# Patient Record
Sex: MALE | Race: ASIAN | NOT HISPANIC OR LATINO | Employment: FULL TIME | URBAN - METROPOLITAN AREA
[De-identification: names, ages, dates, MRNs, and addresses within clinical notes are randomized per-mention and may not be internally consistent; named-entity substitution may affect disease eponyms.]

---

## 2017-01-05 ENCOUNTER — MYC REFILL (OUTPATIENT)
Dept: FAMILY MEDICINE | Facility: CLINIC | Age: 52
End: 2017-01-05

## 2017-01-05 DIAGNOSIS — G89.4 CHRONIC PAIN SYNDROME: ICD-10-CM

## 2017-01-06 ENCOUNTER — TELEPHONE (OUTPATIENT)
Dept: PALLIATIVE MEDICINE | Facility: CLINIC | Age: 52
End: 2017-01-06

## 2017-01-06 NOTE — TELEPHONE ENCOUNTER
Patient had a caudal epidural injection on 12/30/16.  Called patient for an update.      Left message that we were calling for an update about how s/he was doing after the injection.  LM that if s/he has any problems or questions to call the nurse line at [phone].     Karen HERNANDEZ)

## 2017-01-06 NOTE — TELEPHONE ENCOUNTER
Controlled Substance Refill Request for Percocet  Problem List Complete:  Yes    Patient is followed by VAMSHI TINSLEY JR for ongoing prescription of pain medication.  All refills should be approved by this provider, or covering partner.    Medication(s): Percocet 5/325 mg.    Maximum quantity per month: 70  Clinic visit frequency required: Q 6 months     Controlled substance agreement on file: Yes       Date(s): 1/11/16    Pain Clinic evaluation in the past: Yes       Date/Location:   [facility] - 2013.  [facility] - 2/3/16.    DIRE Total Score(s): 16  No flowsheet data found.    Last [facility] website verification: 11/28/2016     [email or web address]    Last Written Prescription Date:  12/2/16  Last Fill Quantity: 70,   # refills: 0    Last Office Visit with [facility] primary care provider: 11/2/16    Future Office visit:   Next 5 appointments (look out 90 days)     Feb 01, 2017 10:30 AM   Return Visit with ANDREAS Presley [facility] ([facility] [place])    [address] [address] [address]   [phone]            Feb 06, 2017 10:00 AM   Office Visit with Vamshi Tinsley Jr., MD   [facility] ([facility])    [address] [address] [phone]                 Processing:  Patient will  in clinic   Estefani Morales RN, BSN  [facility]

## 2017-01-06 NOTE — TELEPHONE ENCOUNTER
Message from [employer]:  Original authorizing provider: MD Saran Billingsley Ariel Wallis would like a refill of the following medications:  oxyCODONE-acetaminophen (PERCOCET) 5-325 MG per tablet [Jr Vamshi Clarke MD]    Preferred pharmacy: [facility] [id number] [place] [address], [place] - [address]    Comment:

## 2017-01-09 ENCOUNTER — THERAPY VISIT (OUTPATIENT)
Dept: PHYSICAL THERAPY | Facility: CLINIC | Age: 52
End: 2017-01-09
Payer: COMMERCIAL

## 2017-01-09 DIAGNOSIS — M25.511 CHRONIC RIGHT SHOULDER PAIN: Primary | ICD-10-CM

## 2017-01-09 DIAGNOSIS — G89.29 CHRONIC RIGHT SHOULDER PAIN: Primary | ICD-10-CM

## 2017-01-09 DIAGNOSIS — M75.41 IMPINGEMENT SYNDROME OF RIGHT SHOULDER: ICD-10-CM

## 2017-01-09 PROCEDURE — 97110 THERAPEUTIC EXERCISES: CPT | Mod: GP | Performed by: PHYSICAL THERAPIST

## 2017-01-09 RX ORDER — OXYCODONE AND ACETAMINOPHEN 5; 325 MG/1; MG/1
1-2 TABLET ORAL EVERY 8 HOURS PRN
Qty: 70 TABLET | Refills: 0 | Status: SHIPPED | OUTPATIENT
Start: 2017-01-09 | End: 2017-02-06

## 2017-01-09 NOTE — TELEPHONE ENCOUNTER
The requested prescription(s) has/have been approved and has/have been printed and signed. This note was forwarded to the patient care pool to contact the patient to arrange for the patient to obtain the signed prescription(s).  Jr Vamshi Clarke

## 2017-01-16 DIAGNOSIS — E78.5 HYPERLIPIDEMIA LDL GOAL <70: Primary | ICD-10-CM

## 2017-01-16 RX ORDER — SIMVASTATIN 10 MG
10 TABLET ORAL AT BEDTIME
Qty: 90 TABLET | Refills: 1 | Status: SHIPPED | OUTPATIENT
Start: 2017-01-16 | End: 2017-07-16

## 2017-01-16 NOTE — TELEPHONE ENCOUNTER
Prescription approved per [facility] Refill Protocol.  Estefani Morales, RN, BSN  [facility]

## 2017-01-16 NOTE — TELEPHONE ENCOUNTER
simvastatin (ZOCOR) 10 MG tablet     New Pharmacy  Last Written Prescription Date: 11/2/2016  Last Fill Quantity: 90 tablet, # refills: 1  Last Office Visit with [facility], [facility] or [facility] prescribing provider: 11/2/2016   Next 5 appointments (look out 90 days)     Feb 01, 2017 10:30 AM   Return Visit with ANDREAS Presley CNP   [facility] ([facility])    [address]  [address]  [facility] [address]   [phone]            Feb 06, 2017 10:00 AM   Office Visit with Vamshi Clarke Jr., MD   [facility] ([facility])    [address]  [facility] [address] [phone]                   CHOL      113   5/3/2016  HDL       35   5/3/2016  LDL       51   5/3/2016  TRIG      133   5/3/2016  No results found for this basename: cholhdlratio

## 2017-01-18 DIAGNOSIS — E11.9 TYPE 2 DIABETES MELLITUS WITHOUT COMPLICATION (H): ICD-10-CM

## 2017-01-18 DIAGNOSIS — F51.04 CHRONIC INSOMNIA: Primary | ICD-10-CM

## 2017-01-19 RX ORDER — TRAZODONE HYDROCHLORIDE 100 MG/1
100 TABLET ORAL
Qty: 90 TABLET | Refills: 0 | Status: SHIPPED | OUTPATIENT
Start: 2017-01-19 | End: 2017-02-06

## 2017-01-19 NOTE — TELEPHONE ENCOUNTER
Prescription approved per [facility] Refill Protocol.  Estefani Morales, RN, BSN  [facility]

## 2017-02-01 ENCOUNTER — OFFICE VISIT (OUTPATIENT)
Dept: PALLIATIVE MEDICINE | Facility: CLINIC | Age: 52
End: 2017-02-01
Payer: COMMERCIAL

## 2017-02-01 VITALS
HEART RATE: 78 BPM | SYSTOLIC BLOOD PRESSURE: 112 MMHG | DIASTOLIC BLOOD PRESSURE: 78 MMHG | OXYGEN SATURATION: 99 % | WEIGHT: 198 LBS | BODY MASS INDEX: 29.23 KG/M2

## 2017-02-01 DIAGNOSIS — F51.04 CHRONIC INSOMNIA: ICD-10-CM

## 2017-02-01 DIAGNOSIS — M54.16 CHRONIC RADICULAR LOW BACK PAIN: Primary | ICD-10-CM

## 2017-02-01 DIAGNOSIS — R53.81 PHYSICAL DECONDITIONING: ICD-10-CM

## 2017-02-01 DIAGNOSIS — G89.4 CHRONIC PAIN SYNDROME: ICD-10-CM

## 2017-02-01 DIAGNOSIS — G89.29 CHRONIC RADICULAR LOW BACK PAIN: Primary | ICD-10-CM

## 2017-02-01 PROCEDURE — 99213 OFFICE O/P EST LOW 20 MIN: CPT | Performed by: NURSE PRACTITIONER

## 2017-02-01 ASSESSMENT — PAIN SCALES - GENERAL: PAINLEVEL: SEVERE PAIN (6)

## 2017-02-01 NOTE — PATIENT INSTRUCTIONS
PLAN:  Medications:  Gabapentin to 600 mg  3 x per day.  Ok to take Percocet every 4 hours as needed, still limit to 3 per day prescribed per PCP   Occasional use of tylenol pm is ok   Diclofenac gel  As needed up to 4 x per day    Zanaflex 2-4 mg 3 x/ day prn         Stretching   Clam shells X 3 reps count to 5   Hip extensions X 3 reps count to 5   Hip flexion X 3 reps count to 5   Daily to 2x per day stretching   Daily walking or exercise         Referrals:  Physical Therapy  [facility] start after epidural      Internal referrals:  Lumbar BARBARA  Caudal   Return visit to be scheduled about 6-8 weeks with Germania Gong CNP     Nurse Triage line:  [phone]   Call this number with any questions or concerns. You may leave a detailed message anytime. Calls are typically returned Monday through Friday between 8 AM and 4:30 PM. We usually get back to you within 2 business days depending on the issue/request.       Medication refills:    For non-narcotic medications, call your pharmacy directly to request a refill. The pharmacy will contact the Pain Management Center for authorization. Please allow 3-4 days for these refills to be processed.     For narcotic refills, call the nurse triage line or send a [employer] message. Please contact us 7-10 days before your refill is due. The message MUST include the name of the specific medication(s) requested and how you would like to receive the prescription(s). The options are as follows:    Pain Clinic staff can mail the prescription to your pharmacy. Please tell us the name of the pharmacy.    You may pick the prescription up at the Pain Clinic (tell us the location) or during a clinic visit with your pain provider    Pain Clinic staff can deliver the prescription to the [place] pharmacy in the clinic building. Please tell us the location.      Scheduling number: [phone].  Call this number to schedule or change appointments.    We believe regular  attendance is key to your success in our program.    Any time you are unable to keep your appointment we ask that you call us at least 24 hours in advance to let us know. This will allow us to offer the appointment time to another patient.

## 2017-02-01 NOTE — PROGRESS NOTES
"               [facility]  INTERVAL VISIT    This a  follow up examination  for Saran Wallis is a 51 year old male.  [facility] provider[facility]  [facility]  Referring Provider: Vamshi Clarke MD    Today's visit: 02/01/2017  Last visit: 11/22/16  CHIEF COMPLAINT:  Chief Complaint   Patient presents with     Pain     Interval Visit:   S/p L5 S1 left BARBARA 03/25/16 and 5/10/16 helping with sustained relief    Left calve cramp now in back thigh at night worsening, always when laying on right side   Seen by Physical Therapy for LBP  Complete  Walking daily and stretching \"not as much\"  No restlessness in legs, but movement helps SX.  Magnesium did not help.   Pain in low back is less radicular to left leg  Right shoulder pain, post surgery 2008 can not sleep on it post injection 11/03/16 saw    Recurrent knee pain better and right lateral forearm pain continues   Sleep, mood, libido are better. trazodone ( Desyrel)  prn     Pain level 6/10 range 4-8/10  Pain description: aching, cramping  Aggravating factors: siting and standing laying down at night    Relieving factors: laying down percocet and steroid injection     ASSESSMENT:  1. Chronic low back pain with radicular features L3-4 on right and L5-S1 on left  1. Left calve cramp >> gluteal deconditioning, ? Left hip bursitis, per interventionist could be related to lumbar Degenerative Disc Disease.  2. Chronic opioid use ~1 per day   3. Facet mediation VS hip pain VS Degenerative Disc Disease   2. Multi level mild to moderate lumbar stenosis S1 nerve root impingement  3. Failed back syndrome S/p hemicraniectomy L4-5 2009, L5-S1 2012 x 2  4. Anxiety related to increase pain associated with routine, sexual activity reduced arousal chronic use of ativan. Seems controlled   5. MARK  6. History of Alcohol over use ( excess drinking in [place]) not drinking in [place] Currently resolved  7. H/O Hep B [place] at [facility]  8. OSTEOARTHRITIS right shoulder, " forearm and knees   1. Repetitive use syndrome   1. Early ulnar neuritis  2. Tendonitis, vs rotator vs joint disease of the shoulder     9. Disorder of the Gluteus Medius Muscle.  1. Physical Deconditioning, Myofascial pain left gluteus medius         PLAN:  Medications:  Gabapentin to 600 mg  3 x per day.  Ok to take Percocet every 4 hours as needed, still limit to 3 per day prescribed per PCP   Occasional use of tylenol pm is ok   Diclofenac gel  As needed up to 4 x per day    Zanaflex 2-4 mg 3 x/ day prn         Stretching   Clam shells X 3 reps count to 5   Hip extensions X 3 reps count to 5   Hip flexion X 3 reps count to 5   Daily to 2x per day stretching   Daily walking or exercise         Referrals:  Physical Therapy  [facility] start after epidural      Internal referrals:  Lumbar BARBARA  Caudal   Return visit to be scheduled about 6-8 weeks with Germania Gong CNP       PAIN HISTORY: Back hx since 1991 after a lifting injury, This episode resolved with periodic back pain until winter 2008. The patient had another lifting injury after raising a garage door. He then had discectomy with Dr. Macdonald. He report that his pain was well controlled until he was working in the yard. The patient went to [facility] and under the care of Dr. Rodriguez he had another discectomy. His pain did not improve post surgery, resulting in failed lback surgery. He then went to Dr. Macdonald who did not want to do surgery. He went to [facility] also in 2013, a lumbar fusion was recommended but denied by his insurance. He found the lack of surgical access from his insurance very frustrating and distressing. The patient then 2014 he went to [facility] an epidural was recommended and again denied by his insurance.   Over time the patient has become less active and spend the majority of his day is quite sedentary.   His pain is located central low back with muscle spasm and radiating pain down both legs on left to L5-S1  and right to L3-4.    PAST MEDICATION TRIALS:   OPIOIDS: Fentanyl, Hydromorphone, Tramadol  NSAID'S/ANALEGESICS: clinoril, Ibuprofen   ANTIMIGRAINE:none  STEROIDS: none  ANTIDEPRESSANTS: Celexa, Sertraline Trazodone (bad taste in mouth)   ANTIANXIETY: Ativan   HYPNOTICS: Lunesta, Ambien,   ANTICONVULSANTS: gabapentin   TOPICALS: None     PAST PAIN TREATMENTS:   Y/N   HELP   NO HELP   WORSENED  PREVIOUS PAIN CLINIC:   YES   X ([facility], [facility])  SURGERY:     YES   X FOR SHORT TIME  INJECTIONS:               YES   PHYSICAL THERAPY:   YES   X (TENS)   EXERCISE:     YES   X  INTEGRATIVE MEDICINE  CHIROPRACTOR    NO   ACUPUNCTURE    NO  AROMATHERAPY    NO  RELAXATION THERAPY  NO  COUNSELING:    NO    Progress in pain program:  good  Impact on function:moderate to severe  Working  Yes   Quality of life:improved   Self care yes exercise yes,relaxation no, body awareness no.    CURRENT PAIN MEDICATION: Percocet 5/325 mg 2-3 per day, Gabapentin 600-tid  , Trazodone 100 mg , tizanidine ( Zanaflex) 4 mg prn   Medication side effects: none     INVESTIGATIONAL:  MRI 2016 Jan 16 LUMBAR:  L1-L2, L2-L3: Normal.  L3-L4: Small to moderate central disc extrusion slightly to the left  of midline moderately indenting the thecal sac with overall mild  central stenosis but no apparent direct neural impingement. The neural  foramina appear to be bilaterally patent.  L4-L5: Broad-based central disc protrusion or herniation slightly  greater to the right of midline. No central stenosis. Lateral annular  bulging and endplate spurring results in moderate left and mild right  foraminal stenosis.  L5-S1: Postsurgical changes of left hemilaminotomy/posterior  decompression. There is a moderate broad-based central disc protrusion  or herniation posteriorly displacing the left S1 nerve root. No  central stenosis. The L5 neural foramina appear bilaterally patent.    IMPRESSION:  1. L5-S1 broad-based left central disc herniation posteriorly  displacing  the left S1 nerve root. There has been previous posterior  decompression. No central stenosis.  2. L4-L5 broad-based central disc protrusion or herniation without  apparent central stenosis.  3. L4-L5 moderate bilateral foraminal stenosis.  4. L3-L4 central disc herniation slightly to the left of midline  without apparent direct neural impingement or central stenosis      PMHX:  Past Medical History   Diagnosis Date     Diabetes mellitus (H)      Gastric reflux      Hypercholesteremia      Hypertension      Depressive disorder      Chronic radicular low back pain 4/30/2016     PAST SURGICAL HISTORY  Past Surgical History   Procedure Laterality Date     Orthopedic surgery       Colonoscopy N/A 1/28/2016     Procedure: COMBINED COLONOSCOPY, SINGLE OR MULTIPLE BIOPSY/POLYPECTOMY BY BIOPSY;  Surgeon: Michelle Morrison MD;  Location:  GI     Esophagoscopy, gastroscopy, duodenoscopy (egd), combined N/A 5/13/2016     Procedure: COMBINED ESOPHAGOSCOPY, GASTROSCOPY, DUODENOSCOPY (EGD);  Surgeon: Vamshi Lynn MD;  Location:  GI       CURRENT MEDICATIONS:   Current Outpatient Prescriptions   Medication     traZODone (DESYREL) 100 MG tablet     metFORMIN (GLUCOPHAGE) 500 MG tablet     simvastatin (ZOCOR) 10 MG tablet     oxyCODONE-acetaminophen (PERCOCET) 5-325 MG per tablet     diazepam (VALIUM) 5 MG tablet     gabapentin (NEURONTIN) 300 MG capsule     irbesartan (AVAPRO) 150 MG tablet     pantoprazole (PROTONIX) 40 MG enteric coated tablet     blood glucose monitoring (ONETOUCH VERIO IQ SYSTEM) meter device kit     blood glucose monitoring (ONE TOUCH VERIO IQ) test strip     blood glucose monitoring (ONE TOUCH DELICA) lancets     nicotine (NICODERM CQ) 14 MG/24HR patch 2h hr     tiZANidine (ZANAFLEX) 4 MG tablet     LORazepam (ATIVAN) 1 MG tablet     ASPIRIN PO     order for DME     diclofenac (VOLTAREN) 1 % GEL     lidocaine-prilocaine (EMLA) cream     No current facility-administered medications for this  visit.       ROS: twelve systems review negative except for: pain, cramps left calve and thigh, right shoulder   Since last visit: changes in mood: No, suicide thoughts No  Any changes in your medical condition, illness, injury or hospitalizations: No    PHYSICAL EXAM:  Constitutional:Blood pressure 112/78, pulse 78, weight 89.812 kg (198 lb), SpO2 99 %., Body mass index is 29.23 kg/(m^2).  Psyche:  Fully oriented, Behavioral Observations: Eye contact  good. Mood  and spirits are improved .  Musculoskeletal exam:  Gait:  Steady reciprocating,  ROM within normal limits,  Shoulder symmetry good, strength 5/5, (-) SLAP,  tender points left hip, right shoulder posterior,   No trigger points,   SAEED x 4, no producible pain with cramping in left calve  No  hip rotation.    Neuro exam:   Alert,  IGNACIO @  3 mm, Speech clear fluent and appropriate. Strength 5/5   Skin/Vascular/ Autonomic:  warm, dry and intact    OTHER: Opioid risk for ongoing opioid management for non malignant chronic pain   DIRE Score for ongoing opioid management is calculated as follows:    Diagnosis = 2    Intractability = 2    Risk: Psych = 2  Chem Hlth = 3  Reliability = 3  Social = 2    Efficacy = 2    Total DIRE Score = 16 (14 or higher predicts good candidate for ongoing opioid management; 13 or lower predicts poor candidate for opioid management)   Carlos Rosa 2006,The Journal of  Pain.,The DIRE Score: Predicting Outcomes of Opioid Prescribing for Chronic Pain Vol.7,No.9, pp 671-681.  MNPMP : Reviewed and as expected without evidence of abuse or misuse? Yes  URINE DRUG SCREEN  yes, DATE: 2/3/16, as expected. OPIOID AGREEMENT: Yes DATE 1/11/16 Vamshi Clarke MD  Daily opioid use ~ 3 /day 5 mg percocet. Morphine  EQ =20  mg /day     Analgesia improved, Adverse effects none,  Activity improved  Adherence good     Opioid management will continue with Vamshi Clarke MD    PROCEDURES none     Time spent: 20 minutes 100 % face to face including   15 minutes counseling and coordinating care for the above identified medical problems    Signed: ANDREAS Gaona, BC, C.N.P.  [facility]

## 2017-02-01 NOTE — NURSING NOTE
"Chief Complaint   Patient presents with     Pain       Initial /78 mmHg  Pulse 78  Wt 89.812 kg (198 lb)  SpO2 99% Estimated body mass index is 29.23 kg/(m^2) as calculated from the following:    Height as of 11/3/16: 1.753 m (5' 9\").    Weight as of this encounter: 89.812 kg (198 lb).  BP completed using cuff size: nora Barfield CMA   [facility]    "

## 2017-02-01 NOTE — MR AVS SNAPSHOT
After Visit Summary   2/1/2017    Saran Wallis    MRN: [id number]           Patient Information     Date Of Birth          1965        Visit Information        Provider Department      2/1/2017 10:30 AM Germania Gong APRN CNP [facility]        Today's Diagnoses     Chronic radicular low back pain    -  1     Chronic pain syndrome         Chronic insomnia           Care Instructions    PLAN:  Medications:  Gabapentin to 600 mg  3 x per day.  Ok to take Percocet every 4 hours as needed, still limit to 3 per day prescribed per PCP   Occasional use of tylenol pm is ok   Diclofenac gel  As needed up to 4 x per day    Zanaflex 2-4 mg 3 x/ day prn         Stretching   Clam shells X 3 reps count to 5   Hip extensions X 3 reps count to 5   Hip flexion X 3 reps count to 5   Daily to 2x per day stretching   Daily walking or exercise         Referrals:  Physical Therapy  [facility] start after epidural      Internal referrals:  Lumbar BARBARA  Caudal   Return visit to be scheduled about 6-8 weeks with Germania Gong CNP     Nurse Triage line:  [phone]   Call this number with any questions or concerns. You may leave a detailed message anytime. Calls are typically returned Monday through Friday between 8 AM and 4:30 PM. We usually get back to you within 2 business days depending on the issue/request.       Medication refills:    For non-narcotic medications, call your pharmacy directly to request a refill. The pharmacy will contact the [facility] for authorization. Please allow 3-4 days for these refills to be processed.     For narcotic refills, call the nurse triage line or send a [employer] message. Please contact us 7-10 days before your refill is due. The message MUST include the name of the specific medication(s) requested and how you would like to receive the prescription(s). The options are as follows:    Pain Clinic staff can mail the  prescription to your pharmacy. Please tell us the name of the pharmacy.    You may pick the prescription up at the Pain Clinic (tell us the location) or during a clinic visit with your pain provider    Pain Clinic staff can deliver the prescription to the [place] pharmacy in the clinic building. Please tell us the location.      Scheduling number: [phone].  Call this number to schedule or change appointments.    We believe regular attendance is key to your success in our program.    Any time you are unable to keep your appointment we ask that you call us at least 24 hours in advance to let us know. This will allow us to offer the appointment time to another patient.             Follow-ups after your visit        Additional Services     [facility] PT, HAND, AND CHIROPRACTIC REFERRAL       **This order will print in the [facility] Scheduling Office**    Physical Therapy, Hand Therapy and Chiropractic Care are available through:    *[facility]  *[facility]  *[facility]    Call one number to schedule at any of the above locations: [phone].    Your provider has referred you to: Physical Therapy at [facility] or [facility]    Indication/Reason for Referral: Low Back Pain  Onset of Illness:    Therapy Orders: Evaluate and Treat  Special Programs: None  Special Request: None    Andrey Balderas      Additional Comments for the Therapist or Chiropractor:     Please be aware that coverage of these services is subject to the terms and limitations of your health insurance plan.  Call member services at your health plan with any benefit or coverage questions.      Please bring the following to your appointment:    *Your personal calendar for scheduling future appointments  *Comfortable clothing            PAIN INJECTION EVAL/TREAT/FOLLOW UP                 Your next 10 appointments already scheduled     Feb 06, 2017  9:20 AM   [facility] Extremity with Rory Souza PT   [facility] ([facility])    [address] [place] [id number] [phone]            Feb 06, 2017 10:00 AM   [facility]    [address] [place] [id number] [phone]           Bring a current list of meds and any records pertaining to this visit.  For Physicals, please bring immunization records and any forms needing to be filled out.  Please arrive 10 minutes early to complete paperwork.              Who to contact     If you have questions or need follow up information about today's clinic visit or your schedule please contact [facility] directly at [phone].  Normal or non-critical lab and imaging results will be communicated to you by [employer], letter or phone within 4 business days after the clinic has received the results. If you do not hear from us within 7 days, please contact the clinic through [employer] or phone. If you have a critical or abnormal lab result, we will notify you by phone as soon as possible.  Submit refill requests through [employer] or call your pharmacy and they will forward the refill request to us. Please allow 3 business days for your refill to be completed.          Additional Information About Your Visit        Chujianhart Information     [employer] gives you secure access to your electronic health record. If you see a primary care provider, you can also send messages to your care team and make appointments. If you have questions, please call your primary care clinic.  If you do not have a primary care provider, please call [phone] and they will assist you.        Care EveryWhere ID     This is your Care EveryWhere ID. This could be used by other organizations to access your [facility] medical records  [id number]        Your Vitals Were     Pulse Pulse Oximetry                78 99%           Blood Pressure from Last 3 Encounters:   02/01/17 112/78   12/30/16  116/77   12/14/16 120/78    Weight from Last 3 Encounters:   02/01/17 89.812 kg (198 lb)   11/03/16 89.359 kg (197 lb)   11/02/16 89.359 kg (197 lb)              We Performed the Following     [facility] PT, HAND, AND CHIROPRACTIC REFERRAL     PAIN INJECTION EVAL/TREAT/FOLLOW UP        Primary Care Provider Office Phone # Fax #    Vamshi Clarke Jr., [phone]       [facility] [address] [address]        Thank you!     Thank you for choosing [facility]  for your care. Our goal is always to provide you with excellent care. Hearing back from our patients is one way we can continue to improve our services. Please take a few minutes to complete the written survey that you may receive in the mail after your visit with us. Thank you!             Your Updated Medication List - Protect others around you: Learn how to safely use, store and throw away your medicines at www.disposemymeds.org.          This list is accurate as of: 2/1/17 11:07 AM.  Always use your most recent med list.                   Brand Name Dispense Instructions for use    ASPIRIN PO      Take 81 mg by mouth       blood glucose monitoring lancets     1 Box    Use to test blood sugar one times daily or as directed.  Ok to substitute alternative if insurance prefers.       blood glucose monitoring meter device kit     1 kit    Use to test blood sugar 1 times daily or as directed.  Ok to substitute alternative if insurance prefers.       blood glucose monitoring test strip    ONE TOUCH VERIO IQ    100 each    Use to test blood sugar one times daily or as directed.  Ok to substitute alternative if insurance prefers.       diazepam 5 MG tablet    VALIUM    10 tablet    Take 0.5-1 tablets (2.5-5 mg) by mouth 2 times daily as needed for pain ((post-procedure))       diclofenac 1 % Gel topical gel    VOLTAREN    100 g    Apply 4 grams to knees,  Left hip and buttock four times daily using enclosed dosing  card.       gabapentin 300 MG capsule    NEURONTIN    540 capsule    2 caps tid       irbesartan 150 MG tablet    AVAPRO    90 tablet    Take 1 tablet (150 mg) by mouth daily       lidocaine-prilocaine cream    EMLA    30 g    Apply topically as needed for moderate pain To left foot 4-5 times per day nickel size amount       LORazepam 1 MG tablet    ATIVAN    10 tablet    Take 1 tablet (1 mg) by mouth every 8 hours as needed for anxiety       metFORMIN 500 MG tablet    GLUCOPHAGE    360 tablet    Take 2 tablets (1,000 mg) by mouth 2 times daily (with meals)       nicotine 14 MG/24HR 24 hr patch    NICODERM CQ    30 patch    Place 1 patch onto the skin every 24 hours       order for DME     1 each    Equipment being ordered: TENS       oxyCODONE-acetaminophen 5-325 MG per tablet    PERCOCET    70 tablet    Take 1-2 tablets by mouth every 8 hours as needed for moderate to severe pain or pain (Seventy tablets to last thirty days.)       pantoprazole 40 MG EC tablet    PROTONIX    90 tablet    Take 1 tablet (40 mg) by mouth daily       simvastatin 10 MG tablet    ZOCOR    90 tablet    Take 1 tablet (10 mg) by mouth At Bedtime       tiZANidine 4 MG tablet    ZANAFLEX    270 tablet    Take 1 tablet (4 mg) by mouth 3 times daily as needed for muscle spasms       traZODone 100 MG tablet    DESYREL    90 tablet    Take 1 tablet (100 mg) by mouth nightly as needed for sleep

## 2017-02-02 ENCOUNTER — TELEPHONE (OUTPATIENT)
Dept: PALLIATIVE MEDICINE | Facility: CLINIC | Age: 52
End: 2017-02-02

## 2017-02-02 NOTE — TELEPHONE ENCOUNTER
Pre-screening questions for Radiology Injections:    Injection to be done at which interventional clinic site? [facility]    Procedure ordered by Germania Godinez    Procedure ordered? Caudal Epidural Steroid Injection    What insurance would patient like us to bill for this procedure? [employer]      Worker's comp-Any injection DO NOT SCHEDULE and route to Jeannine García.      [employer] insurance - If scheduling an SI joint injection DO NOT SCHEDULE and route Jeannine García.    [employer]- MBB's must be scheduled at LEAST two weeks apart      [employer] - Any injection besides hip/shoulder/knee joint DO NOT SCHEDULE and route to Jeannine García. She will obtain PA and call pt back to schedule procedure or notify pt of denial.     Is an  needed? No     Patient has a drive home? (mandatory) YES:      Is patient taking any blood thinners (plavix, coumadin, jantoven, warfarin, heparin, pradaxa or dabigatran )? No   (If so, do not schedule, contact RN and/or MD)     Is patient taking any aspirin products? Yes - Pt takes 81mg daily; instructed to hold 0 day(s) prior to procedure.    (If more than 325mg/day do not schedule; Contact RN/MD. For all non-cervical interventional procedures if patient is taking MORE than 325mg/day, limit aspirin to 81-325mg/day x 1 week. No hold required day of procedure.  For CERVICAL procedures, hold all aspirin products for 6 days.)      Does the patient have a bleeding or clotting disorder? No   (If yes, okay to schedule, but contact RN/MD).  **For any patients with platelet count <100, must be forwarded to provider**    Is patient diabetic?  Yes  If YES, have them bring their glucometer.    Does patient have an active infection or treated for one within the past week? No     Is patient currently taking any antibiotics?  No   For patients on chronic, preventative, or prophylactic antibiotics, procedures can be scheduled.   For patients on antibiotics for active or  recent infection:  Chyna Gilliam [place], [place]-antibiotic course must have been completed for 4 days  Bobby Aguayo-antibiotic course must have been completed for 7 days    Is patient currently taking any steroid medications? (i.e. Prednisone, Medrol)  No   For patients on steroid medications:  Chyna Gilliam [place], [place]-steroid course must have been completed for 4 days  Bobby Aguayo-steroid course must have been completed for 7 days  Review with patient:  If you are started on any steroids or antibiotics between now and your appointment, you must contact us because it may affect our ability to perform your procedure Informed    Is patient actively being treated for cancer or immunocompromised, including the spleen having been removed? No  **For Dr. Mancia patients without spleens should have the chart sent to her**  (If YES, do NOT schedule and route to RN)    Are you able to get on and off an exam table with minimal or no assistance? Yes  (If NO, do NOT schedule and route to RN)  Are you able to roll over and lay on your stomach with minimal or no assistance? Yes  (If NO, do NOT schedule and route to RN)         Any allergies to contrast dye, iodine, shellfish, or numbing and steroid medications? No  (If so, inform nursing and note in scheduling comments.)    Allergies: Eszopiclone and Food      Any chance of pregnancy?Not Applicable    Has the patient had a flu shot or any other vaccinations within 7 days before or after the procedure.  No       Does patient have an MRI/CT?  YES: MRI  (SI joint, hip injections, lumbar sympathetic blocks, and stellate ganglion blocks do not require an MRI)    If so, was it done at [place]? Yes      If not, where was it done? N/A     Was the MRI done w/in the last 3 years?  Yes   If MRI was not done at [place], [facility] do NOT schedule. Route to nursing.  (If pt has disc the injection can be scheduled but pt has to bring disc  to appt. If they show up w/out disc the injection cannot be done)    Reminders (please tell patient if applicable):       Instructed pt to arrive 30 minutes early for IV start if this is for a cervical procedure, ALL sympathetic (stellate ganglion, hypogastric, or lumbar sympathetic block) and all sedation procedures (RFA, spinal cord stimulation trials).  Not Applicable    -IVs are not routinely placed for [place] and [place] cervical case       If NPO for sedation, informed patient that it is okay to take medications with sips of water (except if they are to hold blood thinners).  Not Applicable   *DO take blood pressure medication if it is prescribed*      If this is for a cervical BARBARA, informed patient that aspirin needs to be held for 6 days.   Not Applicable      Do not schedule procedures requiring IV placement in the first appointment of the day or first appointment after lunch         For patients 85 or older we recommend having an adult stay w/ them for the remainder of the day.         Does the patient have any questions?  NO      Yamila Durán  [facility]

## 2017-02-06 ENCOUNTER — THERAPY VISIT (OUTPATIENT)
Dept: PHYSICAL THERAPY | Facility: CLINIC | Age: 52
End: 2017-02-06
Payer: COMMERCIAL

## 2017-02-06 ENCOUNTER — OFFICE VISIT (OUTPATIENT)
Dept: FAMILY MEDICINE | Facility: CLINIC | Age: 52
End: 2017-02-06
Payer: COMMERCIAL

## 2017-02-06 VITALS
SYSTOLIC BLOOD PRESSURE: 108 MMHG | OXYGEN SATURATION: 99 % | HEART RATE: 79 BPM | DIASTOLIC BLOOD PRESSURE: 64 MMHG | BODY MASS INDEX: 28.58 KG/M2 | TEMPERATURE: 97.4 F | WEIGHT: 193 LBS | HEIGHT: 69 IN

## 2017-02-06 DIAGNOSIS — S60.212A CONTUSION OF LEFT WRIST, INITIAL ENCOUNTER: ICD-10-CM

## 2017-02-06 DIAGNOSIS — G89.4 CHRONIC PAIN SYNDROME: ICD-10-CM

## 2017-02-06 DIAGNOSIS — I10 ESSENTIAL HYPERTENSION WITH GOAL BLOOD PRESSURE LESS THAN 140/90: ICD-10-CM

## 2017-02-06 DIAGNOSIS — Z72.0 TOBACCO ABUSE: ICD-10-CM

## 2017-02-06 DIAGNOSIS — F51.04 CHRONIC INSOMNIA: ICD-10-CM

## 2017-02-06 DIAGNOSIS — M25.511 CHRONIC RIGHT SHOULDER PAIN: Primary | ICD-10-CM

## 2017-02-06 DIAGNOSIS — M75.41 IMPINGEMENT SYNDROME OF RIGHT SHOULDER: ICD-10-CM

## 2017-02-06 DIAGNOSIS — G89.29 CHRONIC RIGHT SHOULDER PAIN: Primary | ICD-10-CM

## 2017-02-06 DIAGNOSIS — E11.9 TYPE 2 DIABETES MELLITUS WITHOUT COMPLICATION, WITHOUT LONG-TERM CURRENT USE OF INSULIN (H): Primary | ICD-10-CM

## 2017-02-06 LAB — HBA1C MFR BLD: 6.3 % (ref 4.3–6)

## 2017-02-06 PROCEDURE — 83036 HEMOGLOBIN GLYCOSYLATED A1C: CPT | Performed by: FAMILY MEDICINE

## 2017-02-06 PROCEDURE — 97110 THERAPEUTIC EXERCISES: CPT | Mod: GP | Performed by: PHYSICAL THERAPIST

## 2017-02-06 PROCEDURE — 99406 BEHAV CHNG SMOKING 3-10 MIN: CPT | Performed by: FAMILY MEDICINE

## 2017-02-06 PROCEDURE — 36415 COLL VENOUS BLD VENIPUNCTURE: CPT | Performed by: FAMILY MEDICINE

## 2017-02-06 PROCEDURE — 99214 OFFICE O/P EST MOD 30 MIN: CPT | Performed by: FAMILY MEDICINE

## 2017-02-06 RX ORDER — IRBESARTAN 150 MG/1
150 TABLET ORAL DAILY
Qty: 90 TABLET | Refills: 1 | Status: SHIPPED | OUTPATIENT
Start: 2017-02-06 | End: 2017-07-16

## 2017-02-06 RX ORDER — OXYCODONE AND ACETAMINOPHEN 5; 325 MG/1; MG/1
1-2 TABLET ORAL EVERY 8 HOURS PRN
Qty: 70 TABLET | Refills: 0 | Status: SHIPPED | OUTPATIENT
Start: 2017-02-09 | End: 2017-03-14

## 2017-02-06 RX ORDER — TRAZODONE HYDROCHLORIDE 100 MG/1
100 TABLET ORAL
Qty: 90 TABLET | Refills: 1 | Status: SHIPPED | OUTPATIENT
Start: 2017-02-06 | End: 2017-08-07

## 2017-02-06 NOTE — PROGRESS NOTES
Quick Note:    Mr. Wallis,    -A1C (test of diabetes control the last 2-3 months) is at your goal. Please continue with current plan. Also, see me and recheck your A1C test in 6 months.     If you have further questions about the interpretation of your labs, labtestsonline.org is a good website to check out for further information.    Please contact the clinic if you have additional questions. Thank you and good luck with your smoking cessation!    Sincerely,    Vamshi Clarke MD     ______

## 2017-02-06 NOTE — PROGRESS NOTES
Subjective:    HPI                    Objective:    System    Physical Exam    General     ROS    Assessment/Plan:      DISCHARGE REPORT    Progress reporting period is from December 2016 to February 2017.       SUBJECTIVE  Subjective: Patient reports feeling much better; states that he rarely has pain, except with full flexion or abduction of the shoulder (sleeping on side with arm tucked under pillow)    Current pain level is 0/10  .     Previous pain level was  6/10  .   Changes in function:  Yes (See Goal flowsheet attached for changes in current functional level)  Adverse reaction to treatment or activity: None    OBJECTIVE  Changes noted in objective findings:  Yes  Objective: AROM: All WNL - ERP with flex, abd, ext/IR; MMT: All 5/5 and pain-free, including ER endurance     ASSESSMENT/PLAN  Updated problem list and treatment plan: Diagnosis 1:  Shoulder Impingement  Pain -  self management, education and home program  Decreased strength - therapeutic exercise and home program  Impaired muscle performance - home program  Decreased function - home program  STG/LTGs have been met or progress has been made towards goals:  Yes (See Goal flow sheet completed today.)  Assessment of Progress: The patient's condition is improving.  Patient is meeting short term goals and is progressing towards long term goals.  Self Management Plans:  Patient has been instructed in a home treatment program.  Patient is independent in a home treatment program.  Patient  has been instructed in self management of symptoms.  I have re-evaluated this patient and find that the nature, scope, duration and intensity of the therapy is appropriate for the medical condition of the patient.  Saran continues to require the following intervention to meet STG and LTG's:  PT intervention is no longer required to meet STG/LTG.    Recommendations:  This patient is ready to be discharged from therapy and continue their home treatment program.    Please  refer to the daily flowsheet for treatment today, total treatment time and time spent performing 1:1 timed codes.

## 2017-02-06 NOTE — PROGRESS NOTES
SUBJECTIVE:                                                    Saran Wallis is a 52 year old male who presents to clinic today for the following health issues:        Diabetes Follow-up      Patient is checking blood sugars: once 180's after eating 120's before eating     Diabetic concerns: None     Symptoms of hypoglycemia (low blood sugar): none     Paresthesias (numbness or burning in feet) or sores: No     Date of last diabetic eye exam: not recently      Hyperlipidemia Follow-Up      Rate your low fat/cholesterol diet?: FAIR    Taking statin?  Yes, no muscle aches from statin    Other lipid medications/supplements?:  none     Hypertension Follow-up      Outpatient blood pressures are not being checked.    Low Salt Diet: not monitoring salt         Amount of exercise or physical activity: 2-3 days/week    Problems taking medications regularly: No    Medication side effects: none    Diet: regular (no restrictions)      Chronic Pain Follow-Up       Type / Location of Pain: multiple locations as outlined at pain clinic visit on 2/1/17  Analgesia/pain control:       Recent changes:  Improved.  Right shoulder pain resolved with physical therapy.  Getting shots for left hip pain which temporarily have relieved pain there for about a week at a time      Overall control: Tolerable with discomfort  Activity level/function:      Daily activities:  Able to do moderate activities    Work:  not applicable  Adverse effects:  No  Adherance    Taking medication as directed?  Yes    Participating in other treatments: yes  Risk Factors:    Sleep:  Good    Mood/anxiety:  controlled    Recent family or social stressors:  none noted    Other aggravating factors: none  PHQ-9 SCORE 1/11/2016 2/4/2016 5/3/2016   Total Score 7 8 7     SKYLA-7 SCORE 1/11/2016 5/3/2016   Total Score 0 0     Encounter-Level CSA - 1/11/16:               Controlled Substance Agreement - Scan on 1/18/2016  3:31 PM : CONTROLLED SUBSTANCE AGREEMENT 01/12/16  "(below)                 Problem list and histories reviewed & adjusted, as indicated.  Additional history: as documented    Recent Labs   Lab Test  02/06/17   1032  11/02/16   1427  08/01/16   0936  05/17/16   1132  05/03/16   1115  12/28/15   1420   A1C  6.3*  6.6*  7.7*   --   6.9*  6.7*   LDL   --    --    --    --   51   --    HDL   --    --    --    --   35*   --    TRIG   --    --    --    --   133   --    ALT   --    --    --   82*   --    --    CR   --    --    --   0.67   --   0.65*   GFRESTIMATED   --    --    --   >90  Non  GFR Calc     --   >90  Non  GFR Calc     GFRESTBLACK   --    --    --   >90   GFR Calc     --   >90   GFR Calc     POTASSIUM   --    --    --   4.7   --   4.5   TSH   --    --    --   0.64   --   0.70      Labs reviewed in EPIC  Problem list, Medication list, Allergies, and Medical/Social/Surgical histories reviewed in [facility] and updated as appropriate.    ROS:  Constitutional, HEENT, cardiovascular, pulmonary, gi and gu systems are negative, except as otherwise noted.    OBJECTIVE:                                                    /64 mmHg  Pulse 79  Temp(Src) 97.4  F (36.3  C) (Tympanic)  Ht 5' 9\" (1.753 m)  Wt 193 lb (87.544 kg)  BMI 28.49 kg/m2  SpO2 99%  Body mass index is 28.49 kg/(m^2).  GENERAL: healthy, alert and no distress  NECK: no adenopathy, no asymmetry, masses, or scars and thyroid normal to palpation  RESP: lungs clear to auscultation - no rales, rhonchi or wheezes  CV: regular rate and rhythm, normal S1 S2, no S3 or S4, no murmur, click or rub, no peripheral edema and peripheral pulses strong  ABDOMEN: soft, nontender, no hepatosplenomegaly, no masses and bowel sounds normal  MS: no gross musculoskeletal defects noted, no edema  NEURO: Normal strength and tone, mentation intact and speech normal    Diagnostic Test Results:  Results for orders placed or performed in visit on 02/06/17 (from the " "past 24 hour(s))   Hemoglobin A1c   Result Value Ref Range    Hemoglobin A1C 6.3 (H) 4.3 - 6.0 %        ASSESSMENT/PLAN:                                                        BMI:   Estimated body mass index is 28.49 kg/(m^2) as calculated from the following:    Height as of this encounter: 5' 9\" (1.753 m).    Weight as of this encounter: 193 lb (87.544 kg).   Weight management plan: Discussed healthy diet and exercise guidelines and patient will follow up in 6 months in clinic to re-evaluate.      1. Type 2 diabetes mellitus without complication, without long-term current use of insulin (H)  A1C under very good control.  Tolerating medications without side effects. Emphasized importance of healthy lifestyle including regular exercise and lower fat & lower sodium diet. Due for annual Ophthalmology check.  - metFORMIN (GLUCOPHAGE) 500 MG tablet; Take 2 tablets (1,000 mg) by mouth 2 times daily (with meals)  Dispense: 360 tablet; Refill: 1  - Hemoglobin A1c  - OPHTHALMOLOGY ADULT REFERRAL    2. Tobacco abuse  Has had most success in quitting cold turkey.  Tried Chantix once but had side effects; reports \"I had a dream where I spoke with a fish.  I stopped the Chantix right after that.\"  Not interested in bupropion.  Advised of importance of smoking cessation in reducing risk of cancer and vascular disease.    3. Chronic pain syndrome  Following with the pain clinic as outlined above.  Having injections by them.  Continue Percocet Rx from me.  Declined request to increase monthly quantity to 80 tablets.    - oxyCODONE-acetaminophen (PERCOCET) 5-325 MG per tablet; Take 1-2 tablets by mouth every 8 hours as needed for moderate to severe pain or pain (Seventy tablets to last thirty days.)  Dispense: 70 tablet; Refill: 0    4. Essential hypertension with goal blood pressure less than 140/90  Blood pressure at goal. Recheck in 6 months.   - irbesartan (AVAPRO) 150 MG tablet; Take 1 tablet (150 mg) by mouth daily  Dispense: " 90 tablet; Refill: 1    5. Chronic insomnia  Refill given.   - traZODone (DESYREL) 100 MG tablet; Take 1 tablet (100 mg) by mouth nightly as needed for sleep  Dispense: 90 tablet; Refill: 1    6. Contusion of left wrist, initial encounter  Notes that he slipped on this ice last week.  Having some discomfort in writs, but has full range of motion.  No edema seen on exam.  Discussed possibly doing an x-ray, though neither Luke nor I felt this was strongly indicated.  Will continue to follow clinically.      See Patient Instructions    Jr Vamshi Clarke MD  [facility]

## 2017-02-06 NOTE — MR AVS SNAPSHOT
After Visit Summary   2/6/2017    Saran Wallis    MRN: [id number]           Patient Information     Date Of Birth          1965        Visit Information        Provider Department      2/6/2017 10:00 AM Vamshi Clarke Jr., MD [facility] [place]        Today's Diagnoses     Type 2 diabetes mellitus without complication, without long-term current use of insulin (H)    -  1     Tobacco abuse         Chronic pain syndrome         Essential hypertension with goal blood pressure less than 140/90         Chronic insomnia         Contusion of left wrist, initial encounter            Follow-ups after your visit        Additional Services     OPHTHALMOLOGY ADULT REFERRAL       Your provider has referred you to: N: [facility]  [phone]  [email or web address]  Dr. Vargas Callahan    Please be aware that coverage of these services is subject to the terms and limitations of your health insurance plan.  Call member services at your health plan with any benefit or coverage questions.      Please bring the following with you to your appointment:    (1) Any X-Rays, CTs or MRIs which have been performed.  Contact the facility where they were done to arrange for  prior to your scheduled appointment.    (2) List of current medications  (3) This referral request   (4) Any documents/labs given to you for this referral                  Follow-up notes from your care team     Return in about 6 months (around 8/6/2017) for diabetes recheck.      Your next 10 appointments already scheduled     Feb 13, 2017  2:15 PM   Radiology Injections with MD [facility] ([facility])    [address] [facility]  [address] [place] [address]   [phone]            Mar 06, 2017  9:15 AM   [facility] with Eulalia Solitario PT   [facility] ([facility] [place]  )    [address] [address]  "[id number]   [phone]            Mar 17, 2017  9:00 AM   Return Visit with ANDREAS Presley CNP   [facility] ([facility])    [address] [address] [address]   [phone]              Who to contact     If you have questions or need follow up information about today's clinic visit or your schedule please contact [facility] [place] directly at [phone].  Normal or non-critical lab and imaging results will be communicated to you by [email or web address], letter or phone within 4 business days after the clinic has received the results. If you do not hear from us within 7 days, please contact the clinic through [email or web address] or phone. If you have a critical or abnormal lab result, we will notify you by phone as soon as possible.  Submit refill requests through [employer] or call your pharmacy and they will forward the refill request to us. Please allow 3 business days for your refill to be completed.          Additional Information About Your Visit        [email or web address] SPINE Information     Yum[employer]ots gives you secure access to your electronic health record. If you see a primary care provider, you can also send messages to your care team and make appointments. If you have questions, please call your primary care clinic.  If you do not have a primary care provider, please call [phone] and they will assist you.        Care EveryWhere ID     This is your Care EveryWhere ID. This could be used by other organizations to access your [facility] medical records  [id number]        Your Vitals Were     Pulse Temperature Height BMI (Body Mass Index) Pulse Oximetry       79 97.4  F (36.3  C) (Tympanic) 5' 9\" (1.753 m) 28.49 kg/m2 99%        Blood Pressure from Last 3 Encounters:   02/06/17 108/64   02/01/17 112/78   12/30/16 116/77    Weight from Last 3 Encounters:   02/06/17 193 lb (87.544 kg)   02/01/17 198 lb (89.812 kg)   11/03/16 197 lb (89.359 kg)              We " Performed the Following     Hemoglobin A1c     OPHTHALMOLOGY ADULT REFERRAL          Where to get your medicines      These medications were sent to [id number] - [place], [place] - [address] [facility]  [address] [address]     Phone:  [phone]    - irbesartan 150 MG tablet  - metFORMIN 500 MG tablet  - traZODone 100 MG tablet      Some of these will need a paper prescription and others can be bought over the counter.  Ask your nurse if you have questions.     Bring a paper prescription for each of these medications    - oxyCODONE-acetaminophen 5-325 MG per tablet       Primary Care Provider Office Phone # Fax #    Vamshi Clarke Jr., [phone]       [facility] [address]  [place] [address]        Thank you!     Thank you for choosing [facility]  for your care. Our goal is always to provide you with excellent care. Hearing back from our patients is one way we can continue to improve our services. Please take a few minutes to complete the written survey that you may receive in the mail after your visit with us. Thank you!             Your Updated Medication List - Protect others around you: Learn how to safely use, store and throw away your medicines at www.disposemymeds.org.          This list is accurate as of: 2/6/17 10:31 AM.  Always use your most recent med list.                   Brand Name Dispense Instructions for use    ASPIRIN PO      Take 81 mg by mouth       blood glucose monitoring lancets     1 Box    Use to test blood sugar one times daily or as directed.  Ok to substitute alternative if insurance prefers.       blood glucose monitoring meter device kit     1 kit    Use to test blood sugar 1 times daily or as directed.  Ok to substitute alternative if insurance prefers.       blood glucose monitoring test strip    ONE TOUCH VERIO IQ    100 each    Use to test blood sugar one times daily or as  directed.  Ok to substitute alternative if insurance prefers.       diclofenac 1 % Gel topical gel    VOLTAREN    100 g    Apply 4 grams to knees,  Left hip and buttock four times daily using enclosed dosing card.       gabapentin 300 MG capsule    NEURONTIN    540 capsule    2 caps tid       irbesartan 150 MG tablet    AVAPRO    90 tablet    Take 1 tablet (150 mg) by mouth daily       metFORMIN 500 MG tablet    GLUCOPHAGE    360 tablet    Take 2 tablets (1,000 mg) by mouth 2 times daily (with meals)       oxyCODONE-acetaminophen 5-325 MG per tablet   Start taking on:  2/9/2017    PERCOCET    70 tablet    Take 1-2 tablets by mouth every 8 hours as needed for moderate to severe pain or pain (Seventy tablets to last thirty days.)       pantoprazole 40 MG EC tablet    PROTONIX    90 tablet    Take 1 tablet (40 mg) by mouth daily       simvastatin 10 MG tablet    ZOCOR    90 tablet    Take 1 tablet (10 mg) by mouth At Bedtime       tiZANidine 4 MG tablet    ZANAFLEX    270 tablet    Take 1 tablet (4 mg) by mouth 3 times daily as needed for muscle spasms       traZODone 100 MG tablet    DESYREL    90 tablet    Take 1 tablet (100 mg) by mouth nightly as needed for sleep

## 2017-02-10 ENCOUNTER — TRANSFERRED RECORDS (OUTPATIENT)
Dept: HEALTH INFORMATION MANAGEMENT | Facility: CLINIC | Age: 52
End: 2017-02-10

## 2017-02-13 ENCOUNTER — RADIOLOGY INJECTION OFFICE VISIT (OUTPATIENT)
Dept: PALLIATIVE MEDICINE | Facility: CLINIC | Age: 52
End: 2017-02-13
Payer: COMMERCIAL

## 2017-02-13 ENCOUNTER — RADIANT APPOINTMENT (OUTPATIENT)
Dept: GENERAL RADIOLOGY | Facility: CLINIC | Age: 52
End: 2017-02-13
Attending: PAIN MEDICINE
Payer: COMMERCIAL

## 2017-02-13 VITALS — OXYGEN SATURATION: 98 % | SYSTOLIC BLOOD PRESSURE: 116 MMHG | HEART RATE: 71 BPM | DIASTOLIC BLOOD PRESSURE: 81 MMHG

## 2017-02-13 DIAGNOSIS — M54.16 LUMBAR RADICULAR PAIN: ICD-10-CM

## 2017-02-13 DIAGNOSIS — M51.17 INTERVERTEBRAL DISC DISORDER WITH RADICULOPATHY OF LUMBOSACRAL REGION: Primary | ICD-10-CM

## 2017-02-13 PROCEDURE — 62323 NJX INTERLAMINAR LMBR/SAC: CPT | Performed by: PAIN MEDICINE

## 2017-02-13 NOTE — NURSING NOTE
Discharge Information    IV Discontiued Time:  NA    Amount of Fluid Infused:  NA    Discharge Criteria = When patient returns to baseline or as per MD order    Consciousness:  Pt is fully awake    Circulation:  BP +/- 20% of pre-procedure level    Respiration:  Patient is able to breathe deeply    O2 Sat:  Patient is able to maintain O2 Sat >92% on room air    Activity:  Moves 4 extremities on command    Ambulation:  Patient is able to stand and walk     Dressing:  Clean/dry or No Dressing    Notes:   Discharge instructions and AVS given to patient    Patient meets criteria for discharge?  YES    Admitted to PCU?  No    Responsible adult present to accompany patient home?  Yes    Signature/Title:    Raeann Carpenter RN Care Coordinator  [facility] [place]

## 2017-02-13 NOTE — NURSING NOTE
Injection intake:    If this procedure is requiring IV sedation has the patient been NPO for 6  Hours? NA    Is patient on a prescribed blood thinner such as coumadin, Plavix, Xarelto?    No    Does patient take aspirin?  Yes -   ASA 81    If this is for a cervical procedure and patient is on aspirin has it been held for 6 days?   No     Any allergies to contrast dye, iodine, steroid and/or numbing medications?  Not Applicable    Is patient currently taking antibiotics or have an active infection?  NO    Does patient have a ? Yes       Is patient pregnant or breastfeeding?  Not Applicable    Are the vital signs normal? Yes    Anupama Carpenter, MSN, RN-BC  Care Coordinator  [facility] [place]

## 2017-02-13 NOTE — PATIENT INSTRUCTIONS
[facility] Procedure Discharge Instructions    Today you saw:    Dr. Sophia Mancia         Your procedure:  Caudal epidural steroid injection      Medications used:  Lidocaine (anesthetic)    Kenalog (steroid)     Omnipaque (contrast)               Be cautious when walking as numbness and/or weakness in the legs may occur up to 6-8 hours after the procedure due to effect of the local anesthetic    Do not drive for 6 hours. The effect of the local anesthetic could slow your reflexes.     Avoid strenuous activity for the first 24 hours. You may resume your regular activities after that.     You may shower, however avoid swimming, tub baths or hot tubs for 24 hours following your procedure    You may have a mild to moderate increase in pain for several days following the injection.      You may use ice packs for 10-15 minutes, 3 to 4 times a day at the injection site for comfort    Do not use heat to painful areas for 6 to 8 hours. This will give the local anesthetic time to wear off and prevent you from accidentally burning your skin.    You may use anti-inflammatory medications (such as Ibuprofen/Advil or Aleve) or Tylenol for pain control if necessary    With diabetes, check your blood sugar more frequently than usual as your blood sugar may be higher than normal for 10-14 days following a steroid injection. Contact your doctor who manages your diabetes if your blood sugar is higher than usual    It may take up to 14 days for the steroid medication to start working although you may feel the effect as early as a few days after the procedure.       If you experience any of the following, call the pain center nursing line during work hours at [phone] or on-call physician after hours at [phone]:  -Fever over 100 degree F  -Swelling, bleeding, redness, drainage, warmth at the injection site  -Progressive weakness or numbness in your legs   -Loss of bowel or bladder function  -Unusual headache that is  not relieved by Tylenol  -Unusual new onset of pain that is not improving    Phone #s:  Nurse triage line for general questions: [phone]

## 2017-02-13 NOTE — MR AVS SNAPSHOT
After Visit Summary   2/13/2017    Saran Wallis    MRN: [id number]           Patient Information     Date Of Birth          1965        Visit Information        Provider Department      2/13/2017 2:15 PM Sophia Mancia MD [facility]        Care Instructions    [facility] Procedure Discharge Instructions    Today you saw:    Dr. Sophia Mancia         Your procedure:  Caudal epidural steroid injection      Medications used:  Lidocaine (anesthetic)    Kenalog (steroid)     Omnipaque (contrast)               Be cautious when walking as numbness and/or weakness in the legs may occur up to 6-8 hours after the procedure due to effect of the local anesthetic    Do not drive for 6 hours. The effect of the local anesthetic could slow your reflexes.     Avoid strenuous activity for the first 24 hours. You may resume your regular activities after that.     You may shower, however avoid swimming, tub baths or hot tubs for 24 hours following your procedure    You may have a mild to moderate increase in pain for several days following the injection.      You may use ice packs for 10-15 minutes, 3 to 4 times a day at the injection site for comfort    Do not use heat to painful areas for 6 to 8 hours. This will give the local anesthetic time to wear off and prevent you from accidentally burning your skin.    You may use anti-inflammatory medications (such as Ibuprofen/Advil or Aleve) or Tylenol for pain control if necessary    With diabetes, check your blood sugar more frequently than usual as your blood sugar may be higher than normal for 10-14 days following a steroid injection. Contact your doctor who manages your diabetes if your blood sugar is higher than usual    It may take up to 14 days for the steroid medication to start working although you may feel the effect as early as a few days after the procedure.       If you experience any of the following, call the pain center nursing  line during work hours at [phone] or on-call physician after hours at [phone]:  -Fever over 100 degree F  -Swelling, bleeding, redness, drainage, warmth at the injection site  -Progressive weakness or numbness in your legs   -Loss of bowel or bladder function  -Unusual headache that is not relieved by Tylenol  -Unusual new onset of pain that is not improving    Phone #s:  Nurse triage line for general questions: [phone]            Follow-ups after your visit        Your next 10 appointments already scheduled     Mar 06, 2017  9:15 AM CST   [facility] with Eulalia Solitario, PT   [facility] ([facility] [place]  )    [address]  [address]  [facility] [address]   [phone]            Mar 17, 2017  9:00 AM CDT   Return Visit with ANDREAS Presley CNP   [facility] ([facility])    [address] [facility]  [address]  [facility] [address] [phone]            Aug 07, 2017  8:20 AM CDT   Office Visit with Vamshi Clarke Jr., MD   [facility]    [address] [facility] [address] [phone]           Bring a current list of meds and any records pertaining to this visit.  For Physicals, please bring immunization records and any forms needing to be filled out.  Please arrive 10 minutes early to complete paperwork.              Who to contact     If you have questions or need follow up information about today's clinic visit or your schedule please contact [facility] directly at [phone].  Normal or non-critical lab and imaging results will be communicated to you by MyChart, letter or phone within 4 business days after the clinic has received the results. If you do not hear from us within 7 days, please contact the clinic through MyChart or phone. If you have a critical or abnormal lab result, we will notify you by phone as soon as possible.  Submit refill requests through  [employer] or call your pharmacy and they will forward the refill request to us. Please allow 3 business days for your refill to be completed.          Additional Information About Your Visit        Job36hart Information     [employer] gives you secure access to your electronic health record. If you see a primary care provider, you can also send messages to your care team and make appointments. If you have questions, please call your primary care clinic.  If you do not have a primary care provider, please call [phone] and they will assist you.        Care EveryWhere ID     This is your Care EveryWhere ID. This could be used by other organizations to access your [facility] medical records  [id number]        Your Vitals Were     Pulse Pulse Oximetry                91 99%           Blood Pressure from Last 3 Encounters:   02/13/17 123/82   02/06/17 108/64   02/01/17 112/78    Weight from Last 3 Encounters:   02/06/17 87.5 kg (193 lb)   02/01/17 89.8 kg (198 lb)   11/03/16 89.4 kg (197 lb)              Today, you had the following     No orders found for display       Primary Care Provider Office Phone # Fax #    Vamshi Clarke Jr., [phone]       [facility] [address] [facility] [address]        Thank you!     Thank you for choosing [facility]  for your care. Our goal is always to provide you with excellent care. Hearing back from our patients is one way we can continue to improve our services. Please take a few minutes to complete the written survey that you may receive in the mail after your visit with us. Thank you!             Your Updated Medication List - Protect others around you: Learn how to safely use, store and throw away your medicines at www.disposemymeds.org.          This list is accurate as of: 2/13/17  2:29 PM.  Always use your most recent med list.                   Brand Name Dispense Instructions for use    ASPIRIN PO      Take 81 mg by mouth        blood glucose monitoring lancets     1 Box    Use to test blood sugar one times daily or as directed.  Ok to substitute alternative if insurance prefers.       blood glucose monitoring meter device kit     1 kit    Use to test blood sugar 1 times daily or as directed.  Ok to substitute alternative if insurance prefers.       blood glucose monitoring test strip    ONE TOUCH VERIO IQ    100 each    Use to test blood sugar one times daily or as directed.  Ok to substitute alternative if insurance prefers.       diclofenac 1 % Gel topical gel    VOLTAREN    100 g    Apply 4 grams to knees,  Left hip and buttock four times daily using enclosed dosing card.       gabapentin 300 MG capsule    NEURONTIN    540 capsule    2 caps tid       irbesartan 150 MG tablet    AVAPRO    90 tablet    Take 1 tablet (150 mg) by mouth daily       metFORMIN 500 MG tablet    GLUCOPHAGE    360 tablet    Take 2 tablets (1,000 mg) by mouth 2 times daily (with meals)       oxyCODONE-acetaminophen 5-325 MG per tablet    PERCOCET    70 tablet    Take 1-2 tablets by mouth every 8 hours as needed for moderate to severe pain or pain (Seventy tablets to last thirty days.)       pantoprazole 40 MG EC tablet    PROTONIX    90 tablet    Take 1 tablet (40 mg) by mouth daily       simvastatin 10 MG tablet    ZOCOR    90 tablet    Take 1 tablet (10 mg) by mouth At Bedtime       tiZANidine 4 MG tablet    ZANAFLEX    270 tablet    Take 1 tablet (4 mg) by mouth 3 times daily as needed for muscle spasms       traZODone 100 MG tablet    DESYREL    90 tablet    Take 1 tablet (100 mg) by mouth nightly as needed for sleep

## 2017-02-19 NOTE — PROGRESS NOTES
"[facility] - Procedure Note    Date of Visit: 17    Indications: Saran Wallis is a 52-year-old male who is seen at the referral of ANDREAS Gaona CNP for caudal epidural steroid injection. He was last seen in clinic on 16, at which time he underwent caudal epidural steroid injection. He felt that this \"hit the right spot\". He notes complete resolution of pain for one week after this injection, although his symptoms have gradually returned since that time. He denies new sensorimotor deficits. He would like to repeat caudal epidural injection today. Previous injections include left L5, S1 transforaminal epidural performed on 16 (50% relief lasting 1.5 months) and 05/10/16 (limited benefit).     Electronic Chart Review: Patient history, pertinent diagnostic studies, vitals, allergies, and medications were reviewed.    Review of Systems: He reports use of Aspirin 81 mg daily. The patient denies recent fever, chills, illness, use of antibiotics or anticoagulants. No allergy to local anesthetic, contrast dye, or steroid.     Physical Examination:  /81  Pulse 71  SpO2 98%  GEN: Alert. Well developed, well nourished. Mild distress secondary to pain.  CV/Resp: Symmetric chest wall excursion. Non-labored breathing. No audible wheezing.  Skin: No rashes/lesions of posterior torso.   Extremities: Distal extremities warm, well perfused.  Neuro/MSK: Slight give-way weakness at left ankle, otherwise power 5/5 throughout bilateral lower extremities.     Imagin16 MRI lumbar spine: Five lumbar vertebrae are assumed. Moderate degenerative loss of disc signal with mild loss of disc height is noted at L4-L5 and L5-S1. Vertebral body heights and sagittal alignment appear within normal limits. Marrow signal is essentially within normal limits. The conus medullaris is unremarkable in appearance on the sagittal images. L1-L2, L2-L3: Normal. L3-L4: Small to moderate central " disc extrusion slightly to the left of midline moderately indenting the thecal sac with overall mild central stenosis but no apparent direct neural impingement. The neural foramina appear to be bilaterally patent. L4-L5: Broad-based central disc protrusion or herniation slightly greater to the right of midline. No central stenosis. Lateral annular bulging and endplate spurring results in moderate left and mild right foraminal stenosis. L5-S1: Postsurgical changes of left hemilaminotomy/posterior decompression. There is a moderate broad-based central disc protrusion or herniation posteriorly displacing the left S1 nerve root. No central stenosis. The L5 neural foramina appear bilaterally patent. Impression: 1. L5-S1 broad-based left central disc herniation posteriorly displacing the left S1 nerve root. There has been previous posterior decompression. No central stenosis. 2. L4-L5 broad-based central disc protrusion or herniation without apparent central stenosis. 3. L4-L5 moderate bilateral foraminal stenosis. 4. L3-L4 central disc herniation slightly to the left of midline without apparent direct neural impingement or central stenosis.    Procedure Description:    Pre-procedure Diagnosis: Intervertebral disc disorder with radiculopathy, lumbosacral region  Post-procedure Diagnosis: Intervertebral disc disorder with radiculopathy, lumbosacral region  Procedure performed: Caudal epidural steroid injection  : Sophia Mancia MD    Local anesthetic: 2 mL 1% lidocaine (preservative free)  Medication solution (injectate): 2 mL of 40 mg/mL triamcinolone + 1.5 mL of normal saline + 1.5 mL of 1% lidocaine (preservative free)  Contrast: 0.5 mL of Omnipaque 300 used, 9.5 mL discarded  Sterile prep/cleansing solution: ChloraPrep    The procedure and risks were explained, and informed written consent was obtained from the patient. Risks include but are not limited to: infection, bleeding, increased pain, and damage to  "soft tissue, nerve, muscle, and vasculature structures. The procedure site was marked using a disposable pen. Immediately prior to the start of the procedure, a \"time out\" safety check was conducted to confirm correct patient, procedure, and laterality. The patient's heart rate and oxygen saturation were monitored throughout the procedure.    The patient was positioned prone on the fluoroscopy table. The skin was prepped and draped in sterile fashion. The sacrum and sacral cornua were visualized in an AP view. The skin and subcutaneous tissue was anesthetized with local anesthetic. Under fluoroscopic guidance, a 25-gauge, 3.5 inch Quincke spinal needle was inserted and advanced into the sacral hiatus. After the needle passed through the sacrococcygeal ligament, the needle angle was lowered, and the needle was advanced 2 cm. There was no evidence of paresthesias throughout needle placement. After negative aspiration for heme and cerebrospinal fluid, 0.5 mL of non-ionic contrast agent was slowly injected. Confirmation of spread of contrast agent within the caudal epidural space was made with fluoroscopic imaging in the AP and lateral views. Subsequently, the injectate was slowly administered without resistance. The stylet was reinserted and the needle was withdrawn.    The patient tolerated the procedure well, and there was no evidence of procedural complications. No new sensory or motor deficits were noted following the procedure. The patient was stable and able to ambulate on discharge home. Post-procedure instructions were provided.     Pre-procedure pain score: 7/10 back, 4/10 leg/buttock  Post-procedure pain score: 4/10 back, 0/10 leg/buttock (\"numb\")    Assessment/Plan: Saran Wallis is a 52-year-old male s/p caudal epidural steroid injection today.    Following today's procedure, the patient was advised to contact the [facility] for any of the following:   Fever, chills, or night " sweats   New onset of pain, numbness, or weakness   Any questions/concerns regarding the procedure  If unable to contact the Pain Center, the patient was instructed to go to a local Emergency Room for any complications.       Sophia Mancia MD  [facility]

## 2017-02-20 ENCOUNTER — TELEPHONE (OUTPATIENT)
Dept: PALLIATIVE MEDICINE | Facility: CLINIC | Age: 52
End: 2017-02-20

## 2017-02-20 NOTE — TELEPHONE ENCOUNTER
Patient had a caudal epidural injection on 02/13/17.  Called patient for an update.      Pt reported the following details:  Patient states that he is feeling pain relief from the injection.   Told patient that the information will be forwarded to her provider.  Also explained that, if a steroid medication was used, it could take up to 14 days to feel the full effect and if pt has any further questions or concerns pt should call the nurse line at [phone].    Karen HERNANDEZ)

## 2017-03-06 ENCOUNTER — THERAPY VISIT (OUTPATIENT)
Dept: PHYSICAL THERAPY | Facility: CLINIC | Age: 52
End: 2017-03-06
Payer: COMMERCIAL

## 2017-03-06 DIAGNOSIS — G89.29 CHRONIC RADICULAR LOW BACK PAIN: Primary | ICD-10-CM

## 2017-03-06 DIAGNOSIS — M54.16 CHRONIC RADICULAR LOW BACK PAIN: Primary | ICD-10-CM

## 2017-03-06 PROCEDURE — 97110 THERAPEUTIC EXERCISES: CPT | Mod: GP | Performed by: PHYSICAL THERAPIST

## 2017-03-06 PROCEDURE — 97162 PT EVAL MOD COMPLEX 30 MIN: CPT | Mod: GP | Performed by: PHYSICAL THERAPIST

## 2017-03-06 PROCEDURE — 97112 NEUROMUSCULAR REEDUCATION: CPT | Mod: GP | Performed by: PHYSICAL THERAPIST

## 2017-03-06 NOTE — MR AVS SNAPSHOT
After Visit Summary   3/6/2017    Saran Wallis    MRN: [id number]           Patient Information     Date Of Birth          1965        Visit Information        Provider Department      3/6/2017 9:15 AM Eulalia Solitario, PT [facility]        Today's Diagnoses     Chronic radicular low back pain    -  1       Follow-ups after your visit        Your next 10 appointments already scheduled     Mar 14, 2017  3:30 PM CDT   [facility] with Eulalia Solitario, PT   [facility] ([facility] [place]  )    [id number] [place], [place] [address] [address] [address]   [phone]            Mar 17, 2017  9:00 AM CDT   Return Visit with ANDREAS Presley [facility] ([facility] [place])    [id number] [place], [place] [address] [address] [address]   [phone]            Apr 04, 2017  3:30 PM CDT   [facility] with Eulalia Solitario, PT   [facility] ([facility] [place]  )    [address] [place], [place] [address] [address] [address]   [phone]            Apr 11, 2017  4:10 PM CDT   [facility] with Eulalia Solitario, PT   [facility] ([facility] [place]  )    [address] [place] [address] [address]   [phone]            Apr 18, 2017  4:10 PM CDT   [facility] with Eulalia Solitario, PT   [facility] ([facility] [place]  )    [address] [place] [address] [address]   [phone]            Apr 25, 2017  4:10 PM CDT   [facility] with Eulalia Solitario, PT   [facility] ([facility] [place]  )    [address] [place] [address] [address]   [phone]            May 02, 2017  4:10 PM CDT   [facility] with Eulalia Solitario, PT   [facility] ([facility] [place]  )    [address] [place] [address] [address]   [phone]            Aug 07, 2017  8:20 AM CDT   Office Visit with Vamshi Clarke Jr., MD   [facility] [place] ([facility])    [address] [place] [address] [phone]           Bring  a current list of meds and any records pertaining to this visit.  For Physicals, please bring immunization records and any forms needing to be filled out.  Please arrive 10 minutes early to complete paperwork.              Who to contact     If you have questions or need follow up information about today's clinic visit or your schedule please contact [facility] directly at [phone].  Normal or non-critical lab and imaging results will be communicated to you by MyChart, letter or phone within 4 business days after the clinic has received the results. If you do not hear from us within 7 days, please contact the clinic through [employer] or phone. If you have a critical or abnormal lab result, we will notify you by phone as soon as possible.  Submit refill requests through [employer] or call your pharmacy and they will forward the refill request to us. Please allow 3 business days for your refill to be completed.          Additional Information About Your Visit        [employer] Information     [employer] gives you secure access to your electronic health record. If you see a primary care provider, you can also send messages to your care team and make appointments. If you have questions, please call your primary care clinic.  If you do not have a primary care provider, please call [phone] and they will assist you.        Care EveryWhere ID     This is your Care EveryWhere ID. This could be used by other organizations to access your [facility] medical records  [id number]         Blood Pressure from Last 3 Encounters:   02/13/17 116/81   02/06/17 108/64   02/01/17 112/78    Weight from Last 3 Encounters:   02/06/17 87.5 kg (193 lb)   02/01/17 89.8 kg (198 lb)   11/03/16 89.4 kg (197 lb)              We Performed the Following     HC PT EVAL, MODERATE COMPLEXITY     [facility] INITIAL EVAL REPORT     NEUROMUSCULAR RE-EDUCATION     THERAPEUTIC EXERCISES        Primary Care Provider Office Phone # Fax #    Vamshi Clarke  MD Sair[place] [phone]       [facility] [address] [facility] [address]        Thank you!     Thank you for choosing [facility]  for your care. Our goal is always to provide you with excellent care. Hearing back from our patients is one way we can continue to improve our services. Please take a few minutes to complete the written survey that you may receive in the mail after your visit with us. Thank you!             Your Updated Medication List - Protect others around you: Learn how to safely use, store and throw away your medicines at www.disposemymeds.org.          This list is accurate as of: 3/6/17 10:00 AM.  Always use your most recent med list.                   Brand Name Dispense Instructions for use    ASPIRIN PO      Take 81 mg by mouth       blood glucose monitoring lancets     1 Box    Use to test blood sugar one times daily or as directed.  Ok to substitute alternative if insurance prefers.       blood glucose monitoring meter device kit     1 kit    Use to test blood sugar 1 times daily or as directed.  Ok to substitute alternative if insurance prefers.       blood glucose monitoring test strip    ONE TOUCH VERIO IQ    100 each    Use to test blood sugar one times daily or as directed.  Ok to substitute alternative if insurance prefers.       diclofenac 1 % Gel topical gel    VOLTAREN    100 g    Apply 4 grams to knees,  Left hip and buttock four times daily using enclosed dosing card.       gabapentin 300 MG capsule    NEURONTIN    540 capsule    2 caps tid       irbesartan 150 MG tablet    AVAPRO    90 tablet    Take 1 tablet (150 mg) by mouth daily       metFORMIN 500 MG tablet    GLUCOPHAGE    360 tablet    Take 2 tablets (1,000 mg) by mouth 2 times daily (with meals)       oxyCODONE-acetaminophen 5-325 MG per tablet    PERCOCET    70 tablet    Take 1-2 tablets by mouth every 8 hours as needed for moderate to severe pain or pain (Seventy tablets to last thirty  days.)       pantoprazole 40 MG EC tablet    PROTONIX    90 tablet    Take 1 tablet (40 mg) by mouth daily       simvastatin 10 MG tablet    ZOCOR    90 tablet    Take 1 tablet (10 mg) by mouth At Bedtime       tiZANidine 4 MG tablet    ZANAFLEX    270 tablet    Take 1 tablet (4 mg) by mouth 3 times daily as needed for muscle spasms       traZODone 100 MG tablet    DESYREL    90 tablet    Take 1 tablet (100 mg) by mouth nightly as needed for sleep

## 2017-03-06 NOTE — PROGRESS NOTES
"[facility] Initial Evaluation -- Lumbar    Date: March 6, 2017  Saran Wallis is a 52 year old male with a lumbar condition.   Referral: [facility]  Work mechanical stresses:  Sitting/computer  Employment status:  Full time  Leisure mechanical stresses: walking 20-30' 3x week  Functional disability score (VY/STarT Back):  50/HIGH  VAS score (0-10): 8/10  Patient goals: \"I would like to reduce my pain by 50%\"    HISTORY:    Present symptoms: L low back, L calf  Pain quality (sharp/shooting/stabbing/aching/burning/cramping):  Achying, cramping in lower leg   Paresthesia (yes/no):  Yes, R lower leg/foot numbness    Present since: 2004, chronic low back pain with multiple interventions, most recent office visit February 2017 with referral to PT.   Symptoms (improving/unchanging/worsening):  unchanging.   Symptoms commenced as a result of: lifting something front trunk of car   Condition occurred in the following environment:   n/a     Symptoms at onset (back/thigh/leg): back/leg  Constant symptoms (back/thigh/leg): left low back, L calf  Intermittent symptoms (back/thigh/leg): L foot    Symptoms are made worse with the following: Always sitting, Always standing and Always PM   Symptoms are made better with the following: Always lying    Disturbed sleep (yes/no):  Yes, due to calf cramping Sleeping postures (prone/sup/side R/L): L side, worse on R    Previous episodes (0/1-5/6-10/11+): 11+ Year of first episode: 2004    Previous history: original injury with back 2004, underwent microdiscectomy 2008/2012  Previous treatments: PT, chronic pain management, BARBARA      Specific Questions:  Cough/Sneeze/Strain (pos/neg): negative  Bowel/Bladder (normal/abnormal): normal  Gait (normal/abnormal): normal  Medications (nil/NSAIDS/analg/steroids/anticoag/other):  Narcotics/Opiods and Other - High blood pressure; Metformin  Medical allergies:  none  General health (excellent/good/fair/poor):  fair  Pertinent " medical history:  Diabetes and Heart problems, smoking  Imaging (NA/Xray/MRI):  None recent  Recent or major surgery (yes/no):  no  Night pain (yes/no): yes  Accidents (yes/no): no  Unexplained weight loss (yes/no): no  Barriers at home: none  Other red flags: none    EXAMINATION    Posture:   Sitting (good/fair/poor): fair  Standing (good/fair/poor):fair  Lordosis (red/acc/normal): decr  Correction of posture (better/worse/no effect):  better    Lateral Shift (right/left/nil): nil ?R  Relevant (yes/no):  ?  Other Observations: n/a    Neurological:    Motor deficit: L5: EHL=4/5, S1 Gastroc=4/5, L5 Ant Tib=4/5  Reflexes:  Not tested  Sensory deficit:  Decreased L5 pattern  Dural signs:  +Slump L    Movement Loss:   Maykel Mod Min Nil Pain   Flexion  x x  erp   Extension  x x  erp   Side Gliding R  x x  erp   Side Gliding L  x x  erp     Test Movements:   During: produces, abolishes, increases, decreases, no effect, centralizing, peripheralizing   After: better, worse, no better, no worse, no effect, centralized, peripheralized    Pretest symptoms standing: not tested   Symptoms During Symptoms After ROM increased ROM decreased No Effect   FIS        Rep FIS        EIS        Rep EIS        Pretest symptoms lying: low back, L Calf    Symptoms During Symptoms After ROM increased ROM decreased No Effect   ESAU No Effect No Effect      Rep ESAU No Effect Better lower leg, better back incr flexion     EIL Increases back, NE L LE No Worse      Rep EIL Increases back, NE L LE No Worse back, better lower leg incr SG, Ext     If required, pretest symptoms: not tested   Symptoms During Symptoms After ROM increased ROM decreased No Effect   SGIS - R        Rep SGIS - R        SGIS - L        Rep SGIS - L          Static Tests:  Sitting slouched:    Sitting erect:    Standing slouched   Standing erect:    Lying prone in extension:   Long sitting:      Other Tests: n/a    Provisional Classification:  Inconclusive/Other - Mechanically  Inconclusive    Principle of Management:  Education:  Posture, DP, monitor radicular sx during exercise, dc if worsens   Equipment provided:  Has lumbar roll  Mechanical therapy (Y/N):  Y   Extension principle:  ?  Lateral Principle:  no  Flexion principle:  Rep ESAU x 20/5x day, monitor sx  Other:  none    ASSESSMENT/PLAN:    Patient is a 52 year old male with lumbar complaints.    Patient has the following significant findings with corresponding treatment plan.                Diagnosis 1:  Lumbar radiculopathy  Pain -  self management, education, directional preference exercise and home program  Decreased ROM/flexibility - manual therapy and therapeutic exercise  Decreased strength - therapeutic exercise and therapeutic activities  Decreased function - therapeutic activities    Therapy Evaluation Codes:   1) History comprised of:   Personal factors that impact the plan of care:      Time since onset of symptoms.    Comorbidity factors that impact the plan of care are:      Diabetes and Smoking.     Medications impacting care: High blood pressure and Pain.  2) Examination of Body Systems comprised of:   Body structures and functions that impact the plan of care:      Lumbar spine.   Activity limitations that impact the plan of care are:      Sitting, Standing, Working and Sleeping.  3) Clinical presentation characteristics are:   Evolving/Changing.  4) Decision-Making    Moderate complexity using standardized patient assessment instrument and/or measureable assessment of functional outcome.  Cumulative Therapy Evaluation is: Moderate complexity.    Previous and current functional limitations:  (See Goal Flow Sheet for this information)    Short term and Long term goals: (See Goal Flow Sheet for this information)     Communication ability:  Patient appears to be able to clearly communicate and understand verbal and written communication and follow directions correctly.  Treatment Explanation - The following has been  discussed with the patient:   RX ordered/plan of care  Anticipated outcomes  Possible risks and side effects  This patient would benefit from PT intervention to resume normal activities.   Rehab potential is fair.    Frequency:  1 X week, once daily  Duration:  for 8 weeks  Discharge Plan:  Achieve all LTG.  Independent in home treatment program.  Reach maximal therapeutic benefit.    Please refer to the daily flowsheet for treatment today, total treatment time and time spent performing 1:1 timed codes.

## 2017-03-14 ENCOUNTER — MYC REFILL (OUTPATIENT)
Dept: FAMILY MEDICINE | Facility: CLINIC | Age: 52
End: 2017-03-14

## 2017-03-14 ENCOUNTER — THERAPY VISIT (OUTPATIENT)
Dept: PHYSICAL THERAPY | Facility: CLINIC | Age: 52
End: 2017-03-14
Payer: COMMERCIAL

## 2017-03-14 DIAGNOSIS — M54.16 CHRONIC RADICULAR LOW BACK PAIN: ICD-10-CM

## 2017-03-14 DIAGNOSIS — G89.29 CHRONIC RADICULAR LOW BACK PAIN: ICD-10-CM

## 2017-03-14 DIAGNOSIS — G89.4 CHRONIC PAIN SYNDROME: ICD-10-CM

## 2017-03-14 PROCEDURE — 97110 THERAPEUTIC EXERCISES: CPT | Mod: GP | Performed by: PHYSICAL THERAPIST

## 2017-03-14 PROCEDURE — 97112 NEUROMUSCULAR REEDUCATION: CPT | Mod: GP | Performed by: PHYSICAL THERAPIST

## 2017-03-14 RX ORDER — OXYCODONE AND ACETAMINOPHEN 5; 325 MG/1; MG/1
1-2 TABLET ORAL EVERY 8 HOURS PRN
Qty: 70 TABLET | Refills: 0 | Status: SHIPPED | OUTPATIENT
Start: 2017-03-14 | End: 2017-04-27

## 2017-03-14 NOTE — TELEPHONE ENCOUNTER
Message from [employer]:  Original authorizing provider: MD Saran Billingsley Ariel Wallis would like a refill of the following medications:  oxyCODONE-acetaminophen (PERCOCET) 5-325 MG per tablet [Jr Vamshi Clarke MD]    Preferred pharmacy: [facility] [id number] [place] [address], [place] - [phone]    Comment:

## 2017-03-14 NOTE — TELEPHONE ENCOUNTER
Controlled Substance Refill Request for Percocet  Problem List Complete:  Yes  Patient is followed by COBY TINSLEY JR for ongoing prescription of pain medication. All refills should be approved by this provider, or covering partner.     Medication(s): Percocet 5/325 mg.   Maximum quantity per month: 70  Clinic visit frequency required: Q 6 months      Controlled substance agreement on file: Yes  Date(s): 1/11/16     Pain Clinic evaluation in the past: Yes  Date/Location:  [facility] - 2013. [place] - 2/3/16.     DIRE Total Score(s): 16  No flowsheet data found.     Last [facility] website verification: 11/28/2016   [email or web address]    Last Written Prescription Date:  2/9/17  Last Fill Quantity: 70,   # refills: 0    [place] Office Visit with [facility] [place]: 2/6/17    Future Office visit:   Next 5 appointments (look out 90 days)     Mar 17, 2017  9:00 AM CDT   Return Visit with ANDREAS Presley CNP   [facility] ([facility])    [address] [address] [address]   [phone]                 Processing:  Patient will  in clinic  Estefani Morales, RN, BSN  [facility] [place]

## 2017-03-17 ENCOUNTER — OFFICE VISIT (OUTPATIENT)
Dept: PALLIATIVE MEDICINE | Facility: CLINIC | Age: 52
End: 2017-03-17
Payer: COMMERCIAL

## 2017-03-17 VITALS — HEART RATE: 100 BPM | OXYGEN SATURATION: 98 % | SYSTOLIC BLOOD PRESSURE: 119 MMHG | DIASTOLIC BLOOD PRESSURE: 77 MMHG

## 2017-03-17 DIAGNOSIS — M71.9 DISORDER OF BURSAE AND TENDONS IN SHOULDER REGION: ICD-10-CM

## 2017-03-17 DIAGNOSIS — F33.0 MAJOR DEPRESSIVE DISORDER, RECURRENT EPISODE, MILD (H): ICD-10-CM

## 2017-03-17 DIAGNOSIS — F51.04 CHRONIC INSOMNIA: ICD-10-CM

## 2017-03-17 DIAGNOSIS — M54.16 CHRONIC RADICULAR LOW BACK PAIN: ICD-10-CM

## 2017-03-17 DIAGNOSIS — G89.29 CHRONIC RADICULAR LOW BACK PAIN: ICD-10-CM

## 2017-03-17 DIAGNOSIS — M67.919 DISORDER OF BURSAE AND TENDONS IN SHOULDER REGION: ICD-10-CM

## 2017-03-17 DIAGNOSIS — G89.4 CHRONIC PAIN SYNDROME: ICD-10-CM

## 2017-03-17 DIAGNOSIS — R53.81 PHYSICAL DECONDITIONING: ICD-10-CM

## 2017-03-17 PROCEDURE — 99213 OFFICE O/P EST LOW 20 MIN: CPT | Performed by: NURSE PRACTITIONER

## 2017-03-17 RX ORDER — GABAPENTIN 300 MG/1
CAPSULE ORAL
Qty: 540 CAPSULE | Refills: 3 | Status: SHIPPED | OUTPATIENT
Start: 2017-03-17 | End: 2017-09-18

## 2017-03-17 ASSESSMENT — PAIN SCALES - GENERAL: PAINLEVEL: SEVERE PAIN (7)

## 2017-03-17 NOTE — PROGRESS NOTES
[facility]  INTERVAL VISIT    This a  follow up examination  for Saran Wallis is a 51 year old male.  Primary Care provider:  MD Kenna Ibanez  Referring Provider: Vamshi Clarke MD    Today's visit: 03/17/2017  Last visit: 11/22/16  CHIEF COMPLAINT:  Chief Complaint   Patient presents with     Pain     Interval Visit:   S/p L5 S1 left BARBARA 03/25/16 and 5/10/16,  Caudal BARBARA 2/13/17.  Lasting effect for about on week.  Returning of left calve cramp  in back thigh, night worsening, always when laying on right side.  Walking 20 minutes 3 days per week, stretching   Sleep, mood, libido poor,  trazodone ( Desyrel)  prn     Pain level 6/10 range 4-8/10  Pain description: aching, cramping  Aggravating factors: sitting and standing laying down at night    Relieving factors: laying down percocet and steroid injection     ASSESSMENT:  1. Chronic low back pain with radicular features L3-4 on right and L5-S1 on left  1. Left calve cramp >> gluteal deconditioning, ? Left hip bursitis, per interventionist could be related to lumbar Degenerative Disc Disease.  2. Chronic opioid use ~1 per day   3. Facet mediation VS hip pain VS Degenerative Disc Disease   2. Multi level mild to moderate lumbar stenosis S1 nerve root impingement  3. Failed back syndrome S/p hemicraniectomy L4-5 2009, L5-S1 2012 x 2  4. Anxiety related to increase pain associated with routine, sexual activity reduced arousal chronic use of ativan. Seems controlled   5. MARK  6. History of Alcohol over use ( excess drinking in [place]) not drinking in [place] Currently resolved  7. H/O Hep B [place] at [facility]  8. OSTEOARTHRITIS right shoulder, forearm and knees   1. Repetitive use syndrome   1. Early ulnar neuritis  2. Tendonitis, vs rotator vs joint disease of the shoulder     9. Disorder of the Gluteus Medius Muscle.  1. Physical Deconditioning, Myofascial pain left gluteus medius         PLAN:  Medications:  Gabapentin to 600 mg  3 x per  day.  Ok to take Percocet every 4 hours as needed, still limit to 3 per day prescribed per PCP   Occasional use of tylenol pm is ok   Diclofenac gel  As needed up to 4 x per day    Zanaflex 2-4 mg 3 x/ day prn         Stretching   Clam shells X 3 reps count to 5   Hip extensions X 3 reps count to 5   Hip flexion X 3 reps count to 5   Daily to 2x per day stretching   Daily walking or exercise     Continue Physical Therapy        Referrals:  Consider spinal cord stimulator     Return visit to be scheduled about 6-8 weeks with Germania Gong CNP       PAIN HISTORY: Back hx since 1991 after a lifting injury, This episode resolved with periodic back pain until winter 2008. The patient had another lifting injury after raising a garage door. He then had discectomy with Dr. Macdonald. He report that his pain was well controlled until he was working in the yard. The patient went to [facility] and under the care of Dr. Rodriguez he had another discectomy. His pain did not improve post surgery, resulting in failed lback surgery. He then went to Dr. Macdonald who did not want to do surgery. He went to [facility] also in 2013, a lumbar fusion was recommended but denied by his insurance. He found the lack of surgical access from his insurance very frustrating and distressing. The patient then 2014 he went to [facility] an epidural was recommended and again denied by his insurance.   Over time the patient has become less active and spend the majority of his day is quite sedentary.   His pain is located central low back with muscle spasm and radiating pain down both legs on left to L5-S1 and right to L3-4.    PAST MEDICATION TRIALS:   OPIOIDS: Fentanyl, Hydromorphone, Tramadol  NSAID'S/ANALEGESICS: clinoril, Ibuprofen   ANTIMIGRAINE:none  STEROIDS: none  ANTIDEPRESSANTS: Celexa, Sertraline Trazodone (bad taste in mouth)   ANTIANXIETY: Ativan   HYPNOTICS: Lunesta, Ambien,   ANTICONVULSANTS: gabapentin   TOPICALS: None     PAST  PAIN TREATMENTS:   Y/N   HELP   NO HELP   WORSENED  PREVIOUS PAIN CLINIC:   YES   X ([facility], [facility])  SURGERY:     YES   X FOR SHORT TIME  INJECTIONS:               YES   PHYSICAL THERAPY:   YES   X (TENS)   EXERCISE:     YES   X  INTEGRATIVE MEDICINE  CHIROPRACTOR    NO   ACUPUNCTURE    NO  AROMATHERAPY    NO  RELAXATION THERAPY  NO  COUNSELING:    NO    Progress in pain program:  good  Impact on function:moderate to severe  Working  Yes   Quality of life:improved   Self care yes exercise yes,relaxation no, body awareness no.    CURRENT PAIN MEDICATION: Percocet 5/325 mg 2-3 per day, Gabapentin 600-tid  , Trazodone 100 mg prn , tizanidine ( Zanaflex) 4 mg prn   Medication side effects: none     INVESTIGATIONAL:  MRI 2016 Jan 16 LUMBAR:  L1-L2, L2-L3: Normal.  L3-L4: Small to moderate central disc extrusion slightly to the left  of midline moderately indenting the thecal sac with overall mild  central stenosis but no apparent direct neural impingement. The neural  foramina appear to be bilaterally patent.  L4-L5: Broad-based central disc protrusion or herniation slightly  greater to the right of midline. No central stenosis. Lateral annular  bulging and endplate spurring results in moderate left and mild right  foraminal stenosis.  L5-S1: Postsurgical changes of left hemilaminotomy/posterior  decompression. There is a moderate broad-based central disc protrusion  or herniation posteriorly displacing the left S1 nerve root. No  central stenosis. The L5 neural foramina appear bilaterally patent.    IMPRESSION:  1. L5-S1 broad-based left central disc herniation posteriorly  displacing the left S1 nerve root. There has been previous posterior  decompression. No central stenosis.  2. L4-L5 broad-based central disc protrusion or herniation without  apparent central stenosis.  3. L4-L5 moderate bilateral foraminal stenosis.  4. L3-L4 central disc herniation slightly to the left of midline  without apparent direct neural  impingement or central stenosis      PMHX:  Past Medical History   Diagnosis Date     Chronic radicular low back pain 4/30/2016     Depressive disorder      Diabetes mellitus (H)      Gastric reflux      Hypercholesteremia      Hypertension      PAST SURGICAL HISTORY  Past Surgical History   Procedure Laterality Date     Orthopedic surgery       Colonoscopy N/A 1/28/2016     Procedure: COMBINED COLONOSCOPY, SINGLE OR MULTIPLE BIOPSY/POLYPECTOMY BY BIOPSY;  Surgeon: Michelle Morrison MD;  Location:  GI     Esophagoscopy, gastroscopy, duodenoscopy (egd), combined N/A 5/13/2016     Procedure: COMBINED ESOPHAGOSCOPY, GASTROSCOPY, DUODENOSCOPY (EGD);  Surgeon: Vamshi Lynn MD;  Location:  GI       CURRENT MEDICATIONS:   Current Outpatient Prescriptions   Medication     oxyCODONE-acetaminophen (PERCOCET) 5-325 MG per tablet     metFORMIN (GLUCOPHAGE) 500 MG tablet     irbesartan (AVAPRO) 150 MG tablet     traZODone (DESYREL) 100 MG tablet     simvastatin (ZOCOR) 10 MG tablet     gabapentin (NEURONTIN) 300 MG capsule     pantoprazole (PROTONIX) 40 MG enteric coated tablet     blood glucose monitoring (ONETOUCH VERIO IQ SYSTEM) meter device kit     blood glucose monitoring (ONE TOUCH VERIO IQ) test strip     blood glucose monitoring (ONE TOUCH DELICA) lancets     tiZANidine (ZANAFLEX) 4 MG tablet     diclofenac (VOLTAREN) 1 % GEL     ASPIRIN PO     No current facility-administered medications for this visit.        ROS: twelve systems review negative except for: pain, cramps left calve and thigh, right shoulder   Since last visit: changes in mood: No, suicide thoughts No  Any changes in your medical condition, illness, injury or hospitalizations: No    PHYSICAL EXAM:  Constitutional:Blood pressure 119/77, pulse 100, SpO2 98 %., There is no height or weight on file to calculate BMI.  Psyche:  Fully oriented, Behavioral Observations: Eye contact  good. Mood  and spirits are improved .  Musculoskeletal exam:   Gait:  Steady reciprocating,  ROM within normal limits,  Shoulder symmetry good, strength 5/5, (-) SLAP,  tender points left hip, right shoulder posterior,   No trigger points,   SAEED x 4, no producible pain with cramping in left calve  No  hip rotation.    Neuro exam:   Alert,  IGNACIO @  3 mm, Speech clear fluent and appropriate. Strength 5/5   Skin/Vascular/ Autonomic:  warm, dry and intact    OTHER: Opioid risk for ongoing opioid management for non malignant chronic pain   DIRE Score for ongoing opioid management is calculated as follows:    Diagnosis = 2    Intractability = 2    Risk: Psych = 2  Chem Hlth = 3  Reliability = 3  Social = 2    Efficacy = 2    Total DIRE Score = 16 (14 or higher predicts good candidate for ongoing opioid management; 13 or lower predicts poor candidate for opioid management)   Carlos Rosa 2006,The Journal of  Pain.,The DIRE Score: Predicting Outcomes of Opioid Prescribing for Chronic Pain Vol.7,No.9, pp 671-961.  MNPMP : Reviewed and as expected without evidence of abuse or misuse? Yes  URINE DRUG SCREEN  yes, DATE: 2/3/16, as expected. OPIOID AGREEMENT: Yes DATE 1/11/16 Vamshi Clarke MD  Daily opioid use ~ 3 /day 5 mg percocet. Morphine  EQ =20  mg /day     Analgesia improved, Adverse effects none,  Activity improved  Adherence good     Opioid management will continue with Vamshi Clarke MD    PROCEDURES none     Time spent: 20 minutes 100 % face to face including  15 minutes counseling and coordinating care for the above identified medical problems    Signed: ANDREAS Gaona, BC, C.N.P.  [facility]

## 2017-03-17 NOTE — NURSING NOTE
"Chief Complaint   Patient presents with     Pain       Initial /77 (BP Location: Right arm, Patient Position: Chair, Cuff Size: Adult Regular)  Pulse 100  SpO2 98% Estimated body mass index is 28.5 kg/(m^2) as calculated from the following:    Height as of 2/6/17: 1.753 m (5' 9\").    Weight as of 2/6/17: 87.5 kg (193 lb).  Medication Reconciliation: dung Barfield CMA   [facility]    "

## 2017-03-17 NOTE — MR AVS SNAPSHOT
After Visit Summary   3/17/2017    Saran Wallis    MRN: [id number]           Patient Information     Date Of Birth          1965        Visit Information        Provider Department      3/17/2017 9:00 AM Germania Gong APRN CNP [facility]      Today's Diagnoses     Chronic pain syndrome        Chronic radicular low back pain        Physical deconditioning        Major depressive disorder, recurrent episode, mild (H)        Chronic insomnia        Disorder of bursae and tendons in shoulder region          Care Instructions    PLAN:  Medications:  Gabapentin to 600 mg  3 x per day.  Ok to take Percocet every 4 hours as needed, still limit to 3 per day prescribed per PCP   Occasional use of tylenol pm is ok   Diclofenac gel  As needed up to 4 x per day    Zanaflex 2-4 mg 3 x/ day prn         Stretching   Clam shells X 3 reps count to 5   Hip extensions X 3 reps count to 5   Hip flexion X 3 reps count to 5   Daily to 2x per day stretching   Daily walking or exercise     Continue Physical Therapy        Referrals:  Consider spinal cord stimulator     Return visit to be scheduled about 6-8 weeks with Germania Gong CNP       ----------------------------------------------------------------  Nurse Triage line:  [phone]   Call this number with any questions or concerns. You may leave a detailed message anytime. Calls are typically returned Monday through Friday between 8 AM and 4:30 PM. We usually get back to you within 2 business days depending on the issue/request.       Medication refills:    For non-narcotic medications, call your pharmacy directly to request a refill. The pharmacy will contact the Pain Management Center for authorization. Please allow 3-4 days for these refills to be processed.     For narcotic refills, call the nurse triage line or send a KnockaTV message. Please contact us 7-10 days before your refill is due. The message MUST  include the name of the specific medication(s) requested and how you would like to receive the prescription(s). The options are as follows:    Pain Clinic staff can mail the prescription to your pharmacy. Please tell us the name of the pharmacy.    You may pick the prescription up at the Pain Clinic (tell us the location) or during a clinic visit with your pain provider    Pain Clinic staff can deliver the prescription to the [facility] pharmacy in the clinic building. Please tell us the location.      Scheduling number: [phone].  Call this number to schedule or change appointments.    We believe regular attendance is key to your success in our program.    Any time you are unable to keep your appointment we ask that you call us at least 24 hours in advance to let us know. This will allow us to offer the appointment time to another patient.             Follow-ups after your visit        Follow-up notes from your care team     Discussed this visit Return in about 2 months (around 5/17/2017) for Routine Visit.      Your next 10 appointments already scheduled     Apr 04, 2017  3:30 PM CDT   [facility] with Eulalia Altaf, PT   [facility] ([facility]  )    [id number] [place] [address]   [phone]            Apr 11, 2017  4:10 PM CDT   [facility] with Eulalia Altaf, PT   [facility] ([facility]  )    [address] [place] [address]   [phone]            Apr 18, 2017  4:10 PM CDT   [facility] with Eulalia Altaf, PT   [facility] ([facility]  )    [address] [address] [address]   [phone]            Apr 25, 2017  4:10 PM CDT   [facility] with Eulalia Altaf, PT   [facility] ([facility]  )    [address] [address] [address]   [phone]            May 02, 2017  4:10 PM CDT   [facility] with Eulalia Altaf, PT   [facility] ([facility]  )    [address] [place] [address]  [address]  [facility] [address]   [phone]            Aug 07, 2017  8:20 AM CDT   Office Visit with Vamshi Clarke Jr., MD   [facility]    [address] [place] [address] [phone]           Bring a current list of meds and any records pertaining to this visit.  For Physicals, please bring immunization records and any forms needing to be filled out.  Please arrive 10 minutes early to complete paperwork.              Who to contact     If you have questions or need follow up information about today's clinic visit or your schedule please contact [facility] directly at [phone].  Normal or non-critical lab and imaging results will be communicated to you by Yoolinkhart, letter or phone within 4 business days after the clinic has received the results. If you do not hear from us within 7 days, please contact the clinic through WeFit or phone. If you have a critical or abnormal lab result, we will notify you by phone as soon as possible.  Submit refill requests through [employer] or call your pharmacy and they will forward the refill request to us. Please allow 3 business days for your refill to be completed.          Additional Information About Your Visit        [employer] Information     [employer] gives you secure access to your electronic health record. If you see a primary care provider, you can also send messages to your care team and make appointments. If you have questions, please call your primary care clinic.  If you do not have a primary care provider, please call [phone] and they will assist you.        Care EveryWhere ID     This is your Care EveryWhere ID. This could be used by other organizations to access your [facility] medical records  [id number]        Your Vitals Were     Pulse Pulse Oximetry                100 98%           Blood Pressure from Last 3 Encounters:   03/17/17 119/77   02/13/17 116/81   02/06/17 108/64    Weight from Last 3  Encounters:   02/06/17 87.5 kg (193 lb)   02/01/17 89.8 kg (198 lb)   11/03/16 89.4 kg (197 lb)              Today, you had the following     No orders found for display         Where to get your medicines      These medications were sent to [id number] - [place], [place] - [address] AT [facility] [address]  [address], [place] [address]     Phone:  [phone]     gabapentin 300 MG capsule          Primary Care Provider Office Phone # Fax #    Vamshi Clarke Jr., [phone]       [facility] [place] [address]  [place] [address]        Thank you!     Thank you for choosing [facility]  for your care. Our goal is always to provide you with excellent care. Hearing back from our patients is one way we can continue to improve our services. Please take a few minutes to complete the written survey that you may receive in the mail after your visit with us. Thank you!             Your Updated Medication List - Protect others around you: Learn how to safely use, store and throw away your medicines at www.disposemymeds.org.          This list is accurate as of: 3/17/17  9:43 AM.  Always use your most recent med list.                   Brand Name Dispense Instructions for use    ASPIRIN PO      Take 81 mg by mouth       blood glucose monitoring lancets     1 Box    Use to test blood sugar one times daily or as directed.  Ok to substitute alternative if insurance prefers.       blood glucose monitoring meter device kit     1 kit    Use to test blood sugar 1 times daily or as directed.  Ok to substitute alternative if insurance prefers.       blood glucose monitoring test strip    ONE TOUCH VERIO IQ    100 each    Use to test blood sugar one times daily or as directed.  Ok to substitute alternative if insurance prefers.       diclofenac 1 % Gel topical gel    VOLTAREN    100 g    Apply 4 grams to knees,  Left hip and buttock four times daily using enclosed  dosing card.       gabapentin 300 MG capsule    NEURONTIN    540 capsule    2 caps tid       irbesartan 150 MG tablet    AVAPRO    90 tablet    Take 1 tablet (150 mg) by mouth daily       metFORMIN 500 MG tablet    GLUCOPHAGE    360 tablet    Take 2 tablets (1,000 mg) by mouth 2 times daily (with meals)       oxyCODONE-acetaminophen 5-325 MG per tablet    PERCOCET    70 tablet    Take 1-2 tablets by mouth every 8 hours as needed for moderate to severe pain or pain (Seventy tablets to last thirty days.)       pantoprazole 40 MG EC tablet    PROTONIX    90 tablet    Take 1 tablet (40 mg) by mouth daily       simvastatin 10 MG tablet    ZOCOR    90 tablet    Take 1 tablet (10 mg) by mouth At Bedtime       tiZANidine 4 MG tablet    ZANAFLEX    270 tablet    Take 1 tablet (4 mg) by mouth 3 times daily as needed for muscle spasms       traZODone 100 MG tablet    DESYREL    90 tablet    Take 1 tablet (100 mg) by mouth nightly as needed for sleep

## 2017-03-17 NOTE — PATIENT INSTRUCTIONS
PLAN:  Medications:  Gabapentin to 600 mg  3 x per day.  Ok to take Percocet every 4 hours as needed, still limit to 3 per day prescribed per PCP   Occasional use of tylenol pm is ok   Diclofenac gel  As needed up to 4 x per day    Zanaflex 2-4 mg 3 x/ day prn         Stretching   Clam shells X 3 reps count to 5   Hip extensions X 3 reps count to 5   Hip flexion X 3 reps count to 5   Daily to 2x per day stretching   Daily walking or exercise     Continue Physical Therapy        Referrals:  Consider spinal cord stimulator     Return visit to be scheduled about 6-8 weeks with Germania Gong CNP       ----------------------------------------------------------------  Nurse Triage line:  [phone]   Call this number with any questions or concerns. You may leave a detailed message anytime. Calls are typically returned Monday through Friday between 8 AM and 4:30 PM. We usually get back to you within 2 business days depending on the issue/request.       Medication refills:    For non-narcotic medications, call your pharmacy directly to request a refill. The pharmacy will contact the Pain Management Center for authorization. Please allow 3-4 days for these refills to be processed.     For narcotic refills, call the nurse triage line or send a [employer] message. Please contact us 7-10 days before your refill is due. The message MUST include the name of the specific medication(s) requested and how you would like to receive the prescription(s). The options are as follows:    Pain Clinic staff can mail the prescription to your pharmacy. Please tell us the name of the pharmacy.    You may pick the prescription up at the Pain Clinic (tell us the location) or during a clinic visit with your pain provider    Pain Clinic staff can deliver the prescription to the [facility] pharmacy in the clinic building. Please tell us the location.      Scheduling number: [phone].  Call this number to schedule or change  appointments.    We believe regular attendance is key to your success in our program.    Any time you are unable to keep your appointment we ask that you call us at least 24 hours in advance to let us know. This will allow us to offer the appointment time to another patient.

## 2017-04-04 ENCOUNTER — THERAPY VISIT (OUTPATIENT)
Dept: PHYSICAL THERAPY | Facility: CLINIC | Age: 52
End: 2017-04-04
Payer: COMMERCIAL

## 2017-04-04 DIAGNOSIS — M54.16 CHRONIC RADICULAR LOW BACK PAIN: ICD-10-CM

## 2017-04-04 DIAGNOSIS — G89.29 CHRONIC RADICULAR LOW BACK PAIN: ICD-10-CM

## 2017-04-04 PROCEDURE — 97110 THERAPEUTIC EXERCISES: CPT | Mod: GP | Performed by: PHYSICAL THERAPIST

## 2017-04-04 PROCEDURE — 97530 THERAPEUTIC ACTIVITIES: CPT | Mod: GP | Performed by: PHYSICAL THERAPIST

## 2017-04-11 ENCOUNTER — THERAPY VISIT (OUTPATIENT)
Dept: PHYSICAL THERAPY | Facility: CLINIC | Age: 52
End: 2017-04-11
Payer: COMMERCIAL

## 2017-04-11 DIAGNOSIS — M54.16 CHRONIC RADICULAR LOW BACK PAIN: ICD-10-CM

## 2017-04-11 DIAGNOSIS — G89.29 CHRONIC RADICULAR LOW BACK PAIN: ICD-10-CM

## 2017-04-11 PROCEDURE — 97110 THERAPEUTIC EXERCISES: CPT | Mod: GP | Performed by: PHYSICAL THERAPIST

## 2017-04-11 PROCEDURE — 97530 THERAPEUTIC ACTIVITIES: CPT | Mod: GP | Performed by: PHYSICAL THERAPIST

## 2017-04-11 PROCEDURE — 97112 NEUROMUSCULAR REEDUCATION: CPT | Mod: GP | Performed by: PHYSICAL THERAPIST

## 2017-04-27 ENCOUNTER — MYC REFILL (OUTPATIENT)
Dept: FAMILY MEDICINE | Facility: CLINIC | Age: 52
End: 2017-04-27

## 2017-04-27 DIAGNOSIS — G89.4 CHRONIC PAIN SYNDROME: ICD-10-CM

## 2017-04-27 NOTE — TELEPHONE ENCOUNTER
Controlled Substance Refill Request for Percocet  Problem List Complete:  Yes  Patient is followed by COBY TINSLEY JR for ongoing prescription of pain medication. All refills should be approved by this provider, or covering partner.     Medication(s): Percocet 5/325 mg.   Maximum quantity per month: 70  Clinic visit frequency required: Q 6 months      Controlled substance agreement on file: Yes  Date(s): 1/11/16     Pain Clinic evaluation in the past: Yes  Date/Location:  [facility] - 2013. [place] - 2/3/16.     DIRE Total Score(s): 16  No flowsheet data found.     Last [facility] website verification: 11/28/2016   [email or web address]    Last Written Prescription Date:  3/14/17  Last Fill Quantity: 70,   # refills: 0    Last Office Visit with [facility] primary care provider: 2/6/17    Future Office visit:   Next 5 appointments (look out 90 days)     May 03, 2017  8:30 AM CDT   Return Visit with ANDREAS Presley CNP   [facility] ([facility])    [address] [address] [address]   [phone]                 Processing:  Patient will  in clinic  Estefani Morales, RN, BSN  [facility]

## 2017-04-27 NOTE — TELEPHONE ENCOUNTER
Message from [employer]:  Original authorizing provider: MD Saran Billingsley TERALeanne Kadi would like a refill of the following medications:  oxyCODONE-acetaminophen (PERCOCET) 5-325 MG per tablet [Jr Vamshi Clarke MD]    Preferred pharmacy: [facility] [id number] [place] [address] FU - [address]    Comment:

## 2017-04-28 RX ORDER — OXYCODONE AND ACETAMINOPHEN 5; 325 MG/1; MG/1
1-2 TABLET ORAL EVERY 8 HOURS PRN
Qty: 70 TABLET | Refills: 0 | Status: SHIPPED | OUTPATIENT
Start: 2017-04-28 | End: 2017-08-07

## 2017-04-28 NOTE — TELEPHONE ENCOUNTER
The requested prescription(s) has/have been approved and has/have been printed and signed. This note was forwarded to the patient care pool to contact the patient to arrange for the patient to obtain the signed prescription(s).  Vamshi Clarke Jr

## 2017-04-28 NOTE — TELEPHONE ENCOUNTER
Spoke with patient and informed him that prescription was placed at the  with his name on it.  Mariia Benavides MA

## 2017-05-02 ENCOUNTER — THERAPY VISIT (OUTPATIENT)
Dept: PHYSICAL THERAPY | Facility: CLINIC | Age: 52
End: 2017-05-02
Payer: COMMERCIAL

## 2017-05-02 DIAGNOSIS — G89.29 CHRONIC RADICULAR LOW BACK PAIN: ICD-10-CM

## 2017-05-02 DIAGNOSIS — M54.16 CHRONIC RADICULAR LOW BACK PAIN: ICD-10-CM

## 2017-05-02 PROCEDURE — 97530 THERAPEUTIC ACTIVITIES: CPT | Mod: GP | Performed by: PHYSICAL THERAPIST

## 2017-05-02 PROCEDURE — 97110 THERAPEUTIC EXERCISES: CPT | Mod: GP | Performed by: PHYSICAL THERAPIST

## 2017-05-02 NOTE — MR AVS SNAPSHOT
After Visit Summary   5/2/2017    Saran Wallis    MRN: [id number]           Patient Information     Date Of Birth          1965        Visit Information        Provider Department      5/2/2017 4:10 PM Eulalia Solitario, PT [facility]        Today's Diagnoses     Chronic radicular low back pain           Follow-ups after your visit        Your next 10 appointments already scheduled     May 03, 2017  8:30 AM CDT   Return Visit with ANDREAS Presley CNP   [facility] ([facility])    [address]  [address]  [place] [address]   [phone]            Aug 07, 2017  8:20 AM CDT   Office Visit with Vamshi Clarke Jr., MD   [facility]    [address] [address] [phone]           Bring a current list of meds and any records pertaining to this visit.  For Physicals, please bring immunization records and any forms needing to be filled out.  Please arrive 10 minutes early to complete paperwork.              Who to contact     If you have questions or need follow up information about today's clinic visit or your schedule please contact [facility] directly at [phone].  Normal or non-critical lab and imaging results will be communicated to you by VIVAhart, letter or phone within 4 business days after the clinic has received the results. If you do not hear from us within 7 days, please contact the clinic through VIVAhart or phone. If you have a critical or abnormal lab result, we will notify you by phone as soon as possible.  Submit refill requests through ethology or call your pharmacy and they will forward the refill request to us. Please allow 3 business days for your refill to be completed.          Additional Information About Your Visit        VIVAharYottaa Information     ethology gives you secure access to your electronic health record. If you see a primary  care provider, you can also send messages to your care team and make appointments. If you have questions, please call your primary care clinic.  If you do not have a primary care provider, please call [phone] and they will assist you.        Care EveryWhere ID     This is your Care EveryWhere ID. This could be used by other organizations to access your [facility] medical records  [id number]         Blood Pressure from Last 3 Encounters:   03/17/17 119/77   02/13/17 116/81   02/06/17 108/64    Weight from Last 3 Encounters:   02/06/17 87.5 kg (193 lb)   02/01/17 89.8 kg (198 lb)   11/03/16 89.4 kg (197 lb)              We Performed the Following     THERAPEUTIC ACTIVITIES     THERAPEUTIC EXERCISES        Primary Care Provider Office Phone # Fax #    Vamshi Clarke Jr., [phone]       [facility] [place] [address] [address]        Thank you!     Thank you for choosing [facility]  for your care. Our goal is always to provide you with excellent care. Hearing back from our patients is one way we can continue to improve our services. Please take a few minutes to complete the written survey that you may receive in the mail after your visit with us. Thank you!             Your Updated Medication List - Protect others around you: Learn how to safely use, store and throw away your medicines at www.disposemymeds.org.          This list is accurate as of: 5/2/17  4:51 PM.  Always use your most recent med list.                   Brand Name Dispense Instructions for use    ASPIRIN PO      Take 81 mg by mouth       blood glucose monitoring lancets     1 Box    Use to test blood sugar one times daily or as directed.  Ok to substitute alternative if insurance prefers.       blood glucose monitoring meter device kit     1 kit    Use to test blood sugar 1 times daily or as directed.  Ok to substitute alternative if insurance prefers.       blood glucose monitoring test strip     ONE TOUCH VERIO IQ    100 each    Use to test blood sugar one times daily or as directed.  Ok to substitute alternative if insurance prefers.       diclofenac 1 % Gel topical gel    VOLTAREN    100 g    Apply 4 grams to knees,  Left hip and buttock four times daily using enclosed dosing card.       gabapentin 300 MG capsule    NEURONTIN    540 capsule    2 caps tid       irbesartan 150 MG tablet    AVAPRO    90 tablet    Take 1 tablet (150 mg) by mouth daily       metFORMIN 500 MG tablet    GLUCOPHAGE    360 tablet    Take 2 tablets (1,000 mg) by mouth 2 times daily (with meals)       oxyCODONE-acetaminophen 5-325 MG per tablet    PERCOCET    70 tablet    Take 1-2 tablets by mouth every 8 hours as needed for moderate to severe pain or pain (Seventy tablets to last thirty days.)       pantoprazole 40 MG EC tablet    PROTONIX    90 tablet    Take 1 tablet (40 mg) by mouth daily       simvastatin 10 MG tablet    ZOCOR    90 tablet    Take 1 tablet (10 mg) by mouth At Bedtime       tiZANidine 4 MG tablet    ZANAFLEX    270 tablet    Take 1 tablet (4 mg) by mouth 3 times daily as needed for muscle spasms       traZODone 100 MG tablet    DESYREL    90 tablet    Take 1 tablet (100 mg) by mouth nightly as needed for sleep

## 2017-05-03 ENCOUNTER — OFFICE VISIT (OUTPATIENT)
Dept: PALLIATIVE MEDICINE | Facility: CLINIC | Age: 52
End: 2017-05-03
Payer: COMMERCIAL

## 2017-05-03 VITALS
SYSTOLIC BLOOD PRESSURE: 114 MMHG | HEART RATE: 100 BPM | WEIGHT: 193 LBS | OXYGEN SATURATION: 98 % | DIASTOLIC BLOOD PRESSURE: 68 MMHG | BODY MASS INDEX: 28.5 KG/M2

## 2017-05-03 DIAGNOSIS — K21.9 GASTROESOPHAGEAL REFLUX DISEASE WITHOUT ESOPHAGITIS: ICD-10-CM

## 2017-05-03 DIAGNOSIS — M79.662 PAIN OF LEFT LOWER LEG: ICD-10-CM

## 2017-05-03 DIAGNOSIS — M54.16 LUMBAR RADICULOPATHY: Primary | ICD-10-CM

## 2017-05-03 PROCEDURE — 99214 OFFICE O/P EST MOD 30 MIN: CPT | Performed by: NURSE PRACTITIONER

## 2017-05-03 RX ORDER — PANTOPRAZOLE SODIUM 40 MG/1
40 TABLET, DELAYED RELEASE ORAL DAILY
Qty: 90 TABLET | Refills: 1 | Status: SHIPPED | OUTPATIENT
Start: 2017-05-03 | End: 2017-10-26

## 2017-05-03 ASSESSMENT — PAIN SCALES - GENERAL: PAINLEVEL: MILD PAIN (3)

## 2017-05-03 NOTE — PROGRESS NOTES
[facility]  INTERVAL VISIT    This a  follow up examination  for Saran Wallis is a 51 year old male.  Primary Care provider:  MD Kenna Ibanez  Referring Provider: Vamshi Clarke MD    Today's visit: 05/03/2017  Last visit: 03/17/17  CHIEF COMPLAINT:  Chief Complaint   Patient presents with     Pain     left side pain     Interval Visit:   S/p Caudal BARBARA 2/13/17.  Lasting effect for about on week.2/13/17  Returning of left calve cramp  in back thigh, night worsening, always when laying on right side.  Low back pain better , left calve not much better   Completed Physical Therapy per note   Walking 20 minutes 3 days per week, stretching   Sleep, mood, libido poor,  trazodone ( Desyrel)  prn     Pain level 3/10 range 3-7/10  Pain description:  Low back aching,  Left calve cramping and numb  Aggravating factors: sitting and standing laying down at night    Relieving factors: laying down percocet and steroid injection     ASSESSMENT:  1. Chronic low back pain with radicular features L3-4 on right and L5-S1 on left  1. Left calve cramp >> gluteal deconditioning, ? Left hip bursitis, per interventionist could be related to lumbar Degenerative Disc Disease.  2. Chronic opioid use ~1 per day   3. Facet mediation VS hip pain VS Degenerative Disc Disease  4. left BARBARA 03/25/16 and 5/10/16,  Caudal BARBARA 2/13/17.   2. Multi level mild to moderate lumbar stenosis S1 nerve root impingement  3. Failed back syndrome S/p hemicraniectomy L4-5 2009, L5-S1 2012 x 2  4. Anxiety related to increase pain associated with routine, sexual activity reduced arousal chronic use of ativan. Seems controlled   5. MARK  6. History of Alcohol over use ( excess drinking in [place]) not drinking in [place] Currently resolved  7. H/O Hep B [place] at [facility]  8. OSTEOARTHRITIS right shoulder, forearm and knees   1. Repetitive use syndrome   1. Early ulnar neuritis  2. Tendonitis, vs rotator vs joint disease of the shoulder      9. Disorder of the Gluteus Medius Muscle.  1. Physical Deconditioning, Myofascial pain left gluteus medius         PLAN:  Medications:  Gabapentin to 600 mg  3 x per day.  Ok to take Percocet every 4 hours as needed, still limit to 3 per day prescribed per PCP   Occasional use of tylenol pm is ok   Diclofenac gel  As needed up to 4 x per day    Zanaflex 2-4 mg 3 x/ day prn         Stretching   Clam shells X 3 reps count to 5   Hip extensions X 3 reps count to 5   Hip flexion X 3 reps count to 5   Daily to 2x per day stretching   Daily walking or exercise         Referrals:  EMG, if + for lumbar radicular consider spinal cord stimulator,     Return visit to be scheduled after EMG Germania Gong CNP  Transition plan ( I am leaving position here) to Pia Clemente DO [facility]  for  spinal cord stimulator, if not a good candidate [place] to PCP  ? Vascular referral for left leg cramping can be done with PCP       PAIN HISTORY: Back hx since 1991 after a lifting injury, This episode resolved with periodic back pain until winter 2008. The patient had another lifting injury after raising a garage door. He then had discectomy with Dr. Macdonald. He report that his pain was well controlled until he was working in the yard. The patient went to [facility] and under the care of Dr. Rodriguez he had another discectomy. His pain did not improve post surgery, resulting in failed lback surgery. He then went to Dr. Macdonald who did not want to do surgery. He went to [facility] also in 2013, a lumbar fusion was recommended but denied by his insurance. He found the lack of surgical access from his insurance very frustrating and distressing. The patient then 2014 he went to [facility] an epidural was recommended and again denied by his insurance.   Over time the patient has become less active and spend the majority of his day is quite sedentary.   His pain is located central low back with muscle spasm and radiating pain  down both legs on left to L5-S1 and right to L3-4.    PAST MEDICATION TRIALS:   OPIOIDS: Fentanyl, Hydromorphone, Tramadol  NSAID'S/ANALEGESICS: clinoril, Ibuprofen   ANTIMIGRAINE:none  STEROIDS: none  ANTIDEPRESSANTS: Celexa, Sertraline Trazodone (bad taste in mouth)   ANTIANXIETY: Ativan   HYPNOTICS: Lunesta, Ambien,   ANTICONVULSANTS: gabapentin   TOPICALS: None     PAST PAIN TREATMENTS:   Y/N   HELP   NO HELP   WORSENED  PREVIOUS PAIN CLINIC:   YES   X ([facility], [facility])  SURGERY:     YES   X FOR SHORT TIME  INJECTIONS:               YES   PHYSICAL THERAPY:   YES   X (TENS)   EXERCISE:     YES   X  INTEGRATIVE MEDICINE  CHIROPRACTOR    NO   ACUPUNCTURE    NO  AROMATHERAPY    NO  RELAXATION THERAPY  NO  COUNSELING:    NO    Progress in pain program:  good  Impact on function:moderate to severe  Working  Yes   Quality of life:improved   Self care yes exercise yes,relaxation no, body awareness no.    CURRENT PAIN MEDICATION: Percocet 5/325 mg 2-3 per day, Gabapentin 600-tid, Trazodone 100 mg prn , tizanidine ( Zanaflex) 4 mg prn   Medication side effects: none     INVESTIGATIONAL:  MRI 2016 Jan 16 LUMBAR:  L1-L2, L2-L3: Normal.  L3-L4: Small to moderate central disc extrusion slightly to the left  of midline moderately indenting the thecal sac with overall mild  central stenosis but no apparent direct neural impingement. The neural  foramina appear to be bilaterally patent.  L4-L5: Broad-based central disc protrusion or herniation slightly  greater to the right of midline. No central stenosis. Lateral annular  bulging and endplate spurring results in moderate left and mild right  foraminal stenosis.  L5-S1: Postsurgical changes of left hemilaminotomy/posterior  decompression. There is a moderate broad-based central disc protrusion  or herniation posteriorly displacing the left S1 nerve root. No  central stenosis. The L5 neural foramina appear bilaterally patent.    IMPRESSION:  1. L5-S1 broad-based left central disc  herniation posteriorly  displacing the left S1 nerve root. There has been previous posterior  decompression. No central stenosis.  2. L4-L5 broad-based central disc protrusion or herniation without  apparent central stenosis.  3. L4-L5 moderate bilateral foraminal stenosis.  4. L3-L4 central disc herniation slightly to the left of midline  without apparent direct neural impingement or central stenosis      PMHX:  Past Medical History:   Diagnosis Date     Chronic radicular low back pain 4/30/2016     Depressive disorder      Diabetes mellitus (H)      Gastric reflux      Hypercholesteremia      Hypertension      PAST SURGICAL HISTORY  Past Surgical History:   Procedure Laterality Date     COLONOSCOPY N/A 1/28/2016    Procedure: COMBINED COLONOSCOPY, SINGLE OR MULTIPLE BIOPSY/POLYPECTOMY BY BIOPSY;  Surgeon: Michelle Morrison MD;  Location:  GI     ESOPHAGOSCOPY, GASTROSCOPY, DUODENOSCOPY (EGD), COMBINED N/A 5/13/2016    Procedure: COMBINED ESOPHAGOSCOPY, GASTROSCOPY, DUODENOSCOPY (EGD);  Surgeon: Vamshi Lynn MD;  Location:  GI     ORTHOPEDIC SURGERY         CURRENT MEDICATIONS:   Current Outpatient Prescriptions   Medication     oxyCODONE-acetaminophen (PERCOCET) 5-325 MG per tablet     gabapentin (NEURONTIN) 300 MG capsule     metFORMIN (GLUCOPHAGE) 500 MG tablet     irbesartan (AVAPRO) 150 MG tablet     traZODone (DESYREL) 100 MG tablet     simvastatin (ZOCOR) 10 MG tablet     pantoprazole (PROTONIX) 40 MG enteric coated tablet     blood glucose monitoring (ONETOUCH VERIO IQ SYSTEM) meter device kit     blood glucose monitoring (ONE TOUCH VERIO IQ) test strip     blood glucose monitoring (ONE TOUCH DELICA) lancets     tiZANidine (ZANAFLEX) 4 MG tablet     diclofenac (VOLTAREN) 1 % GEL     ASPIRIN PO     No current facility-administered medications for this visit.        ROS: twelve systems review negative except for: pain, cramps left calve and thigh, right shoulder   Since last visit: changes in  mood: No, suicide thoughts No  Any changes in your medical condition, illness, injury or hospitalizations: No    PHYSICAL EXAM:  Constitutional:Blood pressure 114/68, pulse 100, weight 87.5 kg (193 lb), SpO2 98 %., Body mass index is 28.5 kg/(m^2).  Psyche:  Fully oriented, Behavioral Observations: Eye contact  good. Mood  and spirits are improved .  Musculoskeletal exam:  Gait:  Steady reciprocating,  ROM within normal limits,  Shoulder symmetry good, strength 5/5,  No trigger points,   SAEED x 4, no producible pain with cramping in left calve  No  hip rotation.    Neuro exam:   Alert,  IGNACIO @  3 mm, Speech clear fluent and appropriate.   Skin/Vascular/ Autonomic:  warm, dry and intact    OTHER: Opioid risk for ongoing opioid management for non malignant chronic pain   DIRE Score for ongoing opioid management is calculated as follows:    Diagnosis = 2    Intractability = 2    Risk: Psych = 2  Chem Hlth = 3  Reliability = 3  Social = 2    Efficacy = 2    Total DIRE Score = 16 (14 or higher predicts good candidate for ongoing opioid management; 13 or lower predicts poor candidate for opioid management)   Carlos Rosa 2006,The Journal of  Pain.,The DIRE Score: Predicting Outcomes of Opioid Prescribing for Chronic Pain Vol.7,No.9, pp 671-681.  MNPMP : Reviewed and as expected without evidence of abuse or misuse? Yes  URINE DRUG SCREEN  yes, DATE: 2/3/16, as expected. OPIOID AGREEMENT: Yes DATE 1/11/16 Vamshi Clarke MD  Daily opioid use ~ 2- 3 /day 5 mg percocet. Morphine  EQ =20  mg /day     Analgesia improved, Adverse effects none,  Activity improved  Adherence good     Opioid management will continue with Vamshi Clarke MD    PROCEDURES none     Time spent: 20 minutes 100 % face to face including  15 minutes counseling and coordinating care for the above identified medical problems    Signed: ANDREAS Gaona, BC, C.N.P.  [facility]

## 2017-05-03 NOTE — Clinical Note
I thought you would like to know that I am leaving the pain clinic as of 6/16/17.  I am taking a position at [facility] inpt consult service. The patient will need to decide if he would like to be evaluated for a spinal cord stimulator.  He may be able to see Pia Clemente DO in our clinic here for this evaluation .  If the patient does not want this done then I will transfer his pain care back to you.  If he needs a future epidural you can write for just an injection referral for him to have this done.  Please let me know if you have questions or concerns  Germania Gong, CNP   [facility]

## 2017-05-03 NOTE — PATIENT INSTRUCTIONS
PLAN:  Medications:  Gabapentin to 600 mg  3 x per day.  Ok to take Percocet every 4 hours as needed, still limit to 3 per day prescribed per PCP   Occasional use of tylenol pm is ok   Diclofenac gel  As needed up to 4 x per day    Zanaflex 2-4 mg 3 x/ day prn         Stretching   Clam shells X 3 reps count to 5   Hip extensions X 3 reps count to 5   Hip flexion X 3 reps count to 5   Daily to 2x per day stretching   Daily walking or exercise         Referrals:  EMG, if + for lumbar radicular consider spinal cord stimulator,     Return visit to be scheduled after EMG Germania Gong CNP  Transition plan ( I am leaving position here) to Pia Clemente, DO [facility] for  spinal cord stimulator, if no radiculopathy >> [place] to PCP  ? Vascular referral         ----------------------------------------------------------------  Nurse Triage line:  [phone]   Call this number with any questions or concerns. You may leave a detailed message anytime. Calls are typically returned Monday through Friday between 8 AM and 4:30 PM. We usually get back to you within 2 business days depending on the issue/request.       Medication refills:    For non-narcotic medications, call your pharmacy directly to request a refill. The pharmacy will contact the [facility] for authorization. Please allow 3-4 days for these refills to be processed.     For narcotic refills, call the nurse triage line or send a [facility] message. Please contact us 7-10 days before your refill is due. The message MUST include the name of the specific medication(s) requested and how you would like to receive the prescription(s). The options are as follows:    Pain Clinic staff can mail the prescription to your pharmacy. Please tell us the name of the pharmacy.    You may pick the prescription up at the Pain Clinic (tell us the location) or during a clinic visit with your pain provider    Pain Clinic staff can deliver the prescription to the [facility] pharmacy  in the clinic building. Please tell us the location.      Scheduling number: [phone].  Call this number to schedule or change appointments.    We believe regular attendance is key to your success in our program.    Any time you are unable to keep your appointment we ask that you call us at least 24 hours in advance to let us know. This will allow us to offer the appointment time to another patient.

## 2017-05-03 NOTE — MR AVS SNAPSHOT
After Visit Summary   5/3/2017    Saran Wallis    MRN: [id number]           Patient Information     Date Of Birth          1965        Visit Information        Provider Department      5/3/2017 8:30 AM Germania Gong APRN CNP [facility]      Today's Diagnoses     Lumbar radiculopathy    -  1    Pain of left lower leg          Care Instructions    PLAN:  Medications:  Gabapentin to 600 mg  3 x per day.  Ok to take Percocet every 4 hours as needed, still limit to 3 per day prescribed per PCP   Occasional use of tylenol pm is ok   Diclofenac gel  As needed up to 4 x per day    Zanaflex 2-4 mg 3 x/ day prn         Stretching   Clam shells X 3 reps count to 5   Hip extensions X 3 reps count to 5   Hip flexion X 3 reps count to 5   Daily to 2x per day stretching   Daily walking or exercise         Referrals:  EMG, if + for lumbar radicular consider spinal cord stimulator,     Return visit to be scheduled after EMG Germania Gong CNP  Transition plan ( I am leaving position here) to [facility], DO [facility] for  spinal cord stimulator, if no radiculopathy >> TX to PCP  ? Vascular referral         ----------------------------------------------------------------  Nurse Triage line:  [phone]   Call this number with any questions or concerns. You may leave a detailed message anytime. Calls are typically returned Monday through Friday between 8 AM and 4:30 PM. We usually get back to you within 2 business days depending on the issue/request.       Medication refills:    For non-narcotic medications, call your pharmacy directly to request a refill. The pharmacy will contact the [facility] for authorization. Please allow 3-4 days for these refills to be processed.     For narcotic refills, call the nurse triage line or send a SampleBoard message. Please contact us 7-10 days before your refill is due. The message MUST include the name of the specific  medication(s) requested and how you would like to receive the prescription(s). The options are as follows:    Pain Clinic staff can mail the prescription to your pharmacy. Please tell us the name of the pharmacy.    You may pick the prescription up at the Pain Clinic (tell us the location) or during a clinic visit with your pain provider    Pain Clinic staff can deliver the prescription to the [facility] in the clinic building. Please tell us the location.      Scheduling number: [phone].  Call this number to schedule or change appointments.    We believe regular attendance is key to your success in our program.    Any time you are unable to keep your appointment we ask that you call us at least 24 hours in advance to let us know. This will allow us to offer the appointment time to another patient.           Follow-ups after your visit        Additional Services     NEUROLOGY ADULT REFERRAL       Your provider has referred you to: [facility]: [facility] of Neurology [facility] [phone]   [email or web address]    Reason for Referral: EMG Left leg     Please be aware that coverage of these services is subject to the terms and limitations of your health insurance plan.  Call member services at your health plan with any benefit or coverage questions.      Please bring the following with you to your appointment:    (1) Any X-Rays, CTs or MRIs which have been performed.  Contact the facility where they were done to arrange for  prior to your scheduled appointment.    (2) List of current medications  (3) This referral request   (4) Any documents/labs given to you for this referral                  Follow-up notes from your care team     Discussed this visit Return in about 4 weeks (around 5/31/2017) for Routine Visit.      Your next 10 appointments already scheduled     Aug 07, 2017  8:20 AM CDT   Office Visit with Vamshi Clarke Jr., MD   [facility]  ([facility])    [address] [place] [address] [phone]           Bring a current list of meds and any records pertaining to this visit.  For Physicals, please bring immunization records and any forms needing to be filled out.  Please arrive 10 minutes early to complete paperwork.              Who to contact     If you have questions or need follow up information about today's clinic visit or your schedule please contact [facility] directly at [phone].  Normal or non-critical lab and imaging results will be communicated to you by Evgenhart, letter or phone within 4 business days after the clinic has received the results. If you do not hear from us within 7 days, please contact the clinic through SurIDx or phone. If you have a critical or abnormal lab result, we will notify you by phone as soon as possible.  Submit refill requests through SurIDx or call your pharmacy and they will forward the refill request to us. Please allow 3 business days for your refill to be completed.          Additional Information About Your Visit        SurIDx Information     Sur[employer]x gives you secure access to your electronic health record. If you see a primary care provider, you can also send messages to your care team and make appointments. If you have questions, please call your primary care clinic.  If you do not have a primary care provider, please call [phone] and they will assist you.        Care EveryWhere ID     This is your Care EveryWhere ID. This could be used by other organizations to access your [place] medical records  [id number]        Your Vitals Were     Pulse Pulse Oximetry BMI (Body Mass Index)             100 98% 28.5 kg/m2          Blood Pressure from Last 3 Encounters:   05/03/17 114/68   03/17/17 119/77   02/13/17 116/81    Weight from Last 3 Encounters:   05/03/17 87.5 kg (193 lb)   02/06/17 87.5 kg (193 lb)   02/01/17 89.8 kg (198 lb)              We  Performed the Following     NEUROLOGY ADULT REFERRAL        Primary Care Provider Office Phone # Fax #    Vamshi Clarke Jr., [phone]       [facility] [address] [facility] [address]        Thank you!     Thank you for choosing [facility]  for your care. Our goal is always to provide you with excellent care. Hearing back from our patients is one way we can continue to improve our services. Please take a few minutes to complete the written survey that you may receive in the mail after your visit with us. Thank you!             Your Updated Medication List - Protect others around you: Learn how to safely use, store and throw away your medicines at www.disposemymeds.org.          This list is accurate as of: 5/3/17  9:28 AM.  Always use your most recent med list.                   Brand Name Dispense Instructions for use    ASPIRIN PO      Take 81 mg by mouth       blood glucose monitoring lancets     1 Box    Use to test blood sugar one times daily or as directed.  Ok to substitute alternative if insurance prefers.       blood glucose monitoring meter device kit     1 kit    Use to test blood sugar 1 times daily or as directed.  Ok to substitute alternative if insurance prefers.       blood glucose monitoring test strip    ONE TOUCH VERIO IQ    100 each    Use to test blood sugar one times daily or as directed.  Ok to substitute alternative if insurance prefers.       diclofenac 1 % Gel topical gel    VOLTAREN    100 g    Apply 4 grams to knees,  Left hip and buttock four times daily using enclosed dosing card.       gabapentin 300 MG capsule    NEURONTIN    540 capsule    2 caps tid       irbesartan 150 MG tablet    AVAPRO    90 tablet    Take 1 tablet (150 mg) by mouth daily       metFORMIN 500 MG tablet    GLUCOPHAGE    360 tablet    Take 2 tablets (1,000 mg) by mouth 2 times daily (with meals)       oxyCODONE-acetaminophen 5-325 MG per tablet    PERCOCET     70 tablet    Take 1-2 tablets by mouth every 8 hours as needed for moderate to severe pain or pain (Seventy tablets to last thirty days.)       pantoprazole 40 MG EC tablet    PROTONIX    90 tablet    Take 1 tablet (40 mg) by mouth daily       simvastatin 10 MG tablet    ZOCOR    90 tablet    Take 1 tablet (10 mg) by mouth At Bedtime       tiZANidine 4 MG tablet    ZANAFLEX    270 tablet    Take 1 tablet (4 mg) by mouth 3 times daily as needed for muscle spasms       traZODone 100 MG tablet    DESYREL    90 tablet    Take 1 tablet (100 mg) by mouth nightly as needed for sleep

## 2017-05-03 NOTE — NURSING NOTE
"Chief Complaint   Patient presents with     Pain     left side pain       Initial /68  Pulse 100  Wt 87.5 kg (193 lb)  SpO2 98%  BMI 28.5 kg/m2 Estimated body mass index is 28.5 kg/(m^2) as calculated from the following:    Height as of 2/6/17: 1.753 m (5' 9\").    Weight as of this encounter: 87.5 kg (193 lb).  [facility]    "

## 2017-05-03 NOTE — TELEPHONE ENCOUNTER
pantoprazole (PROTONIX) 40 MG enteric coated tablet      Last Written Prescription Date: 11/2/2016  Last Fill Quantity: 90 tablet,  # refills: 1   Last Office Visit with FMMARYAN, YESENIAP or [facility] prescribing provider: 2/6/2017

## 2017-05-09 ENCOUNTER — OFFICE VISIT (OUTPATIENT)
Dept: FAMILY MEDICINE | Facility: CLINIC | Age: 52
End: 2017-05-09
Payer: COMMERCIAL

## 2017-05-09 ENCOUNTER — RADIANT APPOINTMENT (OUTPATIENT)
Dept: GENERAL RADIOLOGY | Facility: CLINIC | Age: 52
End: 2017-05-09
Attending: PHYSICIAN ASSISTANT
Payer: COMMERCIAL

## 2017-05-09 VITALS
OXYGEN SATURATION: 100 % | DIASTOLIC BLOOD PRESSURE: 76 MMHG | BODY MASS INDEX: 29.62 KG/M2 | WEIGHT: 200 LBS | TEMPERATURE: 97.8 F | SYSTOLIC BLOOD PRESSURE: 112 MMHG | HEART RATE: 80 BPM | HEIGHT: 69 IN

## 2017-05-09 DIAGNOSIS — R53.83 FATIGUE, UNSPECIFIED TYPE: ICD-10-CM

## 2017-05-09 DIAGNOSIS — R19.5 LOOSE STOOLS: ICD-10-CM

## 2017-05-09 DIAGNOSIS — R00.2 PALPITATIONS: Primary | ICD-10-CM

## 2017-05-09 DIAGNOSIS — G47.33 OSA (OBSTRUCTIVE SLEEP APNEA): ICD-10-CM

## 2017-05-09 DIAGNOSIS — R51.9 FREQUENT HEADACHES: ICD-10-CM

## 2017-05-09 LAB
BASOPHILS # BLD AUTO: 0 10E9/L (ref 0–0.2)
BASOPHILS NFR BLD AUTO: 0.3 %
DIFFERENTIAL METHOD BLD: NORMAL
EOSINOPHIL # BLD AUTO: 0.2 10E9/L (ref 0–0.7)
EOSINOPHIL NFR BLD AUTO: 2.8 %
ERYTHROCYTE [DISTWIDTH] IN BLOOD BY AUTOMATED COUNT: 11.7 % (ref 10–15)
HBA1C MFR BLD: 6.4 % (ref 4.3–6)
HCT VFR BLD AUTO: 41 % (ref 40–53)
HGB BLD-MCNC: 14.3 G/DL (ref 13.3–17.7)
LYMPHOCYTES # BLD AUTO: 2.3 10E9/L (ref 0.8–5.3)
LYMPHOCYTES NFR BLD AUTO: 32.1 %
MCH RBC QN AUTO: 31.6 PG (ref 26.5–33)
MCHC RBC AUTO-ENTMCNC: 34.9 G/DL (ref 31.5–36.5)
MCV RBC AUTO: 91 FL (ref 78–100)
MONOCYTES # BLD AUTO: 0.5 10E9/L (ref 0–1.3)
MONOCYTES NFR BLD AUTO: 6.8 %
NEUTROPHILS # BLD AUTO: 4.1 10E9/L (ref 1.6–8.3)
NEUTROPHILS NFR BLD AUTO: 58 %
PLATELET # BLD AUTO: 178 10E9/L (ref 150–450)
RBC # BLD AUTO: 4.53 10E12/L (ref 4.4–5.9)
WBC # BLD AUTO: 7.1 10E9/L (ref 4–11)

## 2017-05-09 PROCEDURE — 71020 XR CHEST 2 VW: CPT

## 2017-05-09 PROCEDURE — 93000 ELECTROCARDIOGRAM COMPLETE: CPT | Performed by: PHYSICIAN ASSISTANT

## 2017-05-09 PROCEDURE — 83516 IMMUNOASSAY NONANTIBODY: CPT | Mod: 59 | Performed by: PHYSICIAN ASSISTANT

## 2017-05-09 PROCEDURE — 99214 OFFICE O/P EST MOD 30 MIN: CPT | Performed by: PHYSICIAN ASSISTANT

## 2017-05-09 PROCEDURE — 80050 GENERAL HEALTH PANEL: CPT | Performed by: PHYSICIAN ASSISTANT

## 2017-05-09 PROCEDURE — 36415 COLL VENOUS BLD VENIPUNCTURE: CPT | Performed by: PHYSICIAN ASSISTANT

## 2017-05-09 PROCEDURE — 83516 IMMUNOASSAY NONANTIBODY: CPT | Performed by: PHYSICIAN ASSISTANT

## 2017-05-09 PROCEDURE — 83036 HEMOGLOBIN GLYCOSYLATED A1C: CPT | Performed by: PHYSICIAN ASSISTANT

## 2017-05-09 ASSESSMENT — ANXIETY QUESTIONNAIRES
3. WORRYING TOO MUCH ABOUT DIFFERENT THINGS: NOT AT ALL
GAD7 TOTAL SCORE: 0
IF YOU CHECKED OFF ANY PROBLEMS ON THIS QUESTIONNAIRE, HOW DIFFICULT HAVE THESE PROBLEMS MADE IT FOR YOU TO DO YOUR WORK, TAKE CARE OF THINGS AT HOME, OR GET ALONG WITH OTHER PEOPLE: NOT DIFFICULT AT ALL
6. BECOMING EASILY ANNOYED OR IRRITABLE: NOT AT ALL
2. NOT BEING ABLE TO STOP OR CONTROL WORRYING: NOT AT ALL
7. FEELING AFRAID AS IF SOMETHING AWFUL MIGHT HAPPEN: NOT AT ALL
5. BEING SO RESTLESS THAT IT IS HARD TO SIT STILL: NOT AT ALL
1. FEELING NERVOUS, ANXIOUS, OR ON EDGE: NOT AT ALL

## 2017-05-09 ASSESSMENT — PATIENT HEALTH QUESTIONNAIRE - PHQ9: 5. POOR APPETITE OR OVEREATING: NOT AT ALL

## 2017-05-09 NOTE — NURSING NOTE
"Chief Complaint   Patient presents with     Headache     Diarrhea     Fatigue       Initial /76 (BP Location: Right arm, Cuff Size: Adult Regular)  Pulse 110  Temp 97.8  F (36.6  C) (Oral)  Ht 5' 9\" (1.753 m)  Wt 200 lb (90.7 kg)  SpO2 100%  BMI 29.53 kg/m2 Estimated body mass index is 29.53 kg/(m^2) as calculated from the following:    Height as of this encounter: 5' 9\" (1.753 m).    Weight as of this encounter: 200 lb (90.7 kg).  Medication Reconciliation: complete    "

## 2017-05-09 NOTE — MR AVS SNAPSHOT
After Visit Summary   5/9/2017    Saran Wallis    MRN: [id number]           Patient Information     Date Of Birth          1965        Visit Information        Provider Department      5/9/2017 10:00 AM Kari Glaser PA-C [facility]        Today's Diagnoses     Palpitations    -  1    Loose stools        Frequent headaches        MARK (obstructive sleep apnea)          Care Instructions    Exact origin of sx not yet known.  Normal EKG, CXR and no evidence for anemia.   Will repeat screening for electrolytes, TSH and also check for celiac disease.  Given persistence of HA also discussed brain MRI, but pt declines as otherwise neurologically intact.  Reviewed that untreated sleep apnea certainly can cause fatigue, palpitations and headaches.  Also reviewed how medications could cause these sx.  Start percocet taper as directed.  Re-check in 2-3 weeks.    Electronically Signed By: Kari Glaser PA-C              Follow-ups after your visit        Your next 10 appointments already scheduled     Aug 07, 2017  8:20 AM CDT   Office Visit with Vamshi Clarke Jr., MD   [facility] ([facility])    [address] [address] [phone]           Bring a current list of meds and any records pertaining to this visit.  For Physicals, please bring immunization records and any forms needing to be filled out.  Please arrive 10 minutes early to complete paperwork.              Who to contact     If you have questions or need follow up information about today's clinic visit or your schedule please contact [facility] directly at [phone].  Normal or non-critical lab and imaging results will be communicated to you by MyChart, letter or phone within 4 business days after the clinic has received the results. If you do not hear from us within 7 days, please contact the clinic through MyChart or phone. If you have a critical or  "abnormal lab result, we will notify you by phone as soon as possible.  Submit refill requests through [employer] or call your pharmacy and they will forward the refill request to us. Please allow 3 business days for your refill to be completed.          Additional Information About Your Visit        [facility] Information     [employer] gives you secure access to your electronic health record. If you see a primary care provider, you can also send messages to your care team and make appointments. If you have questions, please call your primary care clinic.  If you do not have a primary care provider, please call [phone] and they will assist you.        Care EveryWhere ID     This is your Care EveryWhere ID. This could be used by other organizations to access your [place] medical records  [id number]        Your Vitals Were     Pulse Temperature Height Pulse Oximetry BMI (Body Mass Index)       110 97.8  F (36.6  C) (Oral) 5' 9\" (1.753 m) 100% 29.53 kg/m2        Blood Pressure from Last 3 Encounters:   05/09/17 112/76   05/03/17 114/68   03/17/17 119/77    Weight from Last 3 Encounters:   05/09/17 200 lb (90.7 kg)   05/03/17 193 lb (87.5 kg)   02/06/17 193 lb (87.5 kg)              We Performed the Following     CBC with platelets and differential     Comprehensive metabolic panel (BMP + Alb, Alk Phos, ALT, AST, Total. Bili, TP)     EKG 12-lead complete w/read - Clinics     Hemoglobin A1c     Tissue transglutaminase olvin IgA and IgG     TSH with free T4 reflex     XR Chest 2 Views        Primary Care Provider Office Phone # Fax #    Vamshi Clarke Jr., [phone]       [facility] [address]  [facility] [address]        Thank you!     Thank you for choosing [facility]  for your care. Our goal is always to provide you with excellent care. Hearing back from our patients is one way we can continue to improve our services. Please take a few minutes to complete the written " survey that you may receive in the mail after your visit with us. Thank you!             Your Updated Medication List - Protect others around you: Learn how to safely use, store and throw away your medicines at www.disposemymeds.org.          This list is accurate as of: 5/9/17 11:37 AM.  Always use your most recent med list.                   Brand Name Dispense Instructions for use    ASPIRIN PO      Take 81 mg by mouth       blood glucose monitoring lancets     1 Box    Use to test blood sugar one times daily or as directed.  Ok to substitute alternative if insurance prefers.       blood glucose monitoring meter device kit     1 kit    Use to test blood sugar 1 times daily or as directed.  Ok to substitute alternative if insurance prefers.       blood glucose monitoring test strip    ONE TOUCH VERIO IQ    100 each    Use to test blood sugar one times daily or as directed.  Ok to substitute alternative if insurance prefers.       diclofenac 1 % Gel topical gel    VOLTAREN    100 g    Apply 4 grams to knees,  Left hip and buttock four times daily using enclosed dosing card.       gabapentin 300 MG capsule    NEURONTIN    540 capsule    2 caps tid       irbesartan 150 MG tablet    AVAPRO    90 tablet    Take 1 tablet (150 mg) by mouth daily       metFORMIN 500 MG tablet    GLUCOPHAGE    360 tablet    Take 2 tablets (1,000 mg) by mouth 2 times daily (with meals)       oxyCODONE-acetaminophen 5-325 MG per tablet    PERCOCET    70 tablet    Take 1-2 tablets by mouth every 8 hours as needed for moderate to severe pain or pain (Seventy tablets to last thirty days.)       pantoprazole 40 MG EC tablet    PROTONIX    90 tablet    Take 1 tablet (40 mg) by mouth daily       simvastatin 10 MG tablet    ZOCOR    90 tablet    Take 1 tablet (10 mg) by mouth At Bedtime       tiZANidine 4 MG tablet    ZANAFLEX    270 tablet    Take 1 tablet (4 mg) by mouth 3 times daily as needed for muscle spasms       traZODone 100 MG tablet     DESYREL    90 tablet    Take 1 tablet (100 mg) by mouth nightly as needed for sleep

## 2017-05-09 NOTE — PATIENT INSTRUCTIONS
Exact origin of sx not yet known.  Normal EKG, CXR and no evidence for anemia.   Will repeat screening for electrolytes, TSH and also check for celiac disease.  Given persistence of HA also discussed brain MRI, but pt declines as otherwise neurologically intact.  Reviewed that untreated sleep apnea certainly can cause fatigue, palpitations and headaches.  Also reviewed how medications could cause these sx.  Start percocet taper as directed.  Re-check in 2-3 weeks.    Electronically Signed By: Kari Glaser PA-C
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
## 2017-05-09 NOTE — LETTER
[facility] [place]          [address] [place] [address]                                           [phone]  May 16, 2017     Saran Wallis  [address]  [facility] [address]      Dear Saran,    The results of your recent tests were reviewed and are as follows:    -Liver and gallbladder tests are normal. (ALT,AST, Alk phos, bilirubin), kidney function is normal (Cr, GFR), Sodium is normal, Potassium is normal, Calcium is normal, Glucose is elevated consistent with your history of diabetes.   -TSH (thyroid stimulating hormone) level is normal which indicates normal thyroid function.  -Normal red blood cell (hgb) levels, normal white blood cell count and normal platelet levels.  -A1C (test of diabetes control the last 2-3 months) is at your goal. Please continue with current plan. Follow-up with Dr. Clarke as planned for routine care.  -Celiac disease testing was normal.   Follow-up as previously discussed in clinic.    Enclosed is a copy of the results.     Thank you for choosing [facility].  We appreciate the opportunity to serve you and look forward to supporting your healthcare needs in the future.    If you have any questions or concerns, please call me or my staff at [phone].      Sincerely,    CARLOS Amor

## 2017-05-10 LAB
ALBUMIN SERPL-MCNC: 4.5 G/DL (ref 3.4–5)
ALP SERPL-CCNC: 94 U/L (ref 40–150)
ALT SERPL W P-5'-P-CCNC: 59 U/L (ref 0–70)
ANION GAP SERPL CALCULATED.3IONS-SCNC: 11 MMOL/L (ref 3–14)
AST SERPL W P-5'-P-CCNC: 30 U/L (ref 0–45)
BILIRUB SERPL-MCNC: 0.8 MG/DL (ref 0.2–1.3)
BUN SERPL-MCNC: 17 MG/DL (ref 7–30)
CALCIUM SERPL-MCNC: 9.4 MG/DL (ref 8.5–10.1)
CHLORIDE SERPL-SCNC: 104 MMOL/L (ref 94–109)
CO2 SERPL-SCNC: 24 MMOL/L (ref 20–32)
CREAT SERPL-MCNC: 0.85 MG/DL (ref 0.66–1.25)
GFR SERPL CREATININE-BSD FRML MDRD: ABNORMAL ML/MIN/1.7M2
GLUCOSE SERPL-MCNC: 203 MG/DL (ref 70–99)
POTASSIUM SERPL-SCNC: 4.4 MMOL/L (ref 3.4–5.3)
PROT SERPL-MCNC: 7.8 G/DL (ref 6.8–8.8)
SODIUM SERPL-SCNC: 139 MMOL/L (ref 133–144)
TSH SERPL DL<=0.005 MIU/L-ACNC: 1.3 MU/L (ref 0.4–4)
TTG IGA SER-ACNC: NORMAL U/ML
TTG IGG SER-ACNC: NORMAL U/ML

## 2017-05-10 ASSESSMENT — PATIENT HEALTH QUESTIONNAIRE - PHQ9: SUM OF ALL RESPONSES TO PHQ QUESTIONS 1-9: 9

## 2017-05-10 ASSESSMENT — ANXIETY QUESTIONNAIRES: GAD7 TOTAL SCORE: 0

## 2017-05-11 ENCOUNTER — TELEPHONE (OUTPATIENT)
Dept: GENERAL RADIOLOGY | Facility: CLINIC | Age: 52
End: 2017-05-11

## 2017-05-11 NOTE — TELEPHONE ENCOUNTER
Notice of approval of pantoprazole (PROTONIX) 40 MG EC tablet prescription drug coverage.  Dates of coverage have not yet been provided.    Insurance:  [employer]      Name of medication and dosage:  pantoprazole (PROTONIX) 40 MG EC tablet  Previously tried and failed:  NA  Submitted PA via:  [email or web address]  To:  [employer]  Reference #:  [id number]  Standard or Urgent:  Standard  Date submitted:  5/11/2017  MA signature:  Amanda Cameron RT

## 2017-05-16 NOTE — PROGRESS NOTES
Please call or write patient with the following results:    -Liver and gallbladder tests are normal. (ALT,AST, Alk phos, bilirubin), kidney function is normal (Cr, GFR), Sodium is normal, Potassium is normal, Calcium is normal, Glucose is elevated consistent with your history of diabetes.   -TSH (thyroid stimulating hormone) level is normal which indicates normal thyroid function.  -Normal red blood cell (hgb) levels, normal white blood cell count and normal platelet levels.  -A1C (test of diabetes control the last 2-3 months) is at your goal. Please continue with current plan. Follow-up with Dr. Clarke as planned for routine care.  -Celiac disease testing was normal.   Follow-up as previously discussed in clinic.    Electronically Signed By: Kari Glaser PA-C

## 2017-07-16 ENCOUNTER — MYC REFILL (OUTPATIENT)
Dept: FAMILY MEDICINE | Facility: CLINIC | Age: 52
End: 2017-07-16

## 2017-07-16 DIAGNOSIS — E78.5 HYPERLIPIDEMIA LDL GOAL <70: ICD-10-CM

## 2017-07-16 DIAGNOSIS — I10 ESSENTIAL HYPERTENSION WITH GOAL BLOOD PRESSURE LESS THAN 140/90: ICD-10-CM

## 2017-07-18 NOTE — TELEPHONE ENCOUNTER
Message from [employer]ool[employer]:  Original authorizing provider: MD Saran Olson Jr would like a refill of the following medications:  simvastatin (ZOCOR) 10 MG tablet [Vamshi Clarke Jr, MD]  irbesartan (AVAPRO) 150 MG tablet [Vamshi Clarke Jr, MD]    Preferred pharmacy: [facility] [place] - [address]    Comment:

## 2017-07-18 NOTE — TELEPHONE ENCOUNTER
irbesartan (AVAPRO) 150 MG tablet      Last Written Prescription Date: 2/6/2017  Last Fill Quantity: 90 tablet, # refills: 1  Last Office Visit with [facility] [place] [place], [facility] or  Health prescribing provider: 5/9/2017  Next 5 appointments (look out 90 days)     Aug 07, 2017  8:20 AM CDT   Office Visit with Vamshi Clarke Jr., MD   [facility] ([facility]    [address] at [address] [facility] [id number] [phone]                   Potassium   Date Value Ref Range Status   05/09/2017 4.4 3.4 - 5.3 mmol/L Final     Creatinine   Date Value Ref Range Status   05/09/2017 0.85 0.66 - 1.25 mg/dL Final     BP Readings from Last 3 Encounters:   05/09/17 112/76   05/03/17 114/68   03/17/17 119/77         simvastatin (ZOCOR) 10 MG tablet     Last Written Prescription Date: 1/16/2017  Last Fill Quantity: 90 tablet, # refills: 1  Last Office Visit with [facility] [place], [facility] or  Health prescribing provider: 5/9/2017  Next 5 appointments (look out 90 days)     Aug 07, 2017  8:20 AM CDT   Office Visit with Vamshi Clarke Jr., MD   [facility] ([facility]    [address]  [facility] [id number] [phone]                   Lab Results   Component Value Date    CHOL 113 05/03/2016     Lab Results   Component Value Date    HDL 35 05/03/2016     Lab Results   Component Value Date    LDL 51 05/03/2016     Lab Results   Component Value Date    TRIG 133 05/03/2016     No results found for: CHOLIVANO

## 2017-07-19 RX ORDER — IRBESARTAN 150 MG/1
150 TABLET ORAL DAILY
Qty: 90 TABLET | Refills: 1 | Status: SHIPPED | OUTPATIENT
Start: 2017-07-19 | End: 2017-11-28

## 2017-07-19 RX ORDER — SIMVASTATIN 10 MG
10 TABLET ORAL AT BEDTIME
Qty: 90 TABLET | Refills: 1 | Status: SHIPPED | OUTPATIENT
Start: 2017-07-19 | End: 2017-11-28

## 2017-07-19 NOTE — TELEPHONE ENCOUNTER
Prescription approved per [facility] Refill Protocol.  Peggy Gutierrez R.N.

## 2017-08-07 ENCOUNTER — OFFICE VISIT (OUTPATIENT)
Dept: FAMILY MEDICINE | Facility: CLINIC | Age: 52
End: 2017-08-07
Payer: COMMERCIAL

## 2017-08-07 VITALS
WEIGHT: 203 LBS | TEMPERATURE: 97.4 F | HEIGHT: 69 IN | HEART RATE: 104 BPM | DIASTOLIC BLOOD PRESSURE: 66 MMHG | SYSTOLIC BLOOD PRESSURE: 110 MMHG | OXYGEN SATURATION: 97 % | BODY MASS INDEX: 30.07 KG/M2

## 2017-08-07 DIAGNOSIS — E78.5 HYPERLIPIDEMIA LDL GOAL <70: ICD-10-CM

## 2017-08-07 DIAGNOSIS — E11.9 TYPE 2 DIABETES MELLITUS WITHOUT COMPLICATION, WITHOUT LONG-TERM CURRENT USE OF INSULIN (H): Primary | ICD-10-CM

## 2017-08-07 DIAGNOSIS — F51.04 CHRONIC INSOMNIA: ICD-10-CM

## 2017-08-07 DIAGNOSIS — G89.4 CHRONIC PAIN SYNDROME: ICD-10-CM

## 2017-08-07 LAB
CHOLEST SERPL-MCNC: 132 MG/DL
HBA1C MFR BLD: 6.9 % (ref 4.3–6)
HDLC SERPL-MCNC: 47 MG/DL
LDLC SERPL CALC-MCNC: 45 MG/DL
NONHDLC SERPL-MCNC: 85 MG/DL
TRIGL SERPL-MCNC: 198 MG/DL

## 2017-08-07 PROCEDURE — 99214 OFFICE O/P EST MOD 30 MIN: CPT | Performed by: FAMILY MEDICINE

## 2017-08-07 PROCEDURE — 83036 HEMOGLOBIN GLYCOSYLATED A1C: CPT | Performed by: FAMILY MEDICINE

## 2017-08-07 PROCEDURE — 80061 LIPID PANEL: CPT | Performed by: FAMILY MEDICINE

## 2017-08-07 PROCEDURE — 36415 COLL VENOUS BLD VENIPUNCTURE: CPT | Performed by: FAMILY MEDICINE

## 2017-08-07 RX ORDER — TRAZODONE HYDROCHLORIDE 100 MG/1
100 TABLET ORAL
Qty: 90 TABLET | Refills: 1 | Status: SHIPPED | OUTPATIENT
Start: 2017-08-07 | End: 2017-10-13

## 2017-08-07 NOTE — PROGRESS NOTES
Mr. Wallis,    -Cholesterol levels are at your goal levels.  ADVISE: Continuing your medication, a regular exercise program with at least 30 minutes of aerobic exercise 3-4 days/week ( 45 minutes 4-6 days/week if weight loss needed), and a low saturated fat,/low carbohydrate diet are helpful to maintain this.  Rechecking your fasting cholesterol panel in 12 months is recommended (Lipid w/ LDL reflex).  -A1C (test of diabetes control the last 2-3 months) is at your goal. Please continue with current plan. Also, see me and recheck your A1C test in 6 months.   -You should be getting a call from our chiropractic schedulers to get you seen for an acupuncture evaluation.    If you have further questions about the interpretation of your labs, [email or web address] is a good website to check out for further information.    Please contact the clinic if you have additional questions.  Thank you.    Sincerely,    Vamshi Clarke MD

## 2017-08-07 NOTE — MR AVS SNAPSHOT
After Visit Summary   8/7/2017    Saran Wallis    MRN: [id number]           Patient Information     Date Of Birth          1965        Visit Information        Provider Department      8/7/2017 8:20 AM Vamshi Clarke Jr., MD [facility]        Today's Diagnoses     Type 2 diabetes mellitus without complication, without long-term current use of insulin (H)    -  1    Chronic insomnia        Hyperlipidemia LDL goal <70           Follow-ups after your visit        Follow-up notes from your care team     Return in about 6 months (around 2/7/2018) for Diabetes Recheck.      Who to contact     If you have questions or need follow up information about today's clinic visit or your schedule please contact [facility] [place] directly at [phone].  Normal or non-critical lab and imaging results will be communicated to you by LocBoxhart, letter or phone within 4 business days after the clinic has received the results. If you do not hear from us within 7 days, please contact the clinic through LocBoxhart or phone. If you have a critical or abnormal lab result, we will notify you by phone as soon as possible.  Submit refill requests through [email or web address] or call your pharmacy and they will forward the refill request to us. Please allow 3 business days for your refill to be completed.          Additional Information About Your Visit        MyChart Information     [email or web address] gives you secure access to your electronic health record. If you see a primary care provider, you can also send messages to your care team and make appointments. If you have questions, please call your primary care clinic.  If you do not have a primary care provider, please call [phone] and they will assist you.        Care EveryWhere ID     This is your Care EveryWhere ID. This could be used by other organizations to access your [facility] medical records  [id number]        Your Vitals Were     Pulse Temperature Height Pulse  "Oximetry BMI (Body Mass Index)       104 97.4  F (36.3  C) (Oral) 5' 9\" (1.753 m) 97% 29.98 kg/m2        Blood Pressure from Last 3 Encounters:   08/07/17 110/66   05/09/17 112/76   05/03/17 114/68    Weight from Last 3 Encounters:   08/07/17 203 lb (92.1 kg)   05/09/17 200 lb (90.7 kg)   05/03/17 193 lb (87.5 kg)              We Performed the Following     Hemoglobin A1c     Lipid panel reflex to direct LDL          Where to get your medicines      These medications were sent to [id number] - [place], [place] - [address] AT [place] [address]  [address] [address]     Phone:  [phone]     blood glucose monitoring lancets    blood glucose monitoring test strip    metFORMIN 500 MG tablet    traZODone 100 MG tablet          Primary Care Provider Office Phone # Fax #    Vamshi Clarke Jr., [phone]       [facility] [address] [facility] [address]        Equal Access to Services     [facility] AH: Hadii aad ku hadasho Soomaali, waaxda luqadaha, qaybta kaalmada adeegyada, waxay mercyin hayaan laura man lanazn ah. So [facility] [phone].    ATENCIÓN: Si habla español, tiene a kwan disposición servicios gratuitos de asistencia lingüística. Llame al [phone].    We comply with applicable federal civil rights laws and [place] laws. We do not discriminate on the basis of race, color, national origin, age, disability sex, sexual orientation or gender identity.            Thank you!     Thank you for choosing [facility]  for your care. Our goal is always to provide you with excellent care. Hearing back from our patients is one way we can continue to improve our services. Please take a few minutes to complete the written survey that you may receive in the mail after your visit with us. Thank you!             Your Updated Medication List - Protect others around you: Learn how to safely use, store and throw away your medicines at " www.disposemymeds.org.          This list is accurate as of: 8/7/17  8:52 AM.  Always use your most recent med list.                   Brand Name Dispense Instructions for use Diagnosis    ASPIRIN PO      Take 81 mg by mouth    Chronic pain syndrome, Chronic radicular low back pain, Physical deconditioning, Major depressive disorder, recurrent episode, mild (H), Chronic insomnia       blood glucose monitoring lancets     100 each    Use to test blood sugar one times daily or as directed.  Ok to substitute alternative if insurance prefers.    Type 2 diabetes mellitus without complication, without long-term current use of insulin (H)       blood glucose monitoring meter device kit     1 kit    Use to test blood sugar 1 times daily or as directed.  Ok to substitute alternative if insurance prefers.    Type 2 diabetes mellitus without complication, without long-term current use of insulin (H)       blood glucose monitoring test strip    ONE TOUCH VERIO IQ    100 each    Use to test blood sugar one times daily or as directed.  Ok to substitute alternative if insurance prefers.    Type 2 diabetes mellitus without complication, without long-term current use of insulin (H)       gabapentin 300 MG capsule    NEURONTIN    540 capsule    2 caps tid    Chronic pain syndrome, Chronic radicular low back pain, Physical deconditioning, Major depressive disorder, recurrent episode, mild (H), Chronic insomnia, Disorder of bursae and tendons in shoulder region       irbesartan 150 MG tablet    AVAPRO    90 tablet    Take 1 tablet (150 mg) by mouth daily    Essential hypertension with goal blood pressure less than 140/90       metFORMIN 500 MG tablet    GLUCOPHAGE    360 tablet    Take 2 tablets (1,000 mg) by mouth 2 times daily (with meals)    Type 2 diabetes mellitus without complication, without long-term current use of insulin (H)       pantoprazole 40 MG EC tablet    PROTONIX    90 tablet    Take 1 tablet (40 mg) by mouth daily     Gastroesophageal reflux disease without esophagitis       simvastatin 10 MG tablet    ZOCOR    90 tablet    Take 1 tablet (10 mg) by mouth At Bedtime    Hyperlipidemia LDL goal <70       tiZANidine 4 MG tablet    ZANAFLEX    270 tablet    Take 1 tablet (4 mg) by mouth 3 times daily as needed for muscle spasms    Myofascial pain, Chronic pain syndrome       traZODone 100 MG tablet    DESYREL    90 tablet    Take 1 tablet (100 mg) by mouth nightly as needed for sleep    Chronic insomnia

## 2017-08-07 NOTE — Clinical Note
Kim - chronic pain patient of mine now off narcotics who is interested in acupuncture to help manage his pain.  Do you know of anyone in the [facility] who does this?

## 2017-08-07 NOTE — PROGRESS NOTES
SUBJECTIVE:                                                    Saran Wallis is a 52 year old male who presents to clinic today for the following health issues:      Diabetes Follow-up      Patient is checking blood sugars: not at all    Diabetic concerns: None     Symptoms of hypoglycemia (low blood sugar): dizziness     Paresthesias (numbness or burning in feet) or sores: No     Date of last diabetic eye exam: last winter pt unsure of date     Hyperlipidemia Follow-Up      Rate your low fat/cholesterol diet?: fair    Taking statin?  Yes, no muscle aches from statin    Other lipid medications/supplements?:  none    Hypertension Follow-up      Outpatient blood pressures are not being checked.    Low Salt Diet: not monitoring salt        Amount of exercise or physical activity: 2-3 days/week     Problems taking medications regularly: No    Medication side effects: none  Diet: regular (no restrictions)    Back Pain Follow Up      Description:   Location of pain:  L4 and L5  Character of pain: sharp, dull ache, cramping and constant  Pain radiation: Does not radiate  Since last visit, pain is:  worsened  New numbness or weakness in legs, not attributed to pain:  no     Intensity: moderate    History:   Pain interferes with job: No  Therapies tried without relief: cold and heat  Therapies tried with relief: acetaminophen (Tylenol), NSAIDs and Physical Therapy           Accompanying Signs & Symptoms:  Risk of Fracture:  None  Risk of Cauda Equina:  None  Risk of Infection:  None  Risk of Cancer:  None    Reports he stopped his oxycodone about 3 months ago after having side effects from this. He was having palpitations, diarrhea and headaches and a thorough medical evaluation revealed no underlying cause. He therefore tapered himself off of his oxycodone reports he is feeling significantly better. He still has some breakthrough pain in his low back however and is wondering what else we can try to improve this. He's tried  "epidural steroid injections, topical treatments such as Voltaren gel and gabapentin. He's also tried some muscle relaxants but reports they simply made him too tired to take during the daytime. He has tried acupuncture in the past and is open to trying this again. He is quick to point out the acupuncture he's had in [place] seems to be more effective than that he's had in the [place].        Amount of exercise or physical activity: None    Problems taking medications regularly: No    Medication side effects: none  Diet: diabetic          Problem list and histories reviewed & adjusted, as indicated.  Additional history: as documented    Labs reviewed in EPIC    Reviewed and updated as needed this visit by clinical staffTobacco  Allergies  Meds  Med Hx  Soc Hx      Reviewed and updated as needed this visit by Provider         ROS:  Constitutional, HEENT, cardiovascular, pulmonary, gi and gu systems are negative, except as otherwise noted.      OBJECTIVE:   /66  Pulse 104  Temp 97.4  F (36.3  C) (Oral)  Ht 5' 9\" (1.753 m)  Wt 203 lb (92.1 kg)  SpO2 97%  BMI 29.98 kg/m2  Body mass index is 29.98 kg/(m^2).  GENERAL: healthy, alert and no distress    Diagnostic Test Results:  No results found for this or any previous visit (from the past 24 hour(s)).    ASSESSMENT/PLAN:             1. Type 2 diabetes mellitus without complication, without long-term current use of insulin (H)  Though he is not checking his blood sugars on a regular basis, his last hemoglobin A1c 3 months ago was 6.4. Over the past 5 months he has managed to stop smoking.  His blood pressure is at goal. His weight is increased about 5 pounds over the past year. Continue to work on lifestyle as I don't think he needs any more medications at this point. Refills are given as below. Follow-up with me in 6 months.  - metFORMIN (GLUCOPHAGE) 500 MG tablet; Take 2 tablets (1,000 mg) by mouth 2 times daily (with meals)  Dispense: 360 tablet; " Refill: 1  - Hemoglobin A1c  - blood glucose monitoring (ONE TOUCH VERIO IQ) test strip; Use to test blood sugar one times daily or as directed.  Ok to substitute alternative if insurance prefers.  Dispense: 100 each; Refill: 3  - blood glucose monitoring (ONE TOUCH DELICA) lancets; Use to test blood sugar one times daily or as directed.  Ok to substitute alternative if insurance prefers.  Dispense: 100 each; Refill: 3    2. Chronic insomnia  Sleep is better with trazodone. This is refilled today.  - traZODone (DESYREL) 100 MG tablet; Take 1 tablet (100 mg) by mouth nightly as needed for sleep  Dispense: 90 tablet; Refill: 1    3. Hyperlipidemia LDL goal <70  He continues on simvastatin 10 mg daily. Smoking cessation will significantly decrease his risk for coronary disease and he is reminded of that today. Follow-up in one year.  - Lipid panel reflex to direct LDL    Chronic pain is now largely managed with gabapentin and Tylenol. He requests today consideration of different opioid analgesic which I declined. I also concerned about tramadol use in him chronically. He is open to acupuncture and I think this should be a great way to treat what he feels is largely a musculoskeletal problem. I will recheck out to my colleague Dr. Dodd to see if she can give me some recommendations on who does this with her in our system.    See Patient Instructions    Vamshi Clarke Jr, MD  [facility]

## 2017-08-07 NOTE — NURSING NOTE
"Chief Complaint   Patient presents with     Hypertension     Diabetes     Lipids       Initial /66  Pulse 104  Temp 97.4  F (36.3  C) (Oral)  Ht 5' 9\" (1.753 m)  Wt 203 lb (92.1 kg)  SpO2 97%  BMI 29.98 kg/m2 Estimated body mass index is 29.98 kg/(m^2) as calculated from the following:    Height as of this encounter: 5' 9\" (1.753 m).    Weight as of this encounter: 203 lb (92.1 kg).  Medication Reconciliation: complete  "

## 2017-08-08 ENCOUNTER — TELEPHONE (OUTPATIENT)
Dept: FAMILY MEDICINE | Facility: CLINIC | Age: 52
End: 2017-08-08

## 2017-08-08 DIAGNOSIS — E11.9 TYPE 2 DIABETES MELLITUS WITHOUT COMPLICATION, WITHOUT LONG-TERM CURRENT USE OF INSULIN (H): ICD-10-CM

## 2017-08-08 NOTE — TELEPHONE ENCOUNTER
[facility] [address]  We sent test strips for One Touch Verio so pt needs a new meter to go with that.  Please send new rx to [facility].  Pharmacy pended.  Mali Thomson

## 2017-08-09 NOTE — TELEPHONE ENCOUNTER
New meter still needs to be sent.  RN, please sned new meter to pended pharmacy.  Mali Thomson

## 2017-08-09 NOTE — TELEPHONE ENCOUNTER
Prescription approved per [facility] Refill Protocol.  Peggy Gutierrez R.N.

## 2017-08-16 ENCOUNTER — THERAPY VISIT (OUTPATIENT)
Dept: CHIROPRACTIC MEDICINE | Facility: CLINIC | Age: 52
End: 2017-08-16
Payer: COMMERCIAL

## 2017-08-16 DIAGNOSIS — M99.03 SOMATIC DYSFUNCTION OF LUMBAR REGION: ICD-10-CM

## 2017-08-16 DIAGNOSIS — M54.42 CHRONIC LEFT-SIDED LOW BACK PAIN WITH LEFT-SIDED SCIATICA: Primary | ICD-10-CM

## 2017-08-16 DIAGNOSIS — G89.29 CHRONIC LEFT-SIDED LOW BACK PAIN WITH LEFT-SIDED SCIATICA: Primary | ICD-10-CM

## 2017-08-16 PROCEDURE — 98940 CHIROPRACT MANJ 1-2 REGIONS: CPT | Mod: AT | Performed by: CHIROPRACTOR

## 2017-08-16 PROCEDURE — 99203 OFFICE O/P NEW LOW 30 MIN: CPT | Mod: 25 | Performed by: CHIROPRACTOR

## 2017-08-16 PROCEDURE — 97810 ACUP 1/> WO ESTIM 1ST 15 MIN: CPT | Performed by: CHIROPRACTOR

## 2017-08-16 NOTE — PROGRESS NOTES
Initial Chiropractic Clinic Visit    PCP: Vamshi Clarke Jr.    Saran Wallis is a 52 year old male who is seen  in consultation at the request of  Vamshi Clarke Jr., M.D. presenting with chronic lower back, left leg pain, left lower leg numbness. Patient reports that the onset was 1995. Patient MRI Jan 2016 lumbar spine:  1. L5-S1 broad-based left central disc herniation posteriorly  displacing the left S1 nerve root. There has been previous posterior  decompression. No central stenosis.  2. L4-L5 broad-based central disc protrusion or herniation without  apparent central stenosis.  3. L4-L5 moderate bilateral foraminal stenosis.  4. L3-L4 central disc herniation slightly to the left of midline  without apparent direct neural impingement or central stenosis.      When asked, patient denies:, falling, slipping, bending and reaching or sleeping awkwardly. Patient reports he has had 2 lumbar surgeries (last surgery 2012), injections, physical therapy, and chiropractic care.  Prior to onset, the patient was able to sit 2 hours with back pain. Patient notes that due to symptoms, they can only sit a 1/2 hour without and increase in pain. Saran Wallis notes   sitting rated at a 6/10 painful, difficult and prior to this incident it was 1/10.        Injury: No history of trauma    Location of Pain: bilateral lower lumbar spine with pain radiating down the left leg to the toes. Numbness after the last surgery which never improved-lateral left calf at the following level(s) L5  and Sacrum   Duration of Pain: Since 1995, worse over the last 5 years.  Rating of Pain at worst: 9/10  Rating of Pain Currently: 5/10  Symptoms are better with: Rest  Symptoms are worse with: prolonged sitting and standing  Additional Features: numbness lateral left calf, slight weakness left leg     Health History  as reported by the patient:    How does the patient rate their own health:   Good    Current or past medical history:   Diabetes  "and High blood pressure    Medical allergies  None    Past Traumas/Surgeries  Orthopedic: 2 lumbar surgeries, right shoulder, left hand    Family History  This patient has no significant family history    Medications:  High blood pressure, Pain and Sleep    Occupation:      Primary job tasks:   Computer work and Prolonged sitting    Barriers as home/work:   none    Additional health Issues:     RICK Noonan was asked to complete the Oswestry Low Back Disability Index and Andrey Start Back screening tool.  today in the office.  Disability score: 50%. Keel Start Total Score:7 Sub Score: 4 50  Review of Systems  Musculoskeletal: as above  Remainder of review of systems is negative including constitutional, CV, pulmonary, GI, Skin and Neurologic except as noted in HPI or medical history.    Past Medical History:   Diagnosis Date     Chronic radicular low back pain 4/30/2016     Depressive disorder      Diabetes mellitus (H)      Gastric reflux      Hypercholesteremia      Hypertension      Past Surgical History:   Procedure Laterality Date     COLONOSCOPY N/A 1/28/2016    Procedure: COMBINED COLONOSCOPY, SINGLE OR MULTIPLE BIOPSY/POLYPECTOMY BY BIOPSY;  Surgeon: Michelle Morrison MD;  Location:  GI     ESOPHAGOSCOPY, GASTROSCOPY, DUODENOSCOPY (EGD), COMBINED N/A 5/13/2016    Procedure: COMBINED ESOPHAGOSCOPY, GASTROSCOPY, DUODENOSCOPY (EGD);  Surgeon: Vamshi Lynn MD;  Location:  GI     ORTHOPEDIC SURGERY       Objective  There were no vitals taken for this visit.      GENERAL APPEARANCE: healthy, alert and no distress   GAIT: NORMAL  SKIN: no suspicious lesions or rashes  NEURO: Normal strength and tone, mentation intact and speech normal  PSYCH:  mentation appears normal and affect normal/bright    Low back exam:    Inspection:  \"     no visible deformity in the low back       normal skin\",    ROM:       limited flexion due to pain       limited extension due to " pain    Tender:       paraspinal muscles    Non Tender:       remainder of lumbar spine    Strength:       ankle dorsiflexion 5/5       ankle plantarflexion 5/5       dorsiflexion of the great toe 4/5-left, right 5/5    Reflexes:       patellar (L3, L4) symmetric normal       achilles tendons (S1) symmetric normal    Sensation:      grossly intact throughout lower extremities, except S1 nerve root distribution left lateral calf    Special tests:  Kemps - Right negative and Left negative, SLR - Right negative and Left negative and Slump - Right negative and Left negative    Segmental spinal dysfunction/restrictions found at::  L5 Left rotation restricted  Sacrum Left rotation restricted.    The following soft tissue hypotonicities were observed:Quad lumb: left, referred pain: no    Trigger points were found in:Lumbar erector spine    Muscle spasm found in:Lumbar erector spine and Quad lumb      Radiology:  none    Assessment:    No diagnosis found.    RX ordered/plan of care  Anticipated outcomes  Possible risks and side effects    After discussing the risk and benefits of care, patient consented to treatment    Prognosis: Guarded/Poor-high bebo index, chronic and failed other conservative treatment.       Patient's condition:  Patient had restrictions pre-manipulation    Treatment effectiveness:  Post manipulation there is better intersegmental movement and Patient claims to feel looser post manipulation      Plan:    Procedures:  Evaluation and Management:  56095 Moderate level exam 30 min    CMT:  61127 Chiropractic manipulative treatment 1-2 regions performed   Lumbar: Very gentle PtoA distraction, L5, Prone    Modalities:  88751: Acupuncture, for 15 minutes:  Points: for lower back pain, left leg pain-patient prone 15 min    Therapeutic procedures:  None    Response to Treatment  Patient tolerated the treatment well today.      Treatment plan and goals:  Goals:  SLEEPING: the patient specific goal is to attain  his pre-injury status of 6 hours comfortably  SITTING: the patient specific goal is to attain pre-injury status of  2 hours comfortably    Frequency of care  Duration of care is estimated to be 6-8 weeks, from the initial treatment.  It is estimated that the patient will need a total of 6-8 visits to resolve this episode.  For the initial therapeutic trial of care, the frequency is recommended at 1 X week, once daily.  A reevaluation would be clinically appropriate in 8 visits, to determine progress and further course of care. Trial of 4-5 visits to see if we can improve pain and function.     In-Office Treatment  Evaluation  Chiropractic manipulative treatment 1-2 regions performed   Lumbar: Very gentle PtoA distraction, L5, Prone    Modalities:  38657: Acupuncture, for 15 minutes:  Points: for lower back pain, left leg pain-patient prone 15 min      Recommendations:    Instructions:ice 20 minutes every other hour as needed    Follow-up:  Return to care after his vacation in 2 weeks and we will start our treatment plan.       Discussed the assessment with the patient.      Disclaimer: This note consists of symbols derived from keyboarding, dictation and/or voice recognition software. As a result, there may be errors in the script that have gone undetected. Please consider this when interpreting information found in this chart.

## 2017-09-06 ENCOUNTER — THERAPY VISIT (OUTPATIENT)
Dept: CHIROPRACTIC MEDICINE | Facility: CLINIC | Age: 52
End: 2017-09-06
Payer: COMMERCIAL

## 2017-09-06 DIAGNOSIS — G89.29 CHRONIC LEFT-SIDED LOW BACK PAIN WITH LEFT-SIDED SCIATICA: Primary | ICD-10-CM

## 2017-09-06 DIAGNOSIS — M99.03 SOMATIC DYSFUNCTION OF LUMBAR REGION: ICD-10-CM

## 2017-09-06 DIAGNOSIS — M54.42 CHRONIC LEFT-SIDED LOW BACK PAIN WITH LEFT-SIDED SCIATICA: Primary | ICD-10-CM

## 2017-09-06 PROCEDURE — 98940 CHIROPRACT MANJ 1-2 REGIONS: CPT | Mod: AT | Performed by: CHIROPRACTOR

## 2017-09-06 PROCEDURE — 97810 ACUP 1/> WO ESTIM 1ST 15 MIN: CPT | Performed by: CHIROPRACTOR

## 2017-09-06 NOTE — PROGRESS NOTES
Visit #:  2 of 6 based on treatment plan 6-8 visits    Subjective:  Saran Wallis is a 52 year old male who is seen in f/u up for: chronic lower left back, and radicular left leg pain     Data Unavailable.     Since last visit on 8/16/2017,  Saran Wallis reports the following changes: Pain immediately after last treatment: 5/10 and their pain level today 5/10. Sitting rated at a 5/10 painful, difficult and prior to initial visit 6/10 and prior to this incident it was 1/10. Overall no change since the first visit.    Area of chief complaint:  Lumbar :  Symptoms are graded at 5/10. The quality is described as stiff, achey, dull.  Motion has remained about the same, no improvement, L4-L5, SIJ. Patient feels that they have not improved and feel the same.     Since last visit the patient feels that they are 0 percent  improved from last visit.       Objective:  The following was observed:    P: pain elicited on palpation, L4-S1    A: static palpation demonstrates intersegmental asymmetry , L4-SIJ    R: motion palpation notes restricted motion    T: muscle spasm at level(s): Lumbar erector spine and Quad lumb L>>R      Assessment:    Segmental spinal dysfunction/restrictions found at:  L4  L5  Sacrum    Diagnoses:    No diagnosis found.    Patient's condition:  Patient had restrictions pre-manipulation and Patient had decreased motion prior to manipulation    Treatment effectiveness:  Post manipulation there is better intersegmental movement and Patient claims to feel looser post manipulation      Procedures:  CMT:  46605 Chiropractic manipulative treatment 1-2 regions performed   Lumbar: Very gentle PtoA distraction, L5, Prone    Modalities:  58789: Acupuncture, for 15 minutes:  Points: for lower back pain, left leg pain-patient prone 15 min    Therapeutic procedures:  None      Prognosis: Good    Progress towards Goals: Patient has not made progress towards goal of SLEEPING: the patient specific goal is to attain his  pre-injury status of 6 hours comfortably  SITTING: the patient specific goal is to attain pre-injury status of  2 hours comfortably.     Response to Treatment:   Patient tolerated the treatment well today      Recommendations:    Instructions:ice 20 minutes every other hour as needed    Follow-up:  Return to care in one week.

## 2017-09-18 ENCOUNTER — MYC REFILL (OUTPATIENT)
Dept: PALLIATIVE MEDICINE | Facility: CLINIC | Age: 52
End: 2017-09-18

## 2017-09-18 DIAGNOSIS — M54.16 CHRONIC RADICULAR LOW BACK PAIN: ICD-10-CM

## 2017-09-18 DIAGNOSIS — G89.29 CHRONIC RADICULAR LOW BACK PAIN: ICD-10-CM

## 2017-09-18 DIAGNOSIS — G89.4 CHRONIC PAIN SYNDROME: ICD-10-CM

## 2017-09-20 ENCOUNTER — THERAPY VISIT (OUTPATIENT)
Dept: CHIROPRACTIC MEDICINE | Facility: CLINIC | Age: 52
End: 2017-09-20
Payer: COMMERCIAL

## 2017-09-20 DIAGNOSIS — G89.29 CHRONIC LEFT-SIDED LOW BACK PAIN WITH LEFT-SIDED SCIATICA: Primary | ICD-10-CM

## 2017-09-20 DIAGNOSIS — M54.42 CHRONIC LEFT-SIDED LOW BACK PAIN WITH LEFT-SIDED SCIATICA: Primary | ICD-10-CM

## 2017-09-20 DIAGNOSIS — M99.03 SOMATIC DYSFUNCTION OF LUMBAR REGION: ICD-10-CM

## 2017-09-20 PROCEDURE — 98940 CHIROPRACT MANJ 1-2 REGIONS: CPT | Mod: AT | Performed by: CHIROPRACTOR

## 2017-09-20 PROCEDURE — 97810 ACUP 1/> WO ESTIM 1ST 15 MIN: CPT | Performed by: CHIROPRACTOR

## 2017-09-20 RX ORDER — GABAPENTIN 300 MG/1
CAPSULE ORAL
Qty: 540 CAPSULE | Refills: 3 | Status: SHIPPED | OUTPATIENT
Start: 2017-09-20 | End: 2018-08-30

## 2017-09-20 NOTE — TELEPHONE ENCOUNTER
Patient was at physical therapy today, and stopped by pain clinic to ask status. Will need to check Germania Gong's plan for patient. Patient was advised that I would look into it and get back to him asap.     Anupama Barfield, [facility]

## 2017-09-20 NOTE — PROGRESS NOTES
Visit #:  3 of 6 based on treatment plan 6-8 visits    Subjective:  Saran Wallis is a 52 year old male who is seen in f/u up for: chronic lower left back, and radicular left leg pain     Data Unavailable.     Since last visit on 9/6/17,  Saran Wallis reports the following changes: Pain immediately after last treatment: 5/10 and their pain level today 5/10. Sitting rated at a 5/10 painful, difficult and prior to initial visit 6/10 and prior to this incident it was 1/10. Overall no change since the first visit.    Area of chief complaint:  Lumbar :  Symptoms are graded at 5/10. The quality is described as stiff, achey, dull.  Motion has remained about the same, no improvement, L4-L5, SIJ. Patient feels that they have not improved and feel the same.     Since last visit the patient feels that they are 0 percent  improved from last visit.       Objective:  The following was observed:    P: pain elicited on palpation, L4-S1    A: static palpation demonstrates intersegmental asymmetry , L4-SIJ    R: motion palpation notes restricted motion    T: muscle spasm at level(s): Lumbar erector spine and Quad lumb L>>R      Assessment:    Segmental spinal dysfunction/restrictions found at:  L4  L5  Sacrum    Diagnoses:    No diagnosis found.    Patient's condition:  Patient had restrictions pre-manipulation and Patient had decreased motion prior to manipulation    Treatment effectiveness:  Post manipulation there is better intersegmental movement and Patient claims to feel looser post manipulation      Procedures:  CMT:  34147 Chiropractic manipulative treatment 1-2 regions performed   Lumbar: Very gentle PtoA distraction, L5, Prone    Modalities:  72478: Acupuncture, for 15 minutes:  Points: for lower back pain, left leg pain-patient prone 15 min    Therapeutic procedures:  None      Prognosis: Good    Progress towards Goals: Patient has not made progress towards goal of SLEEPING: the patient specific goal is to attain his  pre-injury status of 6 hours comfortably  SITTING: the patient specific goal is to attain pre-injury status of  2 hours comfortably.     Response to Treatment:   Patient tolerated the treatment well today      Recommendations:    Instructions:ice 20 minutes every other hour as needed    Follow-up:  Return to care in one week.

## 2017-09-20 NOTE — TELEPHONE ENCOUNTER
Chart reviewed.  Rx sent to pt's preferred pharmacy.    [facility] [address] [facility], [place] [address]    Vamshi Clarke MD

## 2017-09-20 NOTE — TELEPHONE ENCOUNTER
Received fax request from [facility] pharmacy requesting refill(s) for gabapentin (NEURONTIN) 300 MG capsule    Last refilled on 3/17/2017 by Germania Gong, Pain Management    Pending Prescriptions:                       Disp   Refills    gabapentin (NEURONTIN) 300 MG capsule     540 ca*3            Si caps tid    Germania Gong's plan for patient was to be discharged from pain clinic and refills for medications should be sent to his PCP, Dr. Clarke.     Will route to patient's PCP to review and advise.     Anupama Barfield, [facility]

## 2017-10-13 ENCOUNTER — OFFICE VISIT (OUTPATIENT)
Dept: FAMILY MEDICINE | Facility: CLINIC | Age: 52
End: 2017-10-13
Payer: COMMERCIAL

## 2017-10-13 ENCOUNTER — RADIANT APPOINTMENT (OUTPATIENT)
Dept: GENERAL RADIOLOGY | Facility: CLINIC | Age: 52
End: 2017-10-13
Attending: FAMILY MEDICINE
Payer: COMMERCIAL

## 2017-10-13 VITALS
TEMPERATURE: 98.7 F | HEIGHT: 69 IN | BODY MASS INDEX: 29.92 KG/M2 | DIASTOLIC BLOOD PRESSURE: 72 MMHG | SYSTOLIC BLOOD PRESSURE: 122 MMHG | OXYGEN SATURATION: 98 % | HEART RATE: 110 BPM | WEIGHT: 202 LBS

## 2017-10-13 DIAGNOSIS — Z86.59 HISTORY OF ANXIETY: ICD-10-CM

## 2017-10-13 DIAGNOSIS — R05.3 CHRONIC COUGH: Primary | ICD-10-CM

## 2017-10-13 DIAGNOSIS — R05.3 CHRONIC COUGH: ICD-10-CM

## 2017-10-13 DIAGNOSIS — Z23 NEED FOR PROPHYLACTIC VACCINATION AND INOCULATION AGAINST INFLUENZA: ICD-10-CM

## 2017-10-13 LAB
FEF 25/75: NORMAL
FEV-1: NORMAL
FEV1/FVC: NORMAL
FVC: NORMAL

## 2017-10-13 PROCEDURE — 90471 IMMUNIZATION ADMIN: CPT | Performed by: FAMILY MEDICINE

## 2017-10-13 PROCEDURE — 90686 IIV4 VACC NO PRSV 0.5 ML IM: CPT | Performed by: FAMILY MEDICINE

## 2017-10-13 PROCEDURE — 71020 XR CHEST 2 VW: CPT

## 2017-10-13 PROCEDURE — 99214 OFFICE O/P EST MOD 30 MIN: CPT | Mod: 25 | Performed by: FAMILY MEDICINE

## 2017-10-13 RX ORDER — CITALOPRAM HYDROBROMIDE 10 MG/1
10 TABLET ORAL DAILY
Qty: 30 TABLET | Refills: 0 | Status: SHIPPED | OUTPATIENT
Start: 2017-10-13 | End: 2017-11-09

## 2017-10-13 ASSESSMENT — ANXIETY QUESTIONNAIRES
2. NOT BEING ABLE TO STOP OR CONTROL WORRYING: SEVERAL DAYS
GAD7 TOTAL SCORE: 5
7. FEELING AFRAID AS IF SOMETHING AWFUL MIGHT HAPPEN: NOT AT ALL
5. BEING SO RESTLESS THAT IT IS HARD TO SIT STILL: SEVERAL DAYS
1. FEELING NERVOUS, ANXIOUS, OR ON EDGE: SEVERAL DAYS
6. BECOMING EASILY ANNOYED OR IRRITABLE: SEVERAL DAYS
3. WORRYING TOO MUCH ABOUT DIFFERENT THINGS: SEVERAL DAYS

## 2017-10-13 ASSESSMENT — PATIENT HEALTH QUESTIONNAIRE - PHQ9
5. POOR APPETITE OR OVEREATING: NOT AT ALL
SUM OF ALL RESPONSES TO PHQ QUESTIONS 1-9: 11

## 2017-10-13 NOTE — NURSING NOTE
"Chief Complaint   Patient presents with     Cough       Initial /72  Pulse 110  Temp 98.7  F (37.1  C) (Oral)  Ht 5' 9\" (1.753 m)  Wt 202 lb (91.6 kg)  SpO2 98%  BMI 29.83 kg/m2 Estimated body mass index is 29.83 kg/(m^2) as calculated from the following:    Height as of this encounter: 5' 9\" (1.753 m).    Weight as of this encounter: 202 lb (91.6 kg).  Medication Reconciliation: complete    "

## 2017-10-13 NOTE — MR AVS SNAPSHOT
After Visit Summary   10/13/2017    Saran Wallis    MRN: [id number]           Patient Information     Date Of Birth          1965        Visit Information        Provider Department      10/13/2017 11:20 AM Vargas Davison, DO [facility]        Today's Diagnoses     Chronic cough    -  1    Need for prophylactic vaccination and inoculation against influenza        History of anxiety           Follow-ups after your visit        Follow-up notes from your care team     Return in about 1 month (around 11/13/2017), or follow up anxiety, chronic cough.      Who to contact     If you have questions or need follow up information about today's clinic visit or your schedule please contact [facility] [place] directly at [phone].  Normal or non-critical lab and imaging results will be communicated to you by RareCytehart, letter or phone within 4 business days after the clinic has received the results. If you do not hear from us within 7 days, please contact the clinic through Spinnakrt or phone. If you have a critical or abnormal lab result, we will notify you by phone as soon as possible.  Submit refill requests through WinWeb or call your pharmacy and they will forward the refill request to us. Please allow 3 business days for your refill to be completed.          Additional Information About Your Visit        MyChart Information     WinWeb gives you secure access to your electronic health record. If you see a primary care provider, you can also send messages to your care team and make appointments. If you have questions, please call your primary care clinic.  If you do not have a primary care provider, please call [phone] and they will assist you.        Care EveryWhere ID     This is your Care EveryWhere ID. This could be used by other organizations to access your [place] medical records  [id number]        Your Vitals Were     Pulse Temperature Height Pulse Oximetry BMI (Body  "Mass Index)       110 98.7  F (37.1  C) (Oral) 5' 9\" (1.753 m) 98% 29.83 kg/m2        Blood Pressure from Last 3 Encounters:   10/13/17 122/72   08/07/17 110/66   05/09/17 112/76    Weight from Last 3 Encounters:   10/13/17 202 lb (91.6 kg)   08/07/17 203 lb (92.1 kg)   05/09/17 200 lb (90.7 kg)              We Performed the Following          ADMIN VACCINE, FIRST [13049]     HC FLU VAC PRESRV FREE QUAD SPLIT VIR 3+YRS IM  [38867]     Spirometry, Breathing Capacity          Today's Medication Changes          These changes are accurate as of: 10/13/17 12:16 PM.  If you have any questions, ask your nurse or doctor.               Start taking these medicines.        Dose/Directions    citalopram 10 MG tablet   Commonly known as:  celeXA   Used for:  History of anxiety   Started by:  Vargas Davison DO        Dose:  10 mg   Take 1 tablet (10 mg) by mouth daily   Quantity:  30 tablet   Refills:  0         Stop taking these medicines if you haven't already. Please contact your care team if you have questions.     tiZANidine 4 MG tablet   Commonly known as:  ZANAFLEX   Stopped by:  Vargas Davison DO           traZODone 100 MG tablet   Commonly known as:  DESYREL   Stopped by:  Vargas Davison DO                Where to get your medicines      [facility] [address]  [place], [place] - [address] AT [facility] &  [address] [address]     Phone:  [phone]     citalopram 10 MG tablet                Primary Care Provider Office Phone # Fax #    Vamshi Clarke Jr., [phone] [address]  [place] [address]        Equal Access to Services     ANDRÉS BENTON AH: Malachi Melvin, wasanford luqadaha, qaybta kaalmaberta herrera, ana cristina garcia. So [facility] [phone].    ATENCIÓN: Si habla español, tiene a kwan disposición servicios gratuitos de asistencia lingüística. Llame al [phone].    We " comply with applicable federal civil rights laws and [place] laws. We do not discriminate on the basis of race, color, national origin, age, disability, sex, sexual orientation, or gender identity.            Thank you!     Thank you for choosing [facility] [place]  for your care. Our goal is always to provide you with excellent care. Hearing back from our patients is one way we can continue to improve our services. Please take a few minutes to complete the written survey that you may receive in the mail after your visit with us. Thank you!             Your Updated Medication List - Protect others around you: Learn how to safely use, store and throw away your medicines at www.disposemymeds.org.          This list is accurate as of: 10/13/17 12:16 PM.  Always use your most recent med list.                   Brand Name Dispense Instructions for use Diagnosis    ASPIRIN PO      Take 81 mg by mouth    Chronic pain syndrome, Chronic radicular low back pain, Physical deconditioning, Major depressive disorder, recurrent episode, mild (H), Chronic insomnia       blood glucose monitoring lancets     100 each    Use to test blood sugar one times daily or as directed.  Ok to substitute alternative if insurance prefers.    Type 2 diabetes mellitus without complication, without long-term current use of insulin (H)       blood glucose monitoring meter device kit     1 kit    Use to test blood sugar 1 times daily or as directed.  Ok to substitute alternative if insurance prefers.    Type 2 diabetes mellitus without complication, without long-term current use of insulin (H)       blood glucose monitoring test strip    ONE TOUCH VERIO IQ    100 each    Use to test blood sugar one times daily or as directed.  Ok to substitute alternative if insurance prefers.    Type 2 diabetes mellitus without complication, without long-term current use of insulin (H)       citalopram 10 MG tablet    celeXA    30 tablet    Take 1 tablet (10  mg) by mouth daily    History of anxiety       gabapentin 300 MG capsule    NEURONTIN    540 capsule    2 caps tid    Chronic pain syndrome, Chronic radicular low back pain       irbesartan 150 MG tablet    AVAPRO    90 tablet    Take 1 tablet (150 mg) by mouth daily    Essential hypertension with goal blood pressure less than 140/90       metFORMIN 500 MG tablet    GLUCOPHAGE    360 tablet    Take 2 tablets (1,000 mg) by mouth 2 times daily (with meals)    Type 2 diabetes mellitus without complication, without long-term current use of insulin (H)       pantoprazole 40 MG EC tablet    PROTONIX    90 tablet    Take 1 tablet (40 mg) by mouth daily    Gastroesophageal reflux disease without esophagitis       simvastatin 10 MG tablet    ZOCOR    90 tablet    Take 1 tablet (10 mg) by mouth At Bedtime    Hyperlipidemia LDL goal <70

## 2017-10-14 ASSESSMENT — ANXIETY QUESTIONNAIRES: GAD7 TOTAL SCORE: 5

## 2017-10-26 DIAGNOSIS — K21.9 GASTROESOPHAGEAL REFLUX DISEASE WITHOUT ESOPHAGITIS: ICD-10-CM

## 2017-10-27 RX ORDER — PANTOPRAZOLE SODIUM 40 MG/1
40 TABLET, DELAYED RELEASE ORAL DAILY
Qty: 90 TABLET | Refills: 3 | Status: SHIPPED | OUTPATIENT
Start: 2017-10-27 | End: 2018-10-05

## 2017-10-27 NOTE — TELEPHONE ENCOUNTER
Prescription approved per [facility], [facility] or [facility] refill protocol.  Mini Arriaga RN - Triage  [facility]

## 2017-11-09 DIAGNOSIS — Z86.59 HISTORY OF ANXIETY: ICD-10-CM

## 2017-11-09 RX ORDER — CITALOPRAM HYDROBROMIDE 10 MG/1
10 TABLET ORAL DAILY
Qty: 30 TABLET | Refills: 0 | Status: SHIPPED | OUTPATIENT
Start: 2017-11-09 | End: 2017-11-28

## 2017-11-09 NOTE — TELEPHONE ENCOUNTER
citalopram     Last Written Prescription Date: 10/13/17  Last Fill Quantity: 30, # refills: 0  [place] Office [place] with [facility] [place] provider:  10/13/17   Next 5 appointments (look out 90 days)     Nov 20, 2017 10:40 AM CST   SHORT with Vamshi Clarke Jr., MD   [facility]    [address]  [facility] [place] [id number] [phone]                   Last PHQ-9 score on record=   PHQ-9 SCORE 10/13/2017   Total Score 11     Medication is being filled for 1 time refill only due to:  Patient needs to be seen because for anxiety follow up. Patient has appointment with MD CHRIS on 11/20/17.  Estefani Morales, RN, BSN  [facility]

## 2017-11-28 ENCOUNTER — OFFICE VISIT (OUTPATIENT)
Dept: FAMILY MEDICINE | Facility: CLINIC | Age: 52
End: 2017-11-28
Payer: COMMERCIAL

## 2017-11-28 VITALS
HEIGHT: 69 IN | DIASTOLIC BLOOD PRESSURE: 72 MMHG | BODY MASS INDEX: 29.62 KG/M2 | HEART RATE: 116 BPM | SYSTOLIC BLOOD PRESSURE: 112 MMHG | WEIGHT: 200 LBS | OXYGEN SATURATION: 98 % | TEMPERATURE: 98.3 F

## 2017-11-28 DIAGNOSIS — E11.9 TYPE 2 DIABETES MELLITUS WITHOUT COMPLICATION, WITHOUT LONG-TERM CURRENT USE OF INSULIN (H): ICD-10-CM

## 2017-11-28 DIAGNOSIS — G89.4 CHRONIC PAIN SYNDROME: ICD-10-CM

## 2017-11-28 DIAGNOSIS — F33.0 MAJOR DEPRESSIVE DISORDER, RECURRENT EPISODE, MILD (H): Primary | ICD-10-CM

## 2017-11-28 DIAGNOSIS — E78.5 HYPERLIPIDEMIA LDL GOAL <70: ICD-10-CM

## 2017-11-28 DIAGNOSIS — I10 ESSENTIAL HYPERTENSION WITH GOAL BLOOD PRESSURE LESS THAN 140/90: ICD-10-CM

## 2017-11-28 LAB — HBA1C MFR BLD: 7.5 % (ref 4.3–6)

## 2017-11-28 PROCEDURE — 36415 COLL VENOUS BLD VENIPUNCTURE: CPT | Performed by: FAMILY MEDICINE

## 2017-11-28 PROCEDURE — 99214 OFFICE O/P EST MOD 30 MIN: CPT | Performed by: FAMILY MEDICINE

## 2017-11-28 PROCEDURE — 82043 UR ALBUMIN QUANTITATIVE: CPT | Performed by: FAMILY MEDICINE

## 2017-11-28 PROCEDURE — 99207 C FOOT EXAM  NO CHARGE: CPT | Performed by: FAMILY MEDICINE

## 2017-11-28 PROCEDURE — 83036 HEMOGLOBIN GLYCOSYLATED A1C: CPT | Performed by: FAMILY MEDICINE

## 2017-11-28 RX ORDER — OXYCODONE AND ACETAMINOPHEN 5; 325 MG/1; MG/1
1 TABLET ORAL EVERY 8 HOURS PRN
Qty: 10 TABLET | Refills: 0 | Status: SHIPPED | OUTPATIENT
Start: 2017-11-28 | End: 2017-11-29

## 2017-11-28 RX ORDER — CITALOPRAM HYDROBROMIDE 10 MG/1
10 TABLET ORAL DAILY
Qty: 90 TABLET | Refills: 1 | Status: SHIPPED | OUTPATIENT
Start: 2017-11-28 | End: 2018-05-30 | Stop reason: SINTOL

## 2017-11-28 RX ORDER — SIMVASTATIN 10 MG
10 TABLET ORAL AT BEDTIME
Qty: 90 TABLET | Refills: 1 | Status: SHIPPED | OUTPATIENT
Start: 2017-11-28 | End: 2018-05-30

## 2017-11-28 RX ORDER — IRBESARTAN 150 MG/1
150 TABLET ORAL DAILY
Qty: 90 TABLET | Refills: 1 | Status: SHIPPED | OUTPATIENT
Start: 2017-11-28 | End: 2018-05-30

## 2017-11-28 ASSESSMENT — ANXIETY QUESTIONNAIRES
5. BEING SO RESTLESS THAT IT IS HARD TO SIT STILL: NOT AT ALL
IF YOU CHECKED OFF ANY PROBLEMS ON THIS QUESTIONNAIRE, HOW DIFFICULT HAVE THESE PROBLEMS MADE IT FOR YOU TO DO YOUR WORK, TAKE CARE OF THINGS AT HOME, OR GET ALONG WITH OTHER PEOPLE: NOT DIFFICULT AT ALL
2. NOT BEING ABLE TO STOP OR CONTROL WORRYING: NOT AT ALL
GAD7 TOTAL SCORE: 0
7. FEELING AFRAID AS IF SOMETHING AWFUL MIGHT HAPPEN: NOT AT ALL
6. BECOMING EASILY ANNOYED OR IRRITABLE: NOT AT ALL
1. FEELING NERVOUS, ANXIOUS, OR ON EDGE: NOT AT ALL
3. WORRYING TOO MUCH ABOUT DIFFERENT THINGS: NOT AT ALL

## 2017-11-28 ASSESSMENT — PATIENT HEALTH QUESTIONNAIRE - PHQ9
SUM OF ALL RESPONSES TO PHQ QUESTIONS 1-9: 4
5. POOR APPETITE OR OVEREATING: NOT AT ALL

## 2017-11-28 NOTE — NURSING NOTE
"Chief Complaint   Patient presents with     Anxiety       Initial /72  Pulse 116  Temp 98.3  F (36.8  C) (Oral)  Ht 5' 9\" (1.753 m)  Wt 200 lb (90.7 kg)  SpO2 98%  BMI 29.53 kg/m2 Estimated body mass index is 29.53 kg/(m^2) as calculated from the following:    Height as of this encounter: 5' 9\" (1.753 m).    Weight as of this encounter: 200 lb (90.7 kg).  Medication Reconciliation: complete  "

## 2017-11-28 NOTE — PROGRESS NOTES
SUBJECTIVE:   Saran Walils is a 52 year old male who presents to clinic today for the following health issues:    Anxiety Follow-Up    Status since last visit: Improved     Other associated symptoms:None    Complicating factors:   Significant life event: No   Current substance abuse: None  Depression symptoms: No  SKYLA-7 SCORE 5/3/2016 5/9/2017 10/13/2017   Total Score 0 0 5     GAD7      Anxiety- Pt notes his citalopram is helping with his anxiety, needs refill.     Diabetes- Pt is not fasting today but he wants to check A1C today, he checks his blood sugars in the mornings, have been around 130-140.    Pain- Pt had been taking Oxycodone 1X-2X everyday, he stopped his Percocet this past May due to experiencing palpitations, and diarrhea but has since started taking Percocet for his chronic pain. Pt did acupuncture but did not help. He is still on Gabapentin.  Is again requesting refill of oxycodone today.      Problem list and histories reviewed & adjusted, as indicated.  Additional history: as documented    Recent Labs   Lab Test  08/07/17   0843  05/09/17   1043  02/06/17   1032   05/17/16   1132  05/03/16   1115   A1C  6.9*  6.4*  6.3*   < >   --   6.9*   LDL  45   --    --    --    --   51   HDL  47   --    --    --    --   35*   TRIG  198*   --    --    --    --   133   ALT   --   59   --    --   82*   --    CR   --   0.85   --    --   0.67   --    GFRESTIMATED   --   >90  Non  GFR Calc     --    --   >90  Non  GFR Calc     --    GFRESTBLACK   --   >90   GFR Calc     --    --   >90   GFR Calc     --    POTASSIUM   --   4.4   --    --   4.7   --    TSH   --   1.30   --    --   0.64   --     < > = values in this interval not displayed.      BP Readings from Last 3 Encounters:   11/28/17 112/72   10/13/17 122/72   08/07/17 110/66    Wt Readings from Last 3 Encounters:   11/28/17 90.7 kg (200 lb)   10/13/17 91.6 kg (202 lb)   08/07/17 92.1 kg (203  "lb)        Reviewed and updated as needed this visit by clinical staffTobacco  Allergies  Meds  Problems  Med Hx  Soc Hx      Reviewed and updated as needed this visit by Provider  Allergies  Meds  Problems       ROS:  Constitutional, HEENT, cardiovascular, pulmonary, gi and gu systems are negative, except as otherwise noted.    This document serves as a record of the services and decisions personally performed and made by Vamshi Clarke MD. It was created on his behalf by Carmine Erickson, a trained medical scribe. The creation of this document is based on the provider's statements to the medical scribe.  Carmine Erickson 1:43 PM November 28, 2017    OBJECTIVE:   /72  Pulse 116  Temp 98.3  F (36.8  C) (Oral)  Ht 1.753 m (5' 9\")  Wt 90.7 kg (200 lb)  SpO2 98%  BMI 29.53 kg/m2  Body mass index is 29.53 kg/(m^2).  GENERAL: healthy, alert and no distress  RESP: lungs clear to auscultation - no rales, rhonchi or wheezes  CV: regular rate and rhythm, normal S1 S2, no S3 or S4, no murmur, click or rub, no peripheral edema and peripheral pulses strong  PSYCH: mentation appears normal, affect normal/bright  Diabetic foot exam: normal DP and PT pulses, no trophic changes or ulcerative lesions, normal sensory exam and normal monofilament exam    Diagnostic Test Results:  No results found for this or any previous visit (from the past 24 hour(s)).    ASSESSMENT/PLAN:     (F33.0) Major depressive disorder, recurrent episode, mild (H)  (primary encounter diagnosis)  Comment: Stable, pt is doing well with 10 mg citalopram, will refill and have him continue.  Plan: citalopram (CELEXA) 10 MG tablet        Follow up if needed.    (E11.9) Type 2 diabetes mellitus without complication, without long-term current use of insulin (H)  Comment: His blood sugars have been well controlled with current Metformin dose, will refill and have him continue. Pt is not fasting today but he wanted to check his A1C anyway. Will have " him leave urine sample for further evaluation of renal function.   Plan: Albumin Random Urine Quantitative with Creat         Ratio, Hemoglobin A1c, C FOOT EXAM  NO CHARGE,         metFORMIN (GLUCOPHAGE) 500 MG tablet        Follow up based on labs.    (I10) Essential hypertension with goal blood pressure less than 140/90  Comment: BP was good today (112/72), will refill and have him continue with irbesartan.  Plan: irbesartan (AVAPRO) 150 MG tablet        Follow up if needed.    (E78.5) Hyperlipidemia LDL goal <70  Comment: His last lipid panel on 8/7/17 showed elevated triglycerides at 198 but otherwise was good, will refill and have him continue with simvastatin.  Plan: simvastatin (ZOCOR) 10 MG tablet        Follow up if needed.    (G89.4) Chronic pain syndrome  Comment: Pt did not receive pain relief when he did acupuncture and reports he has since started taking his Percocet again 1X daily, he is requesting refill of his Percocet.  Per  website, last Rx came from me on 4/28/17.   Discussed with pt that I am apprehensive to refill his Oxycodone, is not an appropriate long term solution for his chronic pain, will give him tent of Percocet, advised pt to use PRN for severe pain.  This should last him several months. Will write DME prescription for TENS unit, pt will see if pharmacy will fill this prescription.  Plan: order for DME, oxyCODONE-acetaminophen         (PERCOCET) 5-325 MG per tablet        Follow up if needed.    The information in this document, created by the medical scribe for me, accurately reflects the services I personally performed and the decisions made by me. I have reviewed and approved this document for accuracy prior to leaving the patient care area.  November 28, 2017 1:54 PM    Vamshi Clarke Jr, MD  [facility]

## 2017-11-28 NOTE — MR AVS SNAPSHOT
After Visit Summary   11/28/2017    Saran Wallis    MRN: [id number]           Patient Information     Date Of Birth          1965        Visit Information        Provider Department      11/28/2017 1:20 PM Vamshi Clarke Jr., MD [facility] [place]        Today's Diagnoses     Major depressive disorder, recurrent episode, mild (H)    -  1    Type 2 diabetes mellitus without complication, without long-term current use of insulin (H)        Essential hypertension with goal blood pressure less than 140/90        Hyperlipidemia LDL goal <70        Chronic pain syndrome           Follow-ups after your visit        Follow-up notes from your care team     Return in about 6 months (around 5/28/2018) for Diabetes Recheck.      Who to contact     If you have questions or need follow up information about today's clinic visit or your schedule please contact [facility] [place] directly at [phone].  Normal or non-critical lab and imaging results will be communicated to you by Mobile Health Consumerhart, letter or phone within 4 business days after the clinic has received the results. If you do not hear from us within 7 days, please contact the clinic through Mobile Health Consumerhart or phone. If you have a critical or abnormal lab result, we will notify you by phone as soon as possible.  Submit refill requests through LIQUITY or call your pharmacy and they will forward the refill request to us. Please allow 3 business days for your refill to be completed.          Additional Information About Your Visit        MyChart Information     LIQUITY gives you secure access to your electronic health record. If you see a primary care provider, you can also send messages to your care team and make appointments. If you have questions, please call your primary care clinic.  If you do not have a primary care provider, please call [phone] and they will assist you.        Care EveryWhere ID     This is your Care EveryWhere ID. This could  "be used by other organizations to access your [place] medical records  [id number]        Your Vitals Were     Pulse Temperature Height Pulse Oximetry BMI (Body Mass Index)       116 98.3  F (36.8  C) (Oral) 5' 9\" (1.753 m) 98% 29.53 kg/m2        Blood Pressure from Last 3 Encounters:   11/28/17 112/72   10/13/17 122/72   08/07/17 110/66    Weight from Last 3 Encounters:   11/28/17 200 lb (90.7 kg)   10/13/17 202 lb (91.6 kg)   08/07/17 203 lb (92.1 kg)              We Performed the Following     Albumin Random Urine Quantitative with Creat Ratio     C FOOT EXAM  NO CHARGE     Hemoglobin A1c          Today's Medication Changes          These changes are accurate as of: 11/28/17  1:45 PM.  If you have any questions, ask your nurse or doctor.               Start taking these medicines.        Dose/Directions    order for DME   Used for:  Chronic pain syndrome   Started by:  Vamshi Clarke Jr., MD        Equipment being ordered: TENS   Quantity:  1 Units   Refills:  0       oxyCODONE-acetaminophen 5-325 MG per tablet   Commonly known as:  PERCOCET   Used for:  Chronic pain syndrome   Started by:  Vamshi Clarke Jr., MD        Dose:  1 tablet   Take 1 tablet by mouth every 8 hours as needed for moderate to severe pain or pain (Seventy tablets to last thirty days.)   Quantity:  10 tablet   Refills:  0            Where to get your medicines      These medications were sent to [facility] [id number]  [place], [place] - [address] AT [facility]  [address] [address]     Phone:  [phone]     citalopram 10 MG tablet    irbesartan 150 MG tablet    metFORMIN 500 MG tablet    simvastatin 10 MG tablet         Some of these will need a paper prescription and others can be bought over the counter.  Ask your nurse if you have questions.     Bring a paper prescription for each of these medications     order for DME    oxyCODONE-acetaminophen 5-325 MG per tablet                Primary " Care Provider Office Phone # Fax #    Vamshi Clarke Jr., [phone] [address]  [place] [address]        Equal Access to Services     KEATON BENTON : Hadkatherine fleming ku kathieo Sokemalali, waaxda luqadaha, qaybta kaalmada lalitoda, ana cristina mackluis jose. So [facility] [phone].    ATENCIÓN: Si habla español, tiene a kwan disposición servicios gratuitos de asistencia lingüística. Llame al [phone].    We comply with applicable federal civil rights laws and [place] laws. We do not discriminate on the basis of race, color, national origin, age, disability, sex, sexual orientation, or gender identity.            Thank you!     Thank you for choosing [facility]  for your care. Our goal is always to provide you with excellent care. Hearing back from our patients is one way we can continue to improve our services. Please take a few minutes to complete the written survey that you may receive in the mail after your visit with us. Thank you!             Your Updated Medication List - Protect others around you: Learn how to safely use, store and throw away your medicines at www.disposemymeds.org.          This list is accurate as of: 11/28/17  1:45 PM.  Always use your most recent med list.                   Brand Name Dispense Instructions for use Diagnosis    ASPIRIN PO      Take 81 mg by mouth    Chronic pain syndrome, Chronic radicular low back pain, Physical deconditioning, Major depressive disorder, recurrent episode, mild (H), Chronic insomnia       blood glucose monitoring lancets     100 each    Use to test blood sugar one times daily or as directed.  Ok to substitute alternative if insurance prefers.    Type 2 diabetes mellitus without complication, without long-term current use of insulin (H)       blood glucose monitoring meter device kit     1 kit    Use to test blood sugar 1 times daily or as directed.  Ok to substitute alternative if insurance prefers.     Type 2 diabetes mellitus without complication, without long-term current use of insulin (H)       blood glucose monitoring test strip    ONETOUCH VERIO IQ    100 each    Use to test blood sugar one times daily or as directed.  Ok to substitute alternative if insurance prefers.    Type 2 diabetes mellitus without complication, without long-term current use of insulin (H)       citalopram 10 MG tablet    celeXA    90 tablet    Take 1 tablet (10 mg) by mouth daily    Major depressive disorder, recurrent episode, mild (H)       gabapentin 300 MG capsule    NEURONTIN    540 capsule    2 caps tid    Chronic pain syndrome, Chronic radicular low back pain       irbesartan 150 MG tablet    AVAPRO    90 tablet    Take 1 tablet (150 mg) by mouth daily    Essential hypertension with goal blood pressure less than 140/90       metFORMIN 500 MG tablet    GLUCOPHAGE    360 tablet    Take 2 tablets (1,000 mg) by mouth 2 times daily (with meals)    Type 2 diabetes mellitus without complication, without long-term current use of insulin (H)       order for DME     1 Units    Equipment being ordered: TENS    Chronic pain syndrome       oxyCODONE-acetaminophen 5-325 MG per tablet    PERCOCET    10 tablet    Take 1 tablet by mouth every 8 hours as needed for moderate to severe pain or pain (Seventy tablets to last thirty days.)    Chronic pain syndrome       pantoprazole 40 MG EC tablet    PROTONIX    90 tablet    Take 1 tablet (40 mg) by mouth daily    Gastroesophageal reflux disease without esophagitis       simvastatin 10 MG tablet    ZOCOR    90 tablet    Take 1 tablet (10 mg) by mouth At Bedtime    Hyperlipidemia LDL goal <70

## 2017-11-29 DIAGNOSIS — G89.4 CHRONIC PAIN SYNDROME: ICD-10-CM

## 2017-11-29 LAB
CREAT UR-MCNC: 220 MG/DL
MICROALBUMIN UR-MCNC: 26 MG/L
MICROALBUMIN/CREAT UR: 11.86 MG/G CR (ref 0–17)

## 2017-11-29 RX ORDER — OXYCODONE AND ACETAMINOPHEN 5; 325 MG/1; MG/1
1 TABLET ORAL EVERY 8 HOURS PRN
Qty: 10 TABLET | Refills: 0 | Status: SHIPPED
Start: 2017-11-29 | End: 2018-05-30

## 2017-11-29 ASSESSMENT — ANXIETY QUESTIONNAIRES: GAD7 TOTAL SCORE: 0

## 2017-11-29 NOTE — PROGRESS NOTES
Mr. Wallis,    -A1C test (average blood sugar the last 2-3 months) is above your goal.  I think your Halloween candy comment was spot on, Saran.  ADVISE:  Diabetic followup appointment in 6 months.  In the interim let's decrease your carbohydrate intake and try to exercise a bit more.  We're at the maximum dose of metformin, so if we have to do more medication at your Follow up visit to control your sugars we'll have to add a second medication.  -Microalbumin (urine protein) test is normal.  ADVISE: recheck annually    If you have further questions about the interpretation of your labs, labtestsonline.org is a good website to check out for further information.    Please contact the clinic if you have additional questions.  Thank you.    Sincerely,    Vamshi Clarke MD

## 2018-03-12 ENCOUNTER — TRANSFERRED RECORDS (OUTPATIENT)
Dept: HEALTH INFORMATION MANAGEMENT | Facility: CLINIC | Age: 53
End: 2018-03-12

## 2018-05-30 ENCOUNTER — OFFICE VISIT (OUTPATIENT)
Dept: FAMILY MEDICINE | Facility: CLINIC | Age: 53
End: 2018-05-30
Payer: COMMERCIAL

## 2018-05-30 VITALS
SYSTOLIC BLOOD PRESSURE: 116 MMHG | BODY MASS INDEX: 29.62 KG/M2 | HEART RATE: 107 BPM | WEIGHT: 200 LBS | DIASTOLIC BLOOD PRESSURE: 74 MMHG | OXYGEN SATURATION: 98 % | TEMPERATURE: 97.8 F | HEIGHT: 69 IN

## 2018-05-30 DIAGNOSIS — F33.0 MAJOR DEPRESSIVE DISORDER, RECURRENT EPISODE, MILD (H): ICD-10-CM

## 2018-05-30 DIAGNOSIS — Z11.4 SCREENING FOR HIV WITHOUT PRESENCE OF RISK FACTORS: ICD-10-CM

## 2018-05-30 DIAGNOSIS — E11.9 TYPE 2 DIABETES MELLITUS WITHOUT COMPLICATION, WITHOUT LONG-TERM CURRENT USE OF INSULIN (H): Primary | ICD-10-CM

## 2018-05-30 DIAGNOSIS — N52.03 COMBINED ARTERIAL INSUFFICIENCY AND CORPORO-VENOUS OCCLUSIVE ERECTILE DYSFUNCTION: ICD-10-CM

## 2018-05-30 DIAGNOSIS — G89.4 CHRONIC PAIN SYNDROME: ICD-10-CM

## 2018-05-30 DIAGNOSIS — I10 ESSENTIAL HYPERTENSION WITH GOAL BLOOD PRESSURE LESS THAN 140/90: ICD-10-CM

## 2018-05-30 DIAGNOSIS — E78.5 HYPERLIPIDEMIA LDL GOAL <70: ICD-10-CM

## 2018-05-30 DIAGNOSIS — Z23 NEED FOR SHINGLES VACCINE: ICD-10-CM

## 2018-05-30 LAB
ANION GAP SERPL CALCULATED.3IONS-SCNC: 8 MMOL/L (ref 3–14)
BUN SERPL-MCNC: 13 MG/DL (ref 7–30)
CALCIUM SERPL-MCNC: 9.5 MG/DL (ref 8.5–10.1)
CHLORIDE SERPL-SCNC: 103 MMOL/L (ref 94–109)
CO2 SERPL-SCNC: 27 MMOL/L (ref 20–32)
CREAT SERPL-MCNC: 0.69 MG/DL (ref 0.66–1.25)
GFR SERPL CREATININE-BSD FRML MDRD: >90 ML/MIN/1.7M2
GLUCOSE SERPL-MCNC: 171 MG/DL (ref 70–99)
HBA1C MFR BLD: 8.1 % (ref 0–5.6)
POTASSIUM SERPL-SCNC: 4.2 MMOL/L (ref 3.4–5.3)
SODIUM SERPL-SCNC: 137 MMOL/L (ref 133–144)

## 2018-05-30 PROCEDURE — 99214 OFFICE O/P EST MOD 30 MIN: CPT | Performed by: FAMILY MEDICINE

## 2018-05-30 PROCEDURE — 90750 HZV VACC RECOMBINANT IM: CPT | Performed by: FAMILY MEDICINE

## 2018-05-30 PROCEDURE — 83036 HEMOGLOBIN GLYCOSYLATED A1C: CPT | Performed by: FAMILY MEDICINE

## 2018-05-30 PROCEDURE — 87389 HIV-1 AG W/HIV-1&-2 AB AG IA: CPT | Performed by: FAMILY MEDICINE

## 2018-05-30 PROCEDURE — 80307 DRUG TEST PRSMV CHEM ANLYZR: CPT | Mod: 90 | Performed by: FAMILY MEDICINE

## 2018-05-30 PROCEDURE — 80048 BASIC METABOLIC PNL TOTAL CA: CPT | Performed by: FAMILY MEDICINE

## 2018-05-30 PROCEDURE — 99000 SPECIMEN HANDLING OFFICE-LAB: CPT | Performed by: FAMILY MEDICINE

## 2018-05-30 PROCEDURE — 36415 COLL VENOUS BLD VENIPUNCTURE: CPT | Performed by: FAMILY MEDICINE

## 2018-05-30 RX ORDER — IRBESARTAN 150 MG/1
150 TABLET ORAL DAILY
Qty: 90 TABLET | Refills: 1 | Status: SHIPPED | OUTPATIENT
Start: 2018-05-30 | End: 2018-12-16

## 2018-05-30 RX ORDER — SIMVASTATIN 10 MG
10 TABLET ORAL AT BEDTIME
Qty: 90 TABLET | Refills: 1 | Status: SHIPPED | OUTPATIENT
Start: 2018-05-30 | End: 2018-11-14

## 2018-05-30 RX ORDER — OXYCODONE AND ACETAMINOPHEN 5; 325 MG/1; MG/1
1 TABLET ORAL EVERY 8 HOURS PRN
Qty: 24 TABLET | Refills: 0 | Status: SHIPPED | OUTPATIENT
Start: 2018-05-30 | End: 2018-08-29

## 2018-05-30 RX ORDER — SILDENAFIL CITRATE 20 MG/1
TABLET ORAL
Qty: 90 TABLET | Refills: 1 | Status: SHIPPED | OUTPATIENT
Start: 2018-05-30 | End: 2018-08-29

## 2018-05-30 RX ORDER — GLIPIZIDE 5 MG/1
5 TABLET, FILM COATED, EXTENDED RELEASE ORAL DAILY
Qty: 90 TABLET | Refills: 1 | Status: SHIPPED | OUTPATIENT
Start: 2018-05-30 | End: 2018-11-14

## 2018-05-30 NOTE — PROGRESS NOTES
SUBJECTIVE:   Saran Wallis is a 53 year old male who presents to clinic today for the following health issues:    Diabetes Follow-up      Patient is checking blood sugars: rarely.  This morning was 220 Fasting     Diabetic concerns: None     Symptoms of hypoglycemia (low blood sugar): none     Paresthesias (numbness or burning in feet) or sores: No     Date of last diabetic eye exam: 2/2018    Hyperlipidemia Follow-Up      Rate your low fat/cholesterol diet?: fair    Taking statin?  Yes, no muscle aches from statin    Other lipid medications/supplements?:  none    Hypertension Follow-up      Outpatient blood pressures are not being checked.    Low Salt Diet: not monitoring salt    BP Readings from Last 2 Encounters:   05/30/18 116/74   11/28/17 112/72     Hemoglobin A1C (%)   Date Value   05/30/2018 8.1 (H)   11/28/2017 7.5 (H)     LDL Cholesterol Calculated (mg/dL)   Date Value   08/07/2017 45   05/03/2016 51     Depression and Anxiety Follow-Up    Status since last visit: doing ok stopped Citalopram due to side effects    Significant life event: No     Current substance abuse: None    PHQ-9 5/9/2017 10/13/2017 11/28/2017   Total Score 9 11 4   Q9: Suicide Ideation Not at all Not at all Not at all     SKYLA-7 SCORE 5/9/2017 10/13/2017 11/28/2017   Total Score 0 5 0     PHQ-9  English  PHQ-9   Any Language  SKYLA-7  Suicide Assessment Five-step Evaluation and Treatment (SAFE-T)    Amount of exercise or physical activity: 2-3 days/week     Problems taking medications regularly: No    Medication side effects: none    Diet: regular (no restrictions)        Diabetes- His fasting blood sugar this morning was at 220. He notes he ate [employer] last night at 12 am. He sometimes checks his sugars at lunch time and they have been around 140. 3 years ago he played table tennis every week, but he notes his exercise has decreased. 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 His  diet has not changed. He finds himself eating a lot of white rice, usually is a fist full each time he eats rice. Pt was on glipizide a few years ago, and did not experience any side effects with it. He notes he was on this for about 6 months. Pt rarely checks his blood sugars.     Lower back pain- Pt notes he flies to [place] each month and takes 4 tablets during this week due to experiencing increased pain with flying. Last time he used Percocet was 2 weeks ago. Last prescription for Percocet I gave was October 2017 and this was for 10 tablets. Pt notes the OTC TENS unit has been working well for his lower back pain, but he wants another referral to see [facility] to discuss doing spinal cord stimulator as a treatment option.    Decreased sex drive- Pt has been having decreased sex drive. He is wondering if there are cheaper options than Viagra to help with this.    Anxiety and depression- Pt notes he stopped his Citalopram due to side effects. His anxiety and depression is currently stable.     He notes his father passed away 3 months ago secondary to pneumonia infection and is wondering if he should have the pneumonia shot. Pt has already received pneumovax.     Pt agreed to screen for HIV today.      Problem list and histories reviewed & adjusted, as indicated.  Additional history: as documented    Reviewed and updated as needed this visit by clinical staff  Tobacco  Allergies       Reviewed and updated as needed this visit by Provider       ROS:  Constitutional, HEENT, cardiovascular, pulmonary, gi and gu systems are negative, except as otherwise noted.    This document serves as a record of the services and decisions personally performed and made by Vamshi Clarke MD. It was created on his behalf by Carmine Erickson, a trained medical scribe. The creation of this document is based on the provider's statements to the medical scribe.  Carmine Erickson 1:42 PM May 30, 2018    OBJECTIVE:     /74  " Pulse 107  Temp 97.8  F (36.6  C) (Oral)  Ht 5' 9\" (1.753 m)  Wt 200 lb (90.7 kg)  SpO2 98%  BMI 29.53 kg/m2  Body mass index is 29.53 kg/(m^2).  GENERAL: healthy, alert and no distress  PSYCH: mentation appears normal, affect normal/bright    Diagnostic Test Results:  Results for orders placed or performed in visit on 05/30/18 (from the past 24 hour(s))   Hemoglobin A1c   Result Value Ref Range    Hemoglobin A1C 8.1 (H) 0 - 5.6 %       ASSESSMENT/PLAN:     (E11.9) Type 2 diabetes mellitus without complication, without long-term current use of insulin (H)  (primary encounter diagnosis)  Comment: Will wait to see the results of his A1C today. Discussed with pt that if this comes back higher than 7.5, then will start him on Glipizide along with his Metformin since he has tolerated this well in the past. If this comes back at 7.5 or lower, will have him continue with his Metformin as prescribed. Regardless of his A1C results from today, recommended pt increase his exercise activity as this will help improve his blood sugars.  Given A1C is 8.1 will start him on glipizide and see him back in three months.  Plan: Hemoglobin A1c, Basic metabolic panel  (Ca, Cl,        CO2, Creat, Gluc, K, Na, BUN), metFORMIN         (GLUCOPHAGE) 500 MG tablet        Follow up in 3 months for diabetes recheck.    (F33.0) Major depressive disorder, recurrent episode, mild (H)  Comment: Pt recently stopped his citalopram due to experiencing side effects; however, his depression and anxiety remains stable.  Plan: Follow up if needed.    (I10) Essential hypertension with goal blood pressure less than 140/90  Comment: His BP was good today (116/74); will refill his irbesartan and will recheck BMP today.  Plan: Basic metabolic panel  (Ca, Cl, CO2, Creat,         Gluc, K, Na, BUN), irbesartan (AVAPRO) 150 MG         tablet        Follow up based on labs.    (E78.5) Hyperlipidemia LDL goal <70  Comment: Will refill his simvastatin and have " him continue as prescribed. Will plan on rechecking his cholesterol levels in 3 months.  Plan: simvastatin (ZOCOR) 10 MG tablet        Follow up if needed.    (G89.4) Chronic pain syndrome  Comment: Pt has been taking 4 tablets of his Percocet per month when he flies to [place] given he has increased pain with flying. I agreed to dispense 24 tablets of his Percocet which should last him 6 months. In the interim, will do a urine drug screening today. Lastly, pt has been using OTC TENS unit which has helped with his lower back pain. However, pt is requesting a referral to [facility] to discuss doing spinal cord stimulation as a treatment option; referral placed today.  Plan: Drug  Screen Comprehensive, Urine w/o Reported         Meds (Pain Care Package),         oxyCODONE-acetaminophen (PERCOCET) 5-325 MG per        tablet, PAIN MANAGEMENT REFERRAL        Follow up based on labs and if needed.    (Z11.4) Screening for HIV without presence of risk factors  Comment: Pt agreed to screen for HIV today.  Plan: HIV Antigen Antibody Combo        Follow up based on labs.    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 However, given he has been having decreased sex drive along with his ED, I discussed with pt that sildenafil does not help increase sex drive; it only helps with developing and maintaining erections. Furthermore, discussed with pt that generic sildenafil may not be covered by his insurance, but will write prescription to see if it will be covered. He may find one tablet is all he needs to take to be effective for him, therefore I will write prescription and have him take 1-5 tablets as needed.  Plan: sildenafil (REVATIO) 20 MG tablet        Follow up if needed.    (Z23) Need for shingles vaccine  Comment: Recommended pt receive the  first dose of the Shingrix vaccine; it is over 90% effective at preventing shingles outbreak and subsequent PHN. Discussed with pt that previous Zostavax was only 50% effective at preventing shingles outbreak. After discussion, pt agreed to receive the 1st dose today.  Plan: ZOSTER VACCINE RECOMBINANT ADJUVANTED IM NJX         (SHINGRIX)        Will plan on having him receive the 2nd dose when he follows up in 3 months for diabetes recheck.    The information in this document, created by the medical scribe for me, accurately reflects the services I personally performed and the decisions made by me. I have reviewed and approved this document for accuracy prior to leaving the patient care area.  May 30, 2018 1:42 PM    Vamshi Clarke Jr, MD  [facility]

## 2018-05-30 NOTE — MR AVS SNAPSHOT
After Visit Summary   5/30/2018    Saran Wallis    MRN: [id number]           Patient Information     Date Of Birth          1965        Visit Information        Provider Department      5/30/2018 1:20 PM Vamshi Clarke Jr., MD [facility] [place]        Today's Diagnoses     Type 2 diabetes mellitus without complication, without long-term current use of insulin (H)    -  1    Major depressive disorder, recurrent episode, mild (H)        Essential hypertension with goal blood pressure less than 140/90        Hyperlipidemia LDL goal <70        Chronic pain syndrome        Screening for HIV without presence of risk factors        Combined arterial insufficiency and corporo-venous occlusive erectile dysfunction        Need for shingles vaccine           Follow-ups after your visit        Additional Services     PAIN MANAGEMENT REFERRAL       Your provider [place] referred you [place] [facility] [place] -    Reason for Referral: Evaluation for procedure- clinic evaluation to determine if procedure is appropriate.  Procedure would be done at later date.     Please complete the following questions:    Do you have any specific questions for the pain specialist? Yes:     Are there any red flags that may impact the assessment or management of the patient? None      What is your diagnosis for the patient's pain? Chronic low back pain.  Interested in learning more about spinal cord stimulator as a treatment option.  Has had some benefit from an OTC TENS unit.       For any questions, contact the [facility] [place] at [phone].     **ANY DIAGNOSTIC TESTS THAT ARE NOT IN EPIC SHOULD BE SENT TO THE PAIN CENTER**    REGARDING OPIOID MEDICATIONS:  The discussion of opioids management, appropriateness of therapy, and dosing will be discussed in patients being seen for evaluation.  The pain management clinics are not long-term prescribing clinics, with transition of prescribing  of medications ultimately going back to the referring provider/PCP.  If prescribing is taken over at the pain clinic, it is in actively involved patients whom are appropriate for opioids, urine drug screening is completed, and long-term prescribing plan has been determined.  Therefore, we will not be automatically taking over prescribing at the patient's first visit.  Is this agreeable to you? agrees.     Please be aware that coverage of these services is subject to the terms and limitations of your health insurance plan.  Call member services at your health plan with any benefit or coverage questions.      Please bring the following with you to your appointment:    (1) Any X-Rays, CTs or MRIs which have been performed.  Contact the facility where they were done to arrange for  prior to your scheduled appointment.    (2) List of current medications   (3) This referral request   (4) Any documents/labs given to you for this referral                  Follow-up notes from your care team     Return in about 3 months (around 8/30/2018) for Diabetes Recheck.      Who to contact     If you have questions or need follow up information about today's clinic visit or your schedule please contact [facility] [place] directly at [phone].  Normal or non-critical lab and imaging results will be communicated to you by Invaciohart, letter or phone within 4 business days after the clinic has received the results. If you do not hear from us within 7 days, please contact the clinic through Invaciohart or phone. If you have a critical or abnormal lab result, we will notify you by phone as soon as possible.  Submit refill requests through DashBurst or call your pharmacy and they will forward the refill request to us. Please allow 3 business days for your refill to be completed.          Additional Information About Your Visit        InvacioharReSnap Information     DashBurst gives you secure access to your electronic health record. If you see  "a primary care provider, you can also send messages to your care team and make appointments. If you have questions, please call your primary care clinic.  If you do not have a primary care provider, please call [phone] and they will assist you.        Care EveryWhere ID     This is your Care EveryWhere ID. This could be used by other organizations to access your [place] medical records  [id number]        Your Vitals Were     Pulse Temperature Height Pulse Oximetry BMI (Body Mass Index)       107 97.8  F (36.6  C) (Oral) 5' 9\" (1.753 m) 98% 29.53 kg/m2        Blood Pressure from Last 3 Encounters:   05/30/18 116/74   11/28/17 112/72   10/13/17 122/72    Weight from Last 3 Encounters:   05/30/18 200 lb (90.7 kg)   11/28/17 200 lb (90.7 kg)   10/13/17 202 lb (91.6 kg)              We Performed the Following     Basic metabolic panel  (Ca, Cl, CO2, Creat, Gluc, K, Na, BUN)     Drug  Screen Comprehensive, Urine w/o Reported Meds (Pain Care Package)     Hemoglobin A1c     HIV Antigen Antibody Combo     PAIN MANAGEMENT REFERRAL     ZOSTER VACCINE RECOMBINANT ADJUVANTED IM NJX (SHINGRIX)          Today's Medication Changes          These changes are accurate as of 5/30/18  2:09 PM.  If you have any questions, ask your nurse or doctor.               Start taking these medicines.        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   Quantity:  90 tablet   Refills:  1         Stop taking these medicines if you haven't already. Please contact your care team if you have questions.     citalopram 10 MG tablet   Commonly known as:  celeXA   Stopped by:  Vamshi Clarke Jr., MD                Where to get your medicines      These medications were sent to [facility] [address] [place], [place] - [address] [facility] [address] [address]     Phone:  [phone]     irbesartan 150 MG tablet    metFORMIN 500 MG tablet    sildenafil 20 MG tablet    simvastatin 10 MG tablet         Some of these will need a paper prescription and others can be bought over the counter.  Ask your nurse if you have questions.     Bring a paper prescription for each of these medications     oxyCODONE-acetaminophen 5-325 MG per tablet               Information about OPIOIDS     PRESCRIPTION OPIOIDS: WHAT YOU NEED TO KNOW   You have a prescription for an opioid (narcotic) pain medicine. Opioids can cause addiction. If you have a history of chemical dependency of any type, you are at a higher risk of becoming addicted to opioids. Only take this medicine after all other options have been tried. Take it for as short a time and as few doses as possible.     Do not:    Drive. If you drive while taking these medicines, you could be arrested for driving under the influence (DUI).    Operate heavy machinery    Do any other dangerous activities while taking these medicines.     Drink any alcohol while taking these medicines.      Take with any other medicines that contain acetaminophen. Read all labels carefully. Look for the word  acetaminophen  or  Tylenol.  Ask your pharmacist if you have questions or are unsure.    Store your pills in a secure place, locked if possible. We will not replace any lost or stolen medicine. If you don t finish your medicine, please throw away (dispose) as directed by your pharmacist. The [place] Pollution Control Agency has more information about safe disposal: https://www.pca.[facility].[place].us/living-green/managing-unwanted-medications    All opioids tend to cause constipation. Drink plenty of water and eat foods that have a lot of fiber, such as fruits, vegetables, prune juice, apple juice and high-fiber cereal. Take a laxative (Miralax, milk of magnesia, Colace, Senna) if you  don t move your bowels at least every other day.          Primary Care Provider Office Phone # Fax #    Vamshi Clarke Jr., [phone] [address]  [place] [address]        Equal Access to Services     BONIFACIOANDRÉS SERENITY : Hadii aad ku hadmarquiso Soomaali, waaxda luqadaha, qaybta kaalmada adeegyada, ana cristina rogersn laura man lanazluis garcia. So [facility] [phone].    ATENCIÓN: Si habla español, tiene a kwan disposición servicios gratuitos de asistencia lingüística. Llame al [phone].    We comply with applicable federal civil rights laws and [place] laws. We do not discriminate on the basis of race, color, national origin, age, disability, sex, sexual orientation, or gender identity.            Thank you!     Thank you for choosing [facility]  for your care. Our goal is always to provide you with excellent care. Hearing back from our patients is one way we can continue to improve our services. Please take a few minutes to complete the written survey that you may receive in the mail after your visit with us. Thank you!             Your Updated Medication List - Protect others around you: Learn how to safely use, store and throw away your medicines at www.disposemymeds.org.          This list is accurate as of 5/30/18  2:09 PM.  Always use your most recent med list.                   Brand Name Dispense Instructions for use Diagnosis    ASPIRIN PO      Take 81 mg by mouth    Chronic pain syndrome, Chronic radicular low back pain, Physical deconditioning, Major depressive disorder, recurrent episode, mild (H), Chronic insomnia       blood glucose monitoring lancets     100 each    Use to test blood sugar one times daily or as directed.  Ok to substitute alternative if insurance prefers.    Type 2 diabetes mellitus without complication, without long-term current use of insulin (H)       blood glucose monitoring meter device kit     1 kit    Use to test blood sugar 1 times daily or  as directed.  Ok to substitute alternative if insurance prefers.    Type 2 diabetes mellitus without complication, without long-term current use of insulin (H)       blood glucose monitoring test strip    ONETOUCH VERIO IQ    100 each    Use to test blood sugar one times daily or as directed.  Ok to substitute alternative if insurance prefers.    Type 2 diabetes mellitus without complication, without long-term current use of insulin (H)       gabapentin 300 MG capsule    NEURONTIN    540 capsule    2 caps tid    Chronic pain syndrome, Chronic radicular low back pain       irbesartan 150 MG tablet    AVAPRO    90 tablet    Take 1 tablet (150 mg) by mouth daily    Essential hypertension with goal blood pressure less than 140/90       metFORMIN 500 MG tablet    GLUCOPHAGE    360 tablet    Take 2 tablets (1,000 mg) by mouth 2 times daily (with meals)    Type 2 diabetes mellitus without complication, without long-term current use of insulin (H)       order for DME     1 Units    Equipment being ordered: TENS    Chronic pain syndrome       oxyCODONE-acetaminophen 5-325 MG per tablet    PERCOCET    24 tablet    Take 1 tablet by mouth every 8 hours as needed for moderate to severe pain or pain    Chronic pain syndrome       pantoprazole 40 MG EC tablet    PROTONIX    90 tablet    Take 1 tablet (40 mg) by mouth daily    Gastroesophageal reflux disease without esophagitis       sildenafil 20 MG tablet    REVATIO    90 tablet    Take 1-5 tablet (20 mg) by mouth daily as needed.    Combined arterial insufficiency and corporo-venous occlusive erectile dysfunction       simvastatin 10 MG tablet    ZOCOR    90 tablet    Take 1 tablet (10 mg) by mouth At Bedtime    Hyperlipidemia LDL goal <70

## 2018-05-30 NOTE — PROGRESS NOTES
Mr. Wallis,    -A1C test (average blood sugar the last 2-3 months) is above your goal.   ADVISE:  Diabetic followup appointment in 12 weeks as we scheduled today. Please check and record your blood sugars at least 4 times daily for 1 week prior to your appointment and bring for review.  I sent the glipizide to your pharmacy so you can start taking that once daily when you pick it up.    If you have further questions about the interpretation of your labs, labtestsonline.org is a good website to check out for further information.    Please contact the clinic if you have additional questions.  Thank you.    Sincerely,    Vamshi Clarke MD

## 2018-05-31 ENCOUNTER — TELEPHONE (OUTPATIENT)
Dept: PALLIATIVE MEDICINE | Facility: CLINIC | Age: 53
End: 2018-05-31

## 2018-05-31 LAB — HIV 1+2 AB+HIV1 P24 AG SERPL QL IA: NONREACTIVE

## 2018-05-31 NOTE — TELEPHONE ENCOUNTER
Pain Management Center Referral      1. Confirmed address with patient? Yes  2. Confirmed phone number with patient? Yes  3. Confirmed referring provider? Yes  4. Is the PCP the same as the referring provider? Yes  5. Has the patient been to any previous pain clinics? Yes-[facility]- 2017  (If yes, send LEATHA with welcome letter)  6. Which insurance are we to bill for this appointment?  [employer]    7. Informed pt of cancellation (48 hour) policy? Yes    REGARDING OPIOID MEDICATIONS: We will always address appropriateness of opioid pain medications, but we generally will not automatically take on a prescribing role. When we do take on prescribing of opioids for chronic pain, it is in collaboration with the referring physician for an intermediate period of time (months), with an expectation that the primary physician or provider will assume the prescribing role if medications are effective at stable doses with demonstrated compliance. Therefore, please do not assume that your prescribing responsibilities end on the day of pain clinic consultation.  7. Informed pt of prescribing policy? Yes      8. Referring Provider: Vamshi Clarke Jr.

## 2018-05-31 NOTE — LETTER
May 31, 2018    Saran Wallis  [address] [place] [address]    Dear Saran,                                                                   Welcome to the [facility] at the [facility]. We are located at [address], [address], [place] [address]. Your appointment has been scheduled on Thursday June 7, 2018 at 2:00 PM with Kim Han NP.    At your first visit, you will meet your team of caregivers who will help you to develop pain management strategies that will last a lifetime. You will meet with our support staff to review your insurance information and collect your co-payment if required by your insurance company. You will meet with a medical pain specialist and care coordinator who will assess your pain and develop a plan of care for your successful pain rehabilitation. You should expect to spend 1-2 hours at your first visit with us. Usually, patients work with us for a period of 6-12 months, and eventually return to their primary doctor once their pain management has stabilized.      To help us make your visit go as smoothly as possible, please bring the following items with you on your visit:   Completed Pain Questionnaire enclosed in this packet.  If you do not bring the completed questionnaire, we may have to reschedule your appointment.  List of any medicines that you are currently taking or have been prescribed  Pertinent NON-[facility] medical information such as medical records or tests results (X-rays, or laboratory tests)  Your health insurance card  Financial resources to cover your co-payment or balance due at the time of service (cash, personal check, Visa, and MasterCard are acceptable methods of payment)     Due to the high demand for new patient evaluations, you must notify the scheduling department 48 hours in advance if you are not able to keep this appointment.  Failure to do so could affect your ability to reschedule  with our clinic. Please do not assume that you will receive any prescription medications at your first visit.    Please call [phone] with any questions regarding your appointment. We look forward to meeting you and working to address your health care needs.     Sincerely,    [facility]

## 2018-06-01 NOTE — PROGRESS NOTES
Mr. Wallis,    -Kidney function (GFR) is normal.  -Sodium is normal.  -Potassium is normal.  -HIV test is negative.    If you have further questions about the interpretation of your labs, [email or web address] is a good website to check out for further information.    Please contact the clinic if you have additional questions.  Thank you.    Sincerely,    Vamshi Clarke MD

## 2018-06-02 LAB — COMPREHEN DRUG ANALYSIS UR: NORMAL

## 2018-06-03 NOTE — PROGRESS NOTES
Mr. Wallis,    -All of your labs are normal.  Drug screen is positive for the appropriate substances.    If you have further questions about the interpretation of your labs, [email or web address] is a good website to check out for further information.    Please contact the clinic if you have additional questions.  Thank you.    Sincerely,    Vamshi Clarke MD    D

## 2018-07-02 ENCOUNTER — OFFICE VISIT (OUTPATIENT)
Dept: PALLIATIVE MEDICINE | Facility: CLINIC | Age: 53
End: 2018-07-02
Attending: FAMILY MEDICINE
Payer: COMMERCIAL

## 2018-07-02 VITALS — SYSTOLIC BLOOD PRESSURE: 117 MMHG | HEART RATE: 100 BPM | DIASTOLIC BLOOD PRESSURE: 84 MMHG | OXYGEN SATURATION: 100 %

## 2018-07-02 DIAGNOSIS — M54.16 LUMBAR RADICULOPATHY: ICD-10-CM

## 2018-07-02 DIAGNOSIS — M96.1 FAILED BACK SYNDROME: Primary | ICD-10-CM

## 2018-07-02 PROCEDURE — 99214 OFFICE O/P EST MOD 30 MIN: CPT | Performed by: PAIN MEDICINE

## 2018-07-02 ASSESSMENT — PAIN SCALES - GENERAL: PAINLEVEL: MODERATE PAIN (4)

## 2018-07-02 NOTE — PROGRESS NOTES
[facility]    Referring Provider: Vamshi Clarke MD    Today's visit: 07/02/2018  Last visit: 5/17 with SHAQ Gong    Chief Complaint   Patient presents with     Pain     Interventional evalutation   Recommendation: ASSESSMENT:  1. Chronic low back pain with radicular features L3-4 on right and L5-S1 on left  1. Left calve cramp >> gluteal deconditioning, ? Left hip bursitis, per interventionist could be related to lumbar Degenerative Disc Disease.  2. Chronic opioid use ~1 per day   3. Facet mediation VS hip pain VS Degenerative Disc Disease  4. left BARBARA 03/25/16 and 5/10/16,  Caudal BARBARA 2/13/17.   2. Multi level mild to moderate lumbar stenosis S1 nerve root impingement  3. Failed back syndrome S/p hemicraniectomy L4-5 2009, L5-S1 2012 x 2  4. Anxiety related to increase pain associated with routine, sexual activity reduced arousal chronic use of ativan. Seems controlled   5. MARK  6. History of Alcohol over use ( excess drinking in [place]) not drinking in [place] Currently resolved  7. H/O Hep B [facility]  8. OSTEOARTHRITIS right shoulder, forearm and knees   1. Repetitive use syndrome   1. Early ulnar neuritis  2. Tendonitis, vs rotator vs joint disease of the shoulder     9. Disorder of the Gluteus Medius Muscle.  1. Physical Deconditioning, Myofascial pain left gluteus medius         Interval Visit:   The pt reports continue intermittent sharp shooting pain to his Left calf. The pain radiates into the top of his foot. The pain is sharp/electrical pain. He notes numbness over the same distribution. Along, with the sharp shooting pain he has L>R LBP. The pain is similar to his pain prior to his surgeries. He reports no improvement with surgery. The pain is worse with sitting and bending. He denies progressive weakness. The pain is slightly better with rest. He denies any incontinence. Of note pt reports some benefit with TENS unit  Of note pt has had prior epidural  injection with some short lived relief   Pain level 5/10 range 3-9/10t    Relieving factors: laying down percocet and steroid injection       PAST MEDICATION TRIALS:   Diclofenac gel  As needed up to 4 x per day    Zanaflex 2-4 mg 3 x/ day prn         OPIOIDS: Fentanyl, Hydromorphone, Tramadol  ANTIDEPRESSANTS: Celexa, Sertraline Trazodone (bad taste in mouth)   ANTICONVULSANTS: gabapentin   PREVIOUS PAIN CLINIC:   YES   X ([facility], [facility])  SURGERY:     YES   X FOR SHORT TIME  INJECTIONS:               YES   PHYSICAL THERAPY:   YES   X (TENS)         IMPRESSION:  1. L5-S1 broad-based left central disc herniation posteriorly  displacing the left S1 nerve root. There has been previous posterior  decompression. No central stenosis.  2. L4-L5 broad-based central disc protrusion or herniation without  apparent central stenosis.  3. L4-L5 moderate bilateral foraminal stenosis.  4. L3-L4 central disc herniation slightly to the left of midline  without apparent direct neural impingement or central stenosis      PMHX:  Past Medical History:   Diagnosis Date     Chronic radicular low back pain 4/30/2016     Depressive disorder      Diabetes mellitus (H)      Gastric reflux      Hypercholesteremia      Hypertension      PAST SURGICAL HISTORY  Past Surgical History:   Procedure Laterality Date     COLONOSCOPY N/A 1/28/2016    Procedure: COMBINED COLONOSCOPY, SINGLE OR MULTIPLE BIOPSY/POLYPECTOMY BY BIOPSY;  Surgeon: Michelle Morrison MD;  Location:  GI     ESOPHAGOSCOPY, GASTROSCOPY, DUODENOSCOPY (EGD), COMBINED N/A 5/13/2016    Procedure: COMBINED ESOPHAGOSCOPY, GASTROSCOPY, DUODENOSCOPY (EGD);  Surgeon: Vamshi Lynn MD;  Location:  GI     ORTHOPEDIC SURGERY         CURRENT MEDICATIONS:   Current Outpatient Prescriptions   Medication     ASPIRIN PO     blood glucose monitoring (ONE TOUCH DELICA) lancets     blood glucose monitoring (ONE TOUCH VERIO IQ) test strip     blood glucose monitoring (ONETOUCH VERIO IQ  SYSTEM) meter device kit     gabapentin (NEURONTIN) 300 MG capsule     glipiZIDE (GLIPIZIDE XL) 5 MG 24 hr tablet     irbesartan (AVAPRO) 150 MG tablet     metFORMIN (GLUCOPHAGE) 500 MG tablet     order for DME     oxyCODONE-acetaminophen (PERCOCET) 5-325 MG per tablet     pantoprazole (PROTONIX) 40 MG EC tablet     sildenafil (REVATIO) 20 MG tablet     simvastatin (ZOCOR) 10 MG tablet     No current facility-administered medications for this visit.        ROS:  Any changes in your medical condition, illness, injury or hospitalizations: No    PHYSICAL EXAM:  Constitutional:Blood pressure 117/84, pulse 100, SpO2 100 %., There is no height or weight on file to calculate BMI.  Gait/Station/Posture: slightly antalgic       Lumbar spine:     ROM: neg   Myofascial tenderness:  mild   Evans's: neg               Straight leg exam: mildly + on the L     Neurologic exam:  CN:  Cranial nerves 2-12 are normal  Motor:  5/5 LE strength  Reflexes:        Achilles:  +2    Sensory:  Decreased over the left compared to right  Dx: Failed Back Syndrome/Lumbar radiculopathy      Further procedures recommended:    - Patient will need a psychology evaluation    - Pending approval may consider spinal cord stimulator   - Follow up:    - Will start process towards spinal cord stimulator      Total time spent was 30 minutes, and more than 50% of face to face time was spent counseling and/or coordination of care regarding principles of multidisciplinary care and medication management  L LBP radiating to his LE.     Billy Ralph MD  [facility]

## 2018-07-02 NOTE — PATIENT INSTRUCTIONS
Spinal Cord Stimulation (SCS) Therapy #1  Your provider recommends spinal cord stimulation as an option for management of your chronic pain. It is important that you understand the possible risks and benefits associated with spinal cord stimulation therapy.  Benefits may include:    At least 50% reduction in pain.    Improved ability to function in daily living.    Less pain medication.    Ability to adjust the therapy based on your pain level or turn it off as needed.  Risks may include:    Surgical complications including infection, pain, and bleeding.    Device complications including corrective surgery, jolting, lead breaking, and movement of the lead requiring reprogramming or surgical replacement.    Uncomfortable stimulation.    What happens next?  Spinal cord stimulation information will be given to you in the form of an informational packet and DVD. This will give you a brief explanation of spinal cord stimulation therapy and may help answer any questions you have. Please review the material at home before meeting with the psychologist.     Psychological evaluation:   A psychological evaluation is required by your insurance company prior to going forward with the trial.     Insurance:  Spinal cord stimulation is a procedure that requires special authorization. Once you have completed the psychological evaluation, our clinic can send the necessary records to your insurance company or refer to the coverage policies in order to reach a decision on coverage. Our clinic requires approval from the insurance company before we can schedule a trial. It may take 4-6 weeks to get this approval. You may also want to contact your insurance company. Even though they have approved the trial, there still may be out of pocket expenses that you will be responsible for. Your insurance company can give you an estimate of your out of pocket costs for the trial.    Scheduling:  Once we have received the prior authorization, we  will call you to schedule the remaining appointments, including the trial. You may choose to return to the clinic to meet with your provider prior to the scheduled trial.         Neurostimulator Handout    EXPECTATIONS   1. You will need to refrain from having any dental work or a colonoscopy for 2 weeks prior and 2 weeks after this procedure.  2. Certain things such as an MRI, ultrasound, diathermy, chiropractic manipulation, radiation, or electro-cautery may damage your neurostimulator and you should avoid these things or get physician approval first. Cardiac pacemakers and defibrillators may interfere with the neurostimulator.  3. There are activity restrictions during the recovery period after the trial implant as well as after the permanent implant.    ACTIVITY RESTRICTIONS  The following restrictions will be in place during your trial while the leads are in place and for 6-8 weeks following lead placement of the permanent neurostimulator:  1. No raising hands above the head  2. No twisting, bending, or stretching body at the waist  3. No lifting items greater than 5 pounds    Some things that may help you comply with these restrictions are: slip on shoes, a step stool for the kitchen or bathroom and a reacher to grasp objects. These can be purchased at most pharmacies.   Following these restrictions will help ensure your leads do not move. Once the permanent device is implanted, scar tissue will build up which will ensure the leads do not migrate.    SITUATIONS IN WHICH YOU CANNOT USE THE STIMULATOR  1. Driving an automobile or anything motorized.  2. Operating any type of machinery or equipment such as: chain saw, electric nailer, or wood cutting tools.  Why? Any quick movement or position change can cause extra stimulation to the nerves resulting in you losing your ability to control an automobile or tool. You will eventually learn which position cause this extra stimulation, but it is best for you to simply  turn off the stimulator while doing these tasks to avoid accidents.     RISKS  1. Any complication following surgery or anesthesia is possible, including but not limited to bleeding, infection, and death.  2. Infection in the epidural space could potentially lead to meningitis or an epidural abscess. This would result in removal of the spinal cord stimulator.  3. Accumulation of fluid in the power source site (seroma).  4. Spinal headache which sometimes requires a blood patch.  5. Mechanical complications with the system including dislodgement of the lead/electrode, breaks in the wiring or problems with the power source.  6. Bleeding into the epidural space could result in hematoma and nerve damage. This may require surgical removal of the spinal cord stimulator.        DISCHARGE INSTRUCTIONS  1. Temporary surgical pain may require pain management with the use of opioids. Opioids can cause constipation. Make sure you are eating plenty of fibrous foods and drinking plenty of water. For a while, you may need to use an over-the-counter laxative such as senokot or milk of magnesia.  2. You may not shower at all while your trial lead is in. You may shower 24 hours after your trial leads are pulled. You may not shower for 7 days after the permanent implant surgery.  3. You may bathe 7 days after your trial leads are pulled. You may bathe 3 weeks after your permanent implant surgery.  4. You should start your oral antibiotics the day after your trial and/or permanent implantation and continue taking them for 10 days.   5. Sometimes a leakage of cerebral spinal fluid around the lead site may cause a spinal headache. To alleviate this headache try lying flat as much as possible and drinking plenty of non-carbonated caffeinated fluids.  6. Stimulator reprogramming may be required periodically for the next few months.    SYMPTOMS TO REPORT  1. Signs of infection such as chills, fever, increased pain, redness, drainage or  swelling from the incision site.  2. Headache persisting beyond 48 hours.  3. Unusual changes in sensation or loss of sensation.  4. Painful sensations form the neurostimulator. Please stop the stimulator and call your doctor.  5. It is considered a medical emergency if you experience:    Sudden severe back pain    Sudden onset leg weakness or severe spasms    Loss of bladder control and/or bowel function  GO TO THE HOSPITAL OR CALL DOCTOR ASAP!         Further procedures recommended:    - Patient will need a psychology evaluation    - Pending approval may consider spinal cord stimulator   - Follow up:    - Will start process towards spinal cord stimulator      ----------------------------------------------------------------  Nurse Triage line:  [phone]   Call this number with any questions or concerns. You may leave a detailed message anytime. Calls are typically returned Monday through Friday between 8 AM and 4:30 PM. We usually get back to you within 2 business days depending on the issue/request.       Medication refills:    For non-narcotic medications, call your pharmacy directly to request a refill. The pharmacy will contact the [facility] for authorization. Please allow 3-4 days for these refills to be processed.     For narcotic refills, call the nurse triage line or send a [employer] message. Please contact us 7-10 days before your refill is due. The message MUST include the name of the specific medication(s) requested and how you would like to receive the prescription(s). The options are as follows:    Pain Clinic staff can mail the prescription to your pharmacy. Please tell us the name of the pharmacy.    You may pick the prescription up at the Pain Clinic (tell us the location) or during a clinic visit with your pain provider    Pain Clinic staff can deliver the prescription to the [facility] pharmacy in the clinic building. Please tell us the location.      Scheduling number: [phone].   Call this number to schedule or change appointments.    We believe regular attendance is key to your success in our program.    Any time you are unable to keep your appointment we ask that you call us at least 24 hours in advance to let us know. This will allow us to offer the appointment time to another patient.

## 2018-07-02 NOTE — MR AVS SNAPSHOT
After Visit Summary   7/2/2018    Saran Wallis    MRN: [id number]           Patient Information     Date Of Birth          1965        Visit Information        Provider Department      7/2/2018 1:30 PM Billy Ralph MD [facility]        Today's Diagnoses     Failed back syndrome    -  1    Lumbar radiculopathy          Care Instructions      Spinal Cord Stimulation (SCS) Therapy #1  Your provider recommends spinal cord stimulation as an option for management of your chronic pain. It is important that you understand the possible risks and benefits associated with spinal cord stimulation therapy.  Benefits may include:    At least 50% reduction in pain.    Improved ability to function in daily living.    Less pain medication.    Ability to adjust the therapy based on your pain level or turn it off as needed.  Risks may include:    Surgical complications including infection, pain, and bleeding.    Device complications including corrective surgery, jolting, lead breaking, and movement of the lead requiring reprogramming or surgical replacement.    Uncomfortable stimulation.    What happens next?  Spinal cord stimulation information will be given to you in the form of an informational packet and DVD. This will give you a brief explanation of spinal cord stimulation therapy and may help answer any questions you have. Please review the material at home before meeting with the psychologist.     Psychological evaluation:   A psychological evaluation is required by your insurance company prior to going forward with the trial.     Insurance:  Spinal cord stimulation is a procedure that requires special authorization. Once you have completed the psychological evaluation, our clinic can send the necessary records to your insurance company or refer to the coverage policies in order to reach a decision on coverage. Our clinic requires approval from the insurance company before we can  schedule a trial. It may take 4-6 weeks to get this approval. You may also want to contact your insurance company. Even though they have approved the trial, there still may be out of pocket expenses that you will be responsible for. Your insurance company can give you an estimate of your out of pocket costs for the trial.    Scheduling:  Once we have received the prior authorization, we will call you to schedule the remaining appointments, including the trial. You may choose to return to the clinic to meet with your provider prior to the scheduled trial.         Neurostimulator Handout    EXPECTATIONS   1. You will need to refrain from having any dental work or a colonoscopy for 2 weeks prior and 2 weeks after this procedure.  2. Certain things such as an MRI, ultrasound, diathermy, chiropractic manipulation, radiation, or electro-cautery may damage your neurostimulator and you should avoid these things or get physician approval first. Cardiac pacemakers and defibrillators may interfere with the neurostimulator.  3. There are activity restrictions during the recovery period after the trial implant as well as after the permanent implant.    ACTIVITY RESTRICTIONS  The following restrictions will be in place during your trial while the leads are in place and for 6-8 weeks following lead placement of the permanent neurostimulator:  1. No raising hands above the head  2. No twisting, bending, or stretching body at the waist  3. No lifting items greater than 5 pounds    Some things that may help you comply with these restrictions are: slip on shoes, a step stool for the kitchen or bathroom and a reacher to grasp objects. These can be purchased at most pharmacies.   Following these restrictions will help ensure your leads do not move. Once the permanent device is implanted, scar tissue will build up which will ensure the leads do not migrate.    SITUATIONS IN WHICH YOU CANNOT USE THE STIMULATOR  1. Driving an automobile or  anything motorized.  2. Operating any type of machinery or equipment such as: chain saw, electric nailer, or wood cutting tools.  Why? Any quick movement or position change can cause extra stimulation to the nerves resulting in you losing your ability to control an automobile or tool. You will eventually learn which position cause this extra stimulation, but it is best for you to simply turn off the stimulator while doing these tasks to avoid accidents.     RISKS  1. Any complication following surgery or anesthesia is possible, including but not limited to bleeding, infection, and death.  2. Infection in the epidural space could potentially lead to meningitis or an epidural abscess. This would result in removal of the spinal cord stimulator.  3. Accumulation of fluid in the power source site (seroma).  4. Spinal headache which sometimes requires a blood patch.  5. Mechanical complications with the system including dislodgement of the lead/electrode, breaks in the wiring or problems with the power source.  6. Bleeding into the epidural space could result in hematoma and nerve damage. This may require surgical removal of the spinal cord stimulator.        DISCHARGE INSTRUCTIONS  1. Temporary surgical pain may require pain management with the use of opioids. Opioids can cause constipation. Make sure you are eating plenty of fibrous foods and drinking plenty of water. For a while, you may need to use an over-the-counter laxative such as senokot or milk of magnesia.  2. You may not shower at all while your trial lead is in. You may shower 24 hours after your trial leads are pulled. You may not shower for 7 days after the permanent implant surgery.  3. You may bathe 7 days after your trial leads are pulled. You may bathe 3 weeks after your permanent implant surgery.  4. You should start your oral antibiotics the day after your trial and/or permanent implantation and continue taking them for 10 days.   5. Sometimes a  leakage of cerebral spinal fluid around the lead site may cause a spinal headache. To alleviate this headache try lying flat as much as possible and drinking plenty of non-carbonated caffeinated fluids.  6. Stimulator reprogramming may be required periodically for the next few months.    SYMPTOMS TO REPORT  1. Signs of infection such as chills, fever, increased pain, redness, drainage or swelling from the incision site.  2. Headache persisting beyond 48 hours.  3. Unusual changes in sensation or loss of sensation.  4. Painful sensations form the neurostimulator. Please stop the stimulator and call your doctor.  5. It is considered a medical emergency if you experience:    Sudden severe back pain    Sudden onset leg weakness or severe spasms    Loss of bladder control and/or bowel function  GO TO THE HOSPITAL OR CALL DOCTOR ASAP!         Further procedures recommended:    - Patient will need a psychology evaluation    - Pending approval may consider spinal cord stimulator   - Follow up:    - Will start process towards spinal cord stimulator      ----------------------------------------------------------------  Nurse Triage line:  [phone]   Call this number with any questions or concerns. You may leave a detailed message anytime. Calls are typically returned Monday through Friday between 8 AM and 4:30 PM. We usually get back to you within 2 business days depending on the issue/request.       Medication refills:    For non-narcotic medications, call your pharmacy directly to request a refill. The pharmacy will contact the [facility] for authorization. Please allow 3-4 days for these refills to be processed.     For narcotic refills, call the nurse triage line or send a [employer] message. Please contact us 7-10 days before your refill is due. The message MUST include the name of the specific medication(s) requested and how you would like to receive the prescription(s). The options are as follows:    Pain  Clinic staff can mail the prescription to your pharmacy. Please tell us the name of the pharmacy.    You may pick the prescription up at the Pain Clinic (tell us the location) or during a clinic visit with your pain provider    Pain Clinic staff can deliver the prescription to the [facility] pharmacy in the clinic building. Please tell us the location.      Scheduling number: [phone].  Call this number to schedule or change appointments.    We believe regular attendance is key to your success in our program.    Any time you are unable to keep your appointment we ask that you call us at least 24 hours in advance to let us know. This will allow us to offer the appointment time to another patient.               Follow-ups after your visit        Additional Services     PSYCHOLOGY REFERRAL       Your provider has referred you to:  PREFERRED PROVIDERS:  Psychology evaluation for possible spinal cord stimulator   Please be aware that coverage of these services is subject to the terms and limitations of your health insurance plan.  Call member services at your health plan with any benefit or coverage questions.      Please bring the following to your appointment:    >>   Any x-rays, CTs or MRIs which have been performed.  Contact the facility where they were done to arrange for  prior to your scheduled appointment.   >>   List of current medications   >>   This referral request   >>   Any documents/labs given to you for this referral                  Your next 10 appointments already scheduled     Aug 28, 2018 11:00 AM CDT   SHORT with Vmashi Clarke Jr., MD   [facility] ([facility])    [address]  [facility] [place] [id number] [phone]              Who to contact     If you have questions or need follow up information about today's clinic visit or your schedule please contact [facility] directly at [phone].  Normal or non-critical lab and imaging results will  be communicated to you by MyChart, letter or phone within 4 business days after the clinic has received the results. If you do not hear from us within 7 days, please contact the clinic through [employer] or phone. If you have a critical or abnormal lab result, we will notify you by phone as soon as possible.  Submit refill requests through [employer] or call your pharmacy and they will forward the refill request to us. Please allow 3 business days for your refill to be completed.          Additional Information About Your Visit        [employer] Information     [employer] gives you secure access to your electronic health record. If you see a primary care provider, you can also send messages to your care team and make appointments. If you have questions, please call your primary care clinic.  If you do not have a primary care provider, please call [phone] and they will assist you.        Care EveryWhere ID     This is your Care EveryWhere ID. This could be used by other organizations to access your [facility] medical records  [id number]        Your Vitals Were     Pulse Pulse Oximetry                100 100%           Blood Pressure from Last 3 Encounters:   07/02/18 117/84   05/30/18 116/74   11/28/17 112/72    Weight from Last 3 Encounters:   05/30/18 90.7 kg (200 lb)   11/28/17 90.7 kg (200 lb)   10/13/17 91.6 kg (202 lb)              We Performed the Following     PSYCHOLOGY REFERRAL        Primary Care Provider Office Phone # Fax #    Vamshi Clarke Jr., [phone] [address]  [place] [address]        Equal Access to Services     [facility] AH: Hadii aad ku hadasho Soomaali, waaxda luqadaha, qaybta kaalmada adeegyada, waxrajan acevedo . So [facility] [phone].    ATENCIÓN: Si habla español, tiene a kwan disposición servicios gratuitos de asistencia lingüística. Llame al [phone].    We comply with applicable federal civil rights laws and [place] laws. We do not  discriminate on the basis of race, color, national origin, age, disability, sex, sexual orientation, or gender identity.            Thank you!     Thank you for choosing [facility]  for your care. Our goal is always to provide you with excellent care. Hearing back from our patients is one way we can continue to improve our services. Please take a few minutes to complete the written survey that you may receive in the mail after your visit with us. Thank you!             Your Updated Medication List - Protect others around you: Learn how to safely use, store and throw away your medicines at www.disposemymeds.org.          This list is accurate as of 7/2/18  2:06 PM.  Always use your most recent med list.                   Brand Name Dispense Instructions for use Diagnosis    ASPIRIN PO      Take 81 mg by mouth    Chronic pain syndrome, Chronic radicular low back pain, Physical deconditioning, Major depressive disorder, recurrent episode, mild (H), Chronic insomnia       blood glucose monitoring lancets     100 each    Use to test blood sugar one times daily or as directed.  Ok to substitute alternative if insurance prefers.    Type 2 diabetes mellitus without complication, without long-term current use of insulin (H)       blood glucose monitoring meter device kit     1 kit    Use to test blood sugar 1 times daily or as directed.  Ok to substitute alternative if insurance prefers.    Type 2 diabetes mellitus without complication, without long-term current use of insulin (H)       blood glucose monitoring test strip    ONETOUCH VERIO IQ    100 each    Use to test blood sugar one times daily or as directed.  Ok to substitute alternative if insurance prefers.    Type 2 diabetes mellitus without complication, without long-term current use of insulin (H)       gabapentin 300 MG capsule    NEURONTIN    540 capsule    2 caps tid    Chronic pain syndrome, Chronic radicular low back pain       glipiZIDE 5 MG 24  hr tablet    glipiZIDE XL    90 tablet    Take 1 tablet (5 mg) by mouth daily    Type 2 diabetes mellitus without complication, without long-term current use of insulin (H)       irbesartan 150 MG tablet    AVAPRO    90 tablet    Take 1 tablet (150 mg) by mouth daily    Essential hypertension with goal blood pressure less than 140/90       metFORMIN 500 MG tablet    GLUCOPHAGE    360 tablet    Take 2 tablets (1,000 mg) by mouth 2 times daily (with meals)    Type 2 diabetes mellitus without complication, without long-term current use of insulin (H)       order for [facility]     1 Units    Equipment being ordered: TENS    Chronic pain syndrome       oxyCODONE-acetaminophen 5-325 MG per tablet    PERCOCET    24 tablet    Take 1 tablet by mouth every 8 hours as needed for moderate to severe pain or pain    Chronic pain syndrome       pantoprazole 40 MG EC tablet    PROTONIX    90 tablet    Take 1 tablet (40 mg) by mouth daily    Gastroesophageal reflux disease without esophagitis       sildenafil 20 MG tablet    REVATIO    90 tablet    Take 1-5 tablet (20 mg) by mouth daily as needed.    Combined arterial insufficiency and corporo-venous occlusive erectile dysfunction       simvastatin 10 MG tablet    ZOCOR    90 tablet    Take 1 tablet (10 mg) by mouth At Bedtime    Hyperlipidemia LDL goal <70

## 2018-08-24 ENCOUNTER — TELEPHONE (OUTPATIENT)
Dept: PALLIATIVE MEDICINE | Facility: CLINIC | Age: 53
End: 2018-08-24

## 2018-08-24 NOTE — TELEPHONE ENCOUNTER
Called patient to inform him that Dr. Ralph will be moving out of [place], Advised him that if he is interested in pursuing SCS that he should follow up with Dr. Ralph in [place]. Patient verbalized understanding.     Gwendolyn AUGUSTN-RN Care Coordinator  [facility]

## 2018-08-24 NOTE — TELEPHONE ENCOUNTER
Nursing to contact patient to advised that he should follow Dr. Loree Barbour    Last OV 07/02/18:Further procedures recommended:                         - Patient will need a psychology evaluation                         - Pending approval may consider spinal cord stimulator   - Follow up:                         - Will start process towards spinal cord stimulator       Rocío AUGUSTN, RN Care Coordinator  [facility]

## 2018-08-29 ENCOUNTER — OFFICE VISIT (OUTPATIENT)
Dept: FAMILY MEDICINE | Facility: CLINIC | Age: 53
End: 2018-08-29
Payer: COMMERCIAL

## 2018-08-29 VITALS
TEMPERATURE: 98.7 F | OXYGEN SATURATION: 100 % | DIASTOLIC BLOOD PRESSURE: 62 MMHG | HEIGHT: 69 IN | WEIGHT: 203 LBS | HEART RATE: 102 BPM | BODY MASS INDEX: 30.07 KG/M2 | SYSTOLIC BLOOD PRESSURE: 108 MMHG

## 2018-08-29 DIAGNOSIS — G89.4 CHRONIC PAIN SYNDROME: ICD-10-CM

## 2018-08-29 DIAGNOSIS — E11.9 TYPE 2 DIABETES MELLITUS WITHOUT COMPLICATION, WITHOUT LONG-TERM CURRENT USE OF INSULIN (H): Primary | ICD-10-CM

## 2018-08-29 DIAGNOSIS — Z23 NEED FOR SHINGLES VACCINE: ICD-10-CM

## 2018-08-29 DIAGNOSIS — Z23 NEED FOR PROPHYLACTIC VACCINATION AND INOCULATION AGAINST INFLUENZA: ICD-10-CM

## 2018-08-29 LAB — HBA1C MFR BLD: 6.6 % (ref 0–5.6)

## 2018-08-29 PROCEDURE — 90750 HZV VACC RECOMBINANT IM: CPT | Performed by: FAMILY MEDICINE

## 2018-08-29 PROCEDURE — 90471 IMMUNIZATION ADMIN: CPT | Performed by: FAMILY MEDICINE

## 2018-08-29 PROCEDURE — 99214 OFFICE O/P EST MOD 30 MIN: CPT | Mod: 25 | Performed by: FAMILY MEDICINE

## 2018-08-29 PROCEDURE — 80061 LIPID PANEL: CPT | Performed by: FAMILY MEDICINE

## 2018-08-29 PROCEDURE — 83036 HEMOGLOBIN GLYCOSYLATED A1C: CPT | Performed by: FAMILY MEDICINE

## 2018-08-29 PROCEDURE — 36415 COLL VENOUS BLD VENIPUNCTURE: CPT | Performed by: FAMILY MEDICINE

## 2018-08-29 PROCEDURE — 90472 IMMUNIZATION ADMIN EACH ADD: CPT | Performed by: FAMILY MEDICINE

## 2018-08-29 PROCEDURE — 90686 IIV4 VACC NO PRSV 0.5 ML IM: CPT | Performed by: FAMILY MEDICINE

## 2018-08-29 RX ORDER — OXYCODONE AND ACETAMINOPHEN 5; 325 MG/1; MG/1
1 TABLET ORAL EVERY 8 HOURS PRN
Qty: 20 TABLET | Refills: 0 | Status: SHIPPED | OUTPATIENT
Start: 2018-08-29 | End: 2019-01-17

## 2018-08-29 ASSESSMENT — PATIENT HEALTH QUESTIONNAIRE - PHQ9: 5. POOR APPETITE OR OVEREATING: NOT AT ALL

## 2018-08-29 ASSESSMENT — ANXIETY QUESTIONNAIRES
6. BECOMING EASILY ANNOYED OR IRRITABLE: NOT AT ALL
1. FEELING NERVOUS, ANXIOUS, OR ON EDGE: NOT AT ALL
5. BEING SO RESTLESS THAT IT IS HARD TO SIT STILL: NOT AT ALL
7. FEELING AFRAID AS IF SOMETHING AWFUL MIGHT HAPPEN: NOT AT ALL
IF YOU CHECKED OFF ANY PROBLEMS ON THIS QUESTIONNAIRE, HOW DIFFICULT HAVE THESE PROBLEMS MADE IT FOR YOU TO DO YOUR WORK, TAKE CARE OF THINGS AT HOME, OR GET ALONG WITH OTHER PEOPLE: NOT DIFFICULT AT ALL
2. NOT BEING ABLE TO STOP OR CONTROL WORRYING: NOT AT ALL
GAD7 TOTAL SCORE: 0
3. WORRYING TOO MUCH ABOUT DIFFERENT THINGS: NOT AT ALL

## 2018-08-29 NOTE — PROGRESS NOTES
SUBJECTIVE:   Saran Wallis is a 53 year old male who presents to clinic today for the following health issues:    Diabetes Follow-up      Patient is checking blood sugars: rarely.  Results range from 100 to 140    Diabetic concerns: None     Symptoms of hypoglycemia (low blood sugar): none     Paresthesias (numbness or burning in feet) or sores: No     Date of last diabetic eye exam: 2/18    Diabetes Management Resources    Hyperlipidemia Follow-Up      Rate your low fat/cholesterol diet?: fair    Taking statin?  Yes, no muscle aches from statin    Other lipid medications/supplements?:  none    Hypertension Follow-up      Outpatient blood pressures are not being checked.    Low Salt Diet: not monitoring salt    BP Readings from Last 2 Encounters:   08/29/18 108/62   07/02/18 117/84     Hemoglobin A1C (%)   Date Value   05/30/2018 8.1 (H)   11/28/2017 7.5 (H)     LDL Cholesterol Calculated (mg/dL)   Date Value   08/07/2017 45   05/03/2016 51       Amount of exercise or physical activity: Walking     Problems taking medications regularly: No    Medication side effects: none    Diet: regular (no restrictions)      Pt requesting flu vaccine.    Diabetes- We added Glipizide last visit. Pt notes he is not having problems with this new medication. He checks his blood sugars, but not everyday. He notes his blood sugars have been running between 100-140. Pt used to play table tennis frequently, but has not been playing as much. He notes where he normally plays table tennis is 30 minutes away in [place]. He notes this is the only table tennis club near him. Ever since he got his new job his weight has been increasing.    Failed back syndrome- Pt has been seeing the pain clinic with Dr. Ralph. He recommended starting the process towards spinal cord stimulator. Pt notes a couple of weeks ago he had to switch his water softener to a new one by himself and his back pain was exacerbated with this. Therefore he had to take his  "Oxycodone and he is requesting refill of this. Pt requesting another TENS unit so he can bring this to the [facility] store so he can have it covered by his insurance.    ED- Pt notes he did not fill sildenafil prescription due to it being too expensive.      Problem list and histories reviewed & adjusted, as indicated.  Additional history: as documented    Reviewed and updated as needed this visit by clinical staff  Tobacco  Allergies  Meds       Reviewed and updated as needed this visit by Provider       ROS:  Constitutional, HEENT, cardiovascular, pulmonary, gi and gu systems are negative, except as otherwise noted.    This document serves as a record of the services and decisions personally performed and made by Vamshi Clarke MD. It was created on his behalf by Carmine Erickson, a trained medical scribe. The creation of this document is based on the provider's statements to the medical scribe.  Carmine Erickson 11:16 AM August 29, 2018    OBJECTIVE:     /62  Pulse 102  Temp 98.7  F (37.1  C) (Oral)  Ht 1.753 m (5' 9\")  Wt 92.1 kg (203 lb)  SpO2 100%  BMI 29.98 kg/m2  Body mass index is 29.98 kg/(m^2).  GENERAL: healthy, alert and no distress    Diagnostic Test Results:  No results found for this or any previous visit (from the past 24 hour(s)).    ASSESSMENT/PLAN:     (E11.9) Type 2 diabetes mellitus without complication, without long-term current use of insulin (H)  (primary encounter diagnosis)  Comment: Will recheck A1C today for further evaluation. Discussed with pt that if his A1C is below 8.0, will have him stay the course with his current Metformin, and Glipizide doses. However, if this is above 8.0, will adjust his medications accordingly. Furthermore, if pt is able to lose weight and his blood sugars are running low to the point where he is becoming hypoglycemic, will adjust his medications accordingly as well. His weight has been increasing, which I discussed with pt can " have a negative impact on his blood sugars. Therefore, I offered to help pt with weight loss to help get his blood sugars under better control, but he declined to accept any of the resources I offered him. Therefore, I encouraged pt to start with walking. Start with 120 seconds of walking per day and try to do this 5 out of 7 days. Then I encouraged pt to gradually increase the amount of time he walks and try to get back into playing table tennis. Discussed with pt that it usually takes about 6 weeks to develop an exercise routine.  Plan: Hemoglobin A1c, Lipid panel reflex to direct         LDL Non-fasting        Follow up based on labs.    (Z23) Need for prophylactic vaccination and inoculation against influenza  Comment: Pt requesting flu vaccine today.  Plan: FLU VACCINE, SPLIT VIRUS, IM (QUADRIVALENT)         [93822]- >3 YRS, Vaccine Administration,         Initial [62431]        Follow up if needed.    (G89.4) Chronic pain syndrome  Comment: Discussed with pt that we see a huge overlap between pain and psychological problems. Therefore, I discussed with pt that this is why Dr. Ralph recommended he have a psychology evaluation and is a standard of care for pain clinics in order to address all aspects that could be contributing to pain. Therefore, I encouraged pt to follow up with seeing a pain psychologist. In addition, I encouraged pt to follow up with Dr. Ralph to do a trial of spinal cord stimulator to see if this gives pt pain relief. Lastly, I agreed to refill his Oxycodone and advised pt to take this PRN. Prescription has also been sent for pt to get a TENS unit at the [facility] so that this is able to be covered by his insurance.  Plan: oxyCODONE-acetaminophen (PERCOCET) 5-325 MG per        tablet, order for DME        Follow up if needed.    ED- Will take sildenafil medication off his list given he did not fill this due to cost.    The information in this document, created by the  medical scribe for me, accurately reflects the services I personally performed and the decisions made by me. I have reviewed and approved this document for accuracy prior to leaving the patient care area.  August 29, 2018 11:16 AM    Vamshi Clarke Jr, MD  [facility]    Injectable Influenza Immunization Documentation    1.  Is the person to be vaccinated sick today?   No    2. Does the person to be vaccinated have an allergy to a component   of the vaccine?   No  Egg Allergy Algorithm Link    3. Has the person to be vaccinated ever had a serious reaction   to influenza vaccine in the past?   No    4. Has the person to be vaccinated ever had Guillain-Barré syndrome?   No    Form completed by pt  Valencia Kelly CMA

## 2018-08-29 NOTE — MR AVS SNAPSHOT
After Visit Summary   8/29/2018    Saran Wallis    MRN: [id number]           Patient Information     Date Of Birth          1965        Visit Information        Provider Department      8/29/2018 11:00 AM Vamshi Clarke Jr., MD [facility]        Today's Diagnoses     Type 2 diabetes mellitus without complication, without long-term current use of insulin (H)    -  1    Need for prophylactic vaccination and inoculation against influenza        Chronic pain syndrome           Follow-ups after your visit        Follow-up notes from your care team     Return in about 6 months (around 2/28/2019) for Diabetes Recheck.      Who to contact     If you have questions or need follow up information about today's clinic visit or your schedule please contact [facility] [place] directly at [phone].  Normal or non-critical lab and imaging results will be communicated to you by MyChart, letter or phone within 4 business days after the clinic has received the results. If you do not hear from us within 7 days, please contact the clinic through Cytoguidehart or phone. If you have a critical or abnormal lab result, we will notify you by phone as soon as possible.  Submit refill requests through Naroomi or call your pharmacy and they will forward the refill request to us. Please allow 3 business days for your refill to be completed.          Additional Information About Your Visit        MyChart Information     Naroomi gives you secure access to your electronic health record. If you see a primary care provider, you can also send messages to your care team and make appointments. If you have questions, please call your primary care clinic.  If you do not have a primary care provider, please call [phone] and they will assist you.        Care EveryWhere ID     This is your Care EveryWhere ID. This could be used by other organizations to access your [facility] medical records  [id number]        Your  "Vitals Were     Pulse Temperature Height Pulse Oximetry BMI (Body Mass Index)       102 98.7  F (37.1  C) (Oral) 5' 9\" (1.753 m) 100% 29.98 kg/m2        Blood Pressure from Last 3 Encounters:   08/29/18 108/62   07/02/18 117/84   05/30/18 116/74    Weight from Last 3 Encounters:   08/29/18 203 lb (92.1 kg)   05/30/18 200 lb (90.7 kg)   11/28/17 200 lb (90.7 kg)              We Performed the Following     FLU VACCINE, SPLIT VIRUS, IM (QUADRIVALENT) [48989]- >3 YRS     Hemoglobin A1c     Lipid panel reflex to direct LDL Non-fasting     Vaccine Administration, Initial [52815]          Today's Medication Changes          These changes are accurate as of 8/29/18 11:54 AM.  If you have any questions, ask your nurse or doctor.               Stop taking these medicines if you haven't already. Please contact your care team if you have questions.     sildenafil 20 MG tablet   Commonly known as:  REVATIO   Stopped by:  Vamshi Clarke Jr., MD                Where to get your medicines      Some of these will need a paper prescription and others can be bought over the counter.  Ask your nurse if you have questions.     Bring a paper prescription for each of these medications     order for DME    oxyCODONE-acetaminophen 5-325 MG per tablet               Information about OPIOIDS     PRESCRIPTION OPIOIDS: WHAT YOU NEED TO KNOW   We gave you an opioid (narcotic) pain medicine. It is important to manage your pain, but opioids are not always the best choice. You should first try all the other options your care team gave you. Take this medicine for as short a time (and as few doses) as possible.    Some activities can increase your pain, such as bandage changes or therapy sessions. It may help to take your pain medicine 30 to 60 minutes before these activities. Reduce your stress by getting enough sleep, working on hobbies you enjoy and practicing relaxation or meditation. Talk to your care team about ways to manage your pain " beyond prescription opioids.    These medicines have risks:    DO NOT drive when on new or higher doses of pain medicine. These medicines can affect your alertness and reaction times, and you could be arrested for driving under the influence (DUI). If you need to use opioids long-term, talk to your care team about driving.    DO NOT operate heavy machinery    DO NOT do any other dangerous activities while taking these medicines.    DO NOT drink any alcohol while taking these medicines.     If the opioid prescribed includes acetaminophen, DO NOT take with any other medicines that contain acetaminophen. Read all labels carefully. Look for the word  acetaminophen  or  Tylenol.  Ask your pharmacist if you have questions or are unsure.    You can get addicted to pain medicines, especially if you have a history of addiction (chemical, alcohol or substance dependence). Talk to your care team about ways to reduce this risk.    All opioids tend to cause constipation. Drink plenty of water and eat foods that have a lot of fiber, such as fruits, vegetables, prune juice, apple juice and high-fiber cereal. Take a laxative (Miralax, milk of magnesia, Colace, Senna) if you don t move your bowels at least every other day. Other side effects include upset stomach, sleepiness, dizziness, throwing up, tolerance (needing more of the medicine to have the same effect), physical dependence and slowed breathing.    Store your pills in a secure place, locked if possible. We will not replace any lost or stolen medicine. If you don t finish your medicine, please throw away (dispose) as directed by your pharmacist. The [place] Pollution Control Agency has more information about safe disposal: [email or web address]         Primary Care Provider Office Phone # Fax #    Vamshi Clarke Jr., [phone] [address]  [place] [address]        Equal Access to Services      KEATON BENTON : Hadii aad katarzyna sarah Melvin, waaxda luqadaha, qaybta kaalmada lalitoberta, ana cristina jen hayshayla olverastephanybreezy acevedo . So [facility] [phone].    ATENCIÓN: Si habla español, tiene a kwan disposición servicios gratuitos de asistencia lingüística. Llame al [phone].    We comply with applicable federal civil rights laws and [place] laws. We do not discriminate on the basis of race, color, national origin, age, disability, sex, sexual orientation, or gender identity.            Thank you!     Thank you for choosing [facility]  for your [facility]. Our goal is always to provide you with excellent care. Hearing back from our patients is one way we can continue to improve our services. Please take a few minutes to complete the written survey that you may receive in the mail after your visit with us. Thank you!             Your Updated Medication List - Protect others around you: Learn how to safely use, store and throw away your medicines at www.disposemymeds.org.          This list is accurate as of 8/29/18 11:54 AM.  Always use your most recent med list.                   Brand Name Dispense Instructions for use Diagnosis    ASPIRIN PO      Take 81 mg by mouth    Chronic pain syndrome, Chronic radicular low back pain, Physical deconditioning, Major depressive disorder, recurrent episode, mild (H), Chronic insomnia       blood glucose monitoring lancets     100 each    Use to test blood sugar one times daily or as directed.  Ok to substitute alternative if insurance prefers.    Type 2 diabetes mellitus without complication, without long-term current use of insulin (H)       blood glucose monitoring meter device kit     1 kit    Use to test blood sugar 1 times daily or as directed.  Ok to substitute alternative if insurance prefers.    Type 2 diabetes mellitus without complication, without long-term current use of insulin (H)       blood glucose monitoring test strip    ONETOUCH VERIO IQ    100 each     Use to test blood sugar one times daily or as directed.  Ok to substitute alternative if insurance prefers.    Type 2 diabetes mellitus without complication, without long-term current use of insulin (H)       gabapentin 300 MG capsule    NEURONTIN    540 capsule    2 caps tid    Chronic pain syndrome, Chronic radicular low back pain       glipiZIDE 5 MG 24 hr tablet    glipiZIDE XL    90 tablet    Take 1 tablet (5 mg) by mouth daily    Type 2 diabetes mellitus without complication, without long-term current use of insulin (H)       irbesartan 150 MG tablet    AVAPRO    90 tablet    Take 1 tablet (150 mg) by mouth daily    Essential hypertension with goal blood pressure less than 140/90       metFORMIN 500 MG tablet    GLUCOPHAGE    360 tablet    Take 2 tablets (1,000 mg) by mouth 2 times daily (with meals)    Type 2 diabetes mellitus without complication, without long-term current use of insulin (H)       order for DME     1 Units    Equipment being ordered: TENS    Chronic pain syndrome       oxyCODONE-acetaminophen 5-325 MG per tablet    PERCOCET    20 tablet    Take 1 tablet by mouth every 8 hours as needed for moderate to severe pain or pain    Chronic pain syndrome       pantoprazole 40 MG EC tablet    PROTONIX    90 tablet    Take 1 tablet (40 mg) by mouth daily    Gastroesophageal reflux disease without esophagitis       simvastatin 10 MG tablet    ZOCOR    90 tablet    Take 1 tablet (10 mg) by mouth At Bedtime    Hyperlipidemia LDL goal <70

## 2018-08-30 DIAGNOSIS — G89.29 CHRONIC RADICULAR LOW BACK PAIN: ICD-10-CM

## 2018-08-30 DIAGNOSIS — M54.16 CHRONIC RADICULAR LOW BACK PAIN: ICD-10-CM

## 2018-08-30 DIAGNOSIS — G89.4 CHRONIC PAIN SYNDROME: ICD-10-CM

## 2018-08-30 LAB
CHOLEST SERPL-MCNC: 144 MG/DL
HDLC SERPL-MCNC: 41 MG/DL
LDLC SERPL CALC-MCNC: 66 MG/DL
NONHDLC SERPL-MCNC: 103 MG/DL
TRIGL SERPL-MCNC: 183 MG/DL

## 2018-08-30 RX ORDER — GABAPENTIN 300 MG/1
CAPSULE ORAL
Qty: 540 CAPSULE | Refills: 3 | Status: SHIPPED | OUTPATIENT
Start: 2018-08-30 | End: 2019-09-16

## 2018-08-30 ASSESSMENT — PATIENT HEALTH QUESTIONNAIRE - PHQ9: SUM OF ALL RESPONSES TO PHQ QUESTIONS 1-9: 2

## 2018-08-30 ASSESSMENT — ANXIETY QUESTIONNAIRES: GAD7 TOTAL SCORE: 0

## 2018-08-30 NOTE — PROGRESS NOTES
Mr. Wallis,    -Cholesterol levels are at your goal levels.  ADVISE: Continuing your medication, a regular exercise program with at least 30 minutes of aerobic exercise 3-4 days/week ( 45 minutes 4-6 days/week if weight loss needed), and a low saturated fat,/low carbohydrate diet are helpful to maintain this.  Rechecking your fasting cholesterol panel in 12 months is recommended (Lipid w/ LDL reflex).  -A1C (test of diabetes control the last 2-3 months) is at your goal. Please continue with current plan. Also, see me and recheck your A1C test in 6 months.     If you have further questions about the interpretation of your labs, [email or web address] is a good website to check out for further information.    Please contact the clinic if you have additional questions.  Thank you.    Sincerely,    Vamshi Clarke MD

## 2018-08-30 NOTE — TELEPHONE ENCOUNTER
gabapentin (NEURONTIN) 300 MG capsule      Last Written Prescription Date:  9/20/17  Last Fill Quantity: 540 capsule,   # refills: 3  Last Office Visit: 8/29/18  Future Office visit:       Routing refill request to provider for review/approval because:  Drug not on the [facility], P or [facility] refill protocol or controlled substance

## 2018-10-05 DIAGNOSIS — K21.9 GASTROESOPHAGEAL REFLUX DISEASE WITHOUT ESOPHAGITIS: ICD-10-CM

## 2018-10-05 NOTE — TELEPHONE ENCOUNTER
"Requested Prescriptions   Pending Prescriptions Disp Refills     pantoprazole (PROTONIX) 40 MG EC tablet  Last Written Prescription Date:  10/27/2017  Last Fill Quantity: 90 tablet,  # refills: 3   Last office visit: 8/29/2018 with prescribing provider:  Vince   Future Office Visit:       90 tablet 3     Sig: Take 1 tablet (40 mg) by mouth daily    PPI Protocol Passed    10/5/2018  9:43 AM       Passed - Not on Clopidogrel (unless Pantoprazole ordered)       Passed - No diagnosis of osteoporosis on record       Passed - Recent (12 mo) or future (30 days) visit within the authorizing provider's specialty    Patient had office visit in the last 12 months or has a visit in the next 30 days with authorizing provider or within the authorizing provider's specialty.  See \"Patient Info\" tab in inbasket, or \"Choose Columns\" in Meds & Orders section of the refill encounter.           Passed - Patient is age 18 or older          "

## 2018-10-08 RX ORDER — PANTOPRAZOLE SODIUM 40 MG/1
40 TABLET, DELAYED RELEASE ORAL DAILY
Qty: 90 TABLET | Refills: 3 | Status: SHIPPED | OUTPATIENT
Start: 2018-10-08 | End: 2019-09-16

## 2018-10-08 NOTE — TELEPHONE ENCOUNTER
Prescription approved per [facility] RN Refill Protocol. Peggy Gutierrez R.N.

## 2018-11-14 DIAGNOSIS — E78.5 HYPERLIPIDEMIA LDL GOAL <70: ICD-10-CM

## 2018-11-14 DIAGNOSIS — E11.9 TYPE 2 DIABETES MELLITUS WITHOUT COMPLICATION, WITHOUT LONG-TERM CURRENT USE OF INSULIN (H): ICD-10-CM

## 2018-11-14 NOTE — TELEPHONE ENCOUNTER
"  Requested Prescriptions   Pending Prescriptions Disp Refills     metFORMIN (GLUCOPHAGE) 500 MG tablet  Last Written Prescription Date:  5/30/2018  Last Fill Quantity: 360 tablet,  # refills: 1   Last office visit: 8/29/2018 with prescribing provider:  Vince   Future Office Visit:       360 tablet 1     Sig: Take 2 tablets (1,000 mg) by mouth 2 times daily (with meals)    Biguanide Agents Passed    11/14/2018  1:45 PM       Passed - Blood pressure less than 140/90 in past 6 months    BP Readings from Last 3 Encounters:   08/29/18 108/62   07/02/18 117/84   05/30/18 116/74            Passed - Patient has documented LDL within the past 12 mos.    Recent Labs   Lab Test  08/29/18   1208   LDL  66            Passed - Patient has had a Microalbumin in the past 15 mos.    Recent Labs   Lab Test  11/28/17   1359   MICROL  26   UMALCR  11.86            Passed - Patient is age 10 or older       Passed - Patient has documented A1c within the specified period of time.    If HgbA1C is 8 or greater, it needs to be on file within the past 3 months.  If less than 8, must be on file within the past 6 months.     Recent Labs   Lab Test  08/29/18   1208   A1C  6.6*            Passed - Patient's CR is NOT>1.4 OR Patient's EGFR is NOT<45 within past 12 mos.    Recent Labs   Lab Test  05/30/18   1417   GFRESTIMATED  >90   GFRESTBLACK  >90       Recent Labs   Lab Test  05/30/18   1417   CR  0.69            Passed - Patient does NOT have a diagnosis of CHF.       Passed - Recent (6 mo) or future (30 days) visit within the authorizing provider's specialty    Patient had office visit in the last 6 months or has a visit in the next 30 days with authorizing provider or within the authorizing provider's specialty.  See \"Patient Info\" tab in inbasket, or \"Choose Columns\" in Meds & Orders section of the refill encounter.                      glipiZIDE (GLIPIZIDE XL) 5 MG 24 hr tablet  Last Written Prescription Date:  5/30/2018  Last Fill " "Quantity: 90 tablet,  # refills: 1   Last office visit: 8/29/2018 with prescribing provider:  Vince   Future Office Visit:       90 tablet 1     Sig: Take 1 tablet (5 mg) by mouth daily    Sulfonylurea Agents Passed    11/14/2018  1:45 PM       Passed - Blood pressure less than 140/90 in past 6 months    BP Readings from Last 3 Encounters:   08/29/18 108/62   07/02/18 117/84   05/30/18 116/74            Passed - Patient has documented LDL within the past 12 mos.    Recent Labs   Lab Test  08/29/18   1208   LDL  66            Passed - Patient has had a Microalbumin in the past 12 mos.    Recent Labs   Lab Test  11/28/17   1359   MICROL  26   UMALCR  11.86            Passed - Patient has documented A1c within the specified period of time.    If HgbA1C is 8 or greater, it needs to be on file within the past 3 months.  If less than 8, must be on file within the past 6 months.     Recent Labs   Lab Test  08/29/18   1208   A1C  6.6*            Passed - Patient is age 18 or older       Passed - Patient has a recent creatinine (normal) within the past 12 mos.    Recent Labs   Lab Test  05/30/18   1417   CR  0.69            Passed - Recent (6 mo) or future (30 days) visit within the authorizing provider's specialty    Patient had office visit in the last 6 months or has a visit in the next 30 days with authorizing provider or within the authorizing provider's specialty.  See \"Patient Info\" tab in inbasket, or \"Choose Columns\" in Meds & Orders section of the refill encounter.                      simvastatin (ZOCOR) 10 MG tablet  Last Written Prescription Date:  5/30/2018  Last Fill Quantity: 90 tablet,  # refills: 1   Last office visit: 8/29/2018 with prescribing provider:  Vince   Future Office Visit:       90 tablet 1     Sig: Take 1 tablet (10 mg) by mouth At Bedtime    Statins Protocol Passed    11/14/2018  1:45 PM       Passed - LDL on file in past 12 months    Recent Labs   Lab Test  08/29/18   1208   LDL  66 " "           Passed - No abnormal creatine kinase in past 12 months    No lab results found.            Passed - Recent (12 mo) or future (30 days) visit within the authorizing provider's specialty    Patient had office visit in the last 12 months or has a visit in the next 30 days with authorizing provider or within the authorizing provider's specialty.  See \"Patient Info\" tab in inbasket, or \"Choose Columns\" in Meds & Orders section of the refill encounter.             Passed - Patient is age 18 or older          "

## 2018-11-15 RX ORDER — SIMVASTATIN 10 MG
10 TABLET ORAL AT BEDTIME
Qty: 90 TABLET | Refills: 2 | Status: SHIPPED | OUTPATIENT
Start: 2018-11-15 | End: 2019-09-16

## 2018-11-15 RX ORDER — GLIPIZIDE 5 MG/1
5 TABLET, FILM COATED, EXTENDED RELEASE ORAL DAILY
Qty: 90 TABLET | Refills: 1 | Status: SHIPPED | OUTPATIENT
Start: 2018-11-15 | End: 2019-03-27

## 2018-11-15 NOTE — TELEPHONE ENCOUNTER
Filled per [facility] protocol.     LOV note from 8/29/2018:     Return in about 6 months (around 2/28/2019) for Diabetes Recheck.       MARIANGEL BlantonN, RN  [employer] [facility]

## 2018-12-14 DIAGNOSIS — E11.9 TYPE 2 DIABETES MELLITUS WITHOUT COMPLICATION, WITHOUT LONG-TERM CURRENT USE OF INSULIN (H): Primary | ICD-10-CM

## 2018-12-14 DIAGNOSIS — I10 ESSENTIAL HYPERTENSION WITH GOAL BLOOD PRESSURE LESS THAN 140/90: ICD-10-CM

## 2018-12-14 NOTE — TELEPHONE ENCOUNTER
Routing refill request to provider for review/approval because:  Please review for follow up. Peggy Gutierrez R.N.

## 2018-12-14 NOTE — TELEPHONE ENCOUNTER
"Requested Prescriptions   Pending Prescriptions Disp Refills     irbesartan (AVAPRO) 150 MG tablet  Last Written Prescription Date:  5/30/2018  Last Fill Quantity: 90 tablet,  # refills: 1   Last office visit: 8/29/2018 with prescribing provider:  Vince     Future Office Visit:   Next 5 appointments (look out 90 days)    Feb 27, 2019 11:20 AM CST  Office Visit with Vamshi Clarke Jr., MD  [facility]) [address]  [facility] [address] [phone]            90 tablet 1     Sig: Take 1 tablet (150 mg) by mouth daily    Angiotensin-II Receptors Passed - 12/14/2018  1:11 PM       Passed - Blood pressure under 140/90 in past 12 months    BP Readings from Last 3 Encounters:   08/29/18 108/62   07/02/18 117/84   05/30/18 116/74                Passed - Recent (12 mo) or future (30 days) visit within the authorizing provider's specialty    Patient had office visit in the last 12 months or has a visit in the next 30 days with authorizing provider or within the authorizing provider's specialty.  See \"Patient Info\" tab in inbasket, or \"Choose Columns\" in Meds & Orders section of the refill encounter.             Passed - Patient is age 18 or older       Passed - Normal serum creatinine on file in past 12 months    Recent Labs   Lab Test 05/30/18  1417   CR 0.69            Passed - Normal serum potassium on file in past 12 months    Recent Labs   Lab Test 05/30/18  1417   POTASSIUM 4.2                      "

## 2018-12-16 RX ORDER — IRBESARTAN 150 MG/1
150 TABLET ORAL DAILY
Qty: 90 TABLET | Refills: 1 | Status: SHIPPED | OUTPATIENT
Start: 2018-12-16 | End: 2019-03-27

## 2018-12-17 DIAGNOSIS — E11.9 TYPE 2 DIABETES MELLITUS WITHOUT COMPLICATION, WITHOUT LONG-TERM CURRENT USE OF INSULIN (H): ICD-10-CM

## 2018-12-17 LAB — HBA1C MFR BLD: 6.8 % (ref 0–5.6)

## 2018-12-17 PROCEDURE — 36415 COLL VENOUS BLD VENIPUNCTURE: CPT | Performed by: FAMILY MEDICINE

## 2018-12-17 PROCEDURE — 83036 HEMOGLOBIN GLYCOSYLATED A1C: CPT | Performed by: FAMILY MEDICINE

## 2018-12-17 NOTE — TELEPHONE ENCOUNTER
Approved, but I'd like Saran to have an A1C before the end of the year as his last A1C was above 8.  Future order placed & MyChart message sent to Saran.    Chart reviewed.  Rx sent to pt's preferred pharmacy.    [id number] - [place], [place] - [address]    Vamshi Clarke MD

## 2018-12-18 NOTE — RESULT ENCOUNTER NOTE
Mr. Wallis,    -All of your labs are normal.  Things look great, Luke.  We'll see you in February!    If you have further questions about the interpretation of your labs, [email or web address] is a good website to check out for further information.    Please contact the clinic if you have additional questions.  Thank you.    Sincerely,    Vamshi Clarke MD

## 2019-01-17 ENCOUNTER — MYC REFILL (OUTPATIENT)
Dept: FAMILY MEDICINE | Facility: CLINIC | Age: 54
End: 2019-01-17

## 2019-01-17 DIAGNOSIS — G89.4 CHRONIC PAIN SYNDROME: ICD-10-CM

## 2019-01-17 RX ORDER — OXYCODONE AND ACETAMINOPHEN 5; 325 MG/1; MG/1
1 TABLET ORAL EVERY 8 HOURS PRN
Qty: 20 TABLET | Refills: 0 | Status: SHIPPED | OUTPATIENT
Start: 2019-01-17 | End: 2019-02-20

## 2019-01-17 NOTE — TELEPHONE ENCOUNTER
Controlled Substance Refill Request for Percocet  Problem List Complete:    Yes  Patient is followed by VAMSHI TINSLEY JR for ongoing prescription of pain medication.  All refills should be approved by this provider, or covering partner.    Medication(s): Percocet 5/325 mg.   Maximum quantity per month: four  Clinic visit frequency required: Q 6 months     Controlled substance agreement on file: Yes       Date(s): 1/11/16    Pain Clinic evaluation in the past: Yes       Date/Location:   [facility] - 2013.  [place] - 2/3/16.    DIRE Total Score(s): 16  No flowsheet data found.    Last [facility] website verification: 1/17/19 - no concerns    [email or web address]      Last Written Prescription Date:  8/29/18  Last Fill Quantity: 20,   # refills: 0    Last Office Visit with [facility] primary care provider: 8/29/18    Future Office visit:   Next 5 appointments (look out 90 days)    Mar 27, 2019 11:20 AM CDT  Office Visit with Vamshi Tinsley Jr., MD  [facility] [address] [facility] [address]  [phone]          Controlled substance agreement: [unfilled]    Last [place] Drug Screen:   Pain Drug SCR UR W RPTD Meds   Date Value Ref Range Status   02/03/2016   Final    FINAL  (Note)  ====================================================================  [facility] COMP DRUG ANALYSIS,UR  ====================================================================  Test                             Result       Flag       Units  Drug Present and Declared for Prescription Verification   Lorazepam                      501          EXPECTED   ng/mg creat    Source of lorazepam is a scheduled prescription medication.     Oxycodone                      117          EXPECTED   ng/mg creat   Oxymorphone                    31           EXPECTED   ng/mg creat   Noroxycodone                   415          EXPECTED   ng/mg creat   Noroxymorphone                 37           EXPECTED   ng/mg  creat    Sources of oxycodone are scheduled prescription medications.    Oxymorphone, noroxycodone, and noroxymorphone are expected    metabolites of oxycodone. Oxymorphone is also available as a    scheduled prescription medication.     Citalopram                     PRESENT      EXPECTED   Desmethylcitalopram            PRESENT      EXPECTED    Desmethylcitalopram is an expected metabolite of citalopram or    the enantiomeric form, escitalopram.    ====================================================================  Test                      Result    Flag   Units      Ref Range   Creatinine              254              mg/dL      >=20  ====================================================================  Declared Medications:  The flagging and interpretation on this report are based on the  following declared medications.  Unexpected results may arise from  inaccuracies in the declared medications.    **Note: The testing scope of this panel includes these medications:    Citalopram (Celexa)  Lorazepam (Ativan)  Oxycodone  ====================================================================  For clinical consultation, please call [phone].  ====================================================================  Analysis performed by [facility], [address] [place] [address]     ,   [facility] Drug Analysis UR   Date Value Ref Range Status   05/30/2018 FINAL  Final     Comment:     (Note)  ====================================================================  COMPREHENSIVE DRUG ANALYSIS,UR  ====================================================================  Test                             Result       Flag       Units        Drug Present   Gabapentin                     PRESENT                               Acetaminophen                  PRESENT                               Diphenhydramine                PRESENT                               ====================================================================  Test                      Result    Flag   Units      Ref Range        Creatinine              105              mg/dL      >=20            ====================================================================  For clinical consultation, please call [phone].  ====================================================================  Analysis performed by [employer] [place] [address]     , No results found for: THC13, PCP13, COC13, MAMP13, OPI13, AMP13, BZO13, TCA13, MTD13, BAR13, OXY13, PPX13, BUP13     Processing:  Patient will  in clinic    [email or web address]  RX [place] reviewed:  reviewed- no concerns    [place]:  [email or web address]    BREE Armas, RN  [facility]

## 2019-02-20 ENCOUNTER — MYC REFILL (OUTPATIENT)
Dept: FAMILY MEDICINE | Facility: CLINIC | Age: 54
End: 2019-02-20

## 2019-02-20 DIAGNOSIS — G89.4 CHRONIC PAIN SYNDROME: ICD-10-CM

## 2019-02-20 NOTE — TELEPHONE ENCOUNTER
Controlled Substance Refill Request for:  oxyCODONE-acetaminophen (PERCOCET) 5-325 MG tablet    Last Written Prescription Date:  2019  Last Fill Quantity: 20 tablet,   # refills: 0    Last Office Visit with [facility] primary care provider: 2018  [place]  THE MOST RECENT OFFICE VISIT MUST BE WITHIN THE PAST 3 MONTHS. (AT LEAST ONE FACE TO FACE VISIT MUST OCCUR EVERY 6 MONTHS. ADDITIONAL VISITS CAN BE VIRTUAL.)    Future Office visit:   Next 5 appointments (look out 90 days)    Mar 27, 2019 11:20 AM CDT  [facility] [address] [facility] [address]  [phone]          Controlled substance agreement: !!  Encounter-Level CSA - 2016:    Controlled Substance Agreement - Scan on 2016  3:31 PM: CONTROLLED SUBSTANCE AGREEMENT 16 (below)       Patient-Level CSA:    There are no patient-level csa.         Problem List Complete:  Yes    Patient is followed by VAMSHI TINSLEY JR for ongoing prescription of pain medication.  All refills should be approved by this provider, or covering partner.     Medication(s): Percocet 5/325 mg.   Maximum quantity per month: four  Clinic visit frequency required: Q 6 months      Controlled substance agreement on file: Yes       Date(s): 16     Pain Clinic evaluation in the past: Yes       Date/Location:   2013.  [place] - 2/3/16.     DIRE Total Score(s): 16  No flowsheet data found.     Last [facility] website verification: 19    [email or web address]   checked in past 3 months?  Yes 2019    Last Urine Drug Screen:   Pain Drug SCR UR W RPTD Meds   Date Value Ref Range Status   2016   Final    FINAL  (Note)  ====================================================================  [facility] COMP DRUG ANALYSIS,UR  ====================================================================  Test                              Result       Flag       Units  Drug Present and Declared for Prescription Verification   Lorazepam                      501          EXPECTED   ng/mg creat    Source of lorazepam is a scheduled prescription medication.     Oxycodone                      117          EXPECTED   ng/mg creat   Oxymorphone                    31           EXPECTED   ng/mg creat   Noroxycodone                   415          EXPECTED   ng/mg creat   Noroxymorphone                 37           EXPECTED   ng/mg creat    Sources of oxycodone are scheduled prescription medications.    Oxymorphone, noroxycodone, and noroxymorphone are expected    metabolites of oxycodone. Oxymorphone is also available as a    scheduled prescription medication.     Citalopram                     PRESENT      EXPECTED   Desmethylcitalopram            PRESENT      EXPECTED    Desmethylcitalopram is an expected metabolite of citalopram or    the enantiomeric form, escitalopram.    ====================================================================  Test                      Result    Flag   Units      Ref Range   Creatinine              254              mg/dL      >=20  ====================================================================  Declared Medications:  The flagging and interpretation on this report are based on the  following declared medications.  Unexpected results may arise from  inaccuracies in the declared medications.    **Note: The testing scope of this panel includes these medications:    Citalopram (Celexa)  Lorazepam (Ativan)  Oxycodone  ====================================================================  For clinical consultation, please call [phone].  ====================================================================  Analysis performed by [facility] [place] [address]     ,   [facility] Drug Analysis UR   Date Value Ref Range Status   05/30/2018 FINAL  Final     Comment:      (Note)  ====================================================================  COMPREHENSIVE DRUG ANALYSIS,UR  ====================================================================  Test                             Result       Flag       Units        Drug Present   Gabapentin                     PRESENT                               Acetaminophen                  PRESENT                               Diphenhydramine                PRESENT                              ====================================================================  Test                      Result    Flag   Units      Ref Range        Creatinine              105              mg/dL      >=20            ====================================================================  For clinical consultation, please call [phone].  ====================================================================  Analysis performed by [email or web address] [place] [address]     , No results found for: THC13, PCP13, COC13, MAMP13, OPI13, AMP13, BZO13, TCA13, MTD13, BAR13, OXY13, PPX13, BUP13     Processing:  Not Noted

## 2019-02-22 RX ORDER — OXYCODONE AND ACETAMINOPHEN 5; 325 MG/1; MG/1
1 TABLET ORAL EVERY 8 HOURS PRN
Qty: 20 TABLET | Refills: 0 | Status: SHIPPED | OUTPATIENT
Start: 2019-02-22 | End: 2019-03-27 | Stop reason: ALTCHOICE

## 2019-02-22 NOTE — TELEPHONE ENCOUNTER
Has appointment with me in four weeks.  Will update CSA at that visit and advise of new protocol for visits q 3 months for controlled substances at that visit.  I have pended the requested refill(s).  Please have one of the other MDs print and sign the controlled substance prescription(s) in my absence.     Vamshi Clarke MD

## 2019-02-22 NOTE — TELEPHONE ENCOUNTER
Patient calling regarding percocet refill.  Advised patient there is 72 business hours to process refills.  Patient stated he is down to 1 pill.  Also advised patient when on these types of medications an office visit is required every 3 months.    Routing to provider for review and advise as appropriate.    MARIANGEL ForbesN, RN  [employer] [facility]

## 2019-02-25 ENCOUNTER — OFFICE VISIT (OUTPATIENT)
Dept: FAMILY MEDICINE | Facility: CLINIC | Age: 54
End: 2019-02-25
Payer: COMMERCIAL

## 2019-02-25 VITALS
HEIGHT: 69 IN | OXYGEN SATURATION: 99 % | HEART RATE: 101 BPM | DIASTOLIC BLOOD PRESSURE: 80 MMHG | WEIGHT: 208 LBS | SYSTOLIC BLOOD PRESSURE: 128 MMHG | BODY MASS INDEX: 30.81 KG/M2 | TEMPERATURE: 98.2 F

## 2019-02-25 DIAGNOSIS — H10.33 ACUTE CONJUNCTIVITIS OF BOTH EYES, UNSPECIFIED ACUTE CONJUNCTIVITIS TYPE: ICD-10-CM

## 2019-02-25 DIAGNOSIS — B34.9 VIRAL SYNDROME: Primary | ICD-10-CM

## 2019-02-25 PROCEDURE — 99213 OFFICE O/P EST LOW 20 MIN: CPT | Performed by: FAMILY MEDICINE

## 2019-02-25 RX ORDER — POLYMYXIN B SULFATE AND TRIMETHOPRIM 1; 10000 MG/ML; [USP'U]/ML
1-2 SOLUTION OPHTHALMIC 4 TIMES DAILY
Qty: 4 ML | Refills: 0 | Status: SHIPPED | OUTPATIENT
Start: 2019-02-25 | End: 2019-03-07

## 2019-02-25 ASSESSMENT — ANXIETY QUESTIONNAIRES
2. NOT BEING ABLE TO STOP OR CONTROL WORRYING: NOT AT ALL
7. FEELING AFRAID AS IF SOMETHING AWFUL MIGHT HAPPEN: NOT AT ALL
6. BECOMING EASILY ANNOYED OR IRRITABLE: SEVERAL DAYS
5. BEING SO RESTLESS THAT IT IS HARD TO SIT STILL: NOT AT ALL
GAD7 TOTAL SCORE: 3
1. FEELING NERVOUS, ANXIOUS, OR ON EDGE: SEVERAL DAYS
3. WORRYING TOO MUCH ABOUT DIFFERENT THINGS: SEVERAL DAYS
IF YOU CHECKED OFF ANY PROBLEMS ON THIS QUESTIONNAIRE, HOW DIFFICULT HAVE THESE PROBLEMS MADE IT FOR YOU TO DO YOUR WORK, TAKE CARE OF THINGS AT HOME, OR GET ALONG WITH OTHER PEOPLE: NOT DIFFICULT AT ALL

## 2019-02-25 ASSESSMENT — MIFFLIN-ST. JEOR: SCORE: 1778.86

## 2019-02-25 ASSESSMENT — PATIENT HEALTH QUESTIONNAIRE - PHQ9
5. POOR APPETITE OR OVEREATING: NOT AT ALL
SUM OF ALL RESPONSES TO PHQ QUESTIONS 1-9: 0

## 2019-02-25 NOTE — PATIENT INSTRUCTIONS
Patient Education     Self-Care for Sore Throats    Sore throats happen for many reasons, such as colds, allergies, and infections caused by viruses or bacteria. In any case, your throat becomes red and sore. Your goal for self-care is to reduce your discomfort while giving your throat a chance to heal.  Moisten and soothe your throat  Tips include the following:    Try a sip of water first thing after waking up.    Keep your throat moist by drinking 6 or more glasses of clear liquids every day.    Run a cool-air humidifier in your room overnight.    Avoid cigarette smoke.     Suck on throat lozenges, cough drops, hard candy, ice chips, or frozen fruit-juice bars. Use the sugar-free versions if your diet or medical condition requires them.  Gargle to ease irritation  Gargling every hour or 2 can ease irritation. Try gargling with 1 of these solutions:    1/4 teaspoon of salt in 1/2 cup of warm water    An over-the-counter anesthetic gargle  Use medicine for more relief  Over-the-counter medicine can reduce sore throat symptoms. Ask your pharmacist if you have questions about which medicine to use:    Ease pain with anesthetic sprays. Aspirin or an aspirin substitute also helps. Remember, never give aspirin to anyone 18 or younger, or if you are already taking blood thinners.     For sore throats caused by allergies, try antihistamines to block the allergic reaction.    Remember: unless a sore throat is caused by a bacterial infection, antibiotics won t help you.  Prevent future sore throats  Prevention tips include the following:    Stop smoking or reduce contact with secondhand smoke. Smoke irritates the tender throat lining.    Limit contact with pets and with allergy-causing substances, such as pollen and mold.    When you re around someone with a sore throat or cold, wash your hands often to keep viruses or bacteria from spreading.    Don t strain your vocal cords.  Call your healthcare provider  Contact your  healthcare provider if you have:    A temperature over 101 F (38.3 C)    White spots on the throat    Great difficulty swallowing    Trouble breathing    A skin rash    Recent exposure to someone else with strep bacteria    Severe hoarseness and swollen glands in the neck or jaw   Date Last Reviewed: 8/1/2016 2000-2018 [employer]. [address] [place], [place] [id number]. All rights reserved. This information is not intended as a substitute for professional medical care. Always follow your healthcare professional's instructions.
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
## 2019-02-26 ASSESSMENT — ANXIETY QUESTIONNAIRES: GAD7 TOTAL SCORE: 3

## 2019-03-27 ENCOUNTER — OFFICE VISIT (OUTPATIENT)
Dept: FAMILY MEDICINE | Facility: CLINIC | Age: 54
End: 2019-03-27
Payer: COMMERCIAL

## 2019-03-27 VITALS
OXYGEN SATURATION: 97 % | DIASTOLIC BLOOD PRESSURE: 78 MMHG | WEIGHT: 206 LBS | SYSTOLIC BLOOD PRESSURE: 120 MMHG | HEIGHT: 69 IN | HEART RATE: 99 BPM | BODY MASS INDEX: 30.51 KG/M2 | TEMPERATURE: 97.6 F

## 2019-03-27 DIAGNOSIS — E11.9 TYPE 2 DIABETES MELLITUS WITHOUT COMPLICATION, WITHOUT LONG-TERM CURRENT USE OF INSULIN (H): Primary | ICD-10-CM

## 2019-03-27 DIAGNOSIS — F33.0 MAJOR DEPRESSIVE DISORDER, RECURRENT EPISODE, MILD (H): ICD-10-CM

## 2019-03-27 DIAGNOSIS — N52.03 COMBINED ARTERIAL INSUFFICIENCY AND CORPORO-VENOUS OCCLUSIVE ERECTILE DYSFUNCTION: ICD-10-CM

## 2019-03-27 DIAGNOSIS — G89.4 CHRONIC PAIN SYNDROME: ICD-10-CM

## 2019-03-27 DIAGNOSIS — I10 ESSENTIAL HYPERTENSION WITH GOAL BLOOD PRESSURE LESS THAN 140/90: ICD-10-CM

## 2019-03-27 LAB — HBA1C MFR BLD: 6.5 % (ref 0–5.6)

## 2019-03-27 PROCEDURE — 83036 HEMOGLOBIN GLYCOSYLATED A1C: CPT | Performed by: FAMILY MEDICINE

## 2019-03-27 PROCEDURE — 99207 C FOOT EXAM  NO CHARGE: CPT | Mod: 25 | Performed by: FAMILY MEDICINE

## 2019-03-27 PROCEDURE — 99214 OFFICE O/P EST MOD 30 MIN: CPT | Performed by: FAMILY MEDICINE

## 2019-03-27 PROCEDURE — 36415 COLL VENOUS BLD VENIPUNCTURE: CPT | Performed by: FAMILY MEDICINE

## 2019-03-27 PROCEDURE — 82043 UR ALBUMIN QUANTITATIVE: CPT | Performed by: FAMILY MEDICINE

## 2019-03-27 RX ORDER — HYDROCODONE BITARTRATE AND ACETAMINOPHEN 5; 325 MG/1; MG/1
1 TABLET ORAL EVERY 6 HOURS PRN
Qty: 10 TABLET | Refills: 0 | Status: SHIPPED | OUTPATIENT
Start: 2019-05-27 | End: 2019-03-27

## 2019-03-27 RX ORDER — HYDROCODONE BITARTRATE AND ACETAMINOPHEN 5; 325 MG/1; MG/1
1 TABLET ORAL EVERY 6 HOURS PRN
Qty: 20 TABLET | Refills: 0 | Status: SHIPPED | OUTPATIENT
Start: 2019-03-27 | End: 2019-04-26

## 2019-03-27 RX ORDER — GLIPIZIDE 5 MG/1
5 TABLET, FILM COATED, EXTENDED RELEASE ORAL DAILY
Qty: 90 TABLET | Refills: 1 | Status: SHIPPED | OUTPATIENT
Start: 2019-03-27 | End: 2019-09-16

## 2019-03-27 RX ORDER — HYDROCODONE BITARTRATE AND ACETAMINOPHEN 5; 325 MG/1; MG/1
1 TABLET ORAL EVERY 6 HOURS PRN
Qty: 20 TABLET | Refills: 0 | Status: SHIPPED | OUTPATIENT
Start: 2019-04-27 | End: 2019-09-16

## 2019-03-27 RX ORDER — HYDROCODONE BITARTRATE AND ACETAMINOPHEN 5; 325 MG/1; MG/1
1 TABLET ORAL EVERY 6 HOURS PRN
Qty: 20 TABLET | Refills: 0 | Status: SHIPPED | OUTPATIENT
Start: 2019-05-27 | End: 2019-06-26

## 2019-03-27 RX ORDER — SILDENAFIL 100 MG/1
100 TABLET, FILM COATED ORAL DAILY PRN
Qty: 30 TABLET | Refills: 1 | Status: SHIPPED | OUTPATIENT
Start: 2019-03-27 | End: 2020-04-13

## 2019-03-27 RX ORDER — IRBESARTAN 150 MG/1
150 TABLET ORAL DAILY
Qty: 90 TABLET | Refills: 1 | Status: SHIPPED | OUTPATIENT
Start: 2019-03-27 | End: 2019-09-16

## 2019-03-27 ASSESSMENT — MIFFLIN-ST. JEOR: SCORE: 1769.79

## 2019-03-27 NOTE — LETTER
My Depression Action Plan  Name: Saran Wallis   Date of Birth 1965  Date: 3/27/2019    My doctor: Vamshi Clarke Jr.   My clinic: [facility] [place]  [address]  [place] [address] [phone]          GREEN    ZONE   Good Control    What it looks like:     Things are going generally well. You have normal up s and down s. You may even feel depressed from time to time, but bad moods usually last less than a day.   What you need to do:  1. Continue to care for yourself (see self care plan)  2. Check your depression survival kit and update it as needed  3. Follow your physician s recommendations including any medication.  4. Do not stop taking medication unless you consult with your physician first.           YELLOW         ZONE Getting Worse    What it looks like:     Depression is starting to interfere with your life.     It may be hard to get out of bed; you may be starting to isolate yourself from others.    Symptoms of depression are starting to last most all day and this has happened for several days.     You may have suicidal thoughts but they are not constant.   What you need to do:     1. Call your care team, your response to treatment will improve if you keep your care team informed of your progress. Yellow periods are signs an adjustment may need to be made.     2. Continue your self-care, even if you have to fake it!    3. Talk to someone in your support network    4. Open up your depression survival kit           RED    ZONE Medical Alert - Get Help    What it looks like:     Depression is seriously interfering with your life.     You may experience these or other symptoms: You can t get out of bed most days, can t work or engage in other necessary activities, you have trouble taking care of basic hygiene, or basic responsibilities, thoughts of suicide or death that will not go away, self-injurious behavior.     What you need to do:  1. Call your care team and request a  same-day appointment. If they are not available (weekends or after hours) call your local crisis line, emergency room or 911.            Depression Self Care Plan / Survival Kit    Self-Care for Depression  Here s the deal. Your body and mind are really not as separate as most people think.  What you do and think affects how you feel and how you feel influences what you do and think. This means if you do things that people who feel good do, it will help you feel better.  Sometimes this is all it takes.  There is also a place for medication and therapy depending on how severe your depression is, so be sure to consult with your medical provider and/ or Behavioral Health Consultant if your symptoms are worsening or not improving.     In order to better manage my stress, I will:    Exercise  Get some form of exercise, every day. This will help reduce pain and release endorphins, the  feel good  chemicals in your brain. This is almost as good as taking antidepressants!  This is not the same as joining a gym and then never going! (they count on that by the way ) It can be as simple as just going for a walk or doing some gardening, anything that will get you moving.      Hygiene   Maintain good hygiene (Get out of bed in the morning, Make your bed, Brush your teeth, Take a shower, and Get dressed like you were going to work, even if you are unemployed).  If your clothes don't fit try to get ones that do.    Diet  I will strive to eat foods that are good for me, drink plenty of water, and avoid excessive sugar, caffeine, alcohol, and other mood-altering substances.  Some foods that are helpful in depression are: complex carbohydrates, B vitamins, flaxseed, fish or fish oil, fresh fruits and vegetables.    Psychotherapy  I agree to participate in Individual Therapy (if recommended).    Medication  If prescribed medications, I agree to take them.  Missing doses can result in serious side effects.  I understand that drinking  alcohol, or other illicit drug use, may cause potential side effects.  I will not stop my medication abruptly without first discussing it with my provider.    Staying Connected With Others  I will stay in touch with my friends, family members, and my primary care provider/team.    Use your imagination  Be creative.  We all have a creative side; it doesn t matter if it s oil painting, sand castles, or mud pies! This will also kick up the endorphins.    Witness Beauty  (AKA stop and smell the roses) Take a look outside, even in mid-winter. Notice colors, textures. Watch the squirrels and birds.     Service to others  Be of service to others.  There is always someone else in need.  By helping others we can  get out of ourselves  and remember the really important things.  This also provides opportunities for practicing all the other parts of the program.    Humor  Laugh and be silly!  Adjust your TV habits for less news and crime-drama and more comedy.    Control your stress  Try breathing deep, massage therapy, biofeedback, and meditation. Find time to relax each day.     My support system    Clinic Contact:  Phone number:    Contact 1:  Phone number:    Contact 2:  Phone number:    Jew/:  Phone number:    Therapist:  Phone number:    Local crisis center:    Phone number:    Other community support:  Phone number:

## 2019-03-27 NOTE — PROGRESS NOTES
SUBJECTIVE:   Saran Wallis is a 54 year old male who presents to clinic today for the following health issues:    Diabetes Follow-up      Patient is checking blood sugars: rarely.  180 after meal     Diabetic concerns: None     Symptoms of hypoglycemia (low blood sugar): none     Paresthesias (numbness or burning in feet) or sores: No     Date of last diabetic eye exam: last Feb     Diabetes Management Resources    Hyperlipidemia Follow-Up      Rate your low fat/cholesterol diet?: fair    Taking statin?  Yes, no muscle aches from statin    Other lipid medications/supplements?:  none    Hypertension Follow-up      Outpatient blood pressures are not being checked.    Low Salt Diet: not monitoring salt    BP Readings from Last 2 Encounters:   03/27/19 120/78   02/25/19 128/80     Hemoglobin A1C (%)   Date Value   03/27/2019 6.5 (H)   12/17/2018 6.8 (H)     LDL Cholesterol Calculated (mg/dL)   Date Value   08/29/2018 66   08/07/2017 45       Amount of exercise or physical activity: not often this past winter due to weather     Problems taking medications regularly: No    Medication side effects: none    Diet: low fat/cholesterol      Diabetes- Pt periodically checks his blood sugars and they are around 180 after meals. He has not been exercising as much with the winter weather and therefore thinks his A1C is going to be higher this time. He tries to watch his carb intake. His wife helps pt manage his portions of rice per meal.    Chronic pain syndrome- Pt is requesting a refill of his Percocet. He states he flies to [place] every month and he gets back pain each time after flying and sometimes it radiates to his LE's. When this pain develops, he tries taking Advil, and if this does not help he then takes Percocet 1X per day until the pain resolves.    Erectile dysfunction- He states that every time he has sexual intercourse, he has problem with erectile dysfunction. He fears hurting his back when he has sex, and  "therefore he thinks this contributes to his ED.      Problem list and histories reviewed & adjusted, as indicated.  Additional history: as documented    Labs reviewed in EPIC    Reviewed and updated as needed this visit by clinical staff  Tobacco  Allergies  Meds  Soc Hx      Reviewed and updated as needed this visit by Provider       ROS:  Constitutional, HEENT, cardiovascular, pulmonary, gi and gu systems are negative, except as otherwise noted.    This document serves as a record of the services and decisions personally performed and made by Vamshi Clarke MD. It was created on his behalf by Carmine Erickson, a trained medical scribe. The creation of this document is based on the provider's statements to the medical scribe.  Carmine Ercikson 11:43 AM March 27, 2019    OBJECTIVE:     /78   Pulse 99   Temp 97.6  F (36.4  C) (Oral)   Ht 1.753 m (5' 9\")   Wt 93.4 kg (206 lb)   SpO2 97%   BMI 30.42 kg/m    Body mass index is 30.42 kg/m .  GENERAL: healthy, alert and no distress  NECK: no adenopathy, no asymmetry, masses, or scars and thyroid normal to palpation  RESP: lungs clear to auscultation - no rales, rhonchi or wheezes  CV: regular rate and rhythm, normal S1 S2, no S3 or S4, no murmur, click or rub, no peripheral edema and peripheral pulses strong  NEURO: Normal strength and tone, mentation intact and speech normal  PSYCH: mentation appears normal, affect normal/bright  Diabetic foot exam: normal DP and PT pulses, no trophic changes or ulcerative lesions, normal sensory exam and normal monofilament exam    Diagnostic Test Results:  Results for orders placed or performed in visit on 03/27/19 (from the past 24 hour(s))   Hemoglobin A1c   Result Value Ref Range    Hemoglobin A1C 6.5 (H) 0 - 5.6 %       ASSESSMENT/PLAN:     (E11.9) Type 2 diabetes mellitus without complication, without long-term current use of insulin (H)  (primary encounter diagnosis)  Comment: Will recheck his A1C today- this came " back today at 6.5. Given this is below 7.0, will have him continue with his glipizide, and Metformin as prescribed without change. I also encouraged him to increase his exercise as the weather improves and decrease his carbohydrate intake to help promote weight loss which will help improve his blood sugars even more. Will plan on rechecking his A1C in 3 months. Lastly, per patient's request, will refer him to ophthalmologist to have an eye exam as he is due for one.  Plan: Hemoglobin A1c, Albumin Random Urine         Quantitative with Creat Ratio, C FOOT EXAM  NO         CHARGE, EYE EXAM (SIMPLE-NONBILLABLE),         metFORMIN (GLUCOPHAGE) 500 MG tablet, glipiZIDE        (GLIPIZIDE XL) 5 MG 24 hr tablet, **A1C FUTURE         3mo        Follow up in 3 months for A1C recheck.    (F33.0) Major depressive disorder, recurrent episode, mild (H)  Comment: This is currently stable and pt remains off medication for this.  Plan: Follow up if needed.    (I10) Essential hypertension with goal blood pressure less than 140/90  Comment: His BP was good today; will refill his irbesartan.  Plan: irbesartan (AVAPRO) 150 MG tablet        Follow up if needed.    (G89.4) Chronic pain syndrome  Comment: I did agree to refill of his Percocet for his chronic pain syndrome and I had him sign a controlled substance agreement with me today. To make it easier for him, will give him three 1 month paper prescriptions of his Percocet. Will then plan on doing an E-visit in 3 months to recheck his chronic pain and Percocet prescription.  Plan: HYDROcodone-acetaminophen (NORCO) 5-325 MG         tablet, HYDROcodone-acetaminophen (NORCO) 5-325        MG tablet, HYDROcodone-acetaminophen (NORCO)         5-325 MG tablet, DISCONTINUED:         HYDROcodone-acetaminophen (NORCO) 5-325 MG         tablet        Follow up if needed.    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 However, advised pt that this insurance will not likely cover this medication and will likely have to pay out-of-pocket for this.   Plan: sildenafil (VIAGRA) 100 MG tablet        Follow up if needed.    The information in this document, created by the medical scribe for me, accurately reflects the services I personally performed and the decisions made by me. I have reviewed and approved this document for accuracy prior to leaving the patient care area.  March 27, 2019 11:43 AM    Vamshi Clarke Jr, MD  [facility]

## 2019-03-27 NOTE — LETTER
[facility]  03/27/19    Patient: Saran Wallis  YOB: 1965  Medical Record Number: [id number]                                                                  Opioid / Opioid Plus Controlled Substance Agreement    I understand that my care provider has prescribed an opioid (narcotic) controlled substance to help manage my condition(s). I am taking this medicine to help me function or work. I know this is strong medicine, and that it can cause serious side effects. Opioid medicine can be sedating, addicting and may cause a dependency on the drug. They can affect my ability to drive or think, and cause depression. They need to be taken exactly as prescribed. Combining opioids with certain medicines or chemicals (such as cocaine, sedatives and tranquilizers, sleeping pills, meth) can be dangerous or even fatal. Also, if I stop opioids suddenly, I may have severe withdrawal symptoms. Last, I understand that opioids do not work for all types of pain nor for all patients. If not helpful, I may be asked to stop them.        The risks, benefits, and side effects of these medicine(s) were explained to me. I agree that:    1. I will take part in other treatments as advised by my care team. This may be psychiatry or counseling, physical therapy, behavioral therapy, group treatment or a referral to a pain clinic. I will reduce or stop my medicine when my care team tells me to do so.  2. I will take my medicines as prescribed. I will not change the dose or schedule unless my care team tells me to. There will be no refills if I  run out early.   I may be contactedwithout warning and asked to complete a urine drug test or pill count at any time.   3. I will keep all my appointments, and understand this is part of the monitoring of opioids. My care team may require an office visit for EVERY opioid/controlled substance refill. If I miss appointments or don t follow instructions, my care team may stop my  medicine.  4. I will not ask other providers to prescribe controlled substances, and I will not accept controlled substances from other people. If I need another prescribed controlled substance for a new reason, I will tell my care team within 1 business day.  5. I will use one pharmacy to fill all of my controlled substance prescriptions, and it is up to me to make sure that I do not run out of my medicines on weekends or holidays. If my care team is willing to refill my opioid prescription without a visit, I must request refills only during office hours, refills may take up to 3 days to process, and it may take up to 5 to 7 days for my medicine to be mailed and ready at my pharmacy. Prescriptions will not be mailed anywhere except my pharmacy.        [id number]  Rev 12/18         Registration to scan to EHR                             Page 1 of 2               Controlled Substance Agreement Opioid        [facility]  03/27/19  Patient: Saran Wallis  YOB: 1965  Medical Record Number: [id number]                                                                  6. I am responsible for my prescriptions. If the medicine/prescription is lost or stolen, it will not be replaced. I also agree not to share controlled substance medicines with anyone.  7. I agree to not use ANY illegal or recreational drugs. This includes marijuana, cocaine, bath salts or other drugs. I agree not to use alcohol unless my care team says I may.          I agree to give urine samples whenever asked. If I don t give a urine sample, the care team may stop my medicine.    8. If I enroll in the [place] Medical Marijuana program, I will tell my care team. I will also sign an agreement to share my medical records with my care team.   9. I will bring in my list of medicines (or my medicine bottles) each time I come to the clinic.   10. I will tell my care team right away if I become pregnant or have a new medical problem treated  outside of my regular clinic.  11. I understand that this medicine can affect my thinking and judgment. It may be unsafe for me to drive, use machinery and do dangerous tasks. I will not do any of these things until I know how the medicine affects me. If my dose changes, I will wait to see how it affects me. I will contact my care team if I have concerns about medicine side effects.    I understand that if I do not follow any of the conditions above, my prescriptions or treatment may be stopped.      I agree that my provider, clinic care team, and pharmacy may work with any city, state or federal law enforcement agency that investigates the misuse, sale, or other diversion of my controlled medicine. I will allow my provider to discuss my care with or share a copy of this agreement with any other treating provider, pharmacy or emergency room where I receive care. I agree to give up (waive) any right of privacy or confidentiality with respect to these consents.     I have read this agreement and have asked questions about anything I did not understand.      ________________________________________________________________________  Patient signature - Date/Time -  Saran Wallis                                      ________________________________________________________________________  Witness signature                                                            ________________________________________________________________________  Provider signature - Vamshi Clarke Jr, MD      [id number]  Rev 12/18         Registration to scan to EHR                         Page 2 of 2                   Controlled Substance Agreement Opioid           Page 1 of 2  Opioid Pain Medicines (also known as Narcotics)  What You Need to Know    What are opioids?   Opioids are pain medicines that must be prescribed by a doctor.  They are also known as narcotics.    Examples are:     morphine (MS Contin, Stacia)    oxycodone  (Oxycontin)    oxycodone and acetaminophen (Percocet)    hydrocodone and acetaminophen (Vicodin, Norco)     fentanyl patch (Duragesic)     hydromorphone (Dilaudid)     methadone     What do opioids do well?   Opioids are best for short-term pain after a surgery or injury. They also work well for cancer pain. Unlike other pain medicines, they do not cause liver or kidney failure or ulcers. They may help some people with long-lasting (chronic) pain.     What do opioids NOT do well?   Opioids never get rid of pain entirely, and they do not work well for most patients with chronic pain. Opioids do not reduce swelling, one of the causes of pain. They also don t work well for nerve pain.                           For informational purposes only.  Not to replace the advice of your care provider.  Copyright [facility]. All right reserved. [employer] 333784-Fdr 02/18.      Page 2 of 2    Risks and side effects   Talk to your doctor before you start or decide to keep taking one of these medicines. Side effects include:    Lowering your breathing rate enough to cause death    Overdose, including death, especially if taking higher than prescribed doses    Long-term opioid use    Worse depression symptoms; less pleasure in things you usually enjoy    Feeling tired or sluggish    Slower thoughts or cloudy thinking    Being more sensitive to pain over time; pain is harder to control    Trouble sleeping or restless sleep    Changes in hormone levels (for example, less testosterone)    Changes in sex drive or ability to have sex    Constipation    Unsafe driving    Itching and sweating    Feeling dizzy    Nausea, vomiting and dry mouth    What else should I know about opioids?  When someone takes opioids for too long or too often, they become dependent. This means that if you stop or reduce the medicine too quickly, you will have withdrawal symptoms.    Dependence is not the same as addiction. Addiction is when  people keep using a substance that harms their body, their mind or their relations with others. If you have a history of drug or alcohol abuse, taking opioids can cause a relapse.    Over time, opioids don t work as well. Most people will need higher and higher doses. The higher the dose, the more serious the side effects. We don t know the long-term effects of opioids.      Prescribed opioids aren't the best way to manage chronic pain    Other ways to manage pain include:      Ibuprofen or acetaminophen.  You should always try this first.      Treat health problems that may be causing pain.      acupuncture or massage, deep breathing, meditation, visual imagery, aromatherapy.      Use heat or ice at the pain site      Physical therapy and exercise      Stop smoking      See a counselor or therapist                                                  People who have used opioids for a long time may have a lower quality of life, worse depression, higher levels of pain and more visits to doctors.    Never share your opioids with others. Be sure to store opioids in a secure place, locked if possible.Young children can easily swallow them and overdose.     You can overdose on opioids.  Signs of overdose include decrease or loss of consciousness, slowed breathing, trouble waking and blue lips.  If someone is worried about overdose, they should call 911.    If you are at risk for overdose, you may get naloxone (Narcan, a medicine that reverses the effects of opioids.  If you overdose, a friend or family member can give you Narcan while waiting for the ambulance.  They need to know the signs of overdose and how to give Narcan.    While you're taking opioids:    Don't use alcohol or street drugs. Taking them together can cause death.    Don't take any of these medicines unless your doctor says its okay.  Taking these with opioids can cause death.    Benzodiazepines (such as lorazepam         or diazepam)    Muscle relaxers  (such as cyclobenzaprine)    sleeping pills    other opioids    Safe disposal of opioids  Find your area drug take-back program, your pharmacy mail-back program, buy a special disposal bag (such as Deterra) from your pharmacy or flush them down the toilet.  Use the guidelines at:  www.fda.gov/drugs/resourcesforyou

## 2019-03-27 NOTE — RESULT ENCOUNTER NOTE
Mr. Wallis,    -A1C (test of diabetes control the last 2-3 months) is at your goal. Please recheck your A1C test in 3 months.     If you have further questions about the interpretation of your labs, [email or web address] is a good website to check out for further information.    Please contact the clinic if you have additional questions.  Thank you.    Sincerely,    Vamshi Clarke MD

## 2019-03-28 LAB
CREAT UR-MCNC: 110 MG/DL
MICROALBUMIN UR-MCNC: 14 MG/L
MICROALBUMIN/CREAT UR: 12.55 MG/G CR (ref 0–17)

## 2019-03-28 NOTE — RESULT ENCOUNTER NOTE
Mr. Wallis,    -Microalbumin (urine protein) test is normal.  ADVISE: rechecking this annually.    If you have further questions about the interpretation of your labs, [email or web address] is a good website to check out for further information.    Please contact the clinic if you have additional questions.  Thank you.    Sincerely,    Vamshi Clarke MD

## 2019-07-29 DIAGNOSIS — E78.5 HYPERLIPIDEMIA LDL GOAL <70: ICD-10-CM

## 2019-07-29 DIAGNOSIS — K21.9 GASTROESOPHAGEAL REFLUX DISEASE WITHOUT ESOPHAGITIS: ICD-10-CM

## 2019-07-29 DIAGNOSIS — G89.29 CHRONIC RADICULAR LOW BACK PAIN: ICD-10-CM

## 2019-07-29 DIAGNOSIS — G89.4 CHRONIC PAIN SYNDROME: ICD-10-CM

## 2019-07-29 DIAGNOSIS — M54.16 CHRONIC RADICULAR LOW BACK PAIN: ICD-10-CM

## 2019-07-29 RX ORDER — GABAPENTIN 300 MG/1
CAPSULE ORAL
Qty: 540 CAPSULE | Refills: 3 | Status: CANCELLED | OUTPATIENT
Start: 2019-07-29

## 2019-07-29 RX ORDER — SIMVASTATIN 10 MG
10 TABLET ORAL AT BEDTIME
Qty: 90 TABLET | Refills: 2 | Status: CANCELLED | OUTPATIENT
Start: 2019-07-29

## 2019-07-29 RX ORDER — PANTOPRAZOLE SODIUM 40 MG/1
40 TABLET, DELAYED RELEASE ORAL DAILY
Qty: 90 TABLET | Refills: 3 | Status: CANCELLED | OUTPATIENT
Start: 2019-07-29

## 2019-07-30 NOTE — TELEPHONE ENCOUNTER
Pantoprazole rx has refills available. Gabapentin and simvastatin - patient should have enough medication until mid-end of August. Too soon to refill.  Estefani Morales, MARIANGELN, RN  [facility]
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
## 2019-09-16 ENCOUNTER — OFFICE VISIT (OUTPATIENT)
Dept: FAMILY MEDICINE | Facility: CLINIC | Age: 54
End: 2019-09-16
Payer: COMMERCIAL

## 2019-09-16 VITALS
SYSTOLIC BLOOD PRESSURE: 112 MMHG | BODY MASS INDEX: 30.13 KG/M2 | TEMPERATURE: 98.2 F | DIASTOLIC BLOOD PRESSURE: 60 MMHG | HEART RATE: 100 BPM | WEIGHT: 204 LBS | OXYGEN SATURATION: 98 %

## 2019-09-16 DIAGNOSIS — K21.9 GASTROESOPHAGEAL REFLUX DISEASE WITHOUT ESOPHAGITIS: ICD-10-CM

## 2019-09-16 DIAGNOSIS — I10 ESSENTIAL HYPERTENSION WITH GOAL BLOOD PRESSURE LESS THAN 140/90: ICD-10-CM

## 2019-09-16 DIAGNOSIS — G47.33 OBSTRUCTIVE SLEEP APNEA SYNDROME: ICD-10-CM

## 2019-09-16 DIAGNOSIS — G89.4 CHRONIC PAIN SYNDROME: ICD-10-CM

## 2019-09-16 DIAGNOSIS — Z23 NEED FOR IMMUNIZATION AGAINST INFLUENZA: ICD-10-CM

## 2019-09-16 DIAGNOSIS — E11.9 TYPE 2 DIABETES MELLITUS WITHOUT COMPLICATION, WITHOUT LONG-TERM CURRENT USE OF INSULIN (H): Primary | ICD-10-CM

## 2019-09-16 DIAGNOSIS — G89.29 CHRONIC RADICULAR LOW BACK PAIN: ICD-10-CM

## 2019-09-16 DIAGNOSIS — M54.16 CHRONIC RADICULAR LOW BACK PAIN: ICD-10-CM

## 2019-09-16 DIAGNOSIS — E78.5 HYPERLIPIDEMIA LDL GOAL <70: ICD-10-CM

## 2019-09-16 LAB — HBA1C MFR BLD: 7.2 % (ref 0–5.6)

## 2019-09-16 PROCEDURE — 80307 DRUG TEST PRSMV CHEM ANLYZR: CPT | Performed by: FAMILY MEDICINE

## 2019-09-16 PROCEDURE — 84443 ASSAY THYROID STIM HORMONE: CPT | Performed by: FAMILY MEDICINE

## 2019-09-16 PROCEDURE — 83036 HEMOGLOBIN GLYCOSYLATED A1C: CPT | Performed by: FAMILY MEDICINE

## 2019-09-16 PROCEDURE — 36415 COLL VENOUS BLD VENIPUNCTURE: CPT | Performed by: FAMILY MEDICINE

## 2019-09-16 PROCEDURE — 90686 IIV4 VACC NO PRSV 0.5 ML IM: CPT | Performed by: FAMILY MEDICINE

## 2019-09-16 PROCEDURE — 90471 IMMUNIZATION ADMIN: CPT | Performed by: FAMILY MEDICINE

## 2019-09-16 PROCEDURE — 80061 LIPID PANEL: CPT | Performed by: FAMILY MEDICINE

## 2019-09-16 PROCEDURE — 80053 COMPREHEN METABOLIC PANEL: CPT | Performed by: FAMILY MEDICINE

## 2019-09-16 PROCEDURE — 99214 OFFICE O/P EST MOD 30 MIN: CPT | Mod: 25 | Performed by: FAMILY MEDICINE

## 2019-09-16 PROCEDURE — 40000358 ZZHCL STATISTIC DRUG SCREEN MULTIPLE (METRO): Performed by: FAMILY MEDICINE

## 2019-09-16 RX ORDER — IRBESARTAN 150 MG/1
150 TABLET ORAL DAILY
Qty: 90 TABLET | Refills: 1 | Status: SHIPPED | OUTPATIENT
Start: 2019-09-16 | End: 2020-04-13

## 2019-09-16 RX ORDER — GLIPIZIDE 5 MG/1
5 TABLET, FILM COATED, EXTENDED RELEASE ORAL DAILY
Qty: 90 TABLET | Refills: 1 | Status: SHIPPED | OUTPATIENT
Start: 2019-09-16 | End: 2020-03-18

## 2019-09-16 RX ORDER — HYDROCODONE BITARTRATE AND ACETAMINOPHEN 5; 325 MG/1; MG/1
1 TABLET ORAL EVERY 6 HOURS PRN
Qty: 20 TABLET | Refills: 0 | Status: SHIPPED | OUTPATIENT
Start: 2019-09-16 | End: 2020-04-13 | Stop reason: ALTCHOICE

## 2019-09-16 ASSESSMENT — ANXIETY QUESTIONNAIRES
GAD7 TOTAL SCORE: 0
6. BECOMING EASILY ANNOYED OR IRRITABLE: NOT AT ALL
IF YOU CHECKED OFF ANY PROBLEMS ON THIS QUESTIONNAIRE, HOW DIFFICULT HAVE THESE PROBLEMS MADE IT FOR YOU TO DO YOUR WORK, TAKE CARE OF THINGS AT HOME, OR GET ALONG WITH OTHER PEOPLE: NOT DIFFICULT AT ALL
1. FEELING NERVOUS, ANXIOUS, OR ON EDGE: NOT AT ALL
5. BEING SO RESTLESS THAT IT IS HARD TO SIT STILL: NOT AT ALL
7. FEELING AFRAID AS IF SOMETHING AWFUL MIGHT HAPPEN: NOT AT ALL
2. NOT BEING ABLE TO STOP OR CONTROL WORRYING: NOT AT ALL
3. WORRYING TOO MUCH ABOUT DIFFERENT THINGS: NOT AT ALL

## 2019-09-16 ASSESSMENT — PATIENT HEALTH QUESTIONNAIRE - PHQ9
SUM OF ALL RESPONSES TO PHQ QUESTIONS 1-9: 2
5. POOR APPETITE OR OVEREATING: NOT AT ALL

## 2019-09-16 NOTE — TELEPHONE ENCOUNTER
"gabapentin (NEURONTIN) 300 MG capsule  Last Written Prescription Date:  8/30/2018  Last Fill Quantity: 540 capsule,   # refills: 3  Last Office Visit: 9/16/2019 Saman  Future Office visit:       Routing refill request to provider for review/approval because:  Drug not on the [facility], [facility] or [facility] refill protocol or controlled substance        pantoprazole (PROTONIX) 40 MG EC tablet [Pharmacy Med Name: PANTOPRAZOLE 40MG TABLETS] 90 tablet 0     Sig: TAKE 1 TABLET(40 MG) BY MOUTH DAILY       PPI Protocol Passed - 9/16/2019 11:20 AM        Passed - Not on Clopidogrel (unless Pantoprazole ordered)        Passed - No diagnosis of osteoporosis on record        Passed - Recent (12 mo) or future (30 days) visit within the authorizing provider's specialty     Patient had office visit in the last 12 months or has a visit in the next 30 days with authorizing provider or within the authorizing provider's specialty.  See \"Patient Info\" tab in inbasket, or \"Choose Columns\" in Meds & Orders section of the refill encounter.              Passed - Medication is active on med list        Passed - Patient is age 18 or older        simvastatin (ZOCOR) 10 MG tablet [Pharmacy Med Name: SIMVASTATIN 10MG TABLETS] 90 tablet 0     Sig: TAKE 1 TABLET(10 MG) BY MOUTH AT BEDTIME       Statins Protocol Failed - 9/16/2019 11:20 AM        Failed - LDL on file in past 12 months     Recent Labs   Lab Test 09/16/19  1032   LDL 71             Passed - No abnormal creatine kinase in past 12 months     No lab results found.             Passed - Recent (12 mo) or future (30 days) visit within the authorizing provider's specialty     Patient had office visit in the last 12 months or has a visit in the next 30 days with authorizing provider or within the authorizing provider's specialty.  See \"Patient Info\" tab in inbasket, or \"Choose Columns\" in Meds & Orders section of the refill encounter.              Passed - Medication is active on med list        " Passed - Patient is age 18 or older

## 2019-09-17 LAB
ACETAMINOPHEN QUAL: POSITIVE
ALBUMIN SERPL-MCNC: 4.6 G/DL (ref 3.4–5)
ALP SERPL-CCNC: 94 U/L (ref 40–150)
ALT SERPL W P-5'-P-CCNC: 95 U/L (ref 0–70)
AMANTADINE: NEGATIVE
AMITRIPTYLINE QUAL: NEGATIVE
AMOXAPINE: NEGATIVE
AMPHETAMINES QUAL: NEGATIVE
ANION GAP SERPL CALCULATED.3IONS-SCNC: 10 MMOL/L (ref 3–14)
AST SERPL W P-5'-P-CCNC: 56 U/L (ref 0–45)
ATROPINE: NEGATIVE
BENZODIAZ UR QL: NEGATIVE
BILIRUB SERPL-MCNC: 1.3 MG/DL (ref 0.2–1.3)
BUN SERPL-MCNC: 18 MG/DL (ref 7–30)
BUPROPION QUAL: NEGATIVE
CAFFEINE QUAL: POSITIVE
CALCIUM SERPL-MCNC: 9.2 MG/DL (ref 8.5–10.1)
CANNABINOIDS UR QL SCN: NEGATIVE
CARBAMAZEPINE QUAL: NEGATIVE
CHLORIDE SERPL-SCNC: 106 MMOL/L (ref 94–109)
CHLORPHENIRAMINE: NEGATIVE
CHLORPROMAZINE: NEGATIVE
CHOLEST SERPL-MCNC: 137 MG/DL
CITALOPRAM QUAL: NEGATIVE
CLOMIPRAMINE QUAL: NEGATIVE
CO2 SERPL-SCNC: 22 MMOL/L (ref 20–32)
COCAINE QUAL: NEGATIVE
COCAINE UR QL: NEGATIVE
CODEINE QUAL: NEGATIVE
CREAT SERPL-MCNC: 0.71 MG/DL (ref 0.66–1.25)
DESIPRAMINE QUAL: NEGATIVE
DEXTROMETHORPHAN: NEGATIVE
DIPHENHYDRAMINE: POSITIVE
DOXEPIN/METABOLITE: NEGATIVE
DOXYLAMINE: NEGATIVE
EPHEDRINE OR PSEUDO: NEGATIVE
FENTANYL QUAL: NEGATIVE
FLUOXETINE AND METAB: NEGATIVE
GFR SERPL CREATININE-BSD FRML MDRD: >90 ML/MIN/{1.73_M2}
GLUCOSE SERPL-MCNC: 159 MG/DL (ref 70–99)
HDLC SERPL-MCNC: 48 MG/DL
HYDROCODONE QUAL: NEGATIVE
HYDROMORPHONE QUAL: NEGATIVE
IBUPROFEN QUAL: NEGATIVE
IMIPRAMINE QUAL: NEGATIVE
KETAMINE QUAL: NEGATIVE
LAMOTRIGINE QUAL: NEGATIVE
LDLC SERPL CALC-MCNC: 71 MG/DL
LIDOCAIN SPEC QL: NEGATIVE
LOXAPINE: NEGATIVE
MAPROTYLINE: NEGATIVE
MDMA QUAL: NEGATIVE
MEPERIDINE QUAL: NEGATIVE
METHAMPHETAMINE: NEGATIVE
METHODONE QUAL: NEGATIVE
MIRTAZAPINE QUAL: NEGATIVE
MORPHINE QUAL: NEGATIVE
NICOTINE: NEGATIVE
NONHDLC SERPL-MCNC: 89 MG/DL
NORTRIPTYLINE QUAL: NEGATIVE
OLANZAPINE QUAL: NEGATIVE
OPIATES UR QL SCN: NEGATIVE
OXYCODONE QUAL: NEGATIVE
PENTAZOCINE: NEGATIVE
PHENCYCLIDINE QUAL: NEGATIVE
PHENTERMINE: NEGATIVE
POTASSIUM SERPL-SCNC: 4 MMOL/L (ref 3.4–5.3)
PROPOFOL QUAL: NEGATIVE
PROPOXPHENE QUAL: NEGATIVE
PROPRANOLOL QUAL: NEGATIVE
PROT SERPL-MCNC: 7.9 G/DL (ref 6.8–8.8)
PYRILAMINE: NEGATIVE
QUETIAPINE METAB QUAL: NEGATIVE
SALICYLATE QUAL: NEGATIVE
SERTRALINE QUAL: NEGATIVE
SODIUM SERPL-SCNC: 138 MMOL/L (ref 133–144)
THEOBROMINE: POSITIVE
TOPIRAMATE QUAL: NEGATIVE
TRAMADOL QUAL: NEGATIVE
TRIGL SERPL-MCNC: 90 MG/DL
TRIMIPRAMINE QUAL: NEGATIVE
TSH SERPL DL<=0.005 MIU/L-ACNC: 1.02 MU/L (ref 0.4–4)
VENLAFAXINE QUAL: NEGATIVE

## 2019-09-17 RX ORDER — SIMVASTATIN 10 MG
TABLET ORAL
Qty: 90 TABLET | Refills: 3 | Status: SHIPPED | OUTPATIENT
Start: 2019-09-17 | End: 2020-09-14

## 2019-09-17 RX ORDER — GABAPENTIN 300 MG/1
CAPSULE ORAL
Qty: 540 CAPSULE | Refills: 0 | Status: SHIPPED | OUTPATIENT
Start: 2019-09-17 | End: 2019-10-26

## 2019-09-17 RX ORDER — PANTOPRAZOLE SODIUM 40 MG/1
TABLET, DELAYED RELEASE ORAL
Qty: 90 TABLET | Refills: 3 | Status: SHIPPED | OUTPATIENT
Start: 2019-09-17 | End: 2020-09-14

## 2019-09-17 ASSESSMENT — ANXIETY QUESTIONNAIRES: GAD7 TOTAL SCORE: 0

## 2019-09-20 ENCOUNTER — TRANSFERRED RECORDS (OUTPATIENT)
Dept: HEALTH INFORMATION MANAGEMENT | Facility: CLINIC | Age: 54
End: 2019-09-20

## 2019-10-26 DIAGNOSIS — G89.4 CHRONIC PAIN SYNDROME: ICD-10-CM

## 2019-10-26 DIAGNOSIS — G89.29 CHRONIC RADICULAR LOW BACK PAIN: ICD-10-CM

## 2019-10-26 DIAGNOSIS — M54.16 CHRONIC RADICULAR LOW BACK PAIN: ICD-10-CM

## 2019-10-28 NOTE — TELEPHONE ENCOUNTER
gabapentin (NEURONTIN) 300 MG capsule      Last Written Prescription Date:  9/17/2019  Last Fill Quantity: 540 capsule,   # refills: 0  Last Office Visit: 9/16/2019 Saman    Future Office visit:       Routing refill request to provider for review/approval because:  Drug not on the [facility], [facility] or [facility] refill protocol or controlled substance

## 2019-10-28 NOTE — TELEPHONE ENCOUNTER
Routing refill request to provider for review/approval because:  Drug not on the [facility] refill protocol   Refill request too soon.    MARIANGEL ForbesN, RN  [employer] [facility]

## 2019-10-29 RX ORDER — GABAPENTIN 300 MG/1
CAPSULE ORAL
Qty: 540 CAPSULE | Refills: 0 | Status: SHIPPED | OUTPATIENT
Start: 2019-10-29 | End: 2020-03-18

## 2019-10-29 NOTE — TELEPHONE ENCOUNTER
Chart reviewed.  Rx sent to pt's preferred pharmacy.    [id number] - [place], [place] - [address]    Vamshi Clarke MD

## 2019-11-08 ENCOUNTER — HEALTH MAINTENANCE LETTER (OUTPATIENT)
Age: 54
End: 2019-11-08

## 2020-02-23 ENCOUNTER — HEALTH MAINTENANCE LETTER (OUTPATIENT)
Age: 55
End: 2020-02-23

## 2020-03-16 DIAGNOSIS — E11.9 TYPE 2 DIABETES MELLITUS WITHOUT COMPLICATION, WITHOUT LONG-TERM CURRENT USE OF INSULIN (H): ICD-10-CM

## 2020-03-16 DIAGNOSIS — M54.16 CHRONIC RADICULAR LOW BACK PAIN: ICD-10-CM

## 2020-03-16 DIAGNOSIS — G89.29 CHRONIC RADICULAR LOW BACK PAIN: ICD-10-CM

## 2020-03-16 DIAGNOSIS — G89.4 CHRONIC PAIN SYNDROME: ICD-10-CM

## 2020-03-16 NOTE — TELEPHONE ENCOUNTER
"Requested Prescriptions   Pending Prescriptions Disp Refills     glipiZIDE (GLUCOTROL XL) 5 MG 24 hr tablet [Pharmacy Med Name: GLIPIZIDE ER 5MG TABLETS]  Last Written Prescription Date:  9/16/2019  Last Fill Quantity: 90 tablet,  # refills: 1   Last office visit: 9/16/2019 with prescribing provider:  Saman     Future Office Visit:   Next 5 appointments (look out 90 days)    Apr 06, 2020  8:40 AM CDT  Office Visit with Vamshi Clarke Jr., MD  [facility] [address]  [facility] [id number] [phone]            90 tablet 1     Sig: TAKE 1 TABLET(5 MG) BY MOUTH DAILY       Sulfonylurea Agents Failed - 3/16/2020  5:06 AM        Failed - Blood pressure less than 140/90 in past 6 months     BP Readings from Last 3 Encounters:   09/16/19 112/60   03/27/19 120/78   02/25/19 128/80             Failed - Patient has documented A1c within the specified period of time.     If HgbA1C is 8 or greater, it needs to be on file within the past 3 months.  If less than 8, must be on file within the past 6 months.     Recent Labs   Lab Test 09/16/19  1032   A1C 7.2*             Passed - Patient has documented LDL within the past 12 mos.     Recent Labs   Lab Test 09/16/19  1032   LDL 71             Passed - Patient has had a Microalbumin in the past 15 mos.     Recent Labs   Lab Test 03/27/19  1200   MICROL 14   UMALCR 12.55             Passed - Medication is active on med list        Passed - Patient is age 18 or older        Passed - Patient has a recent creatinine (normal) within the past 12 mos.     Recent Labs   Lab Test 09/16/19  1032   CR 0.71       Ok to refill medication if creatinine is low          Passed - Recent (6 mo) or future (30 days) visit within the authorizing provider's specialty     Patient had office visit in the last 6 months or has a visit in the next 30 days with authorizing provider or within the authorizing provider's specialty.  See \"Patient Info\" tab in " "inbasket, or \"Choose Columns\" in Meds & Orders section of the refill encounter.               "

## 2020-03-16 NOTE — LETTER
[facility]  [address]  [facility] [id number] [phone]  March 20, 2020    Saran Wallis  [address]  [facility] [place] [address]    Dear Saran,    We had a prescription refill request from your pharmacy for your Glipizide and gabapentin medication.  At this time, we were able to refill that medication for a 90 day supply.  You are due for a follow up on your diabetes however, with the current Covid-19 pandemic we would like to postpone all non urgent appointments until this summer.  Please contact our office closer to when you will be due for more medication and we will discuss appointment scheduling at that time.  Please know we are here for any questions or concerns you may have.        Thank you for choosing [facility] and stay well,    Mali

## 2020-03-17 NOTE — TELEPHONE ENCOUNTER
Routing refill request to provider for review/approval because:  Labs out of range:  A1C  Due for office visit.     Zina Mar RN, BSN  [facility]

## 2020-03-17 NOTE — TELEPHONE ENCOUNTER
gabapentin (NEURONTIN) 300 MG capsule       Last Written Prescription Date:  10/29/2019  Last Fill Quantity: 540 capsule,   # refills: 0  Last Office Visit: 9/16/2019 Saman    Future Office visit:    Next 5 appointments (look out 90 days)    Apr 06, 2020  8:40 AM CDT  Office Visit with Vamshi Clarke Jr., MD  [facility] [place] ([facility]) [address] [place] [id number] [phone]           Routing refill request to provider for review/approval because:  Drug not on the [facility], [facility] or [facility] refill protocol or controlled substance

## 2020-03-18 RX ORDER — GLIPIZIDE 5 MG/1
TABLET, FILM COATED, EXTENDED RELEASE ORAL
Qty: 90 TABLET | Refills: 0 | Status: SHIPPED | OUTPATIENT
Start: 2020-03-18 | End: 2020-04-13

## 2020-03-18 RX ORDER — GABAPENTIN 300 MG/1
600 CAPSULE ORAL 3 TIMES DAILY
Qty: 540 CAPSULE | Refills: 0 | Status: SHIPPED | OUTPATIENT
Start: 2020-03-18 | End: 2020-04-13

## 2020-03-19 NOTE — TELEPHONE ENCOUNTER
Refill signed. Saran will be due for diabetes follow up before needing next refill. Please help him schedule follow up in 2-3 months.    Vargas Davison,   3/18/2020 11:11 PM

## 2020-03-30 ENCOUNTER — TELEPHONE (OUTPATIENT)
Dept: FAMILY MEDICINE | Facility: CLINIC | Age: 55
End: 2020-03-30

## 2020-03-30 NOTE — TELEPHONE ENCOUNTER
Called pt and left message letting him know appt for 4/6 was cancelled.  Can do evisit or phone visit.  Mali Thomson

## 2020-04-09 NOTE — TELEPHONE ENCOUNTER
Patient called to set up an appt for telephone visit on 4/13/2020 320pm with Dr. Clarke.    Please advise  [phone] (home)   Thank you  Ciara Holliday

## 2020-04-13 ENCOUNTER — VIRTUAL VISIT (OUTPATIENT)
Dept: FAMILY MEDICINE | Facility: CLINIC | Age: 55
End: 2020-04-13
Payer: COMMERCIAL

## 2020-04-13 DIAGNOSIS — M54.16 CHRONIC RADICULAR LOW BACK PAIN: ICD-10-CM

## 2020-04-13 DIAGNOSIS — E11.9 TYPE 2 DIABETES MELLITUS WITHOUT COMPLICATION, WITHOUT LONG-TERM CURRENT USE OF INSULIN (H): Primary | ICD-10-CM

## 2020-04-13 DIAGNOSIS — Z20.822 SUSPECTED COVID-19 VIRUS INFECTION: ICD-10-CM

## 2020-04-13 DIAGNOSIS — I10 ESSENTIAL HYPERTENSION WITH GOAL BLOOD PRESSURE LESS THAN 140/90: ICD-10-CM

## 2020-04-13 DIAGNOSIS — G89.4 CHRONIC PAIN SYNDROME: ICD-10-CM

## 2020-04-13 DIAGNOSIS — G89.29 CHRONIC RADICULAR LOW BACK PAIN: ICD-10-CM

## 2020-04-13 DIAGNOSIS — N52.03 COMBINED ARTERIAL INSUFFICIENCY AND CORPORO-VENOUS OCCLUSIVE ERECTILE DYSFUNCTION: ICD-10-CM

## 2020-04-13 DIAGNOSIS — F33.0 MAJOR DEPRESSIVE DISORDER, RECURRENT EPISODE, MILD (H): ICD-10-CM

## 2020-04-13 PROCEDURE — 99214 OFFICE O/P EST MOD 30 MIN: CPT | Mod: 95 | Performed by: FAMILY MEDICINE

## 2020-04-13 RX ORDER — BLOOD SUGAR DIAGNOSTIC
STRIP MISCELLANEOUS
Qty: 100 STRIP | Status: SHIPPED | OUTPATIENT
Start: 2020-04-13 | End: 2021-08-17

## 2020-04-13 RX ORDER — LANCETS
EACH MISCELLANEOUS
Qty: 100 EACH | Refills: 3 | Status: SHIPPED | OUTPATIENT
Start: 2020-04-13 | End: 2021-06-02

## 2020-04-13 RX ORDER — BLOOD-GLUCOSE METER
EACH MISCELLANEOUS
Qty: 1 KIT | Refills: 0 | Status: SHIPPED | OUTPATIENT
Start: 2020-04-13

## 2020-04-13 RX ORDER — OXYCODONE HYDROCHLORIDE 5 MG/1
5 TABLET ORAL EVERY 6 HOURS PRN
Qty: 20 TABLET | Refills: 0 | Status: SHIPPED | OUTPATIENT
Start: 2020-04-13 | End: 2020-08-06

## 2020-04-13 RX ORDER — SILDENAFIL 100 MG/1
100 TABLET, FILM COATED ORAL DAILY PRN
Qty: 30 TABLET | Refills: 1 | Status: SHIPPED | OUTPATIENT
Start: 2020-04-13 | End: 2023-09-20

## 2020-04-13 RX ORDER — IRBESARTAN 150 MG/1
150 TABLET ORAL DAILY
Qty: 90 TABLET | Refills: 1 | Status: SHIPPED | OUTPATIENT
Start: 2020-04-13 | End: 2020-11-25

## 2020-04-13 RX ORDER — GLIPIZIDE 5 MG/1
TABLET, FILM COATED, EXTENDED RELEASE ORAL
Qty: 90 TABLET | Refills: 1 | Status: SHIPPED | OUTPATIENT
Start: 2020-04-13 | End: 2020-04-29

## 2020-04-13 RX ORDER — GABAPENTIN 300 MG/1
600 CAPSULE ORAL 3 TIMES DAILY
Qty: 540 CAPSULE | Refills: 1 | Status: SHIPPED | OUTPATIENT
Start: 2020-04-13 | End: 2020-11-25

## 2020-04-13 NOTE — PROGRESS NOTES
"Saran Wallis is a 55 year old male who is being evaluated via a billable telephone visit.      The patient has been notified of following:     \"This telephone visit will be conducted via a call between you and your physician/provider. We have found that certain health care needs can be provided without the need for a physical exam.  This service lets us provide the care you need with a short phone conversation.  If a prescription is necessary we can send it directly to your pharmacy.  If lab work is needed we can place an order for that and you can then stop by our lab to have the test done at a later time.    Telephone visits are billed at different rates depending on your insurance coverage. During this emergency period, for some insurers they may be billed the same as an in-person visit.  Please reach out to your insurance provider with any questions.    If during the course of the call the physician/provider feels a telephone visit is not appropriate, you will not be charged for this service.\"    Patient has given verbal consent for Telephone visit?  Yes    How would you like to obtain your AVS? [place]    [place]     Saran Wallis is a 55 year old male who presents to clinic today for the following health issues:    Diabetes Follow-up      How often are you checking your blood sugar? Not at all    What concerns do you have today about your diabetes? Concerned about a1c     Do you have any of these symptoms? (Select all that apply)  No numbness or tingling in feet.  No redness, sores or blisters on feet.  No complaints of excessive thirst.  No reports of blurry vision.  No significant changes to weight.      BP Readings from Last 2 Encounters:   09/16/19 112/60   03/27/19 120/78     Hemoglobin A1C (%)   Date Value   09/16/2019 7.2 (H)   03/27/2019 6.5 (H)     LDL Cholesterol Calculated (mg/dL)   Date Value   09/16/2019 71   08/29/2018 66               Hyperlipidemia Follow-Up      Are you regularly taking any " medication or supplement to lower your cholesterol?   Yes- Simvastatin    Are you having muscle aches or other side effects that you think could be caused by your cholesterol lowering medication?  No    Hypertension Follow-up      Do you check your blood pressure regularly outside of the clinic? Yes BP: 112/68 heart rate 76    Are you following a low salt diet? Yes    Are your blood pressures ever more than 140 on the top number (systolic) OR more   than 90 on the bottom number (diastolic), for example 140/90? No    Chronic Pain Follow-Up - Gabapentin    Where in your body do you have pain? 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 Medication is helping  How well are you sleeping? Good  How has your mood been since your last visit? About the same  Have you had a significant life event? No  Other aggravating factors: none  Taking medication as directed? Yes  Would like refill of chronic opioid.  Was on Norco (last fill October 2019 - 20 tablets) but reports this is less effective.  Would like to change to oxycodone as he finds this more effective.      PHQ-9 SCORE 8/29/2018 2/25/2019 9/16/2019   PHQ-9 Total Score 2 0 2     SKYLA-7 SCORE 8/29/2018 2/25/2019 9/16/2019   Total Score 0 3 0     No flowsheet data found.  Encounter-Level CSA - 01/11/2016:    Controlled Substance Agreement - Scan on 1/18/2016  3:31 PM: CONTROLLED SUBSTANCE AGREEMENT 01/12/16     Patient-Level CSA:    Controlled Substance Agreement - Opioid - Scan on 3/27/2019  2:37 PM         How many servings of fruits and vegetables do you eat daily?  2-3    On average, how many sweetened beverages do you drink each day (Examples: soda, juice, sweet tea, etc.  Do NOT count diet or artificially sweetened beverages)?   0    How many days per week do you exercise enough to make your heart beat faster? A couple times a week he walks 40  minutes    How many minutes a day do you exercise enough to make your heart beat faster? 30 - 60    How many days per week do you miss taking your medication? 0       Wife is an ICU nurse and exposed to COVID-19 on a regular basis.  About 3 weeks ago he had a fever that lasted one week.  Fever was about 38C.  Also lost sense of smell for a week.  He estimates about 60% of his sense of smell has returned even after two weeks of resolution of symptoms.  It has been more than 7 days and more than 72 hours since he last had a fever.              Reviewed and updated as needed this visit by Provider         Review of Systems   ROS COMP: CONSTITUTIONAL: NEGATIVE for fever, chills, change in weight  ENT/MOUTH: NEGATIVE for ear, mouth and throat problems  RESP: NEGATIVE for significant cough or SOB  CV: NEGATIVE for chest pain, palpitations or peripheral edema  All his URI symptoms including fever have resolved two weeks ago with the exception of anosmia as outlined above.       Objective   Reported vitals:  There were no vitals taken for this visit.   healthy, alert and no distress  PSYCH: Alert and oriented times 3; coherent speech, normal   rate and volume, able to articulate logical thoughts, able   to abstract reason, no tangential thoughts, no hallucinations   or delusions  His affect is normal  RESP: No cough, no audible wheezing, able to talk in full sentences  Remainder of exam unable to be completed due to telephone visits    Diagnostic Test Results:  Labs reviewed in Epic        Assessment/Plan:  1. Type 2 diabetes mellitus without complication, without long-term current use of insulin (H)  Due for labs to be checked and has no glucose testing materials at home.  Gave new glucometer, strips, lancets and renewed metformin & glipizide.  Await A1C.  Encouraged regular exercise.  Recheck in 3-6 months.  Will get A1C checked along with weight and BP in a few weeks.  - **A1C FUTURE anytime; Future  - Albumin Random  Urine Quantitative with Creat Ratio; Future  - blood glucose (ACCU-CHEK ALBINO PLUS) test strip; Use to test blood sugar 1 times daily or as directed.  Ok to substitute alternative if insurance prefers.  Dispense: 100 strip; Refill: prn  - blood glucose monitoring (ACCU-CHEK ALBINO PLUS) meter device kit; Use to test blood sugar 1 times daily or as directed.  Ok to substitute alternative if insurance prefers.  Dispense: 1 kit; Refill: 0  - blood glucose monitoring (SOFTCLIX) lancets; Use to test blood sugar 1 times daily or as directed.  Ok to substitute alternative if insurance prefers.  Dispense: 100 each; Refill: 3  - metFORMIN (GLUCOPHAGE) 500 MG tablet; Take 2 tablets (1,000 mg) by mouth 2 times daily (with meals)  Dispense: 360 tablet; Refill: 1  - glipiZIDE (GLUCOTROL XL) 5 MG 24 hr tablet; TAKE 1 TABLET(5 MG) BY MOUTH DAILY  Dispense: 90 tablet; Refill: 1    2. Major depressive disorder, recurrent episode, mild (H)  This is clinically stable.   Due for PHQ-9 at next visit.    3. Suspected Covid-19 Virus Infection  I suspect he indeed had COVID-19 three weeks ago.  His symptoms have resolved.  No need for further self isolation.       4. Combined arterial insufficiency and corporo-venous occlusive erectile dysfunction  Getting success with 50 mg sildenafil dose.  Refills given.  - sildenafil (VIAGRA) 100 MG tablet; Take 1 tablet (100 mg) by mouth daily as needed (erectile dysfunction)  Dispense: 30 tablet; Refill: 1    5. Essential hypertension with goal blood pressure less than 140/90  Blood pressure at goal at last visit, but Saran is not checking this regularly.  Will refill and see what his BP looks like at his ancillary visit next week.  - irbesartan (AVAPRO) 150 MG tablet; Take 1 tablet (150 mg) by mouth daily  Dispense: 90 tablet; Refill: 1    6. Chronic pain syndrome  Refills given.  Will continue to be judicious about his opioid use.  He's used 20 tablets in the past seven months.  Will continue to move  at the pace - about 20 tablets every six months to be used only for breakthrough pain.  Continue gabapentin as the mainstay of his analgesic treatment.  Will need updated controlled substance agreement at our next visit.  - oxyCODONE (ROXICODONE) 5 MG tablet; Take 1 tablet (5 mg) by mouth every 6 hours as needed for severe pain  Dispense: 20 tablet; Refill: 0  - gabapentin (NEURONTIN) 300 MG capsule; Take 2 capsules (600 mg) by mouth 3 times daily  Dispense: 540 capsule; Refill: 1  - Drug Abuse Screen Panel 13, Urine (Pain Care Package); Future    7. Chronic radicular low back pain  See above.  - oxyCODONE (ROXICODONE) 5 MG tablet; Take 1 tablet (5 mg) by mouth every 6 hours as needed for severe pain  Dispense: 20 tablet; Refill: 0  - gabapentin (NEURONTIN) 300 MG capsule; Take 2 capsules (600 mg) by mouth 3 times daily  Dispense: 540 capsule; Refill: 1  - Drug Abuse Screen Panel 13, Urine (Pain Care Package); Future    No follow-ups on file.      Phone call duration:  17 minutes    Vamshi Clarke Jr, MD

## 2020-04-14 ENCOUNTER — TELEPHONE (OUTPATIENT)
Dept: FAMILY MEDICINE | Facility: CLINIC | Age: 55
End: 2020-04-14

## 2020-04-14 ENCOUNTER — MYC MEDICAL ADVICE (OUTPATIENT)
Dept: FAMILY MEDICINE | Facility: CLINIC | Age: 55
End: 2020-04-14

## 2020-04-14 NOTE — TELEPHONE ENCOUNTER
MyChart message sent to patient in regards to follow up appointments.  Needs lab only as well as MA appt for vitals.  Mali Thomsno

## 2020-04-15 ENCOUNTER — MYC MEDICAL ADVICE (OUTPATIENT)
Dept: FAMILY MEDICINE | Facility: CLINIC | Age: 55
End: 2020-04-15

## 2020-04-15 NOTE — TELEPHONE ENCOUNTER
Pt has read message but has not responded.  Will postpone to follow up on next week.  Mali Thomson

## 2020-04-17 ENCOUNTER — MYC MEDICAL ADVICE (OUTPATIENT)
Dept: FAMILY MEDICINE | Facility: CLINIC | Age: 55
End: 2020-04-17

## 2020-04-29 DIAGNOSIS — G89.4 CHRONIC PAIN SYNDROME: ICD-10-CM

## 2020-04-29 DIAGNOSIS — E11.9 TYPE 2 DIABETES MELLITUS WITHOUT COMPLICATION, WITHOUT LONG-TERM CURRENT USE OF INSULIN (H): ICD-10-CM

## 2020-04-29 DIAGNOSIS — G89.29 CHRONIC RADICULAR LOW BACK PAIN: ICD-10-CM

## 2020-04-29 DIAGNOSIS — M54.16 CHRONIC RADICULAR LOW BACK PAIN: ICD-10-CM

## 2020-04-29 LAB
AMPHETAMINES UR QL: NOT DETECTED NG/ML
BARBITURATES UR QL SCN: NOT DETECTED NG/ML
BENZODIAZ UR QL SCN: NOT DETECTED NG/ML
BUPRENORPHINE UR QL: NOT DETECTED NG/ML
CANNABINOIDS UR QL: NOT DETECTED NG/ML
COCAINE UR QL SCN: NOT DETECTED NG/ML
D-METHAMPHET UR QL: NOT DETECTED NG/ML
HBA1C MFR BLD: 7.3 % (ref 0–5.6)
METHADONE UR QL SCN: NOT DETECTED NG/ML
OPIATES UR QL SCN: NOT DETECTED NG/ML
OXYCODONE UR QL SCN: NOT DETECTED NG/ML
PCP UR QL SCN: NOT DETECTED NG/ML
PROPOXYPH UR QL: NOT DETECTED NG/ML
TRICYCLICS UR QL SCN: NOT DETECTED NG/ML

## 2020-04-29 PROCEDURE — 83036 HEMOGLOBIN GLYCOSYLATED A1C: CPT | Performed by: FAMILY MEDICINE

## 2020-04-29 PROCEDURE — 80306 DRUG TEST PRSMV INSTRMNT: CPT | Performed by: FAMILY MEDICINE

## 2020-04-29 PROCEDURE — 36415 COLL VENOUS BLD VENIPUNCTURE: CPT | Performed by: FAMILY MEDICINE

## 2020-04-29 RX ORDER — GLIPIZIDE 5 MG/1
10 TABLET, FILM COATED, EXTENDED RELEASE ORAL DAILY
Qty: 180 TABLET | Refills: 1 | Status: SHIPPED | OUTPATIENT
Start: 2020-04-29 | End: 2020-04-30

## 2020-04-29 NOTE — RESULT ENCOUNTER NOTE
Mr. Wallis,    -A1C (test of diabetes control the last 2-3 months) is just above your goal. Please continue with your current plan for metformin, but increase your glipizide to 10 mg (two tablets) daily. Also, you should make an appointment to see me and recheck your A1C test in 6 months.     If you have further questions about the interpretation of your labs, [email or web address] is a good website to check out for further information.    Please contact the clinic if you have additional questions.  Thank you.    Sincerely,    Vamshi Clarke MD

## 2020-04-30 DIAGNOSIS — E11.9 TYPE 2 DIABETES MELLITUS WITHOUT COMPLICATION, WITHOUT LONG-TERM CURRENT USE OF INSULIN (H): ICD-10-CM

## 2020-04-30 RX ORDER — GLIPIZIDE 5 MG/1
10 TABLET, FILM COATED, EXTENDED RELEASE ORAL DAILY
Qty: 180 TABLET | Refills: 1 | Status: SHIPPED | OUTPATIENT
Start: 2020-04-30 | End: 2020-11-25

## 2020-04-30 NOTE — RESULT ENCOUNTER NOTE
Mr. Wallis,    -All of your labs are normal.  Urine drug screen is negative as you advised me it would be.    If you have further questions about the interpretation of your labs, [email or web address] is a good website to check out for further information.    Please contact the clinic if you have additional questions.  Thank you.    Sincerely,    Vamshi Clarke MD

## 2020-04-30 NOTE — TELEPHONE ENCOUNTER
Kennedi Requesting specific directions to process prescription. Current- Take 2 tablets (10 mg) by mouth daily TAKE 1 TABLET(5 MG) BY MOUTH DAILY  Valencia Kelly CMA
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
## 2020-08-06 ENCOUNTER — OFFICE VISIT (OUTPATIENT)
Dept: INTERNAL MEDICINE | Facility: CLINIC | Age: 55
End: 2020-08-06
Payer: COMMERCIAL

## 2020-08-06 ENCOUNTER — TELEPHONE (OUTPATIENT)
Dept: FAMILY MEDICINE | Facility: CLINIC | Age: 55
End: 2020-08-06

## 2020-08-06 VITALS
SYSTOLIC BLOOD PRESSURE: 115 MMHG | HEART RATE: 85 BPM | HEIGHT: 70 IN | DIASTOLIC BLOOD PRESSURE: 79 MMHG | RESPIRATION RATE: 16 BRPM | OXYGEN SATURATION: 99 % | TEMPERATURE: 98.5 F | BODY MASS INDEX: 28.77 KG/M2 | WEIGHT: 201 LBS

## 2020-08-06 DIAGNOSIS — K64.4 RESIDUAL HEMORRHOIDAL SKIN TAGS: Primary | ICD-10-CM

## 2020-08-06 DIAGNOSIS — G89.4 CHRONIC PAIN SYNDROME: ICD-10-CM

## 2020-08-06 DIAGNOSIS — M54.16 CHRONIC RADICULAR LOW BACK PAIN: ICD-10-CM

## 2020-08-06 DIAGNOSIS — G89.29 CHRONIC RADICULAR LOW BACK PAIN: ICD-10-CM

## 2020-08-06 PROCEDURE — 99213 OFFICE O/P EST LOW 20 MIN: CPT | Performed by: INTERNAL MEDICINE

## 2020-08-06 RX ORDER — OMEGA-3/DHA/EPA/FISH OIL 60 MG-90MG
CAPSULE ORAL DAILY
COMMUNITY

## 2020-08-06 RX ORDER — OXYCODONE HYDROCHLORIDE 5 MG/1
5 TABLET ORAL EVERY 6 HOURS PRN
COMMUNITY
End: 2020-08-06

## 2020-08-06 ASSESSMENT — MIFFLIN-ST. JEOR: SCORE: 1757.98

## 2020-08-06 NOTE — TELEPHONE ENCOUNTER
It looks like at last visit with Dr. Clarke it was advised that 20 tablets should last 6 months. It has only been 4 months since then. Will not refill medication at this time. Dr. Clarke can review when he is back.    Vargas Davison,   8/6/2020 1:14 PM

## 2020-08-06 NOTE — TELEPHONE ENCOUNTER
Patient came to see me for an unrelated problem today and was requesting a refill of his oxycodone.  Advised I would send this to his primary provider since it is a controlled substance and he is on it for a chronic pain syndrome.
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
## 2020-08-10 NOTE — TELEPHONE ENCOUNTER
His primary denied the refill, please let the patient know.  If any further questions, he can call back directly to his primary clinic.

## 2020-08-11 RX ORDER — OXYCODONE HYDROCHLORIDE 5 MG/1
5 TABLET ORAL EVERY 6 HOURS PRN
Qty: 20 TABLET | Refills: 0 | Status: SHIPPED | OUTPATIENT
Start: 2020-08-11 | End: 2020-11-25

## 2020-08-11 NOTE — TELEPHONE ENCOUNTER
Chart reviewed.  Rx sent to pt's preferred pharmacy.  Will need to be seen when needs a refill as his controlled substance agreement needs to be updated.  Message sent to patient to inform him of this.    [id number] - [place] [place] - [address] AT [facility] [id number]    Vamshi Clarke MD

## 2020-09-14 DIAGNOSIS — E78.5 HYPERLIPIDEMIA LDL GOAL <70: ICD-10-CM

## 2020-09-14 DIAGNOSIS — K21.9 GASTROESOPHAGEAL REFLUX DISEASE WITHOUT ESOPHAGITIS: ICD-10-CM

## 2020-09-14 RX ORDER — SIMVASTATIN 10 MG
TABLET ORAL
Qty: 90 TABLET | Refills: 1 | Status: SHIPPED | OUTPATIENT
Start: 2020-09-14 | End: 2020-11-25

## 2020-09-14 RX ORDER — PANTOPRAZOLE SODIUM 40 MG/1
40 TABLET, DELAYED RELEASE ORAL DAILY
Qty: 90 TABLET | Refills: 1 | Status: SHIPPED | OUTPATIENT
Start: 2020-09-14 | End: 2020-11-25

## 2020-11-25 ENCOUNTER — OFFICE VISIT (OUTPATIENT)
Dept: FAMILY MEDICINE | Facility: CLINIC | Age: 55
End: 2020-11-25
Payer: COMMERCIAL

## 2020-11-25 VITALS
TEMPERATURE: 96.6 F | OXYGEN SATURATION: 99 % | HEIGHT: 70 IN | SYSTOLIC BLOOD PRESSURE: 120 MMHG | DIASTOLIC BLOOD PRESSURE: 72 MMHG | BODY MASS INDEX: 29.2 KG/M2 | HEART RATE: 90 BPM | WEIGHT: 204 LBS

## 2020-11-25 DIAGNOSIS — E78.5 HYPERLIPIDEMIA LDL GOAL <70: ICD-10-CM

## 2020-11-25 DIAGNOSIS — Z11.59 NEED FOR HEPATITIS C SCREENING TEST: ICD-10-CM

## 2020-11-25 DIAGNOSIS — Z00.00 ROUTINE GENERAL MEDICAL EXAMINATION AT A HEALTH CARE FACILITY: Primary | ICD-10-CM

## 2020-11-25 DIAGNOSIS — E11.9 TYPE 2 DIABETES MELLITUS WITHOUT COMPLICATION, WITHOUT LONG-TERM CURRENT USE OF INSULIN (H): ICD-10-CM

## 2020-11-25 DIAGNOSIS — K21.9 GASTROESOPHAGEAL REFLUX DISEASE WITHOUT ESOPHAGITIS: ICD-10-CM

## 2020-11-25 DIAGNOSIS — G89.4 CHRONIC PAIN SYNDROME: ICD-10-CM

## 2020-11-25 DIAGNOSIS — M54.16 CHRONIC RADICULAR LOW BACK PAIN: ICD-10-CM

## 2020-11-25 DIAGNOSIS — G89.29 CHRONIC RADICULAR LOW BACK PAIN: ICD-10-CM

## 2020-11-25 DIAGNOSIS — I10 ESSENTIAL HYPERTENSION WITH GOAL BLOOD PRESSURE LESS THAN 140/90: ICD-10-CM

## 2020-11-25 LAB
AMPHETAMINES UR QL: NOT DETECTED NG/ML
BARBITURATES UR QL SCN: NOT DETECTED NG/ML
BENZODIAZ UR QL SCN: NOT DETECTED NG/ML
BUPRENORPHINE UR QL: NOT DETECTED NG/ML
CANNABINOIDS UR QL: NOT DETECTED NG/ML
COCAINE UR QL SCN: NOT DETECTED NG/ML
D-METHAMPHET UR QL: NOT DETECTED NG/ML
HBA1C MFR BLD: 8.4 % (ref 0–5.6)
METHADONE UR QL SCN: NOT DETECTED NG/ML
OPIATES UR QL SCN: NOT DETECTED NG/ML
OXYCODONE UR QL SCN: NOT DETECTED NG/ML
PCP UR QL SCN: NOT DETECTED NG/ML
PROPOXYPH UR QL: NOT DETECTED NG/ML
TRICYCLICS UR QL SCN: NOT DETECTED NG/ML

## 2020-11-25 PROCEDURE — 80061 LIPID PANEL: CPT | Performed by: FAMILY MEDICINE

## 2020-11-25 PROCEDURE — 83036 HEMOGLOBIN GLYCOSYLATED A1C: CPT | Performed by: FAMILY MEDICINE

## 2020-11-25 PROCEDURE — 36415 COLL VENOUS BLD VENIPUNCTURE: CPT | Performed by: FAMILY MEDICINE

## 2020-11-25 PROCEDURE — 86803 HEPATITIS C AB TEST: CPT | Performed by: FAMILY MEDICINE

## 2020-11-25 PROCEDURE — 80306 DRUG TEST PRSMV INSTRMNT: CPT | Performed by: FAMILY MEDICINE

## 2020-11-25 PROCEDURE — 99396 PREV VISIT EST AGE 40-64: CPT | Performed by: FAMILY MEDICINE

## 2020-11-25 PROCEDURE — 80048 BASIC METABOLIC PNL TOTAL CA: CPT | Performed by: FAMILY MEDICINE

## 2020-11-25 PROCEDURE — 82043 UR ALBUMIN QUANTITATIVE: CPT | Performed by: FAMILY MEDICINE

## 2020-11-25 PROCEDURE — 99207 PR FOOT EXAM NO CHARGE: CPT | Mod: 25 | Performed by: FAMILY MEDICINE

## 2020-11-25 PROCEDURE — 99214 OFFICE O/P EST MOD 30 MIN: CPT | Mod: 25 | Performed by: FAMILY MEDICINE

## 2020-11-25 RX ORDER — GABAPENTIN 300 MG/1
600 CAPSULE ORAL 2 TIMES DAILY
Qty: 360 CAPSULE | Refills: 1 | Status: SHIPPED | OUTPATIENT
Start: 2020-11-25 | End: 2021-05-26

## 2020-11-25 RX ORDER — OXYCODONE HYDROCHLORIDE 5 MG/1
5 TABLET ORAL EVERY 6 HOURS PRN
Qty: 20 TABLET | Refills: 0 | Status: SHIPPED | OUTPATIENT
Start: 2020-11-25 | End: 2021-02-24

## 2020-11-25 RX ORDER — SIMVASTATIN 10 MG
TABLET ORAL
Qty: 90 TABLET | Refills: 1 | Status: SHIPPED | OUTPATIENT
Start: 2020-11-25 | End: 2021-05-24

## 2020-11-25 RX ORDER — IRBESARTAN 150 MG/1
150 TABLET ORAL DAILY
Qty: 90 TABLET | Refills: 1 | Status: SHIPPED | OUTPATIENT
Start: 2020-11-25 | End: 2021-05-26

## 2020-11-25 RX ORDER — GLIPIZIDE 5 MG/1
10 TABLET, FILM COATED, EXTENDED RELEASE ORAL DAILY
Qty: 180 TABLET | Refills: 1 | Status: SHIPPED | OUTPATIENT
Start: 2020-11-25 | End: 2020-11-25 | Stop reason: ALTCHOICE

## 2020-11-25 RX ORDER — PANTOPRAZOLE SODIUM 40 MG/1
40 TABLET, DELAYED RELEASE ORAL DAILY
Qty: 90 TABLET | Refills: 1 | Status: SHIPPED | OUTPATIENT
Start: 2020-11-25 | End: 2021-05-26

## 2020-11-25 ASSESSMENT — PATIENT HEALTH QUESTIONNAIRE - PHQ9
SUM OF ALL RESPONSES TO PHQ QUESTIONS 1-9: 3
5. POOR APPETITE OR OVEREATING: NOT AT ALL

## 2020-11-25 ASSESSMENT — MIFFLIN-ST. JEOR: SCORE: 1771.59

## 2020-11-25 ASSESSMENT — ANXIETY QUESTIONNAIRES
6. BECOMING EASILY ANNOYED OR IRRITABLE: NOT AT ALL
GAD7 TOTAL SCORE: 1
IF YOU CHECKED OFF ANY PROBLEMS ON THIS QUESTIONNAIRE, HOW DIFFICULT HAVE THESE PROBLEMS MADE IT FOR YOU TO DO YOUR WORK, TAKE CARE OF THINGS AT HOME, OR GET ALONG WITH OTHER PEOPLE: NOT DIFFICULT AT ALL
5. BEING SO RESTLESS THAT IT IS HARD TO SIT STILL: NOT AT ALL
7. FEELING AFRAID AS IF SOMETHING AWFUL MIGHT HAPPEN: NOT AT ALL
2. NOT BEING ABLE TO STOP OR CONTROL WORRYING: NOT AT ALL
3. WORRYING TOO MUCH ABOUT DIFFERENT THINGS: NOT AT ALL
1. FEELING NERVOUS, ANXIOUS, OR ON EDGE: SEVERAL DAYS

## 2020-11-25 NOTE — LETTER
[facility]  11/25/20    Patient: Saran Wallis  YOB: 1965  Medical Record Number: [id number]                                                                  Opioid / Opioid Plus Controlled Substance Agreement    I understand that my care provider has prescribed an opioid (narcotic) controlled substance to help manage my condition(s). I am taking this medicine to help me function or work. I know this is strong medicine, and that it can cause serious side effects. Opioid medicine can be sedating, addicting and may cause a dependency on the drug. They can affect my ability to drive or think, and cause depression. They need to be taken exactly as prescribed. Combining opioids with certain medicines or chemicals (such as cocaine, sedatives and tranquilizers, sleeping pills, meth) can be dangerous or even fatal. Also, if I stop opioids suddenly, I may have severe withdrawal symptoms. Last, I understand that opioids do not work for all types of pain nor for all patients. If not helpful, I may be asked to stop them.        The risks, benefits, and side effects of these medicine(s) were explained to me. I agree that:    1. I will take part in other treatments as advised by my care team. This may be psychiatry or counseling, physical therapy, behavioral therapy, group treatment or a referral to a pain clinic. I will reduce or stop my medicine when my care team tells me to do so.  2. I will take my medicines as prescribed. I will not change the dose or schedule unless my care team tells me to. There will be no refills if I  run out early.   I may be contactedwithout warning and asked to complete a urine drug test or pill count at any time.   3. I will keep all my appointments, and understand this is part of the monitoring of opioids. My care team may require an office visit for EVERY opioid/controlled substance refill. If I miss appointments or don t follow instructions, my care team may stop  my medicine.  4. I will not ask other providers to prescribe controlled substances, and I will not accept controlled substances from other people. If I need another prescribed controlled substance for a new reason, I will tell my care team within 1 business day.  5. I will use one pharmacy to fill all of my controlled substance prescriptions, and it is up to me to make sure that I do not run out of my medicines on weekends or holidays. If my care team is willing to refill my opioid prescription without a visit, I must request refills only during office hours, refills may take up to 3 days to process, and it may take up to 5 to 7 days for my medicine to be mailed and ready at my pharmacy. Prescriptions will not be mailed anywhere except my pharmacy.        [id number]  Rev 12/18         Registration to scan to EHR                             Page 1 of 2               Controlled Substance Agreement [facility]  11/25/20  Patient: Saran Wallis  YOB: 1965  Medical Record Number: [id number]                                                                  6. I am responsible for my prescriptions. If the medicine/prescription is lost or stolen, it will not be replaced. I also agree not to share controlled substance medicines with anyone.  7. I agree to not use ANY illegal or recreational drugs. This includes marijuana, cocaine, bath salts or other drugs. I agree not to use alcohol unless my care team says I may.          I agree to give urine samples whenever asked. If I don t give a urine sample, the care team may stop my medicine.    8. If I enroll in the [place] Medical Marijuana program, I will tell my care team. I will also sign an agreement to share my medical records with my care team.   9. I will bring in my list of medicines (or my medicine bottles) each time I come to the clinic.   10. I will tell my care team right away if I become pregnant or have a new medical problem  treated outside of my regular clinic.  11. I understand that this medicine can affect my thinking and judgment. It may be unsafe for me to drive, use machinery and do dangerous tasks. I will not do any of these things until I know how the medicine affects me. If my dose changes, I will wait to see how it affects me. I will contact my care team if I have concerns about medicine side effects.    I understand that if I do not follow any of the conditions above, my prescriptions or treatment may be stopped.      I agree that my provider, clinic care team, and pharmacy may work with any city, state or federal law enforcement agency that investigates the misuse, sale, or other diversion of my controlled medicine. I will allow my provider to discuss my care with or share a copy of this agreement with any other treating provider, pharmacy or emergency room where I receive care. I agree to give up (waive) any right of privacy or confidentiality with respect to these consents.     I have read this agreement and have asked questions about anything I did not understand.      ________________________________________________________________________  Patient signature - Date/Time -  Saran Wallis                                      ________________________________________________________________________  Witness signature                                                            ________________________________________________________________________  Provider signature - Vamshi Clarke Jr, MD      [id number]  Rev 12/18         Registration to scan to EHR                         Page 2 of 2                   Controlled Substance Agreement Opioid           Page 1 of 2  Opioid Pain Medicines (also known as Narcotics)  What You Need to Know    What are opioids?   Opioids are pain medicines that must be prescribed by a doctor.  They are also known as narcotics.    Examples are:     morphine (MS Contin, Stacia)    oxycodone  (Oxycontin)    oxycodone and acetaminophen (Percocet)    hydrocodone and acetaminophen (Vicodin, Norco)     fentanyl patch (Duragesic)     hydromorphone (Dilaudid)     methadone     What do opioids do well?   Opioids are best for short-term pain after a surgery or injury. They also work well for cancer pain. Unlike other pain medicines, they do not cause liver or kidney failure or ulcers. They may help some people with long-lasting (chronic) pain.     What do opioids NOT do well?   Opioids never get rid of pain entirely, and they do not work well for most patients with chronic pain. Opioids do not reduce swelling, one of the causes of pain. They also don t work well for nerve pain.                           For informational purposes only.  Not to replace the advice of your care provider.  Copyright [facility]. All right reserved. [employer] 127533-[employer] 02/18.      Page 2 of 2    Risks and side effects   Talk to your doctor before you start or decide to keep taking one of these medicines. Side effects include:    Lowering your breathing rate enough to cause death    Overdose, including death, especially if taking higher than prescribed doses    Long-term opioid use    Worse depression symptoms; less pleasure in things you usually enjoy    Feeling tired or sluggish    Slower thoughts or cloudy thinking    Being more sensitive to pain over time; pain is harder to control    Trouble sleeping or restless sleep    Changes in hormone levels (for example, less testosterone)    Changes in sex drive or ability to have sex    Constipation    Unsafe driving    Itching and sweating    Feeling dizzy    Nausea, vomiting and dry mouth    What else should I know about opioids?  When someone takes opioids for too long or too often, they become dependent. This means that if you stop or reduce the medicine too quickly, you will have withdrawal symptoms.    Dependence is not the same as addiction. Addiction is when  people keep using a substance that harms their body, their mind or their relations with others. If you have a history of drug or alcohol abuse, taking opioids can cause a relapse.    Over time, opioids don t work as well. Most people will need higher and higher doses. The higher the dose, the more serious the side effects. We don t know the long-term effects of opioids.      Prescribed opioids aren't the best way to manage chronic pain    Other ways to manage pain include:      Ibuprofen or acetaminophen.  You should always try this first.      Treat health problems that may be causing pain.      acupuncture or massage, deep breathing, meditation, visual imagery, aromatherapy.      Use heat or ice at the pain site      Physical therapy and exercise      Stop smoking      See a counselor or therapist                                                  People who have used opioids for a long time may have a lower quality of life, worse depression, higher levels of pain and more visits to doctors.    Never share your opioids with others. Be sure to store opioids in a secure place, locked if possible.Young children can easily swallow them and overdose.     You can overdose on opioids.  Signs of overdose include decrease or loss of consciousness, slowed breathing, trouble waking and blue lips.  If someone is worried about overdose, they should call 911.    If you are at risk for overdose, you may get naloxone (Narcan, a medicine that reverses the effects of opioids.  If you overdose, a friend or family member can give you Narcan while waiting for the ambulance.  They need to know the signs of overdose and how to give Narcan.    While you're taking opioids:    Don't use alcohol or street drugs. Taking them together can cause death.    Don't take any of these medicines unless your doctor says its okay.  Taking these with opioids can cause death.    Benzodiazepines (such as lorazepam         or diazepam)    Muscle relaxers  (such as cyclobenzaprine)    sleeping pills    other opioids    Safe disposal of opioids  Find your area drug take-back program, your pharmacy mail-back program, buy a special disposal bag (such as Deterra) from your pharmacy or flush them down the toilet.  Use the guidelines at:  www.fda.gov/drugs/resourcesforyou

## 2020-11-25 NOTE — RESULT ENCOUNTER NOTE
Mr. Wallis,    -A1C test (average blood sugar the last 2-3 months) is above your goal.   ADVISE: Stop your glipizide.  I'm starting you instead on a once weekly injectable medicine called semaglutide which will be much more effective than the glipizide.  I'd like you to meet with diabetes education to get instructions on how to inject this as well as getting some additional education on following a diabetic diet.  Someone will be reaching out to you to get this scheduled, Saran.  -Drug screen showed expected results.    If you have further questions about the interpretation of your labs, [email or web address] is a good website to check out for further information.    Please contact the clinic if you have additional questions.  Thank you.    Sincerely,    Vamshi Clarke MD

## 2020-11-25 NOTE — PROGRESS NOTES
3  SUBJECTIVE:   CC: Saran Wallis is an 55 year old male who presents for preventive health visit.       Patient has been advised of split billing requirements and indicates understanding: Yes  Healthy Habits:  Answers for HPI/ROS submitted by the patient on 11/25/2020   Annual Exam:  Frequency of exercise:: 1 day/week  Getting at least 3 servings of Calcium per day:: Yes  Diet:: Regular (no restrictions)  Taking medications regularly:: Yes  Medication side effects:: None  Bi-annual eye exam:: Yes  Dental care twice a year:: Yes  Sleep apnea or symptoms of sleep apnea:: Daytime drowsiness  Additional concerns today:: No  Duration of exercise:: 30-45 minutes    Gets about 7 hours of sleep most days.  Does have an afternoon lull 3-4 pm.  At rare times he'll feel sleepy most of the day.      Concerns of lower back pain- onset last friday- states garage door wasn't opening so he had to lift it.  Wife backed into door before it was fully open. Sharp pain in low back.  Describes some burning pain in the lateral portion of his left foot.  Also   States he would like to discuss refill of oxycodone- uses patch for pain and advil- helps very little.     Diabetes Follow-up    How often are you checking your blood sugar? A few times a week- not often  What time of day are you checking your blood sugars (select all that apply)?  After meals  Have you had any blood sugars above 200?  Yes sometimes but mostly 180  Have you had any blood sugars below 70?  No    What symptoms do you notice when your blood sugar is low?  None    What concerns do you have today about your diabetes? None     Do you have any of these symptoms? (Select all that apply)  No numbness or tingling in feet.  No redness, sores or blisters on feet.  No complaints of excessive thirst.  No reports of blurry vision.  No significant changes to weight.    Have you had a diabetic eye exam in the last 12 months? No - has one schedule in 12/8/20 at [facility]                  Hyperlipidemia Follow-Up      Are you regularly taking any medication or supplement to lower your cholesterol?   yes    Are you having muscle aches or other side effects that you think could be caused by your cholesterol lowering medication?  No    Hypertension Follow-up      Do you check your blood pressure regularly outside of the clinic? No     Are you following a low salt diet? No    Are your blood pressures ever more than 140 on the top number (systolic) OR more   than 90 on the bottom number (diastolic), for example 140/90? No    BP Readings from Last 2 Encounters:   11/25/20 120/72   08/06/20 115/79     Hemoglobin A1C (%)   Date Value   04/29/2020 7.3 (H)   09/16/2019 7.2 (H)     LDL Cholesterol Calculated (mg/dL)   Date Value   09/16/2019 71   08/29/2018 66       Depression Followup    How are you doing with your depression since your last visit? Worsened     Are you having other symptoms that might be associated with depression? No    Have you had a significant life event?  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   yes    Do you have any concerns with your use of alcohol or other drugs? No    Social History     Tobacco Use     Smoking status: Former Smoker     Packs/day: 0.50     Types: Cigarettes     Quit date: 3/4/2017     Years since quitting: 3.7     Smokeless tobacco: Current User     Tobacco comment: quit smoking June 2019. Vaping    Substance Use Topics     Alcohol use: Yes     Alcohol/week: 0.0 standard drinks     Comment: rarely     Drug use: No     PHQ 8/29/2018 2/25/2019 9/16/2019   PHQ-9 Total Score 2 0 2   Q9: Thoughts of better off dead/self-harm past 2 weeks Not at all Not at all Not at all     SKYLA-7 SCORE 8/29/2018 2/25/2019 9/16/2019   Total Score 0 3 0     Last PHQ-9 11/25/2020   1.  Little interest or pleasure in doing things 1   2.  Feeling down, depressed, or hopeless 1   3.   Trouble falling or staying asleep, or sleeping too much 0   4.  Feeling tired or having little energy 1   5.  Poor appetite or overeating 0   6.  Feeling bad about yourself 0   7.  Trouble concentrating 0   8.  Moving slowly or restless 0   Q9: Thoughts of better off dead/self-harm past 2 weeks 0   PHQ-9 Total Score 3   Difficulty at work, home, or with people Somewhat difficult     SKYLA-7  11/25/2020   1. Feeling nervous, anxious, or on edge 1   2. Not being able to stop or control worrying 0   3. Worrying too much about different things 0   4. Trouble relaxing 0   5. Being so restless that it is hard to sit still 0   6. Becoming easily annoyed or irritable 0   7. Feeling afraid, as if something awful might happen 0   SKYLA-7 Total Score 1   If you checked any problems, how difficult have they made it for you to do your work, take care of things at home, or get along with other people? Not difficult at all         Suicide Assessment Five-step Evaluation and Treatment (SAFE-T)      Today's PHQ-2 Score:   PHQ-2 ( 1999 Pf[employer]) 11/25/2020 3/27/2019   Q1: Little interest or pleasure in doing things 1 0   Q2: Feeling down, depressed or hopeless 1 0   PHQ-2 Score 2 0   Q1: Little interest or pleasure in doing things Several days -   Q2: Feeling down, depressed or hopeless Several days -   PHQ-2 Score 2 -       Abuse: Current or Past(Physical, Sexual or Emotional)- No  Do you feel safe in your environment? Yes        Social History     Tobacco Use     Smoking status: Former Smoker     Packs/day: 0.50     Types: Cigarettes     Quit date: 3/4/2017     Years since quitting: 3.7     Smokeless tobacco: Current User     Tobacco comment: quit smoking June 2019. Vaping    Substance Use Topics     Alcohol use: Yes     Alcohol/week: 0.0 standard drinks     Comment: rarely     If you drink alcohol do you typically have >3 drinks per day or >7 drinks per week? No                      Last PSA: No results found for: PSA    Reviewed orders  "with patient. Reviewed health maintenance and updated orders accordingly - Yes  Lab work is in process  Labs reviewed in EPIC    Reviewed and updated as needed this visit by clinical staff  Tobacco  Allergies               Reviewed and updated as needed this visit by Provider                    ROS:  CONSTITUTIONAL: NEGATIVE for fever, chills, change in weight  INTEGUMENTARY/SKIN: NEGATIVE for worrisome rashes, moles or lesions  EYES: NEGATIVE for vision changes or irritation  ENT: NEGATIVE for ear, mouth and throat problems  RESP: NEGATIVE for significant cough or SOB  CV: NEGATIVE for chest pain, palpitations or peripheral edema  GI: NEGATIVE for nausea, abdominal pain, heartburn, or change in bowel habits   male: negative for dysuria, hematuria, decreased urinary stream, erectile dysfunction, urethral discharge  MUSCULOSKELETAL: NEGATIVE for significant arthralgias or myalgia  NEURO: NEGATIVE for weakness, dizziness or paresthesias  PSYCHIATRIC: NEGATIVE for changes in mood or affect    OBJECTIVE:   /72   Pulse 90   Temp 96.6  F (35.9  C)   Ht 1.778 m (5' 10\")   Wt 92.5 kg (204 lb)   SpO2 99%   BMI 29.27 kg/m    EXAM:  GENERAL: healthy, alert and no distress  EYES: Eyes grossly normal to inspection, PERRL and conjunctivae and sclerae normal  HENT: ear canals and TM's normal, nose and mouth without ulcers or lesions  NECK: no adenopathy, no asymmetry, masses, or scars and thyroid normal to palpation  RESP: lungs clear to auscultation - no rales, rhonchi or wheezes  CV: regular rate and rhythm, normal S1 S2, no S3 or S4, no murmur, click or rub, no peripheral edema and peripheral pulses strong  ABDOMEN: soft, nontender, no hepatosplenomegaly, no masses and bowel sounds normal  MS: no gross musculoskeletal defects noted, no edema  SKIN: no suspicious lesions or rashes  NEURO: Normal strength and tone, mentation intact and speech normal  PSYCH: mentation appears normal, affect normal/bright  Diabetic " foot exam: normal DP and PT pulses, no trophic changes or ulcerative lesions, normal sensory exam and normal monofilament exam    Diagnostic Test Results:  Labs reviewed in Epic    ASSESSMENT/PLAN:   1. Routine general medical examination at a health care facility  up to date with preventative care measures.  Encouraged continued good diet and exercise program. Recheck in 6 months.     2. Need for hepatitis C screening test   Discussed CDC recommendation for one time hepatitis C screening for people born between 5014[birth year]0935.  Discussed risk factors for hepatitis C including blood transfusion and IV drug use.   - Hepatitis C Screen Reflex to HCV RNA Quant and Genotype    3. Type 2 diabetes mellitus without complication, without long-term current use of insulin (H)  Has been doing well with this, but today's A1C is now over 8.  Will stop his glipizide and change him to GLP-1 agonist.  Refer to diabetes education given the injection nature of this medication.  Recheck in 3 months.  Concern that some of his burning pain in his lateral left foot may be due to neuropathy.  Is on 600 mg of gabapentin 1 tablet by mouth twice daily.  Could consider increasing this to TID.  Recheck in 6 months with pre-visit labs/vitals and do a video visit.  - REVIEW OF HEALTH MAINTENANCE PROTOCOL ORDERS  - Albumin Random Urine Quantitative with Creat Ratio  - HEMOGLOBIN A1C  - BASIC METABOLIC PANEL  - Lipid panel reflex to direct LDL Non-fasting  - FOOT EXAM  - glipiZIDE (GLUCOTROL XL) 5 MG 24 hr tablet; Take 2 tablets (10 mg) by mouth daily  Dispense: 180 tablet; Refill: 1  - metFORMIN (GLUCOPHAGE) 500 MG tablet; Take 2 tablets (1,000 mg) by mouth 2 times daily (with meals)  Dispense: 360 tablet; Refill: 1    4. Chronic pain syndrome  This is stable.  Typically is using 20 oxycodone tablets every 90 days.  Renewed opioid agreement today.  Urine drug screen.  Recheck in 6 months.   - gabapentin (NEURONTIN) 300 MG capsule; Take 2 capsules (600  "mg) by mouth 2 times daily  Dispense: 360 capsule; Refill: 1  - oxyCODONE (ROXICODONE) 5 MG tablet; Take 1 tablet (5 mg) by mouth every 6 hours as needed for severe pain  Dispense: 20 tablet; Refill: 0  - Drug Abuse Screen Panel 13, Urine (Pain Care Package)    5. Chronic radicular low back pain  See above.  - gabapentin (NEURONTIN) 300 MG capsule; Take 2 capsules (600 mg) by mouth 2 times daily  Dispense: 360 capsule; Refill: 1  - oxyCODONE (ROXICODONE) 5 MG tablet; Take 1 tablet (5 mg) by mouth every 6 hours as needed for severe pain  Dispense: 20 tablet; Refill: 0    6. Essential hypertension with goal blood pressure less than 140/90  Blood pressure at goal. Tolerating meds without side effects.   - irbesartan (AVAPRO) 150 MG tablet; Take 1 tablet (150 mg) by mouth daily  Dispense: 90 tablet; Refill: 1    7. Gastroesophageal reflux disease without esophagitis  This has done very well in stabilizing his GERD symptoms. Recheck in 1 year.   - pantoprazole (PROTONIX) 40 MG EC tablet; Take 1 tablet (40 mg) by mouth daily  Dispense: 90 tablet; Refill: 1    8. Hyperlipidemia LDL goal <70  Tolerating statin without side effects. Recheck in 1 year.   - simvastatin (ZOCOR) 10 MG tablet; TAKE 1 TABLET(10 MG) BY MOUTH AT BEDTIME  Dispense: 90 tablet; Refill: 1    Patient has been advised of split billing requirements and indicates understanding: Yes  COUNSELING:  Reviewed preventive health counseling, as reflected in patient instructions    Estimated body mass index is 29.27 kg/m  as calculated from the following:    Height as of this encounter: 1.778 m (5' 10\").    Weight as of this encounter: 92.5 kg (204 lb).    Weight management plan: Discussed healthy diet and exercise guidelines    He reports that he quit smoking about 3 years ago. His smoking use included cigarettes. He smoked 0.50 packs per day. He uses smokeless tobacco.      Counseling Resources:  ATP IV Guidelines  Pooled Cohorts Equation Calculator  FRAX Risk " Assessment  ICSI Preventive Guidelines  Dietary Guidelines for Americans, 2010  USDA's MyPlate  ASA Prophylaxis  Lung CA Screening    Vamshi Clarke Jr, MD  [facility]

## 2020-11-26 LAB
ANION GAP SERPL CALCULATED.3IONS-SCNC: 7 MMOL/L (ref 3–14)
BUN SERPL-MCNC: 22 MG/DL (ref 7–30)
CALCIUM SERPL-MCNC: 9.5 MG/DL (ref 8.5–10.1)
CHLORIDE SERPL-SCNC: 105 MMOL/L (ref 94–109)
CHOLEST SERPL-MCNC: 145 MG/DL
CO2 SERPL-SCNC: 24 MMOL/L (ref 20–32)
CREAT SERPL-MCNC: 0.7 MG/DL (ref 0.66–1.25)
CREAT UR-MCNC: 38 MG/DL
GFR SERPL CREATININE-BSD FRML MDRD: >90 ML/MIN/{1.73_M2}
GLUCOSE SERPL-MCNC: 260 MG/DL (ref 70–99)
HDLC SERPL-MCNC: 42 MG/DL
LDLC SERPL CALC-MCNC: 71 MG/DL
MICROALBUMIN UR-MCNC: 7 MG/L
MICROALBUMIN/CREAT UR: 17.51 MG/G CR (ref 0–17)
NONHDLC SERPL-MCNC: 103 MG/DL
POTASSIUM SERPL-SCNC: 4.2 MMOL/L (ref 3.4–5.3)
SODIUM SERPL-SCNC: 136 MMOL/L (ref 133–144)
TRIGL SERPL-MCNC: 162 MG/DL

## 2020-11-26 ASSESSMENT — ANXIETY QUESTIONNAIRES: GAD7 TOTAL SCORE: 1

## 2020-11-27 LAB — HCV AB SERPL QL IA: NONREACTIVE

## 2020-11-27 NOTE — RESULT ENCOUNTER NOTE
Mr. Wallis,    -Cholesterol levels are at your goal levels.  ADVISE: continuing your medication, a regular exercise program with at least 150 minutes of aerobic exercise per week, and eating a low saturated fat/low carbohydrate diet.  Also, you should recheck this fasting cholesterol panel in 12 months.  -Kidney function (GFR) is normal.  -Sodium is normal.  -Potassium is normal.  -Calcium is normal.  -Glucose is elevated due to your diabetes.  -Microalbumin (urine protein) level is elevated. This is suggestive of early damage to your kidneys from high blood pressure and diabetes.  ADVISE: avoiding anti-inflamatory agents such as ibuprofen (Advil, Motrin) or naproxen (Aleve) as much as possible, keeping your blood pressure in a normal range, and continuing your medication (irbesartan) that helps protect your kidneys.  Also, this should be rechecked in 1 year.     If you have further questions about the interpretation of your labs, labtestsonline.org is a good website to check out for further information.    Please contact the clinic if you have additional questions.  Thank you.    Sincerely,    Vamshi Clarke MD

## 2020-12-08 ENCOUNTER — TRANSFERRED RECORDS (OUTPATIENT)
Dept: HEALTH INFORMATION MANAGEMENT | Facility: CLINIC | Age: 55
End: 2020-12-08

## 2020-12-08 LAB — RETINOPATHY: NEGATIVE

## 2021-01-27 NOTE — NURSING NOTE
"Chief Complaint   Patient presents with     Hypertension     Diabetes     Lipids       Initial /64 mmHg  Pulse 79  Temp(Src) 97.4  F (36.3  C) (Tympanic)  Ht 5' 9\" (1.753 m)  Wt 193 lb (87.544 kg)  BMI 28.49 kg/m2  SpO2 99% Estimated body mass index is 28.49 kg/(m^2) as calculated from the following:    Height as of this encounter: 5' 9\" (1.753 m).    Weight as of this encounter: 193 lb (87.544 kg).  Medication Reconciliation: complete  "
Statement Selected

## 2021-02-19 ENCOUNTER — MYC MEDICAL ADVICE (OUTPATIENT)
Dept: FAMILY MEDICINE | Facility: CLINIC | Age: 56
End: 2021-02-19

## 2021-02-23 ENCOUNTER — MYC MEDICAL ADVICE (OUTPATIENT)
Dept: FAMILY MEDICINE | Facility: CLINIC | Age: 56
End: 2021-02-23

## 2021-02-23 ENCOUNTER — TELEPHONE (OUTPATIENT)
Dept: FAMILY MEDICINE | Facility: CLINIC | Age: 56
End: 2021-02-23

## 2021-02-23 DIAGNOSIS — M54.16 CHRONIC RADICULAR LOW BACK PAIN: ICD-10-CM

## 2021-02-23 DIAGNOSIS — G89.29 CHRONIC RADICULAR LOW BACK PAIN: ICD-10-CM

## 2021-02-23 DIAGNOSIS — E11.9 TYPE 2 DIABETES MELLITUS WITHOUT COMPLICATION, WITHOUT LONG-TERM CURRENT USE OF INSULIN (H): ICD-10-CM

## 2021-02-23 DIAGNOSIS — G89.4 CHRONIC PAIN SYNDROME: ICD-10-CM

## 2021-02-23 NOTE — TELEPHONE ENCOUNTER
Per result note:  Mr. Wallis,     -A1C test (average blood sugar the last 2-3 months) is above your goal.   ADVISE: Stop your glipizide.  I'm starting you instead on a once weekly injectable medicine called semaglutide which will be much more effective than the glipizide.  I'd like you to meet with diabetes education to get instructions on how to inject this as well as getting some additional education on following a diabetic diet.  Someone will be reaching out to you to get this scheduled, Saran.  -Drug screen showed expected results.  Semaglutide,0.25 or 0.5MG/DOS, 2 MG/1.5ML SOPN 3 mL 1 11/25/2020  --   Sig - Route: Inject 0.25 mg Subcutaneous every 7 days - Subcutaneous   Sent to pharmacy as: Semaglutide(0.25 or 0.5MG/DOS) 2 MG/1.5ML Solution Pen-injector     Mychart sent to advise.    Shaquille MARSHALL RN   [facility] [place]

## 2021-02-23 NOTE — TELEPHONE ENCOUNTER
Diabetes Education Scheduling Outreach #1:    Call to patient to schedule. Patient is interested, but stated that he was supposed to receive a prescription for injectables sent to his pharmacy. Pharmacy never received it. He'd like this script to be sent again - back in November. Once he has his injectables, he will call us back to schedule. Please call patient to confirm once prescription has been resent.    [facility]  Diabetes and Nutrition Scheduling

## 2021-02-24 RX ORDER — OXYCODONE HYDROCHLORIDE 5 MG/1
5 TABLET ORAL EVERY 6 HOURS PRN
Qty: 20 TABLET | Refills: 0 | Status: SHIPPED | OUTPATIENT
Start: 2021-02-24 | End: 2021-05-26

## 2021-02-24 NOTE — TELEPHONE ENCOUNTER
Please see my chart message below     Please review and advise     Thank you     Jessica Vaz RN, BSN  [place] [facility]

## 2021-02-24 NOTE — TELEPHONE ENCOUNTER
Called # [phone]     Pt called, advised of Rx resent. Advised if wanting teaching to call to schedule. Pt noted wife is ICU RN and can inject or teach Pt.     Pt had no further questions at this time, advised to call back if further questions arise.    Shaquille Holder RN   [facility]

## 2021-02-26 DIAGNOSIS — E11.9 TYPE 2 DIABETES MELLITUS WITHOUT COMPLICATION, WITHOUT LONG-TERM CURRENT USE OF INSULIN (H): ICD-10-CM

## 2021-03-01 ENCOUNTER — MYC MEDICAL ADVICE (OUTPATIENT)
Dept: FAMILY MEDICINE | Facility: CLINIC | Age: 56
End: 2021-03-01

## 2021-03-01 NOTE — TELEPHONE ENCOUNTER
Rx not active on med list    11/25/20 OV Notes:   ADVISE: Stop your glipizide.  I'm starting you instead on a once weekly injectable medicine called semaglutide which will be much more effective than the glipizide.  I'd like you to meet with diabetes education to get instructions on how to inject this as well as getting some additional education on following a diabetic diet.     [employer] sent  Awaiting response      America Ordoñez RN  [facility]

## 2021-03-03 RX ORDER — GLIPIZIDE 5 MG/1
TABLET, FILM COATED, EXTENDED RELEASE ORAL
Qty: 180 TABLET | Refills: 1 | OUTPATIENT
Start: 2021-03-03

## 2021-03-03 NOTE — TELEPHONE ENCOUNTER
Liza Response:   America.      I stopped taking Glypzide since last week Thursday due to the switch to injectable medication.   Please do not accept refill request from [facility].      Thank you.      Saran Wallis.    Rx denied      America Ordoñez RN  [facility]

## 2021-04-11 ENCOUNTER — HEALTH MAINTENANCE LETTER (OUTPATIENT)
Age: 56
End: 2021-04-11

## 2021-04-26 ENCOUNTER — IMMUNIZATION (OUTPATIENT)
Dept: NURSING | Facility: CLINIC | Age: 56
End: 2021-04-26
Payer: COMMERCIAL

## 2021-04-26 PROCEDURE — 0001A PR COVID VAC PFIZER DIL RECON 30 MCG/0.3 ML IM: CPT

## 2021-04-26 PROCEDURE — 91300 PR COVID VAC PFIZER DIL RECON 30 MCG/0.3 ML IM: CPT

## 2021-05-17 ENCOUNTER — IMMUNIZATION (OUTPATIENT)
Dept: NURSING | Facility: CLINIC | Age: 56
End: 2021-05-17
Attending: RADIOLOGY
Payer: COMMERCIAL

## 2021-05-17 PROCEDURE — 91300 PR COVID VAC PFIZER DIL RECON 30 MCG/0.3 ML IM: CPT

## 2021-05-17 PROCEDURE — 0002A PR COVID VAC PFIZER DIL RECON 30 MCG/0.3 ML IM: CPT

## 2021-05-21 DIAGNOSIS — E78.5 HYPERLIPIDEMIA LDL GOAL <70: ICD-10-CM

## 2021-05-24 RX ORDER — SIMVASTATIN 10 MG
TABLET ORAL
Qty: 90 TABLET | Refills: 0 | Status: SHIPPED | OUTPATIENT
Start: 2021-05-24 | End: 2021-08-20

## 2021-05-24 NOTE — TELEPHONE ENCOUNTER
Prescription approved per [facility] Refill Protocol.  Alicia Howard RN, BSN

## 2021-05-25 DIAGNOSIS — E11.9 TYPE 2 DIABETES MELLITUS WITHOUT COMPLICATION, WITHOUT LONG-TERM CURRENT USE OF INSULIN (H): ICD-10-CM

## 2021-05-26 ENCOUNTER — OFFICE VISIT (OUTPATIENT)
Dept: FAMILY MEDICINE | Facility: CLINIC | Age: 56
End: 2021-05-26
Payer: COMMERCIAL

## 2021-05-26 VITALS
TEMPERATURE: 97.8 F | BODY MASS INDEX: 26.77 KG/M2 | SYSTOLIC BLOOD PRESSURE: 118 MMHG | HEIGHT: 70 IN | DIASTOLIC BLOOD PRESSURE: 66 MMHG | WEIGHT: 187 LBS | OXYGEN SATURATION: 99 % | RESPIRATION RATE: 14 BRPM | HEART RATE: 91 BPM

## 2021-05-26 DIAGNOSIS — E11.9 TYPE 2 DIABETES MELLITUS WITHOUT COMPLICATION, WITHOUT LONG-TERM CURRENT USE OF INSULIN (H): Primary | ICD-10-CM

## 2021-05-26 DIAGNOSIS — F51.04 CHRONIC INSOMNIA: ICD-10-CM

## 2021-05-26 DIAGNOSIS — F33.0 MAJOR DEPRESSIVE DISORDER, RECURRENT EPISODE, MILD (H): ICD-10-CM

## 2021-05-26 DIAGNOSIS — F11.90 CHRONIC, CONTINUOUS USE OF OPIOIDS: ICD-10-CM

## 2021-05-26 DIAGNOSIS — K21.9 GASTROESOPHAGEAL REFLUX DISEASE WITHOUT ESOPHAGITIS: ICD-10-CM

## 2021-05-26 DIAGNOSIS — M54.16 CHRONIC RADICULAR LOW BACK PAIN: ICD-10-CM

## 2021-05-26 DIAGNOSIS — G89.29 CHRONIC RADICULAR LOW BACK PAIN: ICD-10-CM

## 2021-05-26 DIAGNOSIS — I10 ESSENTIAL HYPERTENSION WITH GOAL BLOOD PRESSURE LESS THAN 140/90: ICD-10-CM

## 2021-05-26 LAB — HBA1C MFR BLD: 8.4 % (ref 0–5.6)

## 2021-05-26 PROCEDURE — 99214 OFFICE O/P EST MOD 30 MIN: CPT | Performed by: FAMILY MEDICINE

## 2021-05-26 PROCEDURE — 83036 HEMOGLOBIN GLYCOSYLATED A1C: CPT | Performed by: FAMILY MEDICINE

## 2021-05-26 PROCEDURE — 36415 COLL VENOUS BLD VENIPUNCTURE: CPT | Performed by: FAMILY MEDICINE

## 2021-05-26 RX ORDER — SERTRALINE HYDROCHLORIDE 25 MG/1
TABLET, FILM COATED ORAL
Qty: 42 TABLET | Refills: 1 | Status: SHIPPED | OUTPATIENT
Start: 2021-05-26 | End: 2021-07-08

## 2021-05-26 RX ORDER — IRBESARTAN 150 MG/1
150 TABLET ORAL DAILY
Qty: 90 TABLET | Refills: 1 | Status: SHIPPED | OUTPATIENT
Start: 2021-05-26 | End: 2021-11-17

## 2021-05-26 RX ORDER — GABAPENTIN 300 MG/1
600 CAPSULE ORAL 2 TIMES DAILY
Qty: 360 CAPSULE | Refills: 1 | Status: SHIPPED | OUTPATIENT
Start: 2021-05-26 | End: 2022-01-13

## 2021-05-26 RX ORDER — PANTOPRAZOLE SODIUM 40 MG/1
40 TABLET, DELAYED RELEASE ORAL DAILY
Qty: 90 TABLET | Refills: 1 | Status: SHIPPED | OUTPATIENT
Start: 2021-05-26 | End: 2021-11-17

## 2021-05-26 RX ORDER — OXYCODONE HYDROCHLORIDE 5 MG/1
5 TABLET ORAL EVERY 6 HOURS PRN
Qty: 20 TABLET | Refills: 0 | Status: SHIPPED | OUTPATIENT
Start: 2021-05-26 | End: 2022-07-12

## 2021-05-26 ASSESSMENT — ANXIETY QUESTIONNAIRES
GAD7 TOTAL SCORE: 4
5. BEING SO RESTLESS THAT IT IS HARD TO SIT STILL: SEVERAL DAYS
7. FEELING AFRAID AS IF SOMETHING AWFUL MIGHT HAPPEN: NOT AT ALL
3. WORRYING TOO MUCH ABOUT DIFFERENT THINGS: SEVERAL DAYS
GAD7 TOTAL SCORE: 4
6. BECOMING EASILY ANNOYED OR IRRITABLE: NOT AT ALL
GAD7 TOTAL SCORE: 4
4. TROUBLE RELAXING: SEVERAL DAYS
7. FEELING AFRAID AS IF SOMETHING AWFUL MIGHT HAPPEN: NOT AT ALL
2. NOT BEING ABLE TO STOP OR CONTROL WORRYING: NOT AT ALL
1. FEELING NERVOUS, ANXIOUS, OR ON EDGE: SEVERAL DAYS

## 2021-05-26 ASSESSMENT — PATIENT HEALTH QUESTIONNAIRE - PHQ9
SUM OF ALL RESPONSES TO PHQ QUESTIONS 1-9: 11
10. IF YOU CHECKED OFF ANY PROBLEMS, HOW DIFFICULT HAVE THESE PROBLEMS MADE IT FOR YOU TO DO YOUR WORK, TAKE CARE OF THINGS AT HOME, OR GET ALONG WITH OTHER PEOPLE: SOMEWHAT DIFFICULT
SUM OF ALL RESPONSES TO PHQ QUESTIONS 1-9: 11

## 2021-05-26 ASSESSMENT — MIFFLIN-ST. JEOR: SCORE: 1689.48

## 2021-05-26 ASSESSMENT — PAIN SCALES - GENERAL: PAINLEVEL: NO PAIN (0)

## 2021-05-26 NOTE — PROGRESS NOTES
Assessment & Plan     Type 2 diabetes mellitus without complication, without long-term current use of insulin (H)  A1C remains above 8 despite weight loss.  Will increase semaglutide from 0.25 mg per week to 0.5 mg per week.  Recheck in 3-6 months.  Encouraged lower carbohydrate diet.  - Hemoglobin A1c    Major depressive disorder, recurrent episode, mild (H)  Appears to have recurred and I'm concerned this may also be impacting his sleep.  Has been on citalopram in the past without much benefit.  Will try sertraline.  Recheck via E-visit in 5-6 weeks.  Patient advised of common side effects of SSRI treatment including headache, nausea, lightheadedness for first 10 days of therapy.  Advised of diminished libido possibility, mild weight gain.    - sertraline (ZOLOFT) 25 MG tablet; Take 1 tablet (25 mg) by mouth daily for 14 days, THEN 2 tablets (50 mg) daily for 14 days.    Chronic radicular low back pain  This is stable.  Has used 20 oxycodone tablets in the past 90 days.  Refill given.  Urine drug screen, CSA to be signed at next visit.  - gabapentin (NEURONTIN) 300 MG capsule; Take 2 capsules (600 mg) by mouth 2 times daily  - oxyCODONE (ROXICODONE) 5 MG tablet; Take 1 tablet (5 mg) by mouth every 6 hours as needed for severe pain    Essential hypertension with goal blood pressure less than 140/90  Blood pressure at goal. Emphasized importance of healthy lifestyle including regular exercise and lower fat & lower sodium diet.   - irbesartan (AVAPRO) 150 MG tablet; Take 1 tablet (150 mg) by mouth daily    Gastroesophageal reflux disease without esophagitis  Stable on pantoprazole.  May want to check Mg2+ level at next visit.  HM updated.  - pantoprazole (PROTONIX) 40 MG EC tablet; Take 1 tablet (40 mg) by mouth daily    Chronic insomnia  Encourage improved sleep hygiene.  Handout given.  Treat underlying depression (root cause?) as outlined above.    Chronic, continuous use of opioids  As abo e.  - oxyCODONE  "(ROXICODONE) 5 MG tablet; Take 1 tablet (5 mg) by mouth every 6 hours as needed for severe pain      35 minutes spent on the date of the encounter doing chart review, history and exam, documentation and further activities per the note       BMI:   Estimated body mass index is 26.83 kg/m  as calculated from the following:    Height as of this encounter: 1.778 m (5' 10\").    Weight as of this encounter: 84.8 kg (187 lb).           Return in about 6 months (around 11/26/2021) for In-person visit for type 2 diabetes.    Vamshi Clarke Jr, MD  [facility] PRIOR OTIS Noonan is a 56 year old who presents for the following health issues     HPI   Question 5/26/2021  9:35 AM CDT - Filed by Patient   How often are you checking your blood sugars? A few times a month   Comments - checking blood sugars:    Average blood sugar result - am:   200   Average blood sugar result - noon: 230   Average blood sugar result - supper: 270   Average blood sugar result - bedtime:    Average blood sugar result - after meals (postprandial): 280   Which symptoms do you notice when your blood sugar is low? Dizzy   Comments: Other Symptoms of Low Blood Sugar / When do Symptoms Occur - (i.e. when blood sugars are below: ___)    Have you experienced any paresthesias (numbness or burning in feet) or sores?: Yes   Have you had a diabetic eye exam within the last year? Yes   Are you following a low fat/low cholesterol diet?   Not much   Are you taking a \"statin\" medication or niaspan? I don t know   If you are taking a \"statin\" medication, have you been experiencing any muscle aches or other side effects? No   Are you taking other supplements or other medications to lower your cholesterol? Symvastatin   How often are you checking your blood pressures? No   What are the results of your blood pressure checks? NA   Are you following a low salt diet? No   Outside of work, how many days during the week do you exercise and how " many hours each time? 30 minutes walk   Do you have any problems taking medications regularly? No   Do you have any side effects from medication? Diarrhea from Ozempic   Are you on a special diet and if so, what kind (i.e. regular, low salt, etc)? Na   Have there been any changes to your medical or surgical history? No   Have there been any changes to your social history? (tobacco use, alcohol use, sexual activity) No   Have there been any changes in your allergies? No   Have there been any changes or updates to the medications you take? No   Do any of your medications need to be refilled? Yes   Please enter the name of the pharmacy you use (clicking on the icon below will allow you to type in your answer): [facility], [address], [place] [address]   Do you have any additional concerns to address? Hard to sleep, Stress   Over the past 2 weeks, how often have you been bothered by any of the following problems?    Q1: Little interest or pleasure in doing things Several days   Q2: Feeling down, depressed or hopeless Several days   PHQ-2 Score (range: 0 - 6)        Chronic/Recurring Back Pain Follow Up      Where is your back pain located? (Select all that apply) low back both    How would you describe your back pain?  Aggravated by physical labor (e.g., washing his car)    Where does your back pain spread? nowhere    Since your last clinic visit for back pain, how has your pain changed? unchanged    Does your back pain interfere with your job? No    Since your last visit, have you tried any new treatment? No          Hypertension Follow-up      Do you check your blood pressure regularly outside of the clinic? No     Are you following a low salt diet? Yes    Are your blood pressures ever more than 140 on the top number (systolic) OR more   than 90 on the bottom number (diastolic), for example 140/90? No    Depression Followup    How are you doing with your depression since your last visit? Worsened especially  sleep    Are you having other symptoms that might be associated with depression? No    Have you had a significant life event?  No     Are you feeling anxious or having panic attacks?   No    Do you have any concerns with your use of alcohol or other drugs? No    Social History     Tobacco Use     Smoking status: Former Smoker     Packs/day: 0.50     Types: Cigarettes     Quit date: 3/4/2017     Years since quittin.2     Smokeless tobacco: Current User     Tobacco comment: quit smoking 2019. Vaping    Substance Use Topics     Alcohol use: Yes     Alcohol/week: 0.0 standard drinks     Comment: rarely     Drug use: No     PHQ 2020   PHQ-9 Total Score 2 3 11   Q9: Thoughts of better off dead/self-harm past 2 weeks Not at all Not at all Not at all     SKYLA-7 SCORE 2020   Total Score - - 4 (minimal anxiety)   Total Score 0 1 4     Last PHQ-9 2021   1.  Little interest or pleasure in doing things 1   2.  Feeling down, depressed, or hopeless 1   3.  Trouble falling or staying asleep, or sleeping too much 3   4.  Feeling tired or having little energy 3   5.  Poor appetite or overeating 1   6.  Feeling bad about yourself 1   7.  Trouble concentrating 1   8.  Moving slowly or restless 0   Q9: Thoughts of better off dead/self-harm past 2 weeks 0   PHQ-9 Total Score 11   Difficulty at work, home, or with people -     SKYLA-7  2021   1. Feeling nervous, anxious, or on edge 1   2. Not being able to stop or control worrying 0   3. Worrying too much about different things 1   4. Trouble relaxing 1   5. Being so restless that it is hard to sit still 1   6. Becoming easily annoyed or irritable 0   7. Feeling afraid, as if something awful might happen 0   SKYLA-7 Total Score 4   If you checked any problems, how difficult have they made it for you to do your work, take care of things at home, or get along with other people? -       Suicide Assessment Five-step Evaluation  "and Treatment (SAFE-T)        Review of Systems   Constitutional, HEENT, cardiovascular, pulmonary, gi and gu systems are negative, except as otherwise noted.  Notes he is struggling both to get to and stay asleep.  Happens multiple nights per week.        Objective    /66   Pulse 91   Temp 97.8  F (36.6  C) (Tympanic)   Resp 14   Ht 1.778 m (5' 10\")   Wt 84.8 kg (187 lb)   SpO2 99%   BMI 26.83 kg/m    Body mass index is 26.83 kg/m .  Physical Exam   GENERAL: healthy, alert and no distress    Results for orders placed or performed in visit on 05/26/21 (from the past 24 hour(s))   Hemoglobin A1c   Result Value Ref Range    Hemoglobin A1C 8.4 (H) 0 - 5.6 %               "

## 2021-05-26 NOTE — PATIENT INSTRUCTIONS
"  Patient Education     Tips for Sleep Hygiene  \"Sleep hygiene\" means having good sleep habits.Follow these tips to sleep better at night:     Get on a schedule. Go to bed and get up at about the same time every day.    Listen to your body. Only try to sleep when you actually feel tired or sleepy.    Be patient. If you haven't been able to get to sleep after about 30 minutes or more, get up and do something calming or boring until you feel sleepy. Then return to bed and try again.    Don't have caffeine (coffee, tea, cola drinks, chocolate and some medicines), alcohol or nicotine (cigarettes). These can make it harder for you to fall asleep and stay asleep.    Use your bed for sleeping only. That means no TV, computer or homework in bed, especially during the evening and before bedtime.    Don't nap during the day. If you must nap, make sure it is for less than 20 minutes.    Create sleep rituals that remind your body it is time to sleep. Examples include breathing exercises, stretching or reading a book.    Avoid all electronic media (smart phone, computer, tablet) within 2 hours of bed time. The \"blue light\" in these devices activates the part of the brain that keeps you awake.    Dim the lights at night.    Get early morning sources of light (walk in the sunshine) to help set sleep patterns at night.    Try a bath or shower before bed. Having a warm bath 1 to 2 hours before bedtime can help you feel sleepy. Hot baths can make you alert, so be mindful of the temperature.    Don't watch the clock. Checking the clock during the night can wake you up. It can also lead to negative thoughts such as, \"I will never fall asleep,\" which can increase anxiety and sleeplessness.    Use a sleep diary. Track your sleep schedule to know your sleep patterns and to see where you can improve.    Get regular exercise every day. Try not to do heavy exercise in the 4 hours before bedtime.    Eat a healthy, balanced diet.    Try eating " a light, healthy snack before bed, but avoid eating a heavy meal.    Create the right sleeping area. A cool, dark, quiet room is best. If needed, try earplugs, fans and blackout curtains.    Keep your daytime routine the same even if you have a bad night sleep. Avoiding activities the next day can make it harder to sleep.  For informational purposes only. Not to replace the advice of your health care provider.   Copyright   2013 Health system. All rights reserved. [employer] 401778 - 01/16.

## 2021-05-26 NOTE — RESULT ENCOUNTER NOTE
Mr. Wallis,    -A1C test (average blood sugar the last 2-3 months) is above your goal.   ADVISE: I've increased your Ozempic dose from 0.25 mg per week to 0.5 mg per week, Saran.  Please make a diabetic followup appointment in 12 weeks. Please check and record your blood sugars at least 4 times daily for 1 week prior to your appointment and bring for review.  Also, you should recheck your A1C in 3 months.    If you have further questions about the interpretation of your labs, [email or web address] is a good website to check out for further information.    Please contact the clinic if you have additional questions.  Thank you.    Sincerely,    Vamshi Clarke MD

## 2021-05-27 ASSESSMENT — ANXIETY QUESTIONNAIRES: GAD7 TOTAL SCORE: 4
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
## 2021-08-13 DIAGNOSIS — E11.9 TYPE 2 DIABETES MELLITUS WITHOUT COMPLICATION, WITHOUT LONG-TERM CURRENT USE OF INSULIN (H): ICD-10-CM

## 2021-08-17 RX ORDER — BLOOD SUGAR DIAGNOSTIC
STRIP MISCELLANEOUS
Qty: 100 STRIP | Refills: 1 | Status: SHIPPED | OUTPATIENT
Start: 2021-08-17 | End: 2023-02-14

## 2021-08-17 NOTE — TELEPHONE ENCOUNTER
Prescription approved per [facility] Refill Protocol.  Ashlee Shepard RN

## 2021-08-19 DIAGNOSIS — E78.5 HYPERLIPIDEMIA LDL GOAL <70: ICD-10-CM

## 2021-08-19 DIAGNOSIS — E11.9 TYPE 2 DIABETES MELLITUS WITHOUT COMPLICATION, WITHOUT LONG-TERM CURRENT USE OF INSULIN (H): ICD-10-CM

## 2021-08-20 RX ORDER — SIMVASTATIN 10 MG
TABLET ORAL
Qty: 90 TABLET | Refills: 0 | Status: SHIPPED | OUTPATIENT
Start: 2021-08-20 | End: 2021-11-17

## 2021-09-20 ENCOUNTER — IMMUNIZATION (OUTPATIENT)
Dept: FAMILY MEDICINE | Facility: CLINIC | Age: 56
End: 2021-09-20
Payer: COMMERCIAL

## 2021-09-20 ENCOUNTER — LAB (OUTPATIENT)
Dept: LAB | Facility: CLINIC | Age: 56
End: 2021-09-20
Payer: COMMERCIAL

## 2021-09-20 DIAGNOSIS — E11.9 TYPE 2 DIABETES MELLITUS WITHOUT COMPLICATION, WITHOUT LONG-TERM CURRENT USE OF INSULIN (H): Primary | ICD-10-CM

## 2021-09-20 DIAGNOSIS — Z23 NEED FOR PROPHYLACTIC VACCINATION AND INOCULATION AGAINST INFLUENZA: Primary | ICD-10-CM

## 2021-09-20 PROCEDURE — 90471 IMMUNIZATION ADMIN: CPT

## 2021-09-20 PROCEDURE — 90682 RIV4 VACC RECOMBINANT DNA IM: CPT

## 2021-09-20 PROCEDURE — 99207 PR NO CHARGE NURSE ONLY: CPT

## 2021-09-21 LAB — HBA1C MFR BLD: 5.9 % (ref 0–5.6)

## 2021-09-21 PROCEDURE — 83036 HEMOGLOBIN GLYCOSYLATED A1C: CPT

## 2021-09-21 PROCEDURE — 36415 COLL VENOUS BLD VENIPUNCTURE: CPT

## 2021-09-22 NOTE — RESULT ENCOUNTER NOTE
Mr. Wallis,    -A1C (test of diabetes control the last 2-3 months) is at your goal. Please continue with your current plan. Also, you should make an appointment to see me and recheck your A1C test in 6 months.     If you have further questions about the interpretation of your labs, [email or web address] is a good website to check out for further information.    Please contact the clinic if you have additional questions.  Thank you.    Sincerely,    Vamshi Clarke MD

## 2021-11-17 DIAGNOSIS — K21.9 GASTROESOPHAGEAL REFLUX DISEASE WITHOUT ESOPHAGITIS: ICD-10-CM

## 2021-11-17 DIAGNOSIS — E78.5 HYPERLIPIDEMIA LDL GOAL <70: ICD-10-CM

## 2021-11-17 DIAGNOSIS — I10 ESSENTIAL HYPERTENSION WITH GOAL BLOOD PRESSURE LESS THAN 140/90: ICD-10-CM

## 2021-11-17 RX ORDER — SIMVASTATIN 10 MG
TABLET ORAL
Qty: 90 TABLET | Refills: 0 | Status: SHIPPED | OUTPATIENT
Start: 2021-11-17 | End: 2022-02-09

## 2021-11-17 RX ORDER — IRBESARTAN 150 MG/1
TABLET ORAL
Qty: 90 TABLET | Refills: 0 | Status: SHIPPED | OUTPATIENT
Start: 2021-11-17 | End: 2022-02-09

## 2021-11-17 RX ORDER — PANTOPRAZOLE SODIUM 40 MG/1
TABLET, DELAYED RELEASE ORAL
Qty: 90 TABLET | Refills: 0 | Status: SHIPPED | OUTPATIENT
Start: 2021-11-17 | End: 2022-02-09

## 2021-11-18 DIAGNOSIS — E11.9 TYPE 2 DIABETES MELLITUS WITHOUT COMPLICATION, WITHOUT LONG-TERM CURRENT USE OF INSULIN (H): ICD-10-CM

## 2022-01-02 DIAGNOSIS — F33.0 MAJOR DEPRESSIVE DISORDER, RECURRENT EPISODE, MILD (H): ICD-10-CM

## 2022-01-02 NOTE — LETTER
[address] [facility] [facility] [id number] [phone]       January 20, 2022    Saran Wallis  [address] [address] [facility] [address]    To Saran:    We have been calling you regarding a recent refill request we received for Zoloft.  Unfortunately, we were unable to reach you.  We are notifying you that you are due for med check prior to your next refill.  You can schedule this appointment via [employer] or by calling the clinic at [phone] Option 1.          Sincerely,    Vamshi Clarke MD / crc

## 2022-01-04 NOTE — TELEPHONE ENCOUNTER
Medication is being filled for 1 time refill only due to:  Patient needs to be seen because needs appt.    Routing to MA/TC pool. The Pt is due for a visit with PCP for med check and DM    Alicia WOODWARD RN, BSN

## 2022-01-06 DIAGNOSIS — E11.9 TYPE 2 DIABETES MELLITUS WITHOUT COMPLICATION, WITHOUT LONG-TERM CURRENT USE OF INSULIN (H): ICD-10-CM

## 2022-01-10 RX ORDER — SEMAGLUTIDE 1.34 MG/ML
INJECTION, SOLUTION SUBCUTANEOUS
Qty: 3 ML | Refills: 0 | Status: SHIPPED | OUTPATIENT
Start: 2022-01-10 | End: 2022-02-09

## 2022-01-10 NOTE — TELEPHONE ENCOUNTER
"GLP-1 Agonists Protocol Failed      Normal serum creatinine on file in past 12 months        Recent Labs   Lab Test 11/25/20  1401   CR 0.70      Ok to refill medication if creatinine is low      Recent (6 mo) or future (30 days) visit within the authorizing provider's specialty    Patient had office visit in the last 6 months or has a visit in the next 30 days with authorizing provider.  See \"Patient Info\" tab in inbasket, or \"Choose Columns\" in Meds & Orders section of the refill encounter.          HgbA1C in past 3 or 6 months    If HgbA1C is 8 or greater, it needs to be on file within the past 3 months.  If less than 8, must be on file within the past 6 months.          Recent Labs   Lab Test 09/21/21  0918   A1C 5.9*     PresentationTubet message sent that patient needs an appointment.  No future appointments scheduled  Caro refill of 30 days given to patient per [facility]/RN protocol. Pharmacy note that office/virtual visit is needed for further refills.  Per [facility]/RN protocol visit is due as of     Gwendolyn HENRIQUEZ RN   [facility] Triage       "

## 2022-01-11 DIAGNOSIS — M54.16 CHRONIC RADICULAR LOW BACK PAIN: ICD-10-CM

## 2022-01-11 DIAGNOSIS — G89.29 CHRONIC RADICULAR LOW BACK PAIN: ICD-10-CM

## 2022-01-13 RX ORDER — GABAPENTIN 300 MG/1
CAPSULE ORAL
Qty: 360 CAPSULE | Refills: 1 | Status: SHIPPED | OUTPATIENT
Start: 2022-01-13 | End: 2022-02-09

## 2022-01-13 NOTE — TELEPHONE ENCOUNTER
Routing refill request to provider for review/approval because:  Drug not on the [facility] refill protocol        Ana Luisa Montesinos RN  [facility]

## 2022-01-15 ENCOUNTER — HEALTH MAINTENANCE LETTER (OUTPATIENT)
Age: 57
End: 2022-01-15

## 2022-02-09 ENCOUNTER — OFFICE VISIT (OUTPATIENT)
Dept: FAMILY MEDICINE | Facility: CLINIC | Age: 57
End: 2022-02-09
Payer: COMMERCIAL

## 2022-02-09 VITALS
TEMPERATURE: 97.2 F | WEIGHT: 182 LBS | RESPIRATION RATE: 12 BRPM | DIASTOLIC BLOOD PRESSURE: 72 MMHG | BODY MASS INDEX: 26.05 KG/M2 | HEIGHT: 70 IN | SYSTOLIC BLOOD PRESSURE: 130 MMHG | HEART RATE: 77 BPM | OXYGEN SATURATION: 99 %

## 2022-02-09 DIAGNOSIS — M25.511 CHRONIC RIGHT SHOULDER PAIN: ICD-10-CM

## 2022-02-09 DIAGNOSIS — F33.0 MAJOR DEPRESSIVE DISORDER, RECURRENT EPISODE, MILD (H): ICD-10-CM

## 2022-02-09 DIAGNOSIS — I10 ESSENTIAL HYPERTENSION WITH GOAL BLOOD PRESSURE LESS THAN 140/90: ICD-10-CM

## 2022-02-09 DIAGNOSIS — G89.29 CHRONIC RIGHT SHOULDER PAIN: ICD-10-CM

## 2022-02-09 DIAGNOSIS — G89.29 CHRONIC RADICULAR LOW BACK PAIN: ICD-10-CM

## 2022-02-09 DIAGNOSIS — E11.9 TYPE 2 DIABETES MELLITUS WITHOUT COMPLICATION, WITHOUT LONG-TERM CURRENT USE OF INSULIN (H): Primary | ICD-10-CM

## 2022-02-09 DIAGNOSIS — Z12.5 SCREENING FOR PROSTATE CANCER: ICD-10-CM

## 2022-02-09 DIAGNOSIS — K21.9 GASTROESOPHAGEAL REFLUX DISEASE WITHOUT ESOPHAGITIS: ICD-10-CM

## 2022-02-09 DIAGNOSIS — M54.16 CHRONIC RADICULAR LOW BACK PAIN: ICD-10-CM

## 2022-02-09 DIAGNOSIS — E78.5 HYPERLIPIDEMIA LDL GOAL <70: ICD-10-CM

## 2022-02-09 LAB
ALBUMIN SERPL-MCNC: 4.3 G/DL (ref 3.4–5)
ALP SERPL-CCNC: 86 U/L (ref 40–150)
ALT SERPL W P-5'-P-CCNC: 57 U/L (ref 0–70)
ANION GAP SERPL CALCULATED.3IONS-SCNC: 7 MMOL/L (ref 3–14)
AST SERPL W P-5'-P-CCNC: 31 U/L (ref 0–45)
BILIRUB SERPL-MCNC: 1.1 MG/DL (ref 0.2–1.3)
BUN SERPL-MCNC: 15 MG/DL (ref 7–30)
CALCIUM SERPL-MCNC: 9.8 MG/DL (ref 8.5–10.1)
CHLORIDE BLD-SCNC: 107 MMOL/L (ref 94–109)
CHOLEST SERPL-MCNC: 123 MG/DL
CO2 SERPL-SCNC: 25 MMOL/L (ref 20–32)
CREAT SERPL-MCNC: 0.62 MG/DL (ref 0.66–1.25)
FASTING STATUS PATIENT QL REPORTED: YES
GFR SERPL CREATININE-BSD FRML MDRD: >90 ML/MIN/1.73M2
GLUCOSE BLD-MCNC: 121 MG/DL (ref 70–99)
HBA1C MFR BLD: 6 % (ref 0–5.6)
HDLC SERPL-MCNC: 52 MG/DL
LDLC SERPL CALC-MCNC: 46 MG/DL
NONHDLC SERPL-MCNC: 71 MG/DL
POTASSIUM BLD-SCNC: 4.4 MMOL/L (ref 3.4–5.3)
PROT SERPL-MCNC: 8.2 G/DL (ref 6.8–8.8)
PSA SERPL-MCNC: 0.41 UG/L (ref 0–4)
SODIUM SERPL-SCNC: 139 MMOL/L (ref 133–144)
TRIGL SERPL-MCNC: 123 MG/DL
VIT B12 SERPL-MCNC: 348 PG/ML (ref 193–986)

## 2022-02-09 PROCEDURE — 82607 VITAMIN B-12: CPT | Performed by: FAMILY MEDICINE

## 2022-02-09 PROCEDURE — 36415 COLL VENOUS BLD VENIPUNCTURE: CPT | Performed by: FAMILY MEDICINE

## 2022-02-09 PROCEDURE — 83735 ASSAY OF MAGNESIUM: CPT | Performed by: FAMILY MEDICINE

## 2022-02-09 PROCEDURE — 99214 OFFICE O/P EST MOD 30 MIN: CPT | Performed by: FAMILY MEDICINE

## 2022-02-09 PROCEDURE — G0103 PSA SCREENING: HCPCS | Performed by: FAMILY MEDICINE

## 2022-02-09 PROCEDURE — 82043 UR ALBUMIN QUANTITATIVE: CPT | Performed by: FAMILY MEDICINE

## 2022-02-09 PROCEDURE — 80061 LIPID PANEL: CPT | Performed by: FAMILY MEDICINE

## 2022-02-09 PROCEDURE — 83036 HEMOGLOBIN GLYCOSYLATED A1C: CPT | Performed by: FAMILY MEDICINE

## 2022-02-09 PROCEDURE — 80053 COMPREHEN METABOLIC PANEL: CPT | Performed by: FAMILY MEDICINE

## 2022-02-09 RX ORDER — PANTOPRAZOLE SODIUM 40 MG/1
40 TABLET, DELAYED RELEASE ORAL DAILY
Qty: 90 TABLET | Refills: 3 | Status: SHIPPED | OUTPATIENT
Start: 2022-02-09 | End: 2023-02-10

## 2022-02-09 RX ORDER — SIMVASTATIN 10 MG
10 TABLET ORAL AT BEDTIME
Qty: 90 TABLET | Refills: 3 | Status: SHIPPED | OUTPATIENT
Start: 2022-02-09 | End: 2023-02-10

## 2022-02-09 RX ORDER — GABAPENTIN 300 MG/1
CAPSULE ORAL
Qty: 270 CAPSULE | Refills: 1 | Status: SHIPPED | OUTPATIENT
Start: 2022-02-09 | End: 2022-07-12

## 2022-02-09 RX ORDER — IRBESARTAN 150 MG/1
TABLET ORAL
Qty: 90 TABLET | Refills: 0 | Status: SHIPPED | OUTPATIENT
Start: 2022-02-09 | End: 2022-05-13

## 2022-02-09 RX ORDER — SEMAGLUTIDE 1.34 MG/ML
0.5 INJECTION, SOLUTION SUBCUTANEOUS
Qty: 9 ML | Refills: 0 | Status: SHIPPED | OUTPATIENT
Start: 2022-02-09 | End: 2022-06-20

## 2022-02-09 ASSESSMENT — ANXIETY QUESTIONNAIRES
5. BEING SO RESTLESS THAT IT IS HARD TO SIT STILL: NOT AT ALL
1. FEELING NERVOUS, ANXIOUS, OR ON EDGE: NOT AT ALL
GAD7 TOTAL SCORE: 0
3. WORRYING TOO MUCH ABOUT DIFFERENT THINGS: NOT AT ALL
6. BECOMING EASILY ANNOYED OR IRRITABLE: NOT AT ALL
7. FEELING AFRAID AS IF SOMETHING AWFUL MIGHT HAPPEN: NOT AT ALL
4. TROUBLE RELAXING: NOT AT ALL
GAD7 TOTAL SCORE: 0
GAD7 TOTAL SCORE: 0
2. NOT BEING ABLE TO STOP OR CONTROL WORRYING: NOT AT ALL
7. FEELING AFRAID AS IF SOMETHING AWFUL MIGHT HAPPEN: NOT AT ALL

## 2022-02-09 ASSESSMENT — PAIN SCALES - GENERAL: PAINLEVEL: EXTREME PAIN (8)

## 2022-02-09 ASSESSMENT — PATIENT HEALTH QUESTIONNAIRE - PHQ9
10. IF YOU CHECKED OFF ANY PROBLEMS, HOW DIFFICULT HAVE THESE PROBLEMS MADE IT FOR YOU TO DO YOUR WORK, TAKE CARE OF THINGS AT HOME, OR GET ALONG WITH OTHER PEOPLE: NOT DIFFICULT AT ALL
SUM OF ALL RESPONSES TO PHQ QUESTIONS 1-9: 3
SUM OF ALL RESPONSES TO PHQ QUESTIONS 1-9: 3

## 2022-02-09 ASSESSMENT — MIFFLIN-ST. JEOR: SCORE: 1661.8

## 2022-02-09 NOTE — PROGRESS NOTES
Assessment & Plan     Type 2 diabetes mellitus without complication, without long-term current use of insulin (H): continue Ozempic and metformin. Recheck labs.  - Albumin Random Urine Quantitative with Creat Ratio; Future  - HEMOGLOBIN A1C; Future  - Comprehensive metabolic panel (BMP + Alb, Alk Phos, ALT, AST, Total. Bili, TP); Future  - Vitamin B12; Future  - metFORMIN (GLUCOPHAGE) 500 MG tablet; TAKE 2 TABLETS BY MOUTH TWICE DAILY WITH MEALS  - semaglutide (OZEMPIC, 0.25 OR 0.5 MG/DOSE,) 2 MG/1.5ML SOPN pen; Inject 0.5 mg Subcutaneous every 7 days  - Albumin Random Urine Quantitative with Creat Ratio  - HEMOGLOBIN A1C  - Comprehensive metabolic panel (BMP + Alb, Alk Phos, ALT, AST, Total. Bili, TP)  - Vitamin B12    Essential hypertension with goal blood pressure less than 140/90: well controlled. Continue current medications.  - irbesartan (AVAPRO) 150 MG tablet; TAKE 1 TABLET(150 MG) BY MOUTH DAILY    Major depressive disorder, recurrent episode, mild (H): stable; continue current regimen.  PHQ 5/26/2021 7/7/2021 2/9/2022   PHQ-9 Total Score 11 4 3   Q9: Thoughts of better off dead/self-harm past 2 weeks Not at all Not at all Not at all     - sertraline (ZOLOFT) 50 MG tablet; Take 1 tablet (50 mg) by mouth daily    Hyperlipidemia LDL goal <70: recheck lipids. Continue simvastatin.  - Lipid panel reflex to direct LDL Fasting; Future  - simvastatin (ZOCOR) 10 MG tablet; Take 1 tablet (10 mg) by mouth At Bedtime  - Lipid panel reflex to direct LDL Fasting    Gastroesophageal reflux disease without esophagitis: continue Protonix.  - Magnesium; Future  - pantoprazole (PROTONIX) 40 MG EC tablet; Take 1 tablet (40 mg) by mouth daily  - Magnesium    Chronic radicular low back pain: interested in tapering off medication which is reasonable. Will cut back on morning dose to 300 mg and continue 600 mg in PM. Can decrease evening dose next  - gabapentin (NEURONTIN) 300 MG capsule; Take 1 capsule (300 mg) by mouth every  "morning AND 2 capsules (600 mg) every evening.    Screening for prostate cancer  - PSA, screen; Future  - PSA, screen    Chronic right shoulder pain: suspect rotator cuff tendinopathy, history of subacromial decompression surgery many years ago. Will refer to orthopedics for further evaluation and recommendations.  - Orthopedic  Referral; Future       Tobacco Cessation:   reports that he has been smoking cigarettes. He has a 15.00 pack-year smoking history. He has never used smokeless tobacco.      BMI:   Estimated body mass index is 26.11 kg/m  as calculated from the following:    Height as of this encounter: 1.778 m (5' 10\").    Weight as of this encounter: 82.6 kg (182 lb).         Return in about 6 months (around 2022) for Diabetes follow-up.    Vargas Davison [facility]   Saran is a 57 year old who presents for the following health issues     History of Present Illness       Back Pain:  He presents for follow up of back pain. Patient's back pain is a chronic problem.  Location of back pain:  Left lower back  Description of back pain: shooting  Back pain spreads: left foot    Since patient first noticed back pain, pain is: gradually worsening  Does back pain interfere with his job:  Yes      Mental Health Follow-up:  Patient presents to follow-up on Depression.Patient's depression since last visit has been:  Better  The patient is not having other symptoms associated with depression.      Any significant life events: relationship concerns  Patient is not feeling anxious or having panic attacks.  Patient has no concerns about alcohol or drug use.     Social History  Tobacco Use    Smoking status: Former Smoker      Packs/day: 0.50      Types: Cigarettes      Quit date: 3/4/2017      Years since quittin.9    Smokeless tobacco: Current User    Tobacco comment: quit smoking 2019. Vaping   Alcohol use: Yes    Alcohol/week: 0.0 standard drinks    " Comment: rarely  Drug use: No      Today's PHQ-9         PHQ-9 Total Score:     (P) 3   PHQ-9 Q9 Thoughts of better off dead/self-harm past 2 weeks :   (P) Not at all   Thoughts of suicide or self harm:      Self-harm Plan:        Self-harm Action:          Safety concerns for self or others:           Diabetes:   He presents for follow up of diabetes.  He is checking home blood glucose a few times a week. He checks blood glucose before and after meals.  Blood glucose is never over 200 and never under 70. He is aware of hypoglycemia symptoms including dizziness. He has no concerns regarding his diabetes at this time.  He is having burning in feet. The patient has had a diabetic eye exam in the last 12 months.         He eats 2-3 servings of fruits and vegetables daily.He consumes 0 sweetened beverage(s) daily.He exercises with enough effort to increase his heart rate 9 or less minutes per day.  He exercises with enough effort to increase his heart rate 3 or less days per week.   He is taking medications regularly.     Checked blood sugar this AM - 121. He is checking 3-4x per week -- usually checking a few hours after eating or sometimes fasting.           BP Readings from Last 2 Encounters:   02/15/22 121/84   22 130/72     Hemoglobin A1C POCT (%)   Date Value   2021 8.4 (H)   2020 8.4 (H)     Hemoglobin A1C (%)   Date Value   2022 6.0 (H)   2021 5.9 (H)     LDL Cholesterol Calculated (mg/dL)   Date Value   2022 46   2020 71   2019 71       Social History     Tobacco Use     Smoking status: Current Every Day Smoker     Packs/day: 0.50     Years: 30.00     Pack years: 15.00     Types: Cigarettes     Last attempt to quit: 3/4/2017     Years since quittin.9     Smokeless tobacco: Never Used     Tobacco comment: Quit for quitting.   Vaping Use     Vaping Use: Every day   Substance Use Topics     Alcohol use: Not Currently     Alcohol/week: 0.0 standard drinks      "Comment: 1 bottles of beer per month.     Drug use: No     PHQ 5/26/2021 7/7/2021 2/9/2022   PHQ-9 Total Score 11 4 3   Q9: Thoughts of better off dead/self-harm past 2 weeks Not at all Not at all Not at all     SKYLA-7 SCORE 5/26/2021 7/7/2021 2/9/2022   Total Score 4 (minimal anxiety) 4 (minimal anxiety) 0 (minimal anxiety)   Total Score 4 4 0     Last PHQ-9 2/9/2022   1.  Little interest or pleasure in doing things 1   2.  Feeling down, depressed, or hopeless 0   3.  Trouble falling or staying asleep, or sleeping too much 0   4.  Feeling tired or having little energy 1   5.  Poor appetite or overeating 0   6.  Feeling bad about yourself 0   7.  Trouble concentrating 1   8.  Moving slowly or restless 0   Q9: Thoughts of better off dead/self-harm past 2 weeks 0   PHQ-9 Total Score 3   Difficulty at work, home, or with people -     SKYLA-7  2/9/2022   1. Feeling nervous, anxious, or on edge 0   2. Not being able to stop or control worrying 0   3. Worrying too much about different things 0   4. Trouble relaxing 0   5. Being so restless that it is hard to sit still 0   6. Becoming easily annoyed or irritable 0   7. Feeling afraid, as if something awful might happen 0   SKYLA-7 Total Score 0   If you checked any problems, how difficult have they made it for you to do your work, take care of things at home, or get along with other people? -       Suicide Assessment Five-step Evaluation and Treatment (SAFE-T)      Review of Systems   Constitutional, HEENT, cardiovascular, pulmonary, gi and gu systems are negative, except as otherwise noted.      Objective    /72   Pulse 77   Temp 97.2  F (36.2  C) (Tympanic)   Resp 12   Ht 1.778 m (5' 10\")   Wt 82.6 kg (182 lb)   SpO2 99%   BMI 26.11 kg/m    Body mass index is 26.11 kg/m .  Physical Exam   GENERAL: healthy, alert and no distress  RESP: lungs clear to auscultation - no rales, rhonchi or wheezes  CV: regular rate and rhythm, normal S1 S2, no S3 or S4, no murmur, " click or rub, no peripheral edema and peripheral pulses strong  MS: extremities normal- no gross deformities noted; right shoulder - positive neers/trevino, empty can test. negative speeds test.  PSYCH: mentation appears normal, affect normal/bright

## 2022-02-10 LAB
CREAT UR-MCNC: 89 MG/DL
MAGNESIUM SERPL-MCNC: 1.9 MG/DL (ref 1.8–2.6)
MICROALBUMIN UR-MCNC: 9 MG/L
MICROALBUMIN/CREAT UR: 10.11 MG/G CR (ref 0–17)

## 2022-02-10 ASSESSMENT — ANXIETY QUESTIONNAIRES: GAD7 TOTAL SCORE: 0

## 2022-02-11 NOTE — PROGRESS NOTES
ASSESSMENT & PLAN  Patient Instructions     1. Tendinopathy of right rotator cuff    2. Chronic right shoulder pain      -Patient has chronic right shoulder pain due to supraspinatus tendinopathy and bursitis.  -Patient had a subacromial decompression 14 years ago which did not improve his pain.  -Patient has completed physical therapy and subacromial cortisone injections without improvement in pain  -Patient will get an MRI of the right shoulder for further evaluation of rotator cuff pathology  - Your MRI has been ordered. You may call [phone] to schedule over the phone. Once you schedule your MRI, call my office to schedule a follow-up phone visit 2 days later to discuss results and next of treatment options.  -To consider repeat cortisone injection versus physical therapy versus PRP or platelet rich plasma treatments versus surgical intervention  -Call direct clinic number [[phone]] at any time with questions or concerns.    Albert Yeo MD [facility] Orthopedics and Sports Medicine  [facility]          -----    SUBJECTIVE  Saran Wallis is a/an 57 year old Right handed male who is seen in consultation at the request of  Vargas Davison D.O. for evaluation of right shoulder pain. The patient is seen by themselves.  Patient complains of 10+ years of right shoulder pain. He had  right shoulder surgery in 7/2008 at [facility] (right subacromial decompression with Dr. Sukhi Venegas).   States he has constant pain, but over the past ~2 weeks pain has worsened.     Onset: 10+ years(s) ago. Reports insidious onset without acute precipitating event.  Location of Pain: right lateral shoulder near acromion process  Rating of Pain at worst: 8/10  Rating of Pain Currently: 7/10  Worsened by: shoveling, internal rotation, reaching behind him, pushing movements   Better with: lidocaine patch  Treatments tried:  lidocaine patch, Tylenol, physical therapy ~2018, previous corticosteriod  injections prior to surgery   Associated symptoms: some numbness and tingling in upper arm, denies neck pain or stiffness  Orthopedic history: YES - Date: 2018 shoulder pain similar, did physical therapy   Relevant surgical history: YES - Date:  right shoulder surgery in 2008 at [facility] (right subacromial decompression with Dr. Sukhi Venegas  Social history: social history: works as     Past Medical History:   Diagnosis Date     Chronic radicular low back pain 2016     Depressive disorder      Diabetes mellitus (H)      Gastric reflux      Hypercholesteremia      Hypertension      Social History     Socioeconomic History     Marital status:      Spouse name: Not on file     Number of children: Not on file     Years of education: Not on file     Highest education level: Not on file   Occupational History     Not on file   Tobacco Use     Smoking status: Current Every Day Smoker     Packs/day: 0.50     Years: 30.00     Pack years: 15.00     Types: Cigarettes     Last attempt to quit: 3/4/2017     Years since quittin.9     Smokeless tobacco: Never Used     Tobacco comment: Quit for quitting.   Vaping Use     Vaping Use: Every day   Substance and Sexual Activity     Alcohol use: Not Currently     Alcohol/week: 0.0 standard drinks     Comment: 1 bottles of beer per month.     Drug use: No     Sexual activity: Not Currently     Partners: Female     Birth control/protection: None     Comment:    Other Topics Concern     Parent/sibling w/ CABG, MI or angioplasty before 65F 55M? No   Social History Narrative     Not on file     Social Determinants of Health     Financial Resource Strain: Not on file   Food Insecurity: Not on file   Transportation Needs: Not on file   Physical Activity: Not on file   Stress: Not on file   Social Connections: Not on file   Intimate Partner Violence: Not on file   Housing Stability: Not on file         Patient's past medical, surgical,  "social, and family histories were reviewed today and no changes are noted.    REVIEW OF SYSTEMS:  10 point ROS is negative other than symptoms noted above in HPI, Past Medical History or as stated below  Constitutional: NEGATIVE for fever, chills, change in weight  Skin: NEGATIVE for worrisome rashes, moles or lesions  GI/: NEGATIVE for bowel or bladder changes  Neuro: NEGATIVE for weakness, dizziness or paresthesias    OBJECTIVE:  /84   Ht 1.778 m (5' 10\")   Wt 82.8 kg (182 lb 9.6 oz)   BMI 26.20 kg/m     General: healthy, alert and in no distress  HEENT: no scleral icterus or conjunctival erythema  Skin: no suspicious lesions or rash. No jaundice.  CV: regular rhythm by palpation  Resp: normal respiratory effort without conversational dyspnea   Psych: normal mood and affect  Gait: normal steady gait with appropriate coordination and balance  Neuro: normal light touch sensory exam of the bilateral upper extremities.    MSK:  RIGHT SHOULDER  Inspection:    no swelling, bruising, discoloration, or obvious deformity or asymmetry  Palpation:    Tender about the proximal biceps tendon, supraspinatus insertion and infraspinatus insertion. Remainder of bony and tendinous landmarks are nontender.  Active Range of Motion:     Abduction 1350, FF 1650, , IR L4.      Scapular dyskinesis absent  Strength:    Scapular plane abduction painful,  ER painful, IR grossly intact, biceps grossly intact, triceps grossly intact  Special Tests:    Positive: Neer's, Cruz', supraspinatus (empty can), drop arm/painful arc and [place]'s    Negative: crossed arm adduction, Speed's and Yergason's        Independent visualization of the below image:  Recent Results (from the past 24 hour(s))   XR Shoulder Right G/E 3 Views    Narrative    Examination:  XR SHOULDER RIGHT G/E 3 VIEWS    Date:  2/15/2022 2:39 PM     Clinical Information: Chronic right shoulder pain.    Comparison: 11/3/2016.      Impression    Impression:    1.  " Normal right shoulder. No fracture or bone lesion. Normal joint  alignment, without significant degenerative or erosive arthropathy.    CR KILGORE MD         SYSTEM ID:  [id number]           Albert Yeo MD [facility] Orthopedic [facility]

## 2022-02-15 ENCOUNTER — ANCILLARY PROCEDURE (OUTPATIENT)
Dept: GENERAL RADIOLOGY | Facility: CLINIC | Age: 57
End: 2022-02-15
Attending: FAMILY MEDICINE
Payer: COMMERCIAL

## 2022-02-15 ENCOUNTER — OFFICE VISIT (OUTPATIENT)
Dept: ORTHOPEDICS | Facility: CLINIC | Age: 57
End: 2022-02-15
Attending: FAMILY MEDICINE
Payer: COMMERCIAL

## 2022-02-15 VITALS
HEIGHT: 70 IN | SYSTOLIC BLOOD PRESSURE: 121 MMHG | WEIGHT: 182.6 LBS | BODY MASS INDEX: 26.14 KG/M2 | DIASTOLIC BLOOD PRESSURE: 84 MMHG

## 2022-02-15 DIAGNOSIS — G89.29 CHRONIC RIGHT SHOULDER PAIN: ICD-10-CM

## 2022-02-15 DIAGNOSIS — M67.911 TENDINOPATHY OF RIGHT ROTATOR CUFF: Primary | ICD-10-CM

## 2022-02-15 DIAGNOSIS — M25.511 CHRONIC RIGHT SHOULDER PAIN: ICD-10-CM

## 2022-02-15 PROCEDURE — 73030 X-RAY EXAM OF SHOULDER: CPT | Mod: RT | Performed by: RADIOLOGY

## 2022-02-15 PROCEDURE — 99204 OFFICE O/P NEW MOD 45 MIN: CPT | Performed by: FAMILY MEDICINE

## 2022-02-15 ASSESSMENT — MIFFLIN-ST. JEOR: SCORE: 1664.52

## 2022-02-15 NOTE — LETTER
2/15/2022         RE: Saran Wallis  [address] [place] [address]        Dear Colleague,    Thank you for referring your patient, Saran Wallis, to the [facility] [place]. Please see a copy of my visit note below.    ASSESSMENT & PLAN  Patient Instructions     1. Tendinopathy of right rotator cuff    2. Chronic right shoulder pain      -Patient has chronic right shoulder pain due to supraspinatus tendinopathy and bursitis.  -Patient had a subacromial decompression 14 years ago which did not improve his pain.  -Patient has completed physical therapy and subacromial cortisone injections without improvement in pain  -Patient will get an MRI of the right shoulder for further evaluation of rotator cuff pathology  - Your MRI has been ordered. You may call [phone] to schedule over the phone. Once you schedule your MRI, call my office to schedule a follow-up phone visit 2 days later to discuss results and next of treatment options.  -To consider repeat cortisone injection versus physical therapy versus PRP or platelet rich plasma treatments versus surgical intervention  -Call direct clinic number [[phone]] at any time with questions or concerns.    Albert Yeo MD [facility] Orthopedics and Sports Medicine  [facility]          -----    SUBJECTIVE  Saran Wallis is a/an 57 year old Right handed male who is seen in consultation at the request of  Vargas Davison D.O. for evaluation of right shoulder pain. The patient is seen by themselves.  Patient complains of 10+ years of right shoulder pain. He had  right shoulder surgery in 7/2008 at [facility] (right subacromial decompression with Dr. Sukhi Venegas).   States he has constant pain, but over the past ~2 weeks pain has worsened.     Onset: 10+ years(s) ago. Reports insidious onset without acute precipitating event.  Location of Pain: right lateral shoulder near acromion process  Rating of  Pain at worst: 8/10  Rating of Pain Currently: 7/10  Worsened by: shoveling, internal rotation, reaching behind him, pushing movements   Better with: lidocaine patch  Treatments tried:  lidocaine patch, Tylenol, physical therapy ~2018, previous corticosteriod injections prior to surgery   Associated symptoms: some numbness and tingling in upper arm, denies neck pain or stiffness  Orthopedic history: YES - Date:  shoulder pain similar, did physical therapy   Relevant surgical history: YES - Date:  right shoulder surgery in 2008 at [facility] (right subacromial decompression with Dr. Sukhi Venegas  Social history: social history: works as     Past Medical History:   Diagnosis Date     Chronic radicular low back pain 2016     Depressive disorder      Diabetes mellitus (H)      Gastric reflux      Hypercholesteremia      Hypertension      Social History     Socioeconomic History     Marital status:      Spouse name: Not on file     Number of children: Not on file     Years of education: Not on file     Highest education level: Not on file   Occupational History     Not on file   Tobacco Use     Smoking status: Current Every Day Smoker     Packs/day: 0.50     Years: 30.00     Pack years: 15.00     Types: Cigarettes     Last attempt to quit: 3/4/2017     Years since quittin.9     Smokeless tobacco: Never Used     Tobacco comment: Quit for quitting.   Vaping Use     Vaping Use: Every day   Substance and Sexual Activity     Alcohol use: Not Currently     Alcohol/week: 0.0 standard drinks     Comment: 1 bottles of beer per month.     Drug use: No     Sexual activity: Not Currently     Partners: Female     Birth control/protection: None     Comment:    Other Topics Concern     Parent/sibling w/ CABG, MI or angioplasty before 65F 55M? No   Social History Narrative     Not on file     Social Determinants of Health     Financial Resource Strain: Not on file   Food Insecurity:  "Not on file   Transportation Needs: Not on file   Physical Activity: Not on file   Stress: Not on file   Social Connections: Not on file   Intimate Partner Violence: Not on file   Housing Stability: Not on file         Patient's past medical, surgical, social, and family histories were reviewed today and no changes are noted.    REVIEW OF SYSTEMS:  10 point ROS is negative other than symptoms noted above in HPI, Past Medical History or as stated below  Constitutional: NEGATIVE for fever, chills, change in weight  Skin: NEGATIVE for worrisome rashes, moles or lesions  GI/: NEGATIVE for bowel or bladder changes  Neuro: NEGATIVE for weakness, dizziness or paresthesias    OBJECTIVE:  /84   Ht 1.778 m (5' 10\")   Wt 82.8 kg (182 lb 9.6 oz)   BMI 26.20 kg/m     General: healthy, alert and in no distress  HEENT: no scleral icterus or conjunctival erythema  Skin: no suspicious lesions or rash. No jaundice.  CV: regular rhythm by palpation  Resp: normal respiratory effort without conversational dyspnea   Psych: normal mood and affect  Gait: normal steady gait with appropriate coordination and balance  Neuro: normal light touch sensory exam of the bilateral upper extremities.    MSK:  RIGHT SHOULDER  Inspection:    no swelling, bruising, discoloration, or obvious deformity or asymmetry  Palpation:    Tender about the proximal biceps tendon, supraspinatus insertion and infraspinatus insertion. Remainder of bony and tendinous landmarks are nontender.  Active Range of Motion:     Abduction 1350, FF 1650, , IR L4.      Scapular dyskinesis absent  Strength:    Scapular plane abduction painful,  ER painful, IR grossly intact, biceps grossly intact, triceps grossly intact  Special Tests:    Positive: Neer's, Cruz', supraspinatus (empty can), drop arm/painful arc and [place]'s    Negative: crossed arm adduction, Speed's and Yergason's        Independent visualization of the below image:  Recent Results (from the " past 24 hour(s))   XR Shoulder Right G/E 3 Views    Narrative    Examination:  XR SHOULDER RIGHT G/E 3 VIEWS    Date:  2/15/2022 2:39 PM     Clinical Information: Chronic right shoulder pain.    Comparison: 11/3/2016.      Impression    Impression:    1.  Normal right shoulder. No fracture or bone lesion. Normal joint  alignment, without significant degenerative or erosive arthropathy.    CR KILGORE MD         SYSTEM ID:  [id number]           Albert Yeo MD [facility] Orthopedic [facility]        Again, thank you for allowing me to participate in the care of your patient.        Sincerely,        Albert Yeo, MD

## 2022-02-15 NOTE — PATIENT INSTRUCTIONS
1. Tendinopathy of right rotator cuff    2. Chronic right shoulder pain      -Patient has chronic right shoulder pain due to supraspinatus tendinopathy and bursitis.  -Patient had a subacromial decompression 14 years ago which did not improve his pain.  -Patient has completed physical therapy and subacromial cortisone injections without improvement in pain  -Patient will get an MRI of the right shoulder for further evaluation of rotator cuff pathology  - Your MRI has been ordered. You may call [phone] to schedule over the phone. Once you schedule your MRI, call my office to schedule a follow-up phone visit 2 days later to discuss results and next of treatment options.  -To consider repeat cortisone injection versus physical therapy versus PRP or platelet rich plasma treatments versus surgical intervention  -Call direct clinic number [[phone]] at any time with questions or concerns.    Albert Yeo MD [facility]  [facility] [place]

## 2022-02-22 ENCOUNTER — HOSPITAL ENCOUNTER (OUTPATIENT)
Dept: MRI IMAGING | Facility: CLINIC | Age: 57
Discharge: HOME | End: 2022-02-22
Attending: FAMILY MEDICINE | Admitting: FAMILY MEDICINE
Payer: COMMERCIAL

## 2022-02-22 DIAGNOSIS — M67.911 TENDINOPATHY OF RIGHT ROTATOR CUFF: ICD-10-CM

## 2022-02-22 DIAGNOSIS — M25.511 CHRONIC RIGHT SHOULDER PAIN: ICD-10-CM

## 2022-02-22 DIAGNOSIS — G89.29 CHRONIC RIGHT SHOULDER PAIN: ICD-10-CM

## 2022-02-22 PROCEDURE — 73221 MRI JOINT UPR EXTREM W/O DYE: CPT | Mod: RT

## 2022-02-22 PROCEDURE — 73221 MRI JOINT UPR EXTREM W/O DYE: CPT | Mod: 26 | Performed by: RADIOLOGY

## 2022-03-28 ENCOUNTER — TRANSFERRED RECORDS (OUTPATIENT)
Dept: HEALTH INFORMATION MANAGEMENT | Facility: CLINIC | Age: 57
End: 2022-03-28
Payer: COMMERCIAL

## 2022-03-28 LAB — RETINOPATHY: NEGATIVE

## 2022-05-10 DIAGNOSIS — E11.9 TYPE 2 DIABETES MELLITUS WITHOUT COMPLICATION, WITHOUT LONG-TERM CURRENT USE OF INSULIN (H): ICD-10-CM

## 2022-05-10 DIAGNOSIS — I10 ESSENTIAL HYPERTENSION WITH GOAL BLOOD PRESSURE LESS THAN 140/90: ICD-10-CM

## 2022-05-12 NOTE — TELEPHONE ENCOUNTER
Prescription approved per [facility] Refill Protocol.  Noris Koenig RN

## 2022-05-13 RX ORDER — IRBESARTAN 150 MG/1
TABLET ORAL
Qty: 90 TABLET | Refills: 1 | Status: SHIPPED | OUTPATIENT
Start: 2022-05-13 | End: 2022-11-10

## 2022-05-13 NOTE — TELEPHONE ENCOUNTER
Creatinine low when last checked.  Chart reviewed.  Rx sent to pt's preferred pharmacy.       [id number] - [place], [place] - [address]  [id number] - [place], [place] - [address] AT [address] & MAGGIE Clarke MD

## 2022-05-18 DIAGNOSIS — E11.9 TYPE 2 DIABETES MELLITUS WITHOUT COMPLICATION, WITHOUT LONG-TERM CURRENT USE OF INSULIN (H): ICD-10-CM

## 2022-05-18 NOTE — PROGRESS NOTES
Nutrition Note: Chart and labs reviewed for patient's current length of stay. No nutrition intervention appears indicated at this time. RD available via consult. Will continue to follow per protocol. See Daily

## 2022-05-19 RX ORDER — LANCETS
EACH MISCELLANEOUS
Qty: 102 EACH | Refills: 1 | Status: SHIPPED | OUTPATIENT
Start: 2022-05-19

## 2022-06-17 ENCOUNTER — TELEPHONE (OUTPATIENT)
Dept: FAMILY MEDICINE | Facility: CLINIC | Age: 57
End: 2022-06-17

## 2022-06-17 DIAGNOSIS — E11.9 TYPE 2 DIABETES MELLITUS WITHOUT COMPLICATION, WITHOUT LONG-TERM CURRENT USE OF INSULIN (H): ICD-10-CM

## 2022-06-17 NOTE — TELEPHONE ENCOUNTER
Routing refill request to provider for review/approval because:  Labs out of range:  creatinine    Creatinine   Date Value Ref Range Status   02/09/2022 0.62 (L) 0.66 - 1.25 mg/dL Final   11/25/2020 0.70 0.66 - 1.25 mg/dL Final     Ana Luisa Montesinos RN  [facility]

## 2022-06-17 NOTE — TELEPHONE ENCOUNTER
Patient calls.     States he got a letter from the insurance company stating the Ozempic is needing prior authorization. He is also needing refill.     Routing PA team.     Ana Luisa Montesinos RN  [facility]

## 2022-06-20 RX ORDER — SEMAGLUTIDE 1.34 MG/ML
0.5 INJECTION, SOLUTION SUBCUTANEOUS
Qty: 9 ML | Refills: 0 | Status: SHIPPED | OUTPATIENT
Start: 2022-06-20 | End: 2022-07-12

## 2022-06-22 NOTE — TELEPHONE ENCOUNTER
Central Prior Authorization Team   Phone: [phone]      PA NOT NEEDED    Medication: Ozempic  Insurance Company: [employer] - Phone [phone] Fax [phone]  Pharmacy Filling the Rx: [id number] - [place], [place] - [address] AT [facility] OF [facility] [address]  Filling Pharmacy Phone: [phone]  Filling Pharmacy Fax:    Start Date: 6/22/2022    Started PA on CMM and a response of Drug is covered by current benefit plan. No further PA activity needed.  Per insurance, medication is still showing on their covered list so a PA cannot be completed at this time.  A PA might have to be completed after the medication shows it is no longer on the formulary.

## 2022-06-22 NOTE — TELEPHONE ENCOUNTER
Notified patient that RX did not require a PA and he can pick it up at the pharmacy       Rosanne Swann

## 2022-07-02 ENCOUNTER — HEALTH MAINTENANCE LETTER (OUTPATIENT)
Age: 57
End: 2022-07-02

## 2022-07-12 ENCOUNTER — OFFICE VISIT (OUTPATIENT)
Dept: FAMILY MEDICINE | Facility: CLINIC | Age: 57
End: 2022-07-12
Payer: COMMERCIAL

## 2022-07-12 VITALS
TEMPERATURE: 97.9 F | HEART RATE: 86 BPM | OXYGEN SATURATION: 99 % | DIASTOLIC BLOOD PRESSURE: 74 MMHG | HEIGHT: 70 IN | SYSTOLIC BLOOD PRESSURE: 118 MMHG | WEIGHT: 180 LBS | BODY MASS INDEX: 25.77 KG/M2 | RESPIRATION RATE: 12 BRPM

## 2022-07-12 DIAGNOSIS — M54.12 CERVICAL RADICULOPATHY: ICD-10-CM

## 2022-07-12 DIAGNOSIS — M54.2 NECK PAIN: ICD-10-CM

## 2022-07-12 DIAGNOSIS — Z23 NEED FOR PNEUMOCOCCAL VACCINATION: ICD-10-CM

## 2022-07-12 DIAGNOSIS — L82.1 SEBORRHEIC KERATOSIS: ICD-10-CM

## 2022-07-12 DIAGNOSIS — E11.9 TYPE 2 DIABETES MELLITUS WITHOUT COMPLICATION, WITHOUT LONG-TERM CURRENT USE OF INSULIN (H): Primary | ICD-10-CM

## 2022-07-12 LAB — HBA1C MFR BLD: 6.3 % (ref 0–5.6)

## 2022-07-12 PROCEDURE — 90677 PCV20 VACCINE IM: CPT | Performed by: FAMILY MEDICINE

## 2022-07-12 PROCEDURE — 17110 DESTRUCTION B9 LES UP TO 14: CPT | Performed by: FAMILY MEDICINE

## 2022-07-12 PROCEDURE — 36415 COLL VENOUS BLD VENIPUNCTURE: CPT | Performed by: FAMILY MEDICINE

## 2022-07-12 PROCEDURE — 90471 IMMUNIZATION ADMIN: CPT | Performed by: FAMILY MEDICINE

## 2022-07-12 PROCEDURE — 83036 HEMOGLOBIN GLYCOSYLATED A1C: CPT | Performed by: FAMILY MEDICINE

## 2022-07-12 PROCEDURE — 99214 OFFICE O/P EST MOD 30 MIN: CPT | Mod: 25 | Performed by: FAMILY MEDICINE

## 2022-07-12 PROCEDURE — 99207 PR FOOT EXAM NO CHARGE: CPT | Mod: 25 | Performed by: FAMILY MEDICINE

## 2022-07-12 RX ORDER — SEMAGLUTIDE 1.34 MG/ML
0.5 INJECTION, SOLUTION SUBCUTANEOUS
Qty: 9 ML | Refills: 0 | Status: SHIPPED | OUTPATIENT
Start: 2022-07-12 | End: 2023-03-15

## 2022-07-12 ASSESSMENT — ANXIETY QUESTIONNAIRES
3. WORRYING TOO MUCH ABOUT DIFFERENT THINGS: NOT AT ALL
GAD7 TOTAL SCORE: 1
4. TROUBLE RELAXING: NOT AT ALL
8. IF YOU CHECKED OFF ANY PROBLEMS, HOW DIFFICULT HAVE THESE MADE IT FOR YOU TO DO YOUR WORK, TAKE CARE OF THINGS AT HOME, OR GET ALONG WITH OTHER PEOPLE?: NOT DIFFICULT AT ALL
5. BEING SO RESTLESS THAT IT IS HARD TO SIT STILL: SEVERAL DAYS
6. BECOMING EASILY ANNOYED OR IRRITABLE: NOT AT ALL
1. FEELING NERVOUS, ANXIOUS, OR ON EDGE: NOT AT ALL
GAD7 TOTAL SCORE: 1
2. NOT BEING ABLE TO STOP OR CONTROL WORRYING: NOT AT ALL
GAD7 TOTAL SCORE: 1
7. FEELING AFRAID AS IF SOMETHING AWFUL MIGHT HAPPEN: NOT AT ALL
7. FEELING AFRAID AS IF SOMETHING AWFUL MIGHT HAPPEN: NOT AT ALL

## 2022-07-12 ASSESSMENT — PAIN SCALES - GENERAL: PAINLEVEL: NO PAIN (0)

## 2022-07-12 NOTE — PROGRESS NOTES
"  Assessment & Plan     Type 2 diabetes mellitus without complication, without long-term current use of insulin (H): well controlled. No neuropathy on exam. Continue metformin, ozempic. Recheck A1c today.  - HEMOGLOBIN A1C; Future  - semaglutide (OZEMPIC, 0.25 OR 0.5 MG/DOSE,) 2 MG/1.5ML SOPN pen; Inject 0.5 mg Subcutaneous every 7 days  - FOOT EXAM    Cervical radiculopathy: symptoms consistent with cervical radiculopathy. Continue acetaminophen/ibuprofen PRN. Can try ice/heat on back of neck as well. Will order MRI for further evaluation and to help guide treatment options.  - MR Cervical Spine w/o Contrast; Future    Need for pneumococcal vaccination  - Pneumococcal 20 Valent Conjugate (Prevnar 20)    Neck pain  - MR Cervical Spine w/o Contrast; Future    Seborrheic keratosis: PROCEDURE NOTE:  Risks, benefits, and alternatives to cryotherapy reviewed including but not limited to blistering, infection.  Verbal consent obtained. 2 lesions were frozen with liquid nitrogen for 2 freeze-thaw cycles.  Band-aid applied.  Patient tolerated procedure well. After care reviewed.  Follow-up in 3 weeks for refreeze if needed.       Tobacco Cessation:   reports that he has been smoking cigarettes. He has a 15.00 pack-year smoking history. He has never used smokeless tobacco.    BMI:   Estimated body mass index is 25.83 kg/m  as calculated from the following:    Height as of this encounter: 1.778 m (5' 10\").    Weight as of this encounter: 81.6 kg (180 lb).       Return in about 6 months (around 1/12/2023) for Diabetes follow-up.    Vargas Davison DO  [facility] is a 57 year old, presenting for the following health issues:  Recheck Medication      History of Present Illness       Diabetes:   He presents for follow up of diabetes.  He is checking home blood glucose a few times a week. He checks blood glucose after meals.  Blood glucose is never over 200 and never under 70. He " "is aware of hypoglycemia symptoms including dizziness.  He is not experiencing numbness or burning in feet, excessive thirst, blurry vision, weight changes or redness, sores or blisters on feet. The patient has had a diabetic eye exam in the last 12 months. Eye exam performed on 3/28/22. Location of last eye exam [facility] (scanned in Media tab) .        Reason for visit:  Neck pain, A1C test  Symptom onset:  More than a month  Symptom intensity:  Moderate  Symptom progression:  Staying the same  Had these symptoms before:  No  What makes it worse:  When my head lift up and down  What makes it better:  Lay down    He eats 0-1 servings of fruits and vegetables daily.He consumes 0 sweetened beverage(s) daily.He exercises with enough effort to increase his heart rate 10 to 19 minutes per day.  He exercises with enough effort to increase his heart rate 3 or less days per week.   He is taking medications regularly.  Today's SKYLA-7 Score: 1       When checking blood sugars after eating, will be 140-150. Checked fasting this AM and was 142.       Neck pain: has been going on for more than a month. Pain is located at the base of his neck in the back. If he extends his neck, will get pain into the left shoulder and also cause a headache. In the morning feels some nausea and dizziness. No pain with neck flexion. Neck rotation is uncomfortable. He states pain started one day when he woke up. Over the pat month, severity of symptoms has been constant. He tried taking Tylenol for the neck pain without much relief. He hasn't tried applying ice or heat. He has been doing some neck stretching exercises. No numbness or tingling in his fingers.         Review of Systems   Constitutional, HEENT, cardiovascular, pulmonary, gi and gu systems are negative, except as otherwise noted.      Objective    /74   Pulse 86   Temp 97.9  F (36.6  C) (Tympanic)   Resp 12   Ht 1.778 m (5' 10\")   Wt 81.6 kg (180 lb)   SpO2 99%   BMI 25.83 " kg/m    Body mass index is 25.83 kg/m .  Physical Exam   GENERAL: healthy, alert and no distress  MS: neck exam shows no torticollis and ROM is normal; Spurling test positive on the left. B/L UE strength 5/5  NEURO: Normal strength and tone, sensory exam grossly normal and mentation intact  PSYCH: mentation appears normal, affect normal/bright  Diabetic foot exam: normal DP and PT pulses, no trophic changes or ulcerative lesions, normal sensory exam and normal monofilament exam                .  ..

## 2022-07-12 NOTE — PATIENT INSTRUCTIONS
[facility] Radiology should be contacting you to schedule your MRI.  If they do not contact you, you can call [phone] to make an appointment.        Orthopedics:  Call direct clinic number [[phone]] at any time with questions or concerns.

## 2022-07-15 ENCOUNTER — HOSPITAL ENCOUNTER (OUTPATIENT)
Dept: MRI IMAGING | Facility: CLINIC | Age: 57
Discharge: HOME | End: 2022-07-15
Attending: FAMILY MEDICINE | Admitting: FAMILY MEDICINE
Payer: COMMERCIAL

## 2022-07-15 DIAGNOSIS — M54.12 CERVICAL RADICULOPATHY: ICD-10-CM

## 2022-07-15 DIAGNOSIS — M54.2 NECK PAIN: ICD-10-CM

## 2022-07-15 PROCEDURE — 72141 MRI NECK SPINE W/O DYE: CPT

## 2022-07-17 DIAGNOSIS — M54.12 CERVICAL RADICULOPATHY: Primary | ICD-10-CM

## 2022-07-17 DIAGNOSIS — M50.222 HERNIATION OF INTERVERTEBRAL DISC AT C5-C6 LEVEL: ICD-10-CM

## 2022-07-20 DIAGNOSIS — E11.9 TYPE 2 DIABETES MELLITUS WITHOUT COMPLICATION, WITHOUT LONG-TERM CURRENT USE OF INSULIN (H): ICD-10-CM

## 2022-07-22 RX ORDER — SEMAGLUTIDE 1.34 MG/ML
INJECTION, SOLUTION SUBCUTANEOUS
Qty: 9 ML | Refills: 0 | OUTPATIENT
Start: 2022-07-22

## 2022-07-22 NOTE — TELEPHONE ENCOUNTER
Patient calling back and stating pharmacy  says they do not have any refill s    Pharmacy stated medication requires prior auth   Read note on 6/22/22 stated does not require PA    Pharmacy stated it went through and patient can  today  One month 24.99  Called patient back and informed him     Denied ?'s     Gwendolyn HENRIQUEZ RN   [facility] Triage

## 2022-08-08 ENCOUNTER — OFFICE VISIT (OUTPATIENT)
Dept: NEUROSURGERY | Facility: CLINIC | Age: 57
End: 2022-08-08
Payer: COMMERCIAL

## 2022-08-08 VITALS
WEIGHT: 183.6 LBS | HEART RATE: 98 BPM | DIASTOLIC BLOOD PRESSURE: 76 MMHG | BODY MASS INDEX: 26.34 KG/M2 | SYSTOLIC BLOOD PRESSURE: 109 MMHG | OXYGEN SATURATION: 100 %

## 2022-08-08 DIAGNOSIS — M48.02 SPINAL STENOSIS IN CERVICAL REGION: Primary | ICD-10-CM

## 2022-08-08 DIAGNOSIS — M54.12 CERVICAL RADICULOPATHY: ICD-10-CM

## 2022-08-08 PROCEDURE — 99243 OFF/OP CNSLTJ NEW/EST LOW 30: CPT | Performed by: NURSE PRACTITIONER

## 2022-08-08 ASSESSMENT — PAIN SCALES - GENERAL: PAINLEVEL: SEVERE PAIN (7)

## 2022-08-08 NOTE — PATIENT INSTRUCTIONS
Referral to physical therapy. They will call you to schedule.     Please follow-up in clinic if symptoms persist, change, or worsen at any time.    [facility] Neurosurgery  [address] [address]  Tel [phone]

## 2022-08-08 NOTE — NURSING NOTE
"Saran Wallis is a 57 year old male who presents for:  Chief Complaint   Patient presents with     Neurologic Problem     Cervical radiculopathy        Initial Vitals:  /76   Pulse 98   Wt 183 lb 9.6 oz (83.3 kg)   SpO2 100%   BMI 26.34 kg/m   Estimated body mass index is 26.34 kg/m  as calculated from the following:    Height as of 7/12/22: 5' 10\" (1.778 m).    Weight as of this encounter: 183 lb 9.6 oz (83.3 kg).. Body surface area is 2.03 meters squared. BP completed using cuff size: regular  Severe Pain (7)    Gerber Morales  "

## 2022-08-08 NOTE — PROGRESS NOTES
"[facility] Neurosurgery Clinic Visit    CC: neck pain, headaches, arm pain     Primary Care Provider: Vamshi Clarke Jr.    Reason For Visit:   I was asked by Dr. Davison to consult on the patient for: cervical radiculopathy, disc herniation       HPI: Saran Wallis is a 57 year old male who presents for evaluation of neck pain, headaches, and arm pain. Symptoms started about 2 months ago. Patient states he \"woke up with pain\" and denies injury or trauma at onset. Today, patient reports constant posterior neck pain with intermittent pain radiating to left shoulder, left tricep, and right forearm. Denies numbness, tingling, or weakness. Pain is worsened with flexion and extension. Patient has tried Tylenol and NSAIDS. Denies any falls, foot drop, saddle anesthesia, or bladder/bowel incontinence.    Current pain: 7/10     Past Medical History:   Diagnosis Date     Chronic radicular low back pain 4/30/2016     Depressive disorder      Diabetes mellitus (H)      Gastric reflux      Hypercholesteremia      Hypertension        Past Medical History reviewed with patient during visit.    Past Surgical History:   Procedure Laterality Date     BACK SURGERY  2009,2012    L4, L5, S1 Disk herniation     COLONOSCOPY N/A 1/28/2016    Procedure: COMBINED COLONOSCOPY, SINGLE OR MULTIPLE BIOPSY/POLYPECTOMY BY BIOPSY;  Surgeon: Michelle Morrison MD;  Location:  GI     COSMETIC SURGERY  1981    Left Hand Skin graft.     ESOPHAGOSCOPY, GASTROSCOPY, DUODENOSCOPY (EGD), COMBINED N/A 5/13/2016    Procedure: COMBINED ESOPHAGOSCOPY, GASTROSCOPY, DUODENOSCOPY (EGD);  Surgeon: Vamshi Lynn MD;  Location:  GI     EYE SURGERY  1981     ORTHOPEDIC SURGERY       Past Surgical History reviewed with patient during visit.    Current Outpatient Medications   Medication     ACCU-CHEK GUIDE test strip     ASPIRIN PO     blood glucose monitoring (ACCU-CHEK ALBINO PLUS) meter device kit     blood glucose monitoring (ACCU-CHEK " FASTCLIX) lancets     irbesartan (AVAPRO) 150 MG tablet     metFORMIN (GLUCOPHAGE) 500 MG tablet     Omega-3 Fatty Acids (FISH OIL) 500 MG CAPS     pantoprazole (PROTONIX) 40 MG EC tablet     semaglutide (OZEMPIC, 0.25 OR 0.5 MG/DOSE,) 2 MG/1.5ML SOPN pen     sertraline (ZOLOFT) 50 MG tablet     sildenafil (VIAGRA) 100 MG tablet     simvastatin (ZOCOR) 10 MG tablet     VITAMIN D PO     No current facility-administered medications for this visit.       Allergies   Allergen Reactions     Eszopiclone      Other reaction(s): Taste, Changes  Works, but bitter taste.     Food Hives     Peaches       Social History     Socioeconomic History     Marital status:    Tobacco Use     Smoking status: Current Every Day Smoker     Packs/day: 0.50     Years: 30.00     Pack years: 15.00     Types: Cigarettes     Last attempt to quit: 3/4/2017     Years since quittin.4     Smokeless tobacco: Never Used     Tobacco comment: Quit for quitting.   Vaping Use     Vaping Use: Every day   Substance and Sexual Activity     Alcohol use: Not Currently     Alcohol/week: 0.0 standard drinks     Comment: 1 bottles of beer per month.     Drug use: No     Sexual activity: Not Currently     Partners: Female     Birth control/protection: None     Comment:    Other Topics Concern     Parent/sibling w/ CABG, MI or angioplasty before 65F 55M? No       Family History   Problem Relation Age of Onset     Diabetes Mother      Hypertension Brother      Hyperlipidemia Brother      Cerebrovascular Disease Brother      Other Cancer Paternal Grandmother      Colon Cancer No family hx of        ROS: 10 point ROS neg other than the symptoms noted above in the HPI.    Vital Signs: /76   Pulse 98   Wt 183 lb 9.6 oz (83.3 kg)   SpO2 100%   BMI 26.34 kg/m      Physical Examination:  Constitutional:  Alert, well nourished, NAD.  HEENT: Normocephalic, atraumatic.   Pulmonary:  Without shortness of breath, normal effort.   Cardiovascular:  No  pitting edema of BLE.      Neurological:  Awake  Alert  Oriented x 3  Speech clear  Cranial nerves II - XII grossly intact  PERRL  EOMI  Face symmetric  Tongue midline  Motor exam:  Shoulder Abduction  Right:  5/5   Left:  5/5  Biceps                      Right:  5/5   Left:  5/5  Triceps                     Right:  5/5   Left:  5/5  Wrist Extensors        Right:  5/5   Left:  5/5  Wrist Flexors            Right:  5/5   Left:  5/5  Intrinsics                   Right:  5/5   Left:  5/5    Iliopsoas  (hip flexion)               Right: 5/5  Left:  5/5  Quadriceps  (knee extension)       Right:  5/5  Left:  5/5  Hamstrings  (knee flexion)            Right:  5/5  Left:  5/5  Gastroc Soleus  (PF)                          Right:  5/5  Left:  5/5  Tibialis Ant  (DF)                          Right:  5/5  Left:  5/5  EHL                          Right:  5/5  Left:  5/5         Sensation normal to BUE and BLE     Reflexes are 2+ in the patellar and Achilles. There is no clonus.     Reflexes are 2+ in the brachial radialis and triceps. Negative Uma sign bilaterally.    Musculoskeletal:  Gait: Able to stand from a seated position. Normal non-antalgic, non-myelopathic gait.   Cervical examination reveals moderate pain with range of motion.  No tenderness of the spine or paraspinous muscles.    Imaging:   MR CERVICAL SPINE WITHOUT CONTRAST 7/15/2022   IMPRESSION:  1.  Degenerative changes are most pronounced at C5-C6 and C6-C7 where  there is moderate to severe and mild to moderate spinal canal stenosis  respectively.  2.  At C5-C6 disc herniation deforms the spinal cord. No spinal cord  signal abnormality. There is moderate to severe right and mild to  moderate left foraminal narrowing.  3.  At C6-C7 there is moderate to severe left and mild right foraminal  narrowing.  4.  Mild spinal canal and foraminal narrowing at C4-C5.     Assessment/Plan:   57 year old male who presents for evaluation of constant posterior neck pain,  headaches, and intermittent pain radiating to left shoulder, left tricep, and right forearm. Symptoms started about 2 months ago. Cervical spine MRI shows multilevel degenerative changes with spinal canal stenosis and bilateral foraminal stenosis at C5-7. We discussed imaging and next steps. Patient would like to start a course of PT, then follow-up in clinic with Dr. Bagley to discuss possible surgical options.      Advised patient to call our clinic with any questions or concerns. Discussed red flag symptoms and advised to seek medical attention if these develop. Patient voiced understanding and agreement.      Wanda Carrillo [facility] Neurosurgery  [address] [address]  Tel [phone]  Pager [phone]

## 2022-08-08 NOTE — LETTER
"    8/8/2022         RE: Saran Wallis  [address] [place] [address]        Dear Colleague,    Thank you for referring your patient, Saran Wallis, to the [facility] NEUROLOGY CLINICS [facility]. Please see a copy of my visit note below.    [facility] Neurosurgery Clinic Visit    CC: neck pain, headaches, arm pain     Primary Care Provider: Vamshi Clarke Jr.    Reason For Visit:   I was asked by Dr. Davison to consult on the patient for: cervical radiculopathy, disc herniation       HPI: Saran Wallis is a 57 year old male who presents for evaluation of neck pain, headaches, and arm pain. Symptoms started about 2 months ago. Patient states he \"woke up with pain\" and denies injury or trauma at onset. Today, patient reports constant posterior neck pain with intermittent pain radiating to left shoulder, left tricep, and right forearm. Denies numbness, tingling, or weakness. Pain is worsened with flexion and extension. Patient has tried Tylenol and NSAIDS. Denies any falls, foot drop, saddle anesthesia, or bladder/bowel incontinence.    Current pain: 7/10     Past Medical History:   Diagnosis Date     Chronic radicular low back pain 4/30/2016     Depressive disorder      Diabetes mellitus (H)      Gastric reflux      Hypercholesteremia      Hypertension        Past Medical History reviewed with patient during visit.    Past Surgical History:   Procedure Laterality Date     BACK SURGERY  2009,2012    L4, L5, S1 Disk herniation     COLONOSCOPY N/A 1/28/2016    Procedure: COMBINED COLONOSCOPY, SINGLE OR MULTIPLE BIOPSY/POLYPECTOMY BY BIOPSY;  Surgeon: Michelle Morrison MD;  Location:  GI     COSMETIC SURGERY  1981    Left Hand Skin graft.     ESOPHAGOSCOPY, GASTROSCOPY, DUODENOSCOPY (EGD), COMBINED N/A 5/13/2016    Procedure: COMBINED ESOPHAGOSCOPY, GASTROSCOPY, DUODENOSCOPY (EGD);  Surgeon: Vamshi Lynn MD;  Location:  GI     EYE SURGERY  1981     ORTHOPEDIC SURGERY       Past " Surgical History reviewed with patient during visit.    Current Outpatient Medications   Medication     ACCU-CHEK GUIDE test strip     ASPIRIN PO     blood glucose monitoring (ACCU-CHEK ALBINO PLUS) meter device kit     blood glucose monitoring (ACCU-CHEK FASTCLIX) lancets     irbesartan (AVAPRO) 150 MG tablet     metFORMIN (GLUCOPHAGE) 500 MG tablet     Omega-3 Fatty Acids (FISH OIL) 500 MG CAPS     pantoprazole (PROTONIX) 40 MG EC tablet     semaglutide (OZEMPIC, 0.25 OR 0.5 MG/DOSE,) 2 MG/1.5ML SOPN pen     sertraline (ZOLOFT) 50 MG tablet     sildenafil (VIAGRA) 100 MG tablet     simvastatin (ZOCOR) 10 MG tablet     VITAMIN D PO     No current facility-administered medications for this visit.       Allergies   Allergen Reactions     Eszopiclone      Other reaction(s): Taste, Changes  Works, but bitter taste.     Food Hives     Peaches       Social History     Socioeconomic History     Marital status:    Tobacco Use     Smoking status: Current Every Day Smoker     Packs/day: 0.50     Years: 30.00     Pack years: 15.00     Types: Cigarettes     Last attempt to quit: 3/4/2017     Years since quittin.4     Smokeless tobacco: Never Used     Tobacco comment: Quit for quitting.   Vaping Use     Vaping Use: Every day   Substance and Sexual Activity     Alcohol use: Not Currently     Alcohol/week: 0.0 standard drinks     Comment: 1 bottles of beer per month.     Drug use: No     Sexual activity: Not Currently     Partners: Female     Birth control/protection: None     Comment:    Other Topics Concern     Parent/sibling w/ CABG, MI or angioplasty before 65F 55M? No       Family History   Problem Relation Age of Onset     Diabetes Mother      Hypertension Brother      Hyperlipidemia Brother      Cerebrovascular Disease Brother      Other Cancer Paternal Grandmother      Colon Cancer No family hx of        ROS: 10 point ROS neg other than the symptoms noted above in the HPI.    Vital Signs: /76    Pulse 98   Wt 183 lb 9.6 oz (83.3 kg)   SpO2 100%   BMI 26.34 kg/m      Physical Examination:  Constitutional:  Alert, well nourished, NAD.  HEENT: Normocephalic, atraumatic.   Pulmonary:  Without shortness of breath, normal effort.   Cardiovascular:  No pitting edema of BLE.      Neurological:  Awake  Alert  Oriented x 3  Speech clear  Cranial nerves II - XII grossly intact  PERRL  EOMI  Face symmetric  Tongue midline  Motor exam:  Shoulder Abduction  Right:  5/5   Left:  5/5  Biceps                      Right:  5/5   Left:  5/5  Triceps                     Right:  5/5   Left:  5/5  Wrist Extensors        Right:  5/5   Left:  5/5  Wrist Flexors            Right:  5/5   Left:  5/5  Intrinsics                   Right:  5/5   Left:  5/5    Iliopsoas  (hip flexion)               Right: 5/5  Left:  5/5  Quadriceps  (knee extension)       Right:  5/5  Left:  5/5  Hamstrings  (knee flexion)            Right:  5/5  Left:  5/5  Gastroc Soleus  (PF)                          Right:  5/5  Left:  5/5  Tibialis Ant  (DF)                          Right:  5/5  Left:  5/5  EHL                          Right:  5/5  Left:  5/5         Sensation normal to BUE and BLE     Reflexes are 2+ in the patellar and Achilles. There is no clonus.     Reflexes are 2+ in the brachial radialis and triceps. Negative Uma sign bilaterally.    Musculoskeletal:  Gait: Able to stand from a seated position. Normal non-antalgic, non-myelopathic gait.   Cervical examination reveals moderate pain with range of motion.  No tenderness of the spine or paraspinous muscles.    Imaging:   MR CERVICAL SPINE WITHOUT CONTRAST 7/15/2022   IMPRESSION:  1.  Degenerative changes are most pronounced at C5-C6 and C6-C7 where  there is moderate to severe and mild to moderate spinal canal stenosis  respectively.  2.  At C5-C6 disc herniation deforms the spinal cord. No spinal cord  signal abnormality. There is moderate to severe right and mild to  moderate left  foraminal narrowing.  3.  At C6-C7 there is moderate to severe left and mild right foraminal  narrowing.  4.  Mild spinal canal and foraminal narrowing at C4-C5.     Assessment/Plan:   57 year old male who presents for evaluation of constant posterior neck pain, headaches, and intermittent pain radiating to left shoulder, left tricep, and right forearm. Symptoms started about 2 months ago. Cervical spine MRI shows multilevel degenerative changes with spinal canal stenosis and bilateral foraminal stenosis at C5-7. We discussed imaging and next steps. Patient would like to start a course of PT, then follow-up in clinic with Dr. Bagley to discuss possible surgical options.      Advised patient to call our clinic with any questions or concerns. Discussed red flag symptoms and advised to seek medical attention if these develop. Patient voiced understanding and agreement.      Wanda Carrillo CNP  [facility]surgery  [address] [address]  Tel [phone]  Pager [phone]          Again, thank you for allowing me to participate in the care of your patient.        Sincerely,        Wanda Carrillo NP

## 2022-08-12 ENCOUNTER — THERAPY VISIT (OUTPATIENT)
Dept: PHYSICAL THERAPY | Facility: CLINIC | Age: 57
End: 2022-08-12
Attending: NURSE PRACTITIONER
Payer: COMMERCIAL

## 2022-08-12 DIAGNOSIS — M54.2 NECK PAIN: ICD-10-CM

## 2022-08-12 DIAGNOSIS — M54.12 CERVICAL RADICULOPATHY: ICD-10-CM

## 2022-08-12 PROCEDURE — 97161 PT EVAL LOW COMPLEX 20 MIN: CPT | Mod: GP | Performed by: PHYSICAL THERAPIST

## 2022-08-12 PROCEDURE — 97140 MANUAL THERAPY 1/> REGIONS: CPT | Mod: GP | Performed by: PHYSICAL THERAPIST

## 2022-08-12 PROCEDURE — 97110 THERAPEUTIC EXERCISES: CPT | Mod: GP | Performed by: PHYSICAL THERAPIST

## 2022-08-12 NOTE — PROGRESS NOTES
Physical Therapy Initial Evaluation  Subjective:  The history is provided by the patient.   Patient Health History  Saran Wallis being seen for neck Pain .     Problem began: 8/8/2022 (MD visit).   Problem occurred: insidious onset   Pain is reported as 7/10 on pain scale.  General health as reported by patient is fair.  Pertinent medical history includes: depression, diabetes, high blood pressure and smoking.     Medical allergies: none.   Surgeries include:  Orthopedic surgery. Other surgery history details: R shoulder, Low back.    Current medications:  Anti-depressants and high blood pressure medication. Other medications details: diabetes, acid reflux.    Current occupation is electronic .                     Therapist Generated HPI Evaluation  Problem details: Pt woke up about two months ago with neck pain and headaches, still having constant headaches and pt reports pain is getting worse. MRI revealing herniation at C5-6. Pt reports he had a few lumbar surgeries and they didn't go well so hoping to avoid surgery for now. Radiating into L shoulder and posterior arm and into R forearm intermittently. Pt is R handed. 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 Pt drives to [place] every month and it takes him 10 hours because he has to stop multiple times, much pain with driving. Denies numbness/tingling .         Type of problem:  Cervical spine.    This is a new condition.      Patient reports pain:  Central cervical spine.  Pain is described as aching (heaviness) and is constant.  Pain radiates to:  Lower arm right and upper arm left.   Since onset symptoms are gradually worsening.  Associated symptoms:  Loss of motion/stiffness and headache (conor horn distribution of headache, redcuced  strength ). Symptoms are exacerbated by driving, sitting and looking up or down  and relieved by rest and analgesics (Tylenol and advil ).  Special tests included:   MRI.    Restrictions due to condition include:  Working in normal job without restrictions.                          Objective:  Standing Alignment:    Cervical/Thoracic:  Forward head                                    Cervical/Thoracic Evaluation    AROM:  AROM Cervical:    Flexion:            Paniful to chest   Extension:       40  Rotation:         Left: 50     Right: 45  Side Bend:      Left: 10     Right:  25      Headaches: cervical  Cervical Myotomes:  normal                        Cervical Palpation:    Tenderness present at Left:    Upper Trap and Suboccipitals  Tenderness present at Right:    Upper Trap and Suboccipitals               Shoulder Evaluation:  ROM:  AROM:  normal                                                                             General     ROS    Assessment/Plan:    Patient is a 57 year old male with cervical complaints.    Patient has the following significant findings with corresponding treatment plan.                Diagnosis 1:  Neck Pain  Pain -  self management, education and home program  Decreased ROM/flexibility - manual therapy, therapeutic exercise and home program  Decreased joint mobility - manual therapy, therapeutic exercise and home program  Decreased strength - therapeutic exercise, therapeutic activities and home program  Impaired muscle performance - neuro re-education and home program  Decreased function - therapeutic activities and home program  Impaired posture - neuro re-education, therapeutic activities and home program      Cumulative Therapy Evaluation is: Low complexity.    Previous and current functional limitations:  (See Goal Flow Sheet for this information)    Short term and Long term goals: (See Goal Flow Sheet for this information)     Communication ability:  Patient appears to be able to clearly communicate and understand verbal and written communication and follow directions correctly.  Treatment Explanation - The following has been discussed with  the patient:   RX ordered/plan of care  Anticipated outcomes  Possible risks and side effects  This patient would benefit from PT intervention to resume normal activities.   Rehab potential is good.    Frequency:  1 X week, once daily  Duration:  for 8 weeks  Discharge Plan:  Achieve all LTG.  Independent in home treatment program.  Reach maximal therapeutic benefit.    Please refer to the daily flowsheet for treatment today, total treatment time and time spent performing 1:1 timed codes.

## 2022-08-18 ENCOUNTER — THERAPY VISIT (OUTPATIENT)
Dept: PHYSICAL THERAPY | Facility: CLINIC | Age: 57
End: 2022-08-18
Payer: COMMERCIAL

## 2022-08-18 DIAGNOSIS — M54.2 NECK PAIN: Primary | ICD-10-CM

## 2022-08-18 PROCEDURE — 97110 THERAPEUTIC EXERCISES: CPT | Mod: GP | Performed by: PHYSICAL THERAPIST

## 2022-08-18 PROCEDURE — 97140 MANUAL THERAPY 1/> REGIONS: CPT | Mod: GP | Performed by: PHYSICAL THERAPIST

## 2022-08-26 ENCOUNTER — THERAPY VISIT (OUTPATIENT)
Dept: PHYSICAL THERAPY | Facility: CLINIC | Age: 57
End: 2022-08-26
Payer: COMMERCIAL

## 2022-08-26 DIAGNOSIS — M54.2 NECK PAIN: Primary | ICD-10-CM

## 2022-08-26 PROCEDURE — 97112 NEUROMUSCULAR REEDUCATION: CPT | Mod: GP | Performed by: PHYSICAL THERAPIST

## 2022-08-26 PROCEDURE — 97110 THERAPEUTIC EXERCISES: CPT | Mod: GP | Performed by: PHYSICAL THERAPIST

## 2022-08-26 PROCEDURE — 97140 MANUAL THERAPY 1/> REGIONS: CPT | Mod: GP | Performed by: PHYSICAL THERAPIST

## 2022-08-29 ENCOUNTER — ANCILLARY PROCEDURE (OUTPATIENT)
Dept: GENERAL RADIOLOGY | Facility: CLINIC | Age: 57
End: 2022-08-29
Attending: NEUROLOGICAL SURGERY
Payer: COMMERCIAL

## 2022-08-29 ENCOUNTER — OFFICE VISIT (OUTPATIENT)
Dept: NEUROSURGERY | Facility: CLINIC | Age: 57
End: 2022-08-29
Attending: NEUROLOGICAL SURGERY
Payer: COMMERCIAL

## 2022-08-29 VITALS
SYSTOLIC BLOOD PRESSURE: 123 MMHG | HEIGHT: 70 IN | BODY MASS INDEX: 26.2 KG/M2 | WEIGHT: 183 LBS | DIASTOLIC BLOOD PRESSURE: 76 MMHG | OXYGEN SATURATION: 100 % | HEART RATE: 106 BPM

## 2022-08-29 DIAGNOSIS — M47.22 CERVICAL SPONDYLOSIS WITH RADICULOPATHY: Primary | ICD-10-CM

## 2022-08-29 DIAGNOSIS — M47.22 CERVICAL SPONDYLOSIS WITH RADICULOPATHY: ICD-10-CM

## 2022-08-29 PROCEDURE — 99213 OFFICE O/P EST LOW 20 MIN: CPT | Performed by: NEUROLOGICAL SURGERY

## 2022-08-29 PROCEDURE — 72050 X-RAY EXAM NECK SPINE 4/5VWS: CPT | Mod: TC | Performed by: RADIOLOGY

## 2022-08-29 ASSESSMENT — PAIN SCALES - GENERAL: PAINLEVEL: SEVERE PAIN (7)

## 2022-08-29 NOTE — PATIENT INSTRUCTIONS
Patient Next Steps:    Order placed for epidural steroid injection  The steroid can take 10-14 days to reach max effect  Please call our clinic if symptoms persist after this timeframe.  You can call to schedule injection at [phone]     Order placed for Cervical XRs to be completed today.       Dr. Bagley would like to see you back in the clinic for follow up after your injection if you would like to discuss next steps/ surgical options. Please call the number below to schedule.         Please call us if you have any further questions or concerns.    [facility]   Phone: [phone]  Fax: [phone]

## 2022-08-29 NOTE — PROGRESS NOTES
"It was a pleasure to see Saran Wallis today in Neurosurgery Clinic. He is a 57 year old male who was recently seen by Wanda Carrillo NP in our clinic. Please see her note for full details.    He has had about 2 months of symptoms into the neck and both arms right greater than left.    He also feels like he has some weakness of the right hand.    He feels that his physical therapy is helping.  Working at the computer tends to make it worse.    He works as an  at "[employer].  His wife is an ICU nurse at [employer] [facility] [place].    Vitals:    08/29/22 1439   BP: 123/76   Pulse: 106   SpO2: 100%   Weight: 83 kg (183 lb)   Height: 1.778 m (5' 10\")     Estimated body mass index is 26.26 kg/m  as calculated from the following:    Height as of this encounter: 1.778 m (5' 10\").    Weight as of this encounter: 83 kg (183 lb).  Severe Pain (7)    Awake and alert.  Bilateral upper extremity strength is 5 out of 5 in all muscle groups.    Missing thumb on left hand.    Decreased sensation to pinprick in a right C6 and C7 distribution.    Reflexes symmetric and normal.    Mild right Spurling sign on right.    Imaging: MRI of the cervical spine shows significant disc bulges at C5-6 and C6-7 with significant foraminal stenosis that is concordant with his symptoms at those levels.  The imaging was reviewed with the patient and with the patient clinic today.    Assessment: Cervical spondylosis with radiculopathy.    Plan: The patient would like to try an epidural steroid injection and we will set him up for an interlaminar C7-T1 epidural steroid injection to see if this will help some of his symptoms.  I would also like to obtain cervical AP lateral and flexion-extension x-rays to see if he might be a candidate for disc replacement rather than discectomy and fusion in the future.  He will let us know how the injection goes and will let us know if he wishes to proceed with surgery in the future.     "

## 2022-08-29 NOTE — LETTER
"    8/29/2022         RE: Saran Wallis  [address] [place] [address]        Dear Colleague,    Thank you for referring your patient, Saran Wallis, to the [facility] [place]. Please see a copy of my visit note below.    It was a pleasure to see Saran Wallis today in Neurosurgery Clinic. He is a 57 year old male who was recently seen by Wanda Carrillo NP in our clinic. Please see her note for full details.    He has had about 2 months of symptoms into the neck and both arms right greater than left.    He also feels like he has some weakness of the right hand.    He feels that his physical therapy is helping.  Working at the computer tends to make it worse.    He works as an  at [employer].  His wife is an ICU nurse at [employer] [place].    Vitals:    08/29/22 1439   BP: 123/76   Pulse: 106   SpO2: 100%   Weight: 83 kg (183 lb)   Height: 1.778 m (5' 10\")     Estimated body mass index is 26.26 kg/m  as calculated from the following:    Height as of this encounter: 1.778 m (5' 10\").    Weight as of this encounter: 83 kg (183 lb).  Severe Pain (7)    Awake and alert.  Bilateral upper extremity strength is 5 out of 5 in all muscle groups.    Missing thumb on left hand.    Decreased sensation to pinprick in a right C6 and C7 distribution.    Reflexes symmetric and normal.    Mild right Spurling sign on right.    Imaging: MRI of the cervical spine shows significant disc bulges at C5-6 and C6-7 with significant foraminal stenosis that is concordant with his symptoms at those levels.  The imaging was reviewed with the patient and with the patient clinic today.    Assessment: Cervical spondylosis with radiculopathy.    Plan: The patient would like to try an epidural steroid injection and we will set him up for an interlaminar C7-T1 epidural steroid injection to see if this will help some of his symptoms.  I would also like to obtain cervical AP lateral and flexion-extension x-rays " to see if he might be a candidate for disc replacement rather than discectomy and fusion in the future.  He will let us know how the injection goes and will let us know if he wishes to proceed with surgery in the future.         Again, thank you for allowing me to participate in the care of your patient.        Sincerely,        Logan Bagley MD

## 2022-09-09 ENCOUNTER — THERAPY VISIT (OUTPATIENT)
Dept: PHYSICAL THERAPY | Facility: CLINIC | Age: 57
End: 2022-09-09
Payer: COMMERCIAL

## 2022-09-09 DIAGNOSIS — M54.2 NECK PAIN: Primary | ICD-10-CM

## 2022-09-09 PROCEDURE — 97112 NEUROMUSCULAR REEDUCATION: CPT | Mod: GP | Performed by: PHYSICAL THERAPIST

## 2022-09-09 PROCEDURE — 97140 MANUAL THERAPY 1/> REGIONS: CPT | Mod: GP | Performed by: PHYSICAL THERAPIST

## 2022-09-09 PROCEDURE — 97110 THERAPEUTIC EXERCISES: CPT | Mod: GP | Performed by: PHYSICAL THERAPIST

## 2022-09-13 NOTE — TELEPHONE ENCOUNTER
Health Maintenance Due   Topic Date Due   • Influenza Vaccine (1) 09/01/2022       Patient is due for topics as listed above but is not proceeding with Immunization(s) Influenza at this time. Education provided for Immunization(s) Influenza.     Prescription approved per [facility], [facility] or [facility] refill protocol.  Mini Arriaga RN  Triage [employer]

## 2022-09-14 ENCOUNTER — THERAPY VISIT (OUTPATIENT)
Dept: PHYSICAL THERAPY | Facility: CLINIC | Age: 57
End: 2022-09-14
Payer: COMMERCIAL

## 2022-09-14 DIAGNOSIS — M54.12 CERVICAL RADICULOPATHY: Primary | ICD-10-CM

## 2022-09-14 DIAGNOSIS — M54.2 NECK PAIN: ICD-10-CM

## 2022-09-14 PROCEDURE — 97110 THERAPEUTIC EXERCISES: CPT | Mod: GP | Performed by: PHYSICAL THERAPIST

## 2022-09-14 PROCEDURE — 97140 MANUAL THERAPY 1/> REGIONS: CPT | Mod: GP | Performed by: PHYSICAL THERAPIST

## 2022-09-16 NOTE — PROGRESS NOTES
[facility] - Procedure Note    Date of Visit: 9/21/2022    Procedure performed: C7-T1 interlaminar epidural steroid injection with fluoroscopic guidance  Diagnosis: Cervical spondylosis; Cervical radiculitis/radiculopathy  : Lolly Dexter MD & Billy Lopes MD (pain fellow)   Anesthesia: none    Indications: Saran Wallis is a 57 year old male who is seen at the request of Dr. GUSTAFSON for cervical epidural steroid injection. The patient describes central neck pain that radiates to his right hand and is causing weakness and tingling. The patient has been exhibiting symptoms consistent with cervical intraspinal inflammation and radiculopathy. Symptoms have been persistent, disabling, and intermittently severe. The patient reports minimal improvement with conservative treatment, including PT and medications.    Cervical MRI was done on 7/15/2022 which showed   FINDINGS: Normal cervical vertebral body heights and alignment. Normal  cervical spinal cord. T2 hyperintense Modic type I edematous  degenerative endplate changes on the left at C6-C7. Otherwise benign  marrow.     C2-C3: Preserved interspace. No herniation. Minor uncovertebral facet  joint degeneration. No stenosis.     C3-C4: Preserved interspace. Tiny central protrusion. Unremarkable  facets. No stenosis.     C4-C5: Preserved interspace. Small central disc protrusion and minor  annular bulging. Unremarkable facets. Mild spinal canal and foraminal  narrowing.     C5-C6: Mild interspace narrowing. Prominent left paracentral disc  extrusion. Minor circumferential annular bulging. Unremarkable facets.  Moderate to severe spinal canal stenosis. Mild spinal cord deformity  without spinal cord signal abnormality. Mild to  moderate left and  moderate to severe right foraminal narrowing.     C6-C7: Preserved interspace. Broad posterior protrusion more prominent  on the left than on the right. Unremarkable facets. Mild to  moderate  spinal canal stenosis. Moderate to severe left and mild right  foraminal narrowing.     C7-T1: Preserved interspace. No herniation. Mild right uncovertebral  joint degeneration. Unremarkable facets. No central stenosis or left  foraminal narrowing. Mild right foraminal narrowing.     T1-T2: Central disc extrusion versus ligamentous ossification  resulting in low-grade spinal canal narrowing.                                                                   IMPRESSION:  1.  Degenerative changes are most pronounced at C5-C6 and C6-C7 where  there is moderate to severe and mild to moderate spinal canal stenosis  respectively.  2.  At C5-C6 disc herniation deforms the spinal cord. No spinal cord  signal abnormality. There is moderate to severe right and mild to  moderate left foraminal narrowing.  3.  At C6-C7 there is moderate to severe left and mild right foraminal  narrowing.  4.  Mild spinal canal and foraminal narrowing at C4-C5.     Allergies:      Allergies   Allergen Reactions     Eszopiclone      Other reaction(s): Taste, Changes  Works, but bitter taste.     Food Hives     Peaches        Vitals:  /81   Pulse 90   SpO2 100%     Review of Systems: The patient denies recent fever, chills, illness, use of antibiotics or anticoagulants. All other 10-point review of systems negative.     Procedure: The procedure and risks were explained, and informed written consent was obtained from the patient. Risks include but are not limited to: infection, bleeding, increased pain, and damage to soft tissue, nerve, muscle, and vasculature structures. After getting informed consent, patient was brought into the procedure suite and was placed in a prone position on the procedure table. A Pause for the Cause was performed. Patient was prepped and draped in sterile fashion.     The C7-T1 interspace was identified with use of fluoroscopy in AP view. A 25-gauge, 1.5 inch needle was used to anesthetize the skin and  subcutaneous tissue entry site with a total of 2 ml of 1% lidocaine. Under fluoroscopic visualization, a 22-gauge, 3.5 inch Tuohy epidural needle was slowly advanced towards the epidural space a few millimeters left of midline. The latter part of the needle advancement was guided with fluoroscopy in the lateral view. The epidural space was identified using loss of resistance technique. After negative aspiration for heme and cerebrospinal fluid, a total of 1 mL of non-ionic contrast was injected to confirm needle placement. 9 mL of contrast was wasted. Epidurogram confirmed spread within the posterior epidural space. 2 ml of 10mg/ml of dexamethasone and 1 ml of preservative free 1% lidocaine was injected. The needle was removed.  Images were saved to PACS.    The patient tolerated the procedure well, and there was no evidence of procedural complications. No new sensory or motor deficits were noted following the procedure. The patient was stable and able to ambulate on discharge home. Post-procedure instructions were provided.     Pre-procedure pain score: 6/10 in the neck, 6/10 in the arm  Post-procedure pain score: 4/10 in the neck, 4/10 in the arm    Assessment/Plan: Saran Wallis is a 57 year old male s/p cervical interlaminar epidural steroid injection today for cervical spondylosis and radiculitis/radiculopathy.     1. Following today's procedure, the patient was advised to contact the Pain Management Center for any of the following:   Fever, chills, or night sweats   New onset of pain, numbness, or weakness   Any questions/concerns regarding the procedure  If unable to contact the Pain Center, the patient was instructed to go to a local Emergency Room for any complications.   2. The patient will receive a follow-up call in 1 week.   3. Follow-up with the referring provider in 2 weeks for post-procedure evaluation.    ARYA DE LEON MD   Pain Management & Addiction Medicine

## 2022-09-18 DIAGNOSIS — F33.0 MAJOR DEPRESSIVE DISORDER, RECURRENT EPISODE, MILD (H): ICD-10-CM

## 2022-09-20 ENCOUNTER — TELEPHONE (OUTPATIENT)
Dept: PALLIATIVE MEDICINE | Facility: CLINIC | Age: 57
End: 2022-09-20

## 2022-09-20 NOTE — TELEPHONE ENCOUNTER
Spoke to patient, reminded patient of date, time and location of appointment.     Asked patient if they have received anti-biotics or vaccines in the past 7 days?     Reminded patient to eat and drink as usual.    Remained to hold any blood thinners or pain medications that they were asked to hold per nurse.     Reminded patient they will need a  for this appointment.      Reminded patient that both patient and  must wear a mask during their visit.        Ese Gomez MA  [facility]

## 2022-09-21 ENCOUNTER — RADIOLOGY INJECTION OFFICE VISIT (OUTPATIENT)
Dept: PALLIATIVE MEDICINE | Facility: CLINIC | Age: 57
End: 2022-09-21
Attending: NEUROLOGICAL SURGERY
Payer: COMMERCIAL

## 2022-09-21 VITALS — HEART RATE: 84 BPM | OXYGEN SATURATION: 96 % | DIASTOLIC BLOOD PRESSURE: 72 MMHG | SYSTOLIC BLOOD PRESSURE: 117 MMHG

## 2022-09-21 DIAGNOSIS — M47.22 CERVICAL SPONDYLOSIS WITH RADICULOPATHY: ICD-10-CM

## 2022-09-21 DIAGNOSIS — M54.12 CERVICAL RADICULOPATHY: Primary | ICD-10-CM

## 2022-09-21 PROCEDURE — 62321 NJX INTERLAMINAR CRV/THRC: CPT | Performed by: ANESTHESIOLOGY

## 2022-09-21 RX ORDER — DEXAMETHASONE SODIUM PHOSPHATE 10 MG/ML
10 INJECTION, SOLUTION INTRAMUSCULAR; INTRAVENOUS ONCE
Status: COMPLETED | OUTPATIENT
Start: 2022-09-21 | End: 2022-09-21

## 2022-09-21 RX ADMIN — DEXAMETHASONE SODIUM PHOSPHATE 10 MG: 10 INJECTION, SOLUTION INTRAMUSCULAR; INTRAVENOUS at 10:26

## 2022-09-21 NOTE — NURSING NOTE
Pre-procedure Intake  If YES to any questions or NO to having a   Please complete laminated checklist and leave on the computer keyboard for Provider, verbally inform provider if able.    For SCS Trial, RFA's or any sedation procedure:  Have you been fasting? NA    If yes, for how long?     Are you taking any any blood thinners such as Coumadin, Warfarin, Jantoven, Pradaxa Xarelto, Eliquis, Edoxaban, Enoxaparin, Lovenox, Heparin, Arixtra, Fondaparinux, or Fragmin? OR Antiplatelet medication such as Plavix, Brilinta, or Effient?   No     If yes, when did you take your last dose?     Do you take aspirin?  Yes -   ASA    If cervical procedure, have you held aspirin for 6 days?   Yes    Do you have any allergies to contrast dye, iodine, steroid and/or numbing medications?  NO    Are you currently taking antibiotics or have an active infection?  NO    Have you had a fever/elevated temperature within the past week? NO    Are you currently taking oral steroids? NO    Do you have a ? Yes    Are you pregnant or breastfeeding?  Not Applicable    Have you received the COVID-19 vaccine? Yes    If yes, was it your 1st, 2nd or only dose needed? 3rd    Date of most recent vaccine: 01/31/22    Notify provider and RNs if systolic BP >170, diastolic BP >100, P >100 or O2 sats < 90%      Ese Gomez MA  [facility]

## 2022-09-21 NOTE — PATIENT INSTRUCTIONS
[facility] Procedure Discharge Instructions    Today you saw:   Dr. Lolly Dexter      Your procedure:  Epidural steroid injection       Medications used:  Lidocaine (anesthetic)   Dexamethasone (steroid)  Omnipaque (contrast)    Normal Saline    If you were holding your blood thinning medication, please restart taking it: Ok to restart Aspirin in 24 hours        Be cautious as numbness and/or weakness may occur up to 6-8 hours after the procedure due to effect of the local anesthetic  Do not drive for 6 hours. The effect of the local anesthetic could slow your reflexes.   Avoid strenuous activity for the first 24 hours. You may resume your regular activities after that.   You may shower, however avoid swimming, tub baths or hot tubs for 24 hours following your procedure  You may have a mild to moderate increase in pain for several days following the injection.    You may use ice packs for 10-15 minutes, 3 to 4 times a day at the injection site for comfort  Do not use heat to painful areas for 6 to 8 hours. This will give the local anesthetic time to wear off and prevent you from accidentally burning your skin.  Unless you have been directed to avoid the use of anti-inflammatory medications (NSAIDS-ibuprofen, Aleve, Motrin), you may use these medications or Tylenol for pain control if needed.   With diabetes, check your blood sugar more frequently than usual as your blood sugar may be higher than normal for 10-14 days following a steroid injection. Contact your doctor who manages your diabetes if your blood sugar is higher than usual  Possible side effects of steroids that you may experience include flushing, elevated blood pressure, increased appetite, mild headaches and restlessness.  All of these symptoms will get better with time.  It may take up to 14 days for the steroid medication to start working although you may feel the effect as early as a few days after the procedure.   Follow up with  your referring provider (Dr Bagley) in 2-3 weeks    If you experience any of the following, call the pain center line during work hours at [phone] or on-call physician after hours at [phone]:  -Fever over 100 degree F  -Swelling, bleeding, redness, drainage, warmth at the injection site  -Progressive weakness or numbness in your legs or arms  -Loss of bowel or bladder function  -Unusual headache that is not relieved by Tylenol or your regular headache medication  -Unusual new onset of pain that is not improving

## 2022-09-21 NOTE — NURSING NOTE
Discharge Information    IV Discontiued Time:  NA    Amount of Fluid Infused:  NA    Discharge Criteria = When patient returns to baseline or as per MD order    Consciousness:  Pt is fully awake    Circulation:  BP +/- 20% of pre-procedure level    Respiration:  Patient is able to breathe deeply    O2 Sat:  Patient is able to maintain O2 Sat >92% on room air    Activity:  Moves 4 extremities on command    Ambulation:  Patient is able to stand and walk or stand and pivot into wheelchair    Dressing:  Clean/dry or No Dressing    Notes:   Discharge instructions and AVS given to patient    Patient meets criteria for discharge?  YES    Admitted to PCU?  No    Responsible adult present to accompany patient home?  Yes    Signature/Title:    Em Arriaza RN  RN Care Coordinator  [facility]

## 2022-10-11 ENCOUNTER — MYC MEDICAL ADVICE (OUTPATIENT)
Dept: RADIATION ONCOLOGY | Facility: CLINIC | Age: 57
End: 2022-10-11

## 2022-10-11 DIAGNOSIS — M47.22 CERVICAL SPONDYLOSIS WITH RADICULOPATHY: Primary | ICD-10-CM

## 2022-10-11 NOTE — TELEPHONE ENCOUNTER
Patient called clinic requesting to repeat injection.    Type of injection: C7-T1 interlaminar BARBARA    Most recent injection date: 9/21/22    Most recent MRI: 7/15/22    How long did injection provide symptomatic relief: Has only improved symptoms slightly.     Current symptoms: No new since symptoms since prior to last injection    Number of injections in last 12 months: 1    Plan: Will route to Provider for approval or other recommendations      Patient voiced understanding and agreement with plan.     Advised patient to call back with any questions or concerns.

## 2022-10-12 ENCOUNTER — TELEPHONE (OUTPATIENT)
Dept: PALLIATIVE MEDICINE | Facility: CLINIC | Age: 57
End: 2022-10-12

## 2022-10-12 NOTE — TELEPHONE ENCOUNTER
Screening Questions for Radiology Injections:    Injection to be done at which interventional clinic site? [facility]    Procedure ordered by: Logan Bagley MD     Procedure ordered? C7-T1 Interlaminar BARBARA    Transforaminal Cervical BARBARA - [place] [facility] - No [facility] providers perform this procedure    What insurance would patient like us to bill for this procedure? MEDICA     Worker's comp or MVA (motor vehicle accident) -Any injection DO NOT SCHEDULE and route to Amarilis Almanzar.      [employer] insurance - For SI joint injections, DO NOT SCHEDULE and route to Jeannine García.       ALL BCBS, [employer] and [employer] - DO NOT SCHEDULE and route to Jeannine García      MEDICA- facet joint injections, route to Jeannine García    Is an  needed? No     Patient has a  home? (Review Grid) YES: Informed     Any chance of pregnancy? Not Applicable   If YES, do NOT schedule and route to RN pool  - Dr. Musa route to Ileana Crook and PM&R Nurse  [[id number]]      Is patient actively being treated for cancer or immunocompromised? No  If YES, do NOT schedule and route to RN pool/ Dr. Musa's Team    Does the patient have a bleeding or clotting disorder? No     If YES, okay to schedule AND route to RN nurse pool/ Dr. Musa's Team     (For any patients with platelet count <100, RN must forward to provider)    Is patient taking any Blood Thinners OR Antiplatelet medication?  No   If hold needed, do NOT schedule, route to RN pool/ Dr. Musa's Team    Examples:   o Blood Thinners: (Coumadin, Warfarin, Jantoven, Pradaxa, Xarelto, Eliquis, Edoxaban, Enoxaparin, Lovenox, Heparin, Arixtra, Fondaparinux or Fragmin)  o Antiplatelet Medications: (Plavix, Brilinta or Effient)     Is patient taking any aspirin products (includes Excedrin and Fiorinal)? Yes - Pt takes 81 mg daily; instructed to hold 6 day(s) prior to procedure.      If more than 325mg/day, OK to schedule; Instruct Pt to decrease  to less than 325 mg for 7 days AND route to RN pool/ Dr. Musa's Team     For CERVICAL procedures, hold all aspirin products for 6 days.     Tell Pt that if aspirin product is not held for 6 days, the procedure WILL BE cancelled.     Any allergies to contrast dye, iodine, shellfish, or numbing and steroid medications? No    If YES, schedule and add allergy information to appointment notes AND route to the RN pool/ Dr. Musa's Team    If BARBARA and Contrast Dye / Iodine Allergy? DO NOT SCHEDULE, route to RN pool/ Dr. Musa's Team    Allergies: Eszopiclone and Food     Does patient have an active infection or treated for one within the past week? No    Is patient currently taking any antibiotics or steroid medications?  No     For patients on chronic, preventative, or prophylactic antibiotics, procedures may be scheduled.     For patients on antibiotics for active or recent infection, schedule 4 days after completed.    For patients on steroid medications, schedule 4 days after completed.     Has the patient had a flu shot or any other vaccinations within the past 7 days? No  If yes, explain that for the vaccine to work best they need to:       wait 1 week before and 1 week after getting any Vaccine    wait 1 week before and 2 weeks after getting Covid Vaccine #2 or BOOSTER    If patient has concerns about the timing, send to RN pool/ Dr. Musa's Team    Does patient have an MRI/CT?  YES: 07/15/22 MRI Include Date and Check Procedure Scheduling Grid to see if required.    Was the MRI/CT done within the last 3 years?  Yes     If no route to RN Pool/ Dr. Petersons Team    If yes, where was the MRI/CT done? [facility]      Refer to PACS Transmissions list for approved external locations and route to RN Pool High Priority/ Dr. Petersons Team    If MRI was not done at approved external location do NOT schedule and route to RN pool/ Dr. Musa's Team      If patient has an imaging disc, the injection MAY be scheduled  but patient must bring disc to appt or appt will be cancelled.    Is patient able to transfer to a procedure table with minimal or no assistance? Yes     If no, do NOT schedule and route to RN Pool/ Dr. Musa's Team    Procedure Specific Instructions:    If celiac plexus block, informed patient NPO for 6 hours and that it is okay to take medications with sips of water, especially blood pressure medications Not Applicable         If this is for a cervical procedure, informed patient that aspirin needs to be held for 6 days.   YES: Informed       Sedation, If Sedation is ordered for any procedure, patient must be NPO for 6 hours prior to procedure Not Applicable      If IV needed:    Do not schedule procedures requiring IV placement in the first appointment of the day or first appointment after lunch. Do NOT schedule at 0745, 0815 or 1245.       Instructed patient to arrive 30 minutes early for IV start if required. (Check Procedure Scheduling Grid)  YES: Informed     Reminders:    If you are started on any steroids or antibiotics between now and your appointment, you must contact us because the procedure may need to be cancelled.  Yes      As a reminder, receiving steroids can decrease your body's ability to fight infection.   Would you still like to move forward with scheduling the injection?  Yes      IV Sedation is not provided for procedures. If oral anti-anxiety medication is needed, the patient should request this from their referring provider.      Instruct patient to arrive as directed prior to the scheduled appointment time:  If IV needed 30 minutes before appointment time       For patients 85 or older we recommend having an adult stay w/ them for the remainder of the day.       If the patient is Diabetic, remind them to bring their glucometer.      Does the patient have any questions?  NO  Little Torres  [facility]

## 2022-11-04 NOTE — PROGRESS NOTES
[facility] - Procedure Note    Date of Visit: 11/7/2022    Procedure performed: C7-T1 interlaminar epidural steroid injection with fluoroscopic guidance  Diagnosis: Cervical spondylosis; Cervical radiculitis/radiculopathy  : Lolly Dexter MD    Anesthesia: none    Indications: Saran Wallis is a 57 year old male who is seen at the request of Dr. GUSTAFSON for cervical epidural steroid injection. The patient describes central neck pain that radiates to his right hand and is causing weakness and tingling. The patient has been exhibiting symptoms consistent with cervical intraspinal inflammation and radiculopathy. Symptoms have been persistent, disabling, and intermittently severe. The patient reports minimal improvement with conservative treatment, including PT and medications.    This is a repeat injection.  Previous ESVIN done by myself on 9/21/2022 provided good pain relief for a period of 2 months.       Cervical MRI was done on 7/15/2022 which showed   FINDINGS: Normal cervical vertebral body heights and alignment. Normal  cervical spinal cord. T2 hyperintense Modic type I edematous  degenerative endplate changes on the left at C6-C7. Otherwise benign  marrow.     C2-C3: Preserved interspace. No herniation. Minor uncovertebral facet  joint degeneration. No stenosis.     C3-C4: Preserved interspace. Tiny central protrusion. Unremarkable  facets. No stenosis.     C4-C5: Preserved interspace. Small central disc protrusion and minor  annular bulging. Unremarkable facets. Mild spinal canal and foraminal  narrowing.     C5-C6: Mild interspace narrowing. Prominent left paracentral disc  extrusion. Minor circumferential annular bulging. Unremarkable facets.  Moderate to severe spinal canal stenosis. Mild spinal cord deformity  without spinal cord signal abnormality. Mild to  moderate left and  moderate to severe right foraminal narrowing.     C6-C7: Preserved interspace. Broad posterior  protrusion more prominent  on the left than on the right. Unremarkable facets. Mild to moderate  spinal canal stenosis. Moderate to severe left and mild right  foraminal narrowing.     C7-T1: Preserved interspace. No herniation. Mild right uncovertebral  joint degeneration. Unremarkable facets. No central stenosis or left  foraminal narrowing. Mild right foraminal narrowing.     T1-T2: Central disc extrusion versus ligamentous ossification  resulting in low-grade spinal canal narrowing.                                                                   IMPRESSION:  1.  Degenerative changes are most pronounced at C5-C6 and C6-C7 where  there is moderate to severe and mild to moderate spinal canal stenosis  respectively.  2.  At C5-C6 disc herniation deforms the spinal cord. No spinal cord  signal abnormality. There is moderate to severe right and mild to  moderate left foraminal narrowing.  3.  At C6-C7 there is moderate to severe left and mild right foraminal  narrowing.  4.  Mild spinal canal and foraminal narrowing at C4-C5.     Allergies:      Allergies   Allergen Reactions     Eszopiclone      Other reaction(s): Taste, Changes  Works, but bitter taste.     Food Hives     Peaches        Vitals:  /80   Pulse 81   SpO2 98%     Review of Systems: The patient denies recent fever, chills, illness, use of antibiotics or anticoagulants. All other 10-point review of systems negative.     Procedure: The procedure and risks were explained, and informed written consent was obtained from the patient. Risks include but are not limited to: infection, bleeding, increased pain, and damage to soft tissue, nerve, muscle, and vasculature structures. After getting informed consent, patient was brought into the procedure suite and was placed in a prone position on the procedure table. A Pause for the Cause was performed. Patient was prepped and draped in sterile fashion.     The C7-T1 interspace was identified with use of  fluoroscopy in AP view. A 25-gauge, 1.5 inch needle was used to anesthetize the skin and subcutaneous tissue entry site with a total of 2 ml of 1% lidocaine. Under fluoroscopic visualization, a 22-gauge, 3.5 inch Tuohy epidural needle was slowly advanced towards the epidural space a few millimeters left of midline. The latter part of the needle advancement was guided with fluoroscopy in the lateral view. The epidural space was identified using loss of resistance technique. After negative aspiration for heme and cerebrospinal fluid, a total of 1 mL of non-ionic contrast was injected to confirm needle placement. 9 mL of contrast was wasted. Epidurogram confirmed spread within the posterior epidural space. 2 ml of 10mg/ml of dexamethasone and 1 ml of preservative free 1% lidocaine was injected. The needle was removed.  Images were saved to PACS.    The patient tolerated the procedure well, and there was no evidence of procedural complications. No new sensory or motor deficits were noted following the procedure. The patient was stable and able to ambulate on discharge home. Post-procedure instructions were provided.     Pre-procedure pain score: 4/10 in the neck, 1/10 in the arm  Post-procedure pain score: 4/10 in the neck, 3/10 in the arm    Assessment/Plan: Saran Wallis is a 57 year old male s/p cervical interlaminar epidural steroid injection today for cervical spondylosis and radiculitis/radiculopathy.     1. Following today's procedure, the patient was advised to contact the Pain Management Center for any of the following:   Fever, chills, or night sweats   New onset of pain, numbness, or weakness   Any questions/concerns regarding the procedure  If unable to contact the Pain Center, the patient was instructed to go to a local Emergency Room for any complications.   2. The patient will receive a follow-up call in 1 week.   3. Follow-up with the referring provider in 2 weeks for post-procedure evaluation.    ARYA  MD MOISES   Pain Management & Addiction Medicine

## 2022-11-05 DIAGNOSIS — I10 ESSENTIAL HYPERTENSION WITH GOAL BLOOD PRESSURE LESS THAN 140/90: ICD-10-CM

## 2022-11-05 DIAGNOSIS — E11.9 TYPE 2 DIABETES MELLITUS WITHOUT COMPLICATION, WITHOUT LONG-TERM CURRENT USE OF INSULIN (H): ICD-10-CM

## 2022-11-07 ENCOUNTER — RADIOLOGY INJECTION OFFICE VISIT (OUTPATIENT)
Dept: PALLIATIVE MEDICINE | Facility: CLINIC | Age: 57
End: 2022-11-07
Attending: NEUROLOGICAL SURGERY
Payer: COMMERCIAL

## 2022-11-07 VITALS — SYSTOLIC BLOOD PRESSURE: 116 MMHG | DIASTOLIC BLOOD PRESSURE: 84 MMHG | OXYGEN SATURATION: 98 % | HEART RATE: 77 BPM

## 2022-11-07 DIAGNOSIS — M47.22 CERVICAL SPONDYLOSIS WITH RADICULOPATHY: ICD-10-CM

## 2022-11-07 DIAGNOSIS — M54.12 CERVICAL RADICULOPATHY: Primary | ICD-10-CM

## 2022-11-07 PROCEDURE — 62321 NJX INTERLAMINAR CRV/THRC: CPT | Performed by: ANESTHESIOLOGY

## 2022-11-07 RX ORDER — DEXAMETHASONE SODIUM PHOSPHATE 10 MG/ML
10 INJECTION, SOLUTION INTRAMUSCULAR; INTRAVENOUS ONCE
Status: COMPLETED | OUTPATIENT
Start: 2022-11-07 | End: 2022-11-07

## 2022-11-07 RX ADMIN — DEXAMETHASONE SODIUM PHOSPHATE 10 MG: 10 INJECTION, SOLUTION INTRAMUSCULAR; INTRAVENOUS at 09:54

## 2022-11-07 NOTE — PATIENT INSTRUCTIONS
[facility] Procedure Discharge Instructions    Today you saw:   Dr. Lolly Dexter      Your procedure:  Epidural steroid injection       Medications used:  Lidocaine (anesthetic) Dexamethasone (steroid)      Omnipaque (contrast)   Normal Saline    If you were holding your blood thinning medication, please restart taking it: 24 hours       Be cautious as numbness and/or weakness may occur up to 6-8 hours after the procedure due to effect of the local anesthetic  Do not drive for 6 hours. The effect of the local anesthetic could slow your reflexes.   Avoid strenuous activity for the first 24 hours. You may resume your regular activities after that.   You may shower, however avoid swimming, tub baths or hot tubs for 24 hours following your procedure  You may have a mild to moderate increase in pain for several days following the injection.    You may use ice packs for 10-15 minutes, 3 to 4 times a day at the injection site for comfort  Do not use heat to painful areas for 6 to 8 hours. This will give the local anesthetic time to wear off and prevent you from accidentally burning your skin.  Unless you have been directed to avoid the use of anti-inflammatory medications (NSAIDS-ibuprofen, Aleve, Motrin), you may use these medications or Tylenol for pain control if needed.   With diabetes, check your blood sugar more frequently than usual as your blood sugar may be higher than normal for 10-14 days following a steroid injection. Contact your doctor who manages your diabetes if your blood sugar is higher than usual  Possible side effects of steroids that you may experience include flushing, elevated blood pressure, increased appetite, mild headaches and restlessness.  All of these symptoms will get better with time.  It may take up to 14 days for the steroid medication to start working although you may feel the effect as early as a few days after the procedure.   Follow up with your referring provider  (Dr Bagley) in 2-3 weeks    If you experience any of the following, call the pain center line during work hours at [phone] or on-call physician after hours at [phone]:  -Fever over 100 degree F  -Swelling, bleeding, redness, drainage, warmth at the injection site  -Progressive weakness or numbness in your legs or arms  -Loss of bowel or bladder function  -Unusual headache that is not relieved by Tylenol or your regular headache medication  -Unusual new onset of pain that is not improving

## 2022-11-07 NOTE — NURSING NOTE
Pre-procedure Intake    If YES to any questions or NO to having a   Please complete laminated checklist and leave on the computer keyboard for Provider, verbally inform provider if able.    For SCS Trial, RFA's or any sedation procedure: NA    If yes, for how long?         Are you taking any any blood thinners such as Coumadin, Warfarin, Jantoven, Pradaxa Xarelto, Eliquis, Edoxaban, Enoxaparin, Lovenox, Heparin, Arixtra, Fondaparinux, or Fragmin? OR Antiplatelet medication such as Plavix, Brilinta, or Effient?  NO     If yes, when did you take your last dose?        Do you take aspirin?   YES: 81 mg    If cervical procedure, have you held aspirin for 6 days?   Yes    Do you have any allergies to contrast dye, iodine, steroid and/or numbing medications?  NO     Are you currently taking antibiotics or have an active infection?  NO     Have you had a fever/elevated temperature within the past week? NO     Are you currently taking oral steroids? NO     Do you have a ? Yes     Are you pregnant or breastfeeding? NA     Have you received the COVID-19 vaccine? Yes     If yes, was it your 1st, 2nd or only dose needed? Booster dose 1/31/22    Notify provider and RNs if systolic BP >170, diastolic BP >100, P >100 or O2 sats < 90%

## 2022-11-09 NOTE — TELEPHONE ENCOUNTER
Routing refill request to provider for review/approval because:  Creatinine   Date Value Ref Range Status   02/09/2022 0.62 (L) 0.66 - 1.25 mg/dL Final   11/25/2020 0.70 0.66 - 1.25 mg/dL Final     Noris Koenig, RN

## 2022-11-10 RX ORDER — IRBESARTAN 150 MG/1
TABLET ORAL
Qty: 90 TABLET | Refills: 1 | Status: SHIPPED | OUTPATIENT
Start: 2022-11-10 | End: 2023-03-15

## 2023-01-04 PROBLEM — M54.2 NECK PAIN: Status: RESOLVED | Noted: 2022-08-12 | Resolved: 2023-01-04

## 2023-01-04 NOTE — PROGRESS NOTES
DISCHARGE SUMMARY    Saran Wallis was seen 5 times for evaluation and treatment.  Patient did not return for further treatment and current status is unknown.  Due to short treatment duration, no objective or functional changes were made.  Please see goal flow sheet from episode noted date below and initial evaluation for further information.  Patient is discharged from therapy and therapy episode is resolved as of 01/04/23.      Linked Episodes   Type: Episode: Status: Noted: Resolved: Last update: Updated by:   PHYSICAL THERAPY Neck Pain 8/12/22 Active 8/12/2022 12/20/2022  3:11 PM Rubi Em, PT      Comments:

## 2023-02-04 DIAGNOSIS — E11.9 TYPE 2 DIABETES MELLITUS WITHOUT COMPLICATION, WITHOUT LONG-TERM CURRENT USE OF INSULIN (H): ICD-10-CM

## 2023-02-08 NOTE — TELEPHONE ENCOUNTER
Routing refill request to provider for review/approval because:  Labs not current:  A1c  Patient needs to be seen because:  LOV 7/12/22  Terese Matos RN

## 2023-02-09 NOTE — TELEPHONE ENCOUNTER
TC- Please call patient to schedule an appointment   Due for an Office visit for further refills.   07/12/2022 Vargas Davison, DO Office Visit  Return in about 6 months (around 1/12/2023) for Diabetes follow-up.       Refill request  was already routed to pcp   Drug does not pass the [facility] [place]     Thank you!  Gwendolyn HENRIQUEZ RN   [facility] Triage

## 2023-02-10 DIAGNOSIS — K21.9 GASTROESOPHAGEAL REFLUX DISEASE WITHOUT ESOPHAGITIS: ICD-10-CM

## 2023-02-10 DIAGNOSIS — E78.5 HYPERLIPIDEMIA LDL GOAL <70: ICD-10-CM

## 2023-02-10 RX ORDER — PANTOPRAZOLE SODIUM 40 MG/1
TABLET, DELAYED RELEASE ORAL
Qty: 90 TABLET | Refills: 1 | Status: SHIPPED | OUTPATIENT
Start: 2023-02-10 | End: 2023-08-10

## 2023-02-10 RX ORDER — SIMVASTATIN 10 MG
TABLET ORAL
Qty: 90 TABLET | Refills: 0 | Status: SHIPPED | OUTPATIENT
Start: 2023-02-10 | End: 2023-03-15

## 2023-02-11 DIAGNOSIS — E11.9 TYPE 2 DIABETES MELLITUS WITHOUT COMPLICATION, WITHOUT LONG-TERM CURRENT USE OF INSULIN (H): ICD-10-CM

## 2023-02-13 NOTE — TELEPHONE ENCOUNTER
Appointments in Next Year      Mar 15, 2023  3:30 PM  (Arrive by 3:10 PM)  Provider Visit with Vamshi Clarke Jr., MD  [facility] ) [phone]          Routing refill request to provider for review/approval because:   Diabetic Supplies Protocol Failed 02/11/2023 12:19 PM   Protocol Details  Recent (6 mo) or future (30 days) visit within the authorizing provider's specialty        Jessica Vaz RN, BSN  [facility] Triage

## 2023-02-14 RX ORDER — BLOOD SUGAR DIAGNOSTIC
STRIP MISCELLANEOUS
Qty: 100 STRIP | Refills: 1 | Status: SHIPPED | OUTPATIENT
Start: 2023-02-14

## 2023-03-15 ENCOUNTER — OFFICE VISIT (OUTPATIENT)
Dept: FAMILY MEDICINE | Facility: CLINIC | Age: 58
End: 2023-03-15
Payer: COMMERCIAL

## 2023-03-15 DIAGNOSIS — E11.9 TYPE 2 DIABETES MELLITUS WITHOUT COMPLICATION, WITHOUT LONG-TERM CURRENT USE OF INSULIN (H): Primary | ICD-10-CM

## 2023-03-15 DIAGNOSIS — Z87.891 PERSONAL HISTORY OF TOBACCO USE: ICD-10-CM

## 2023-03-15 DIAGNOSIS — Z12.5 SCREENING FOR PROSTATE CANCER: ICD-10-CM

## 2023-03-15 DIAGNOSIS — F33.0 MAJOR DEPRESSIVE DISORDER, RECURRENT EPISODE, MILD (H): ICD-10-CM

## 2023-03-15 DIAGNOSIS — Z23 NEED FOR HEPATITIS B VACCINATION: ICD-10-CM

## 2023-03-15 DIAGNOSIS — M48.02 SPINAL STENOSIS IN CERVICAL REGION: ICD-10-CM

## 2023-03-15 DIAGNOSIS — E78.5 HYPERLIPIDEMIA LDL GOAL <70: ICD-10-CM

## 2023-03-15 DIAGNOSIS — I10 HYPERTENSION GOAL BP (BLOOD PRESSURE) < 140/90: ICD-10-CM

## 2023-03-15 LAB — HBA1C MFR BLD: 5.9 % (ref 0–5.6)

## 2023-03-15 PROCEDURE — 80061 LIPID PANEL: CPT | Performed by: FAMILY MEDICINE

## 2023-03-15 PROCEDURE — 90746 HEPB VACCINE 3 DOSE ADULT IM: CPT | Performed by: FAMILY MEDICINE

## 2023-03-15 PROCEDURE — 99215 OFFICE O/P EST HI 40 MIN: CPT | Mod: 25 | Performed by: FAMILY MEDICINE

## 2023-03-15 PROCEDURE — 80048 BASIC METABOLIC PNL TOTAL CA: CPT | Performed by: FAMILY MEDICINE

## 2023-03-15 PROCEDURE — G0296 VISIT TO DETERM LDCT ELIG: HCPCS | Performed by: FAMILY MEDICINE

## 2023-03-15 PROCEDURE — 90471 IMMUNIZATION ADMIN: CPT | Performed by: FAMILY MEDICINE

## 2023-03-15 PROCEDURE — 82043 UR ALBUMIN QUANTITATIVE: CPT | Performed by: FAMILY MEDICINE

## 2023-03-15 PROCEDURE — G0103 PSA SCREENING: HCPCS | Performed by: FAMILY MEDICINE

## 2023-03-15 PROCEDURE — 82570 ASSAY OF URINE CREATININE: CPT | Performed by: FAMILY MEDICINE

## 2023-03-15 PROCEDURE — 36415 COLL VENOUS BLD VENIPUNCTURE: CPT | Performed by: FAMILY MEDICINE

## 2023-03-15 PROCEDURE — 83036 HEMOGLOBIN GLYCOSYLATED A1C: CPT | Performed by: FAMILY MEDICINE

## 2023-03-15 RX ORDER — IRBESARTAN 150 MG/1
150 TABLET ORAL DAILY
Qty: 90 TABLET | Refills: 1 | Status: SHIPPED | OUTPATIENT
Start: 2023-03-15 | End: 2023-09-20

## 2023-03-15 RX ORDER — SIMVASTATIN 10 MG
10 TABLET ORAL AT BEDTIME
Qty: 90 TABLET | Refills: 3 | Status: SHIPPED | OUTPATIENT
Start: 2023-03-15 | End: 2023-09-20

## 2023-03-15 RX ORDER — SEMAGLUTIDE 1.34 MG/ML
0.5 INJECTION, SOLUTION SUBCUTANEOUS
Qty: 6 ML | Refills: 1 | Status: SHIPPED | OUTPATIENT
Start: 2023-03-15 | End: 2023-04-21

## 2023-03-15 ASSESSMENT — ANXIETY QUESTIONNAIRES
GAD7 TOTAL SCORE: 1
6. BECOMING EASILY ANNOYED OR IRRITABLE: NOT AT ALL
8. IF YOU CHECKED OFF ANY PROBLEMS, HOW DIFFICULT HAVE THESE MADE IT FOR YOU TO DO YOUR WORK, TAKE CARE OF THINGS AT HOME, OR GET ALONG WITH OTHER PEOPLE?: SOMEWHAT DIFFICULT
1. FEELING NERVOUS, ANXIOUS, OR ON EDGE: SEVERAL DAYS
7. FEELING AFRAID AS IF SOMETHING AWFUL MIGHT HAPPEN: NOT AT ALL
3. WORRYING TOO MUCH ABOUT DIFFERENT THINGS: NOT AT ALL
IF YOU CHECKED OFF ANY PROBLEMS ON THIS QUESTIONNAIRE, HOW DIFFICULT HAVE THESE PROBLEMS MADE IT FOR YOU TO DO YOUR WORK, TAKE CARE OF THINGS AT HOME, OR GET ALONG WITH OTHER PEOPLE: SOMEWHAT DIFFICULT
2. NOT BEING ABLE TO STOP OR CONTROL WORRYING: NOT AT ALL
GAD7 TOTAL SCORE: 1
4. TROUBLE RELAXING: NOT AT ALL
GAD7 TOTAL SCORE: 1
7. FEELING AFRAID AS IF SOMETHING AWFUL MIGHT HAPPEN: NOT AT ALL
5. BEING SO RESTLESS THAT IT IS HARD TO SIT STILL: NOT AT ALL

## 2023-03-15 ASSESSMENT — PATIENT HEALTH QUESTIONNAIRE - PHQ9
SUM OF ALL RESPONSES TO PHQ QUESTIONS 1-9: 5
SUM OF ALL RESPONSES TO PHQ QUESTIONS 1-9: 5
10. IF YOU CHECKED OFF ANY PROBLEMS, HOW DIFFICULT HAVE THESE PROBLEMS MADE IT FOR YOU TO DO YOUR WORK, TAKE CARE OF THINGS AT HOME, OR GET ALONG WITH OTHER PEOPLE: NOT DIFFICULT AT ALL

## 2023-03-15 NOTE — PROGRESS NOTES
Assessment & Plan   Problem List Items Addressed This Visit     Hyperlipidemia LDL goal <70     Tolerating simvastatin at just 10 mg daily which has gotten us to our LDL target.  Smoking cessation is encouraged.  Refills are given for the next year and lipid panel is checked today.         Relevant Medications    simvastatin (ZOCOR) 10 MG tablet    Other Relevant Orders    Lipid panel reflex to direct LDL Non-fasting    Hypertension goal BP (blood pressure) < 140/90     Blood pressure is at goal on his angiotensin receptor blocker.  Smoking cessation is encouraged.  Recheck 6 months.         Relevant Medications    irbesartan (AVAPRO) 150 MG tablet    Other Relevant Orders    BASIC METABOLIC PANEL    Major depressive disorder, recurrent episode, mild (H)     Stable on sertraline 50 mg which she is tolerating without side effects.  We will renew this for another 6 months.  Recheck in 6 months.         Relevant Medications    sertraline (ZOLOFT) 50 MG tablet    Spinal stenosis in cervical region     Epidural steroid injection ordered to see if this improves his headache.  Headache otherwise sounds largely like a tension headache.  Recommended continued use of Tylenol or ibuprofen as necessary.  No significant warning signs (e.g. nocturnal awakening from headaches, associated ocular symptoms, progressively increasing severity) to necessitate imaging at this time.  Follow-up in 4 weeks if not improving.         Relevant Orders    Pain Management  Referral    Type 2 diabetes mellitus without complication, without long-term current use of insulin (H) - Primary     Diabetes is improving/stable/worsening: unchanged.  Continue current treatment regimen. and Ophthalmology referral.  Diabetes will be reassessed in 6 months.         Relevant Medications    semaglutide (OZEMPIC, 0.25 OR 0.5 MG/DOSE,) 2 MG/1.5ML SOPN pen    metFORMIN (GLUCOPHAGE) 500 MG tablet    Other Relevant Orders    HEMOGLOBIN A1C (Completed)     "Lipid panel reflex to direct LDL Non-fasting    Albumin Random Urine Quantitative with Creat Ratio    Adult Eye  Referral   Other Visit Diagnoses     Personal history of tobacco use        Relevant Orders    Prof fee: Shared Decision Making for Lung Cancer Screening (Completed)    CT Chest Lung Cancer Scrn Low Dose wo    Screening for prostate cancer        Relevant Orders    PSA, screen    Need for hepatitis B vaccination        Relevant Orders    HEPATITIS B VACCINE, ADULT, IM (Completed)             43 minutes spent on the date of the encounter doing chart review, history and exam, documentation and further activities per the note       Nicotine/Tobacco Cessation:  He reports that he has been smoking cigarettes. He started smoking about 43 years ago. He has a 21.50 pack-year smoking history. He has never used smokeless tobacco.  Nicotine/Tobacco Cessation Plan:   Information offered: Patient not interested at this time      BMI:   Estimated body mass index is 26.26 kg/m  as calculated from the following:    Height as of 8/29/22: 1.778 m (5' 10\").    Weight as of 8/29/22: 83 kg (183 lb).   Weight management plan: Discussed healthy diet and exercise guidelines        No follow-ups on file.   Follow-up Visit   Expected date:  Sep 15, 2023 (Approximate)      Follow Up Appointment Details:     Follow-up with whom?: Me    Follow-Up for what?: Chronic Disease f/u    Chronic Disease f/u:  Diabetes Comment - spinal stenosis  Hypertension       How?: In Person                    Vamshi Clarke Jr, MD  [facility] PRIOR OTIS Noonan is a 58 year old, presenting for the following health issues:  Recheck Medication      History of Present Illness       Mental Health Follow-up:  Patient presents to follow-up on Depression.Patient's depression since last visit has been:  Good  The patient is not having other symptoms associated with depression.      Any significant life events: No  Patient " is not feeling anxious or having panic attacks.  Patient has no concerns about alcohol or drug use.    Diabetes:   He presents for follow up of diabetes.  He is checking home blood glucose a few times a week. He checks blood glucose before and after meals.  Blood glucose is never over 200 and never under 70. He is aware of hypoglycemia symptoms including dizziness. He has no concerns regarding his diabetes at this time.  He is not experiencing numbness or burning in feet, excessive thirst, blurry vision, weight changes or redness, sores or blisters on feet. The patient has had a diabetic eye exam in the last 12 months. Eye exam performed on  I don't remember. Location of last eye exam [facility].        Hypertension: He presents for follow up of hypertension.  He does not check blood pressure  regularly outside of the clinic. Outpatient blood pressures have not been over 140/90. He does not follow a low salt diet.     He eats 0-1 servings of fruits and vegetables daily.He consumes 0 sweetened beverage(s) daily.He exercises with enough effort to increase his heart rate 10 to 19 minutes per day.  He exercises with enough effort to increase his heart rate 3 or less days per week.   He is taking medications regularly.    Today's PHQ-9         PHQ-9 Total Score: 5    PHQ-9 Q9 Thoughts of better off dead/self-harm past 2 weeks :   Not at all    How difficult have these problems made it for you to do your work, take care of things at home, or get along with other people: Not difficult at all  Today's SKYLA-7 Score: 1    BP Readings from Last 6 Encounters:   11/07/22 116/84   09/21/22 117/72   08/29/22 123/76   08/08/22 109/76   07/12/22 118/74   02/15/22 121/84                      Hemoglobin A1C (%)   Date Value   03/15/2023 5.9 (H)   07/12/2022 6.3 (H)   05/26/2021 8.4 (H)   11/25/2020 8.4 (H)     LDL Cholesterol Calculated (mg/dL)   Date Value   02/09/2022 46   11/25/2020 71   09/16/2019 71       Social History  "    Tobacco Use     Smoking status: Every Day     Packs/day: 0.50     Years: 43.00     Pack years: 21.50     Types: Cigarettes     Start date: 3/14/1980     Smokeless tobacco: Never     Tobacco comments:     Quit for quitting.   Vaping Use     Vaping Use: Every day   Substance Use Topics     Alcohol use: Not Currently     Alcohol/week: 0.0 standard drinks     Comment: 1 bottles of beer per month.     Drug use: No     PHQ 7/7/2021 2/9/2022 3/15/2023   PHQ-9 Total Score 4 3 5   Q9: Thoughts of better off dead/self-harm past 2 weeks Not at all Not at all Not at all     SKYLA-7 SCORE 2/9/2022 7/12/2022 3/15/2023   Total Score 0 (minimal anxiety) 1 (minimal anxiety) 1 (minimal anxiety)   Total Score 0 1 1     Last PHQ-9 3/15/2023   1.  Little interest or pleasure in doing things 1   2.  Feeling down, depressed, or hopeless 0   3.  Trouble falling or staying asleep, or sleeping too much 1   4.  Feeling tired or having little energy 2   5.  Poor appetite or overeating 0   6.  Feeling bad about yourself 0   7.  Trouble concentrating 1   8.  Moving slowly or restless 0   Q9: Thoughts of better off dead/self-harm past 2 weeks 0   PHQ-9 Total Score 5   Difficulty at work, home, or with people -     SKYLA-7  3/15/2023   1. Feeling nervous, anxious, or on edge 1   2. Not being able to stop or control worrying 0   3. Worrying too much about different things 0   4. Trouble relaxing 0   5. Being so restless that it is hard to sit still 0   6. Becoming easily annoyed or irritable 0   7. Feeling afraid, as if something awful might happen 0   SKYLA-7 Total Score 1   If you checked any problems, how difficult have they made it for you to do your work, take care of things at home, or get along with other people? Somewhat difficult     Notes bitemporal headache that has bothered him for 5-7 months.  headache is \"annoying\".  Pain is \"like something pushing very hard\".  Pain is 5/10.  No change in vision, no nausea, no photo- or phonophobia.  " Has tried Tylenol & Advil without much help.  Is having headache every day and they last almost all day.  1 cup coffee per day.  No longer on opioids.  Reports pain feels very close to headache he has had related to cervical spinal stenosis.  epidural steroid injection has been helpful with that previously.  Has had two epidural steroid injection dating back to August 2022.      Suicide Assessment Five-step Evaluation and Treatment (SAFE-T)        Review of Systems         Objective    There were no vitals taken for this visit.  There is no height or weight on file to calculate BMI.  Physical Exam   GENERAL: healthy, alert and no distress  MS: no gross musculoskeletal defects noted, no edema  NEURO: Normal strength and tone, mentation intact and speech normal  PSYCH: mentation appears normal, affect normal/bright  Diabetic foot exam: normal DP and PT pulses, no trophic changes or ulcerative lesions, normal sensory exam and normal monofilament exam                  Lung Cancer Screening Shared Decision Making Visit     Saran Wallis, a 58 year old male, is eligible for lung cancer screening    History   Smoking Status     Every Day     Packs/day: 0.50     Years: 43.00     Types: Cigarettes     Start date: 3/14/1980   Smokeless Tobacco     Never       I have discussed with patient the risks and benefits of screening for lung cancer with low-dose CT.     The risks include:    radiation exposure: one low dose chest CT has as much ionizing radiation as about 15 chest x-rays, or 6 months of background radiation living in [place]      false positives: most findings/nodules are NOT cancer, but some might still require additional diagnostic evaluation, including biopsy    over-diagnosis: some slow growing cancers that might never have been clinically significant will be detected and treated unnecessarily     The benefit of early detection of lung cancer is contingent upon adherence to annual screening or more frequent  follow up if indicated.     Furthermore, to benefit from screening, Luke must be willing and able to undergo diagnostic procedures, if indicated. Although no specific guide is available for determining severity of comorbidities, it is reasonable to withhold screening in patients who have greater mortality risk from other diseases.     We did discuss that the best way to prevent lung cancer is to not smoke.    Some patients may value a numeric estimation of lung cancer risk when evaluating if lung cancer screening is right for them, here is one calculator:    ShouldIScreen

## 2023-03-15 NOTE — PATIENT INSTRUCTIONS
Lung Cancer Screening   Frequently Asked Questions  If you are at high-risk for lung cancer, getting screened with low-dose computed tomography (LDCT) every year can help save your life. This handout offers answers to some of the most common questions about lung cancer screening. If you have other questions, please call [phone] ([phone]).     What is it?  Lung cancer screening uses special X-ray technology to create an image of your lung tissue. The exam is quick and easy and takes less than 10 seconds. We don t give you any medicine or use any needles. You can eat before and after the exam. You don t need to change your clothes as long as the clothing on your chest doesn t contain metal. But, you do need to be able to hold your breath for at least 6 seconds during the exam.    What is the goal of lung cancer screening?  The goal of lung cancer screening is to save lives. Many times, lung cancer is not found until a person starts having physical symptoms. Lung cancer screening can help detect lung cancer in the earliest stages when it may be easier to treat.    Who should be screened for lung cancer?  We suggest lung cancer screening for anyone who is at high-risk for lung cancer. You are in the high-risk group if you:      are between the ages of 55 and 79, and    have smoked at least 1 pack of cigarettes a day for 20 or more years, and    still smoke or have quit within the past 15 years.    However, if you have a new cough or shortness of breath, you should talk to your doctor before being screened.    Why does it matter if I have symptoms?  Certain symptoms can be a sign that you have a condition in your lungs that should be checked and treated by your doctor. These symptoms include fever, chest pain, a new or changing cough, shortness of breath that you have never felt before, coughing up blood or unexplained weight loss. Having any of these symptoms can greatly affect the results of lung  cancer screening.       Should all smokers get an LDCT lung cancer screening exam?  It depends. Lung cancer screening is for a very specific group of men and women who have a history of heavy smoking over a long period of time (see  Who should be screened for lung cancer  above).  I am in the high-risk group, but have been diagnosed with cancer in the past. Is LDCT lung cancer screening right for me?  In some cases, you should not have LDCT lung screening, such as when your doctor is already following your cancer with CT scan studies. Your doctor will help you decide if LDCT lung screening is right for you.  Do I need to have a screening exam every year?  Yes. If you are in the high-risk group described earlier, you should get an LDCT lung cancer screening exam every year until you are 79, or are no longer willing or able to undergo screening and possible procedures to diagnose and treat lung cancer.  How effective is LDCT at preventing death from lung cancer?  Studies have shown that LDCT lung cancer screening can lower the risk of death from lung cancer by 20 percent in people who are at high-risk.  What are the risks?  There are some risks and limitations of LDCT lung cancer screening. We want to make sure you understand the risks and benefits, so please let us know if you have any questions. Your doctor may want to talk with you more about these risks.    Radiation exposure: As with any exam that uses radiation, there is a very small increased risk of cancer. The amount of radiation in LDCT is small--about the same amount a person would get from a mammogram. Your doctor orders the exam when he or she feels the potential benefits outweigh the risks.    False negatives: No test is perfect, including LDCT. It is possible that you may have a medical condition, including lung cancer, that is not found during your exam. This is called a false negative result.    False positives and more testing: LDCT very often finds  something in the lung that could be cancer, but in fact is not. This is called a false positive result. False positive tests often cause anxiety. To make sure these findings are not cancer, you may need to have more tests. These tests will be done only if you give us permission. Sometimes patients need a treatment that can have side effects, such as a biopsy. For more information on false positives, see  What can I expect from the results?     Findings not related to lung cancer: Your LDCT exam also takes pictures of areas of your body next to your lungs. In a very small number of cases, the CT scan will show an abnormal finding in one of these areas, such as your kidneys, adrenal glands, liver or thyroid. This finding may not be serious, but you may need more tests. Your doctor can help you decide what other tests you may need, if any.  What can I expect from the results?  About 1 out of 4 LDCT exams will find something that may need more tests. Most of the time, these findings are lung nodules. Lung nodules are very small collections of tissue in the lung. These nodules are very common, and the vast majority--more than 97 percent--are not cancer (benign). Most are normal lymph nodes or small areas of scarring from past infections.  But, if a small lung nodule is found to be cancer, the cancer can be cured more than 90 percent of the time. To know if the nodule is cancer, we may need to get more images before your next yearly screening exam. If the nodule has suspicious features (for example, it is large, has an odd shape or grows over time), we will refer you to a specialist for further testing.  Will my doctor also get the results?  Yes. Your doctor will get a copy of your results.  Is it okay to keep smoking now that there s a cancer screening exam?  No. Tobacco is one of the strongest cancer-causing agents. It causes not only lung cancer, but other cancers and cardiovascular (heart) diseases as well. The damage  caused by smoking builds over time. This means that the longer you smoke, the higher your risk of disease. While it is never too late to quit, the sooner you quit, the better.  Where can I find help to quit smoking?  The best way to prevent lung cancer is to stop smoking. If you have already quit smoking, congratulations and keep it up! For help on quitting smoking, please call [employer] at 6-564-QUITNOW (1-685.331.7013) or the American [employer] Society at 1-819.206.6217 to find local resources near you.  One-on-one health coaching:  If you d prefer to work individually with a health care provider on tobacco cessation, we offer:      Medication Therapy Management:  Our specially trained pharmacists work closely with you and your doctor to help you quit smoking.  Call [phone] or [phone] (toll free).

## 2023-03-16 ENCOUNTER — TELEPHONE (OUTPATIENT)
Dept: PALLIATIVE MEDICINE | Facility: CLINIC | Age: 58
End: 2023-03-16
Payer: COMMERCIAL

## 2023-03-16 LAB
ANION GAP SERPL CALCULATED.3IONS-SCNC: 14 MMOL/L (ref 7–15)
BUN SERPL-MCNC: 15.5 MG/DL (ref 6–20)
CALCIUM SERPL-MCNC: 10.1 MG/DL (ref 8.6–10)
CHLORIDE SERPL-SCNC: 106 MMOL/L (ref 98–107)
CHOLEST SERPL-MCNC: 143 MG/DL
CREAT SERPL-MCNC: 0.77 MG/DL (ref 0.67–1.17)
CREAT UR-MCNC: 73 MG/DL
DEPRECATED HCO3 PLAS-SCNC: 24 MMOL/L (ref 22–29)
GFR SERPL CREATININE-BSD FRML MDRD: >90 ML/MIN/1.73M2
GLUCOSE SERPL-MCNC: 102 MG/DL (ref 70–99)
HDLC SERPL-MCNC: 45 MG/DL
LDLC SERPL CALC-MCNC: 64 MG/DL
MICROALBUMIN UR-MCNC: <12 MG/L
MICROALBUMIN/CREAT UR: NORMAL MG/G{CREAT}
NONHDLC SERPL-MCNC: 98 MG/DL
POTASSIUM SERPL-SCNC: 4.6 MMOL/L (ref 3.4–5.3)
PSA SERPL DL<=0.01 NG/ML-MCNC: 0.41 NG/ML (ref 0–3.5)
SODIUM SERPL-SCNC: 144 MMOL/L (ref 136–145)
TRIGL SERPL-MCNC: 170 MG/DL

## 2023-03-16 NOTE — ASSESSMENT & PLAN NOTE
Tolerating simvastatin at just 10 mg daily which has gotten us to our LDL target.  Smoking cessation is encouraged.  Refills are given for the next year and lipid panel is checked today.

## 2023-03-16 NOTE — RESULT ENCOUNTER NOTE
Mr. Wallis,    -PSA (prostate specific antigen) test is normal.  This indicates a low likelihood of prostate cancer.  ADVISE: rechecking this in 1 year.  -Cholesterol levels are at your goal levels.  ADVISE: continuing your medication, a regular exercise program with at least 150 minutes of aerobic exercise per week, and eating a low saturated fat/low carbohydrate diet.  Also, you should recheck this fasting cholesterol panel in 12 months.  -A1C (test of diabetes control the last 2-3 months) is at your goal. Please continue with your current plan. Also, you should make an appointment to see me and recheck your A1C test in 6 months. If your next A1C is less than 6 I think we should consider reducing your metformin dose.  -Microalbumin (urine protein) test is normal.  ADVISE: rechecking this annually.    If you have further questions about the interpretation of your labs, labtestsonline.org is a good website to check out for further information.    Please contact the clinic if you have additional questions.  Thank you.    Sincerely,    Vamshi Clarke MD

## 2023-03-16 NOTE — ASSESSMENT & PLAN NOTE
Diabetes is improving/stable/worsening: unchanged.  Continue current treatment regimen. and Ophthalmology referral.  Diabetes will be reassessed in 6 months.

## 2023-03-16 NOTE — ASSESSMENT & PLAN NOTE
Stable on sertraline 50 mg which she is tolerating without side effects.  We will renew this for another 6 months.  Recheck in 6 months.

## 2023-03-16 NOTE — ASSESSMENT & PLAN NOTE
Blood pressure is at goal on his angiotensin receptor blocker.  Smoking cessation is encouraged.  Recheck 6 months.

## 2023-03-16 NOTE — TELEPHONE ENCOUNTER
Screening Questions for Radiology Injections:    Injection to be done at which interventional clinic site? [facility]    Procedure ordered by     Procedure ordered? ESVIN    Transforaminal Cervical BARBARA - Send to [facility] ([facility]) - No [facility] Site providers perform this procedure    What insurance would patient like us to bill for this procedure? [employer]    Worker's comp or MVA (motor vehicle accident) -Any injection DO NOT SCHEDULE and route to Amarilis Almanzar.      [employer] insurance - For SI joint injections, DO NOT SCHEDULE and route to Jeannine García.       ALL BCBS, [employer] and [employer] - DO NOT SCHEDULE and route to Jeannine García    [employer]- facet joint injections, route to Jeannine García    IF SCHEDULING IN [place] PLEASE SCHEDULE AT LEAST 7-10 BUSINESS DAYS OUT SO A PA CAN BE OBTAINED    Is an  needed? No     Patient has a  home? (Review Grid) YES: ok    Any chance of pregnancy? NO   If YES, do NOT schedule and route to RN pool  - Dr. Musa route to Ileana Crook and PM&R Nurse  [[id number]]      Is patient actively being treated for cancer or immunocompromised? No  If YES, do NOT schedule and route to RN pool/ Dr. Musa's Team    Does the patient have a bleeding or clotting disorder? No     If YES, okay to schedule AND route to RN nurse pool/ Dr. Musa's Team     (For any patients with platelet count <100, RN must forward to provider)    Is patient taking any Blood Thinners OR Antiplatelet medication?  No   If hold needed, do NOT schedule, route to RN pool/ Dr. Musa's Team    Examples:   o Blood Thinners: (Coumadin, Warfarin, Jantoven, Pradaxa, Xarelto, Eliquis, Edoxaban, Enoxaparin, Lovenox, Heparin, Arixtra, Fondaparinux or Fragmin)  o Antiplatelet Medications: (Plavix, Brilinta or Effient)     Is patient taking any aspirin products (includes Excedrin and Fiorinal)? Yes - Pt takes 81mg daily; instructed to hold 6 day(s) prior to procedure.      If more than 325mg/day, OK  to schedule; Instruct Pt to decrease to less than 325 mg for 7 days AND route to RN pool/ Dr. Musa's Team     For CERVICAL procedures, hold all aspirin products for 6 days.     Tell Pt that if aspirin product is not held for 6 days, the procedure WILL BE cancelled.     Any allergies to contrast dye, iodine, shellfish, or numbing and steroid medications? No    If YES, schedule and add allergy information to appointment notes AND route to the RN pool/ Dr. Musa's Team    If BARBARA and Contrast Dye / Iodine Allergy? DO NOT SCHEDULE, route to RN pool/ Dr. Musa's Team    Allergies: Eszopiclone and Food     Does patient have an active infection or treated for one within the past week? No    Is patient currently taking any antibiotics or steroid medications?  No     For patients on chronic, preventative, or prophylactic antibiotics, procedures may be scheduled.     For patients on antibiotics for active or recent infection, schedule 4 days after completed.    For patients on steroid medications, schedule 4 days after completed.     Has the patient had a flu shot or any other vaccinations within the past 7 days? YES Hepatitis B Vaccination 3/15/23  If yes, explain that for the vaccine to work best they need to:       wait 1 week before and 1 week after getting any Vaccine    wait 1 week before and 2 weeks after getting Covid Vaccine #2 or BOOSTER    If patient has concerns about the timing, send to RN pool/ Dr. Musa's Team    Does patient have an MRI/CT?  YES: MRI Include Date and Check Procedure Scheduling Grid to see if required.    Was the MRI/CT done within the last 3 years?  Yes     If no route to RN Pool/ Dr. Petersons Team    If yes, where was the MRI/CT done? [facility]     Refer to PACS Transmissions list for approved external locations and route to RN Pool High Priority/ Dr. Petersons Team    If MRI was not done at approved external location do NOT schedule and route to RN pool/ Dr. Petersons Team      If  patient has an imaging disc, the injection MAY be scheduled but patient must bring disc to appt or appt will be cancelled.    Is patient able to transfer to a procedure table with minimal or no assistance? Yes     If no, do NOT schedule and route to RN Pool/ Dr. Musa's Team    Procedure Specific Instructions:    If celiac plexus block, informed patient NPO for 6 hours and that it is okay to take medications with sips of water, especially blood pressure medications Not Applicable         If this is for a cervical procedure, informed patient that aspirin needs to be held for 6 days.   YES: ok      Sedation, If Sedation is ordered for any procedure, patient must be NPO for 6 hours prior to procedure Not Applicable      If IV needed:    Do not schedule procedures requiring IV placement in the first appointment of the day or first appointment after lunch. Do NOT schedule at 0745, 0815 or 1245. ok    Instructed patient to arrive 30 minutes early for IV start if required. (Check Procedure Scheduling Grid)  YES: ok    Reminders:    If you are started on any steroids or antibiotics between now and your appointment, you must contact us because the procedure may need to be cancelled.  Yes      As a reminder, receiving steroids can decrease your body's ability to fight infection.   Would you still like to move forward with scheduling the injection?  Yes      IV Sedation is not provided for procedures. If oral anti-anxiety medication is needed, the patient should request this from their referring provider.      Instruct patient to arrive as directed prior to the scheduled appointment time:  If IV needed 30 minutes before appointment time       For patients 85 or older we recommend having an adult stay w/ them for the remainder of the day.       If the patient is Diabetic, remind them to bring their glucometer.      Does the patient have any questions?  NO  Mara Almanzar  [facility]

## 2023-03-16 NOTE — ASSESSMENT & PLAN NOTE
Epidural steroid injection ordered to see if this improves his headache.  Headache otherwise sounds largely like a tension headache.  Recommended continued use of Tylenol or ibuprofen as necessary.  No significant warning signs (e.g. nocturnal awakening from headaches, associated ocular symptoms, progressively increasing severity) to necessitate imaging at this time.  Follow-up in 4 weeks if not improving.

## 2023-03-17 ENCOUNTER — TELEPHONE (OUTPATIENT)
Dept: FAMILY MEDICINE | Facility: CLINIC | Age: 58
End: 2023-03-17
Payer: COMMERCIAL

## 2023-03-17 NOTE — TELEPHONE ENCOUNTER
DORENE faxed to PL from [facility]:    Pt declined referral to [facility]. Patient schedules with [facility].

## 2023-03-20 ENCOUNTER — ANCILLARY ORDERS (OUTPATIENT)
Dept: PALLIATIVE MEDICINE | Facility: CLINIC | Age: 58
End: 2023-03-20

## 2023-03-20 DIAGNOSIS — M54.12 CERVICAL RADICULOPATHY: ICD-10-CM

## 2023-03-22 NOTE — PROGRESS NOTES
[facility] - Procedure Note    Date of Visit: 3/23/2023    Procedure performed: C7-T1 interlaminar epidural steroid injection with fluoroscopic guidance  Diagnosis: Cervical spondylosis; Cervical radiculitis/radiculopathy  : Lolly Dexter MD    Anesthesia: none    Indications: Sarna Wallis is a 58 year old male who is seen at the request of Dr. GUSTAFSON for cervical epidural steroid injection. The patient describes central neck pain that radiates to his right hand and is causing weakness and tingling. The patient has been exhibiting symptoms consistent with cervical intraspinal inflammation and radiculopathy. Symptoms have been persistent, disabling, and intermittently severe. The patient reports minimal improvement with conservative treatment, including PT and medications.    This is a repeat injection.  Previous ESVIN done by myself on 11/7/2022 & 9/21/2022 provided good pain relief for a period of 2 months.       Cervical MRI was done on 7/15/2022 which showed   FINDINGS: Normal cervical vertebral body heights and alignment. Normal  cervical spinal cord. T2 hyperintense Modic type I edematous  degenerative endplate changes on the left at C6-C7. Otherwise benign  marrow.     C2-C3: Preserved interspace. No herniation. Minor uncovertebral facet  joint degeneration. No stenosis.     C3-C4: Preserved interspace. Tiny central protrusion. Unremarkable  facets. No stenosis.     C4-C5: Preserved interspace. Small central disc protrusion and minor  annular bulging. Unremarkable facets. Mild spinal canal and foraminal  narrowing.     C5-C6: Mild interspace narrowing. Prominent left paracentral disc  extrusion. Minor circumferential annular bulging. Unremarkable facets.  Moderate to severe spinal canal stenosis. Mild spinal cord deformity  without spinal cord signal abnormality. Mild to  moderate left and  moderate to severe right foraminal narrowing.     C6-C7: Preserved interspace. Broad  posterior protrusion more prominent  on the left than on the right. Unremarkable facets. Mild to moderate  spinal canal stenosis. Moderate to severe left and mild right  foraminal narrowing.     C7-T1: Preserved interspace. No herniation. Mild right uncovertebral  joint degeneration. Unremarkable facets. No central stenosis or left  foraminal narrowing. Mild right foraminal narrowing.     T1-T2: Central disc extrusion versus ligamentous ossification  resulting in low-grade spinal canal narrowing.                                                                   IMPRESSION:  1.  Degenerative changes are most pronounced at C5-C6 and C6-C7 where  there is moderate to severe and mild to moderate spinal canal stenosis  respectively.  2.  At C5-C6 disc herniation deforms the spinal cord. No spinal cord  signal abnormality. There is moderate to severe right and mild to  moderate left foraminal narrowing.  3.  At C6-C7 there is moderate to severe left and mild right foraminal  narrowing.  4.  Mild spinal canal and foraminal narrowing at C4-C5.     Allergies:      Allergies   Allergen Reactions     Eszopiclone      Other reaction(s): Taste, Changes  Works, but bitter taste.     Food Hives     Peaches        Vitals:  /77   Pulse 90   SpO2 98%     Review of Systems: The patient denies recent fever, chills, illness, use of antibiotics or anticoagulants. All other 10-point review of systems negative.     Procedure: The procedure and risks were explained, and informed written consent was obtained from the patient. Risks include but are not limited to: infection, bleeding, increased pain, and damage to soft tissue, nerve, muscle, and vasculature structures. After getting informed consent, patient was brought into the procedure suite and was placed in a prone position on the procedure table. A Pause for the Cause was performed. Patient was prepped and draped in sterile fashion.     The C7-T1 interspace was identified with  use of fluoroscopy in AP view. A 25-gauge, 1.5 inch needle was used to anesthetize the skin and subcutaneous tissue entry site with a total of 2 ml of 1% lidocaine. Under fluoroscopic visualization, a 22-gauge, 3.5 inch Tuohy epidural needle was slowly advanced towards the epidural space a few millimeters left of midline. The latter part of the needle advancement was guided with fluoroscopy in the lateral view. The epidural space was identified using loss of resistance technique. After negative aspiration for heme and cerebrospinal fluid, a total of 1 mL of non-ionic contrast was injected to confirm needle placement. 0 mL of contrast was wasted. Epidurogram confirmed spread within the posterior epidural space. 2 ml of 10mg/ml of dexamethasone and 1 ml of preservative free 1% lidocaine was injected. The needle was removed.  Images were saved to PACS.    The patient tolerated the procedure well, and there was no evidence of procedural complications. No new sensory or motor deficits were noted following the procedure. The patient was stable and able to ambulate on discharge home. Post-procedure instructions were provided.     Pre-procedure pain score: 6/10 in the neck, 6/10 in the arm  Post-procedure pain score: 4/10 in the neck, 4/10 in the arm    Assessment/Plan: Saran Wallis is a 58 year old male s/p cervical interlaminar epidural steroid injection today for cervical spondylosis and radiculitis/radiculopathy.     1. Following today's procedure, the patient was advised to contact the Pain Management Center for any of the following:   Fever, chills, or night sweats   New onset of pain, numbness, or weakness   Any questions/concerns regarding the procedure  If unable to contact the Pain Center, the patient was instructed to go to a local Emergency Room for any complications.   2. The patient will receive a follow-up call in 1 week.   3. Follow-up with the referring provider in 2 weeks for post-procedure  evaluation.    ARYA DE LEON MD   Pain Management & Addiction Medicine

## 2023-03-23 ENCOUNTER — RADIOLOGY INJECTION OFFICE VISIT (OUTPATIENT)
Dept: PALLIATIVE MEDICINE | Facility: CLINIC | Age: 58
End: 2023-03-23
Payer: COMMERCIAL

## 2023-03-23 VITALS — SYSTOLIC BLOOD PRESSURE: 129 MMHG | DIASTOLIC BLOOD PRESSURE: 79 MMHG | OXYGEN SATURATION: 98 % | HEART RATE: 88 BPM

## 2023-03-23 DIAGNOSIS — M48.02 SPINAL STENOSIS IN CERVICAL REGION: ICD-10-CM

## 2023-03-23 DIAGNOSIS — M54.12 CERVICAL RADICULOPATHY: Primary | ICD-10-CM

## 2023-03-23 PROCEDURE — 62321 NJX INTERLAMINAR CRV/THRC: CPT | Performed by: ANESTHESIOLOGY

## 2023-03-23 RX ORDER — DEXAMETHASONE SODIUM PHOSPHATE 10 MG/ML
10 INJECTION, SOLUTION INTRAMUSCULAR; INTRAVENOUS ONCE
Status: COMPLETED | OUTPATIENT
Start: 2023-03-23 | End: 2023-03-23

## 2023-03-23 RX ADMIN — DEXAMETHASONE SODIUM PHOSPHATE 10 MG: 10 INJECTION, SOLUTION INTRAMUSCULAR; INTRAVENOUS at 13:47

## 2023-03-23 NOTE — NURSING NOTE
Discharge Information    IV Discontiued Time:  NA    Amount of Fluid Infused:  NA    Discharge Criteria = When patient returns to baseline or as per MD order    Consciousness:  Pt is fully awake    Circulation:  BP +/- 20% of pre-procedure level    Respiration:  Patient is able to breathe deeply    O2 Sat:  Patient is able to maintain O2 Sat >92% on room air    Activity:  Moves 4 extremities on command    Ambulation:  Patient is able to stand and walk or stand and pivot into wheelchair    Dressing:  Clean/dry or No Dressing    Notes:   Discharge instructions and AVS given to patient    Patient meets criteria for discharge?  YES    Admitted to PCU?  No    Responsible adult present to accompany patient home?  Yes    Signature/Title:    Rocío Oates RN  RN Care Coordinator  [facility]  '

## 2023-03-23 NOTE — PATIENT INSTRUCTIONS
[facility] Procedure Discharge Instructions    Today you saw:   Dr. Lolly Dexter      Your procedure:  Epidural steroid injection       Medications used:  Lidocaine (anesthetic)  Dexamethasone (steroid)  Omnipaque (contrast)           Be cautious as numbness and/or weakness in the area may occur up to 6-8 hours after the procedure due to effect of the local anesthetic  Do not drive for 6 hours. The effect of the local anesthetic could slow your reflexes.   Avoid strenuous activity for the first 24 hours. You may resume your regular activities after that.   You may shower, however avoid swimming, tub baths or hot tubs for 24 hours following your procedure  You may have a mild to moderate increase in pain for several days following the injection.    You may use ice packs for 10-15 minutes, 3 to 4 times a day at the injection site for comfort  Do not use heat to painful areas for 6 to 8 hours. This will give the local anesthetic time to wear off and prevent you from accidentally burning your skin.  Unless you have been directed to avoid the use of anti-inflammatory medications (NSAIDS-ibuprofen, Aleve, Motrin), you may use these medications or Tylenol for pain control if needed.   With diabetes, check your blood sugar more frequently than usual as your blood sugar may be higher than normal for 10-14 days following a steroid injection. Contact your doctor who manages your diabetes if your blood sugar is higher than usual  Possible side effects of steroids that you may experience include flushing, elevated blood pressure, increased appetite, mild headaches and restlessness.  All of these symptoms will get better with time.  It may take up to 14 days for the steroid medication to start working although you may feel the effect as early as a few days after the procedure.   Follow up with your referring provider in 2-3 weeks    If you experience any of the following, call the pain center line during work  hours at [phone] or on-call physician after hours at [phone]:  -Fever over 100 degree F  -Swelling, bleeding, redness, drainage, warmth at the injection site  -Progressive weakness or numbness in your arms  -Loss of bowel or bladder function  -Unusual headache that is not relieved by Tylenol or your regular headache medication  -Unusual new onset of pain that is not improving

## 2023-03-23 NOTE — NURSING NOTE
Pre-procedure Intake  If YES to any questions or NO to having a   Please complete laminated checklist and leave on the computer keyboard for Provider, verbally inform provider if able.    For SCS Trial, RFA's or any sedation procedure:  Have you been fasting? NA    If yes, for how long?     Are you taking any any blood thinners such as Coumadin, Warfarin, Jantoven, Pradaxa Xarelto, Eliquis, Edoxaban, Enoxaparin, Lovenox, Heparin, Arixtra, Fondaparinux, or Fragmin? OR Antiplatelet medication such as Plavix, Brilinta, or Effient?   No     If yes, when did you take your last dose?     Do you take aspirin?  No    If cervical procedure, have you held aspirin for 6 days?   NA    Do you have any allergies to contrast dye, iodine, steroid and/or numbing medications?  NO    Are you currently taking antibiotics or have an active infection?  NO    Have you had a fever/elevated temperature within the past week? NO    Are you currently taking oral steroids? NO    Do you have a ? Yes    Are you pregnant or breastfeeding?  Not Applicable    Have you received the COVID-19 vaccine? Yes    If yes, was it your 1st, 2nd or only dose needed? 4th    Date of most recent vaccine: 11/28/22    Notify provider and RNs if systolic BP >170, diastolic BP >100, P >100 or O2 sats < 90%      Ese Gomez MA  [facility] [place]

## 2023-03-30 ENCOUNTER — HOSPITAL ENCOUNTER (OUTPATIENT)
Dept: CT IMAGING | Facility: CLINIC | Age: 58
Discharge: HOME | End: 2023-03-30
Attending: FAMILY MEDICINE | Admitting: FAMILY MEDICINE
Payer: COMMERCIAL

## 2023-03-30 DIAGNOSIS — Z87.891 PERSONAL HISTORY OF TOBACCO USE: ICD-10-CM

## 2023-03-30 PROCEDURE — 71271 CT THORAX LUNG CANCER SCR C-: CPT

## 2023-03-31 NOTE — RESULT ENCOUNTER NOTE
Mr. Wallis,    -Lung CT did not show any signs of suspicious lung nodules. ADVISE: Rechecking a lung CT scan in 12 months.  -It did show fat in your liver, Luke.  Weight loss will eliminate this.    If you have further questions about the interpretation of your labs, [email or web address] is a good website to check out for further information.    Please contact the clinic if you have additional questions.  Thank you.    Sincerely,    Vamshi Clarke MD

## 2023-04-21 DIAGNOSIS — E11.9 TYPE 2 DIABETES MELLITUS WITHOUT COMPLICATION, WITHOUT LONG-TERM CURRENT USE OF INSULIN (H): ICD-10-CM

## 2023-04-21 RX ORDER — SEMAGLUTIDE 1.34 MG/ML
INJECTION, SOLUTION SUBCUTANEOUS
Qty: 6 ML | Refills: 1 | Status: SHIPPED | OUTPATIENT
Start: 2023-04-21 | End: 2023-08-30

## 2023-04-21 NOTE — TELEPHONE ENCOUNTER
[facility]    Mita Steele RN [place] [facility]

## 2023-04-22 ENCOUNTER — HEALTH MAINTENANCE LETTER (OUTPATIENT)
Age: 58
End: 2023-04-22

## 2023-04-24 ENCOUNTER — TELEPHONE (OUTPATIENT)
Dept: FAMILY MEDICINE | Facility: CLINIC | Age: 58
End: 2023-04-24
Payer: COMMERCIAL

## 2023-04-24 NOTE — TELEPHONE ENCOUNTER
Forms/Letter Request    Type of form/letter: Michele GONZALEZ for Ozempic 2mg/3ml    Have you been seen for this request: N/A    Do we have the form/letter: Yes: Placed in TC bin (yellow folder)    Who is the form from? Michele GONZALEZ for Ozempic 2mg/3ml (if other please explain)    Where did/will the form come from? form was faxed in

## 2023-04-25 NOTE — TELEPHONE ENCOUNTER
Prior Authorization Retail Medication Request    Medication/Dose: [facility] for Ozempic 2mg/3ml  ICD code (if different than what is on RX):    Previously Tried and Failed:    Rationale:      Insurance Name:    Insurance ID:        Pharmacy Information (if different than what is on RX)  Name:  MICHELE  Phone:  [phone]

## 2023-04-26 NOTE — TELEPHONE ENCOUNTER
Patient called stating that he called pharmacy and they don't have the prescription for Ozempic.  Called the pharmacy and they stated that the medication needs a PA and the PA has already been sent to the PA team.  See TE from 4/24/23.      Called patient and relayed message to him.  Patient verbalized understanding and will wait to hear back from us regarding the PA.

## 2023-04-27 NOTE — TELEPHONE ENCOUNTER
Prior Authorization Approval    Authorization Effective Date: 3/28/2023  Authorization Expiration Date: 4/26/2024  Medication: OZEMPIC, 0.25 OR 0.5 MG/DOSE, 2 MG/1.5ML SOPN pen- APPROVED  Approved Dose/Quantity:   Reference #:     Insurance Company: [employer] - Phone [phone] Fax [phone]  Expected CoPay:       CoPay Card Available:      Foundation Assistance Needed:    Which Pharmacy is filling the prescription (Not needed for infusion/clinic administered): [facility] - [place], [place] - [address] [place] [address]  Pharmacy Notified: Yes  Patient Notified:  **Instructed pharmacy to notify patient when script is ready to /ship.**

## 2023-04-27 NOTE — TELEPHONE ENCOUNTER
Central Prior Authorization Team  Phone: [phone]    PA Initiation    Medication: OZEMPIC, 0.25 OR 0.5 MG/DOSE, 2 MG/1.5ML SOPN pen  Insurance Company: [employer] - Phone [phone] Fax [phone]  Pharmacy Filling the Rx: [facility] - [address]  Filling Pharmacy Phone: [phone]  Filling Pharmacy Fax:    Start Date: 4/27/2023

## 2023-05-16 DIAGNOSIS — E11.9 TYPE 2 DIABETES MELLITUS WITHOUT COMPLICATION, WITHOUT LONG-TERM CURRENT USE OF INSULIN (H): ICD-10-CM

## 2023-05-18 NOTE — CONFIDENTIAL NOTE
Called and spoke to Saran to let him know his RX for Ozempic has been approved and to call his pharmacy before he picks it up.    Salome [place]

## 2023-07-15 ENCOUNTER — HEALTH MAINTENANCE LETTER (OUTPATIENT)
Age: 58
End: 2023-07-15

## 2023-08-09 DIAGNOSIS — K21.9 GASTROESOPHAGEAL REFLUX DISEASE WITHOUT ESOPHAGITIS: ICD-10-CM

## 2023-08-10 RX ORDER — PANTOPRAZOLE SODIUM 40 MG/1
TABLET, DELAYED RELEASE ORAL
Qty: 90 TABLET | Refills: 1 | Status: SHIPPED | OUTPATIENT
Start: 2023-08-10 | End: 2023-09-20

## 2023-08-25 DIAGNOSIS — E11.9 TYPE 2 DIABETES MELLITUS WITHOUT COMPLICATION, WITHOUT LONG-TERM CURRENT USE OF INSULIN (H): ICD-10-CM

## 2023-08-25 RX ORDER — SEMAGLUTIDE 0.68 MG/ML
INJECTION, SOLUTION SUBCUTANEOUS
Qty: 6 ML | Refills: 1 | OUTPATIENT
Start: 2023-08-25

## 2023-09-08 DIAGNOSIS — F33.0 MAJOR DEPRESSIVE DISORDER, RECURRENT EPISODE, MILD (H): ICD-10-CM

## 2023-09-20 ENCOUNTER — OFFICE VISIT (OUTPATIENT)
Dept: FAMILY MEDICINE | Facility: CLINIC | Age: 58
End: 2023-09-20
Attending: FAMILY MEDICINE
Payer: COMMERCIAL

## 2023-09-20 VITALS
TEMPERATURE: 97.7 F | DIASTOLIC BLOOD PRESSURE: 74 MMHG | OXYGEN SATURATION: 100 % | HEART RATE: 77 BPM | RESPIRATION RATE: 12 BRPM | HEIGHT: 70 IN | BODY MASS INDEX: 25.91 KG/M2 | WEIGHT: 181 LBS | SYSTOLIC BLOOD PRESSURE: 118 MMHG

## 2023-09-20 DIAGNOSIS — I10 HYPERTENSION GOAL BP (BLOOD PRESSURE) < 140/90: ICD-10-CM

## 2023-09-20 DIAGNOSIS — Z00.00 ROUTINE GENERAL MEDICAL EXAMINATION AT A HEALTH CARE FACILITY: Primary | ICD-10-CM

## 2023-09-20 DIAGNOSIS — K21.9 GASTROESOPHAGEAL REFLUX DISEASE WITHOUT ESOPHAGITIS: ICD-10-CM

## 2023-09-20 DIAGNOSIS — M48.02 SPINAL STENOSIS IN CERVICAL REGION: ICD-10-CM

## 2023-09-20 DIAGNOSIS — E11.9 TYPE 2 DIABETES MELLITUS WITHOUT COMPLICATION, WITHOUT LONG-TERM CURRENT USE OF INSULIN (H): ICD-10-CM

## 2023-09-20 DIAGNOSIS — F40.243 FEAR OF FLYING: ICD-10-CM

## 2023-09-20 DIAGNOSIS — Z23 NEED FOR PROPHYLACTIC VACCINATION AND INOCULATION AGAINST INFLUENZA: ICD-10-CM

## 2023-09-20 DIAGNOSIS — E78.5 HYPERLIPIDEMIA LDL GOAL <70: ICD-10-CM

## 2023-09-20 DIAGNOSIS — F33.0 MAJOR DEPRESSIVE DISORDER, RECURRENT EPISODE, MILD (H): ICD-10-CM

## 2023-09-20 LAB — HBA1C MFR BLD: 5.9 % (ref 0–5.6)

## 2023-09-20 PROCEDURE — 99396 PREV VISIT EST AGE 40-64: CPT | Mod: 25 | Performed by: FAMILY MEDICINE

## 2023-09-20 PROCEDURE — 90471 IMMUNIZATION ADMIN: CPT | Performed by: FAMILY MEDICINE

## 2023-09-20 PROCEDURE — 36415 COLL VENOUS BLD VENIPUNCTURE: CPT | Performed by: FAMILY MEDICINE

## 2023-09-20 PROCEDURE — 83036 HEMOGLOBIN GLYCOSYLATED A1C: CPT | Performed by: FAMILY MEDICINE

## 2023-09-20 PROCEDURE — 90682 RIV4 VACC RECOMBINANT DNA IM: CPT | Performed by: FAMILY MEDICINE

## 2023-09-20 PROCEDURE — 96127 BRIEF EMOTIONAL/BEHAV ASSMT: CPT | Performed by: FAMILY MEDICINE

## 2023-09-20 PROCEDURE — 99214 OFFICE O/P EST MOD 30 MIN: CPT | Mod: 25 | Performed by: FAMILY MEDICINE

## 2023-09-20 RX ORDER — PANTOPRAZOLE SODIUM 40 MG/1
40 TABLET, DELAYED RELEASE ORAL DAILY
Qty: 90 TABLET | Refills: 1 | Status: SHIPPED | OUTPATIENT
Start: 2023-09-20 | End: 2024-03-20

## 2023-09-20 RX ORDER — IRBESARTAN 150 MG/1
150 TABLET ORAL DAILY
Qty: 90 TABLET | Refills: 1 | Status: SHIPPED | OUTPATIENT
Start: 2023-09-20 | End: 2024-03-20

## 2023-09-20 RX ORDER — SIMVASTATIN 10 MG
10 TABLET ORAL AT BEDTIME
Qty: 90 TABLET | Refills: 3 | Status: SHIPPED | OUTPATIENT
Start: 2023-09-20 | End: 2024-05-06

## 2023-09-20 RX ORDER — LORAZEPAM 0.5 MG/1
0.5 TABLET ORAL EVERY 6 HOURS PRN
Qty: 10 TABLET | Refills: 0 | Status: SHIPPED | OUTPATIENT
Start: 2023-09-20 | End: 2024-06-17

## 2023-09-20 RX ORDER — SEMAGLUTIDE 0.68 MG/ML
INJECTION, SOLUTION SUBCUTANEOUS
Qty: 6 ML | Refills: 1 | Status: SHIPPED | OUTPATIENT
Start: 2023-09-20 | End: 2024-02-12

## 2023-09-20 ASSESSMENT — ANXIETY QUESTIONNAIRES
1. FEELING NERVOUS, ANXIOUS, OR ON EDGE: NOT AT ALL
2. NOT BEING ABLE TO STOP OR CONTROL WORRYING: NOT AT ALL
IF YOU CHECKED OFF ANY PROBLEMS ON THIS QUESTIONNAIRE, HOW DIFFICULT HAVE THESE PROBLEMS MADE IT FOR YOU TO DO YOUR WORK, TAKE CARE OF THINGS AT HOME, OR GET ALONG WITH OTHER PEOPLE: NOT DIFFICULT AT ALL
7. FEELING AFRAID AS IF SOMETHING AWFUL MIGHT HAPPEN: NOT AT ALL
3. WORRYING TOO MUCH ABOUT DIFFERENT THINGS: NOT AT ALL
4. TROUBLE RELAXING: NOT AT ALL
GAD7 TOTAL SCORE: 0
5. BEING SO RESTLESS THAT IT IS HARD TO SIT STILL: NOT AT ALL
GAD7 TOTAL SCORE: 0
6. BECOMING EASILY ANNOYED OR IRRITABLE: NOT AT ALL

## 2023-09-20 ASSESSMENT — PAIN SCALES - GENERAL: PAINLEVEL: NO PAIN (0)

## 2023-09-20 NOTE — PROGRESS NOTES
Assessment & Plan     Routine general medical examination at a health care facility  For the most part, is up-to-date on preventative healthcare needs.  I did print off an advanced care directive template and encouraged him to talk with his wife to develop an advance care plan.  Otherwise we will plan on seeing him back in 6 months for his next diabetic check.    Type 2 diabetes mellitus without complication, without long-term current use of insulin (H)  He has excellent glycemic control with a hemoglobin A1c again under 6.  We will reduce his metformin to 1000 mg daily of the extended release preparation.  Follow-up in 6 months.  - HEMOGLOBIN A1C; Future  - semaglutide (OZEMPIC, 0.25 OR 0.5 MG/DOSE,) 2 MG/3ML pen; Inject 0.75 mL (0.5 mg) subcutaneously every week.  - HEMOGLOBIN A1C  - metFORMIN (GLUCOPHAGE) 500 MG tablet; Take 2 tablets (1,000 mg) by mouth daily (with breakfast)    Spinal stenosis in cervical region  I think this is likely a flare of his spinal stenosis that previously has been moderately controlled with epidural steroid injections.  He reassures me he is continuing to do the exercises he was instructed by physical therapy last year and would like to have another epidural steroid injection.  His last 1 was about 6 months ago.  - Pain Management  Referral; Future    Hypertension goal BP (blood pressure) < 140/90  Blood pressures well controlled today.  He is taking his angiotensin receptor blocker without side effects.  Recheck in 6 to 12 months.  - irbesartan (AVAPRO) 150 MG tablet; Take 1 tablet (150 mg) by mouth daily    Gastroesophageal reflux disease without esophagitis  Continues to have regular GERD symptoms that are completely controlled with proton pump inhibitor.  This is renewed.  - pantoprazole (PROTONIX) 40 MG EC tablet; Take 1 tablet (40 mg) by mouth daily    Major depressive disorder, recurrent episode, mild (H)  Depression has been very stable on his selective serotonin  "reuptake inhibitor which she is tolerating without side effects.  Renewal was given today.  Recheck in 6 to 12 months.  - sertraline (ZOLOFT) 50 MG tablet; Take 1 tablet (50 mg) by mouth daily    Hyperlipidemia LDL goal <70  He is achieving his lipid target on a low intensity statin.  Continue regular exercise decreased fat diet and refill of his statin.  - simvastatin (ZOCOR) 10 MG tablet; Take 1 tablet (10 mg) by mouth At Bedtime    Fear of flying  He is given a prescription for lorazepam to help with his fear of flying as he will be traveling to and from [place] later this year.  - LORazepam (ATIVAN) 0.5 MG tablet; Take 1 tablet (0.5 mg) by mouth every 6 hours as needed for anxiety    Need for prophylactic vaccination and inoculation against influenza  Updated today.             BMI:   Estimated body mass index is 25.97 kg/m  as calculated from the following:    Height as of this encounter: 1.778 m (5' 10\").    Weight as of this encounter: 82.1 kg (181 lb).   Weight management plan: Discussed healthy diet and exercise guidelines        Vamshi Clarke Jr, MD  [facility] PRIOR OTIS Noonan is a 58 year old, presenting for the following health issues:  Diabetes and Physical    History of Present Illness       Mental Health Follow-up:  Patient presents to follow-up on Depression.Patient's depression since last visit has been:  Good  The patient is not having other symptoms associated with depression.        Patient is not feeling anxious or having panic attacks.      Diabetes:   He presents for follow up of diabetes.  He is checking home blood glucose a few times a week.   He checks blood glucose before meals and after meals.  Blood glucose is never over 200 and never under 70. He is aware of hypoglycemia symptoms including dizziness and other.    He has no concerns regarding his diabetes at this time.  He is having burning in feet.  The patient has not had a diabetic eye exam in the " "last 12 months.          Hyperlipidemia:  He presents for follow up of hyperlipidemia.   He is taking medication to lower cholesterol. He is not having myalgia or other side effects to statin medications.    Reason for visit:  A1c test, Neck pain, Regular check up    He eats 0-1 servings of fruits and vegetables daily.He consumes 0 sweetened beverage(s) daily.He exercises with enough effort to increase his heart rate 9 or less minutes per day.  He exercises with enough effort to increase his heart rate 3 or less days per week.   He is taking medications regularly.  Healthy Habits:     Getting at least 3 servings of Calcium per day:  Yes    Bi-annual eye exam:  NO    Dental care twice a year:  Yes    Sleep apnea or symptoms of sleep apnea:  None    Diet:  Diabetic    Taking medications regularly:  0    Medication side effects:  None    Additional concerns today:  Yes     Neck pain is \"annoying\" and feels like is distal neck or central posterior shoulder region.  It sometimes is resulting in a bitemporal headache.  Taking at least 4 Tylenol per day.  This helps a little as does laying down for 20 minutes.  Trying to do ROM exercises as instructed by physical therapy. Has been present for over one year.  Pain radiates down to shoulder and to elbow.  Has known central spinal stenosis seen on MRI last year.  Responded favorably to three epidural steroid injections, the last of which was in March 2023.      Will be flying to [place] in December and admits he is having more problems with anxiety with flying.  He is requesting a prescription for lorazepam to make his flight more comfortable.              Review of Systems         Objective    /74   Pulse 77   Temp 97.7  F (36.5  C) (Tympanic)   Resp 12   Ht 1.778 m (5' 10\")   Wt 82.1 kg (181 lb)   SpO2 100%   BMI 25.97 kg/m    Body mass index is 25.97 kg/m .  Physical Exam   GENERAL: healthy, alert and no distress  NECK: no adenopathy, no asymmetry, masses, " or scars and thyroid normal to palpation  RESP: lungs clear to auscultation - no rales, rhonchi or wheezes  CV: regular rate and rhythm, normal S1 S2, no S3 or S4, no murmur, click or rub, no peripheral edema and peripheral pulses strong  ABDOMEN: soft, nontender, no hepatosplenomegaly, no masses and bowel sounds normal  MS: no gross musculoskeletal defects noted, no edema  Diabetic foot exam: normal DP and PT pulses and no trophic changes or ulcerative lesions    Results for orders placed or performed in visit on 09/20/23 (from the past 24 hour(s))   HEMOGLOBIN A1C   Result Value Ref Range    Hemoglobin A1C 5.9 (H) 0.0 - 5.6 %

## 2023-09-20 NOTE — RESULT ENCOUNTER NOTE
Mr. Wallis,    -A1C (test of diabetes control the last 2-3 months) is at your goal. Please drop your metformin dose to 500 mg - two tablets by mouth in the morning as we discussed today since your A1C is again less than 6.0.  Also, you should make an appointment to see me and recheck your A1C test in 6 months.     If you have further questions about the interpretation of your labs, [email or web address] is a good website to check out for further information.    Please contact the clinic if you have additional questions.  Thank you.    Sincerely,    Vamshi Clarke MD

## 2023-09-25 ENCOUNTER — TELEPHONE (OUTPATIENT)
Dept: PALLIATIVE MEDICINE | Facility: CLINIC | Age: 58
End: 2023-09-25
Payer: COMMERCIAL

## 2023-09-25 NOTE — TELEPHONE ENCOUNTER
"Screening Questions for Radiology Injections:    Injection to be done at which interventional clinic site? [facility]    If choosing [facility] for location, please inform patient:  \"[facility] is a Hospital based clinic. Before your visit, you should check with your insurance about how it covers the charges for facility services in a hospital-based clinic.     Procedure ordered by Vamshi Clarke Jr., MD     Procedure ordered? ESVIN    Transforaminal Cervical BARBARA - Send to [facility] - No [facility] providers perform this procedure    What insurance would patient like us to bill for this procedure? MEDICA CHOICE    IF SCHEDULING IN [place] [facility] OR [facility]INE PLEASE SCHEDULE AT LEAST 7-10 BUSINESS DAYS OUT SO A PA CAN BE OBTAINED    Worker's comp or MVA (motor vehicle accident) -Any injection DO NOT SCHEDULE and route to Amarilis Almanzar.      [employer] insurance - For SI joint injections, DO NOT SCHEDULE and route to Jeannine García.       ALL BCBS, [employer] and [employer] - DO NOT SCHEDULE and route to Jeannine Ricky    MEDICA- facet joint injections, [facility]    Is patient scheduled at [facility]? NO    If YES, route every encounter to [facility] SPINE CENTER CARE NAVIGATION POOL [[phone]]    Is an  needed? No     Patient has a  home? (Review Grid) YES: Informed     Any chance of pregnancy? Not Applicable   If YES, do NOT schedule and route to RN pool  - Dr. Musa route to Ileana Crook and PM&R Nurse  [[id number]]      Is patient actively being treated for cancer or immunocompromised? No  If YES, do NOT schedule and route to RN pool/ Dr. Musa's Team    Does the patient have a bleeding or clotting disorder? No     If YES, okay to schedule AND route to RN nurse / Dr. Musa's Team     (For any patients with platelet count <100, RN must forward to provider)    Is patient taking any Blood Thinners OR Antiplatelet medication?  No   If hold needed, " do NOT schedule, route to RN pool/ Dr. Musa's Team    Examples:   o Blood Thinners: (Coumadin, Warfarin, Jantoven, Pradaxa, Xarelto, Eliquis, Edoxaban, Enoxaparin, Lovenox, Heparin, Arixtra, Fondaparinux or Fragmin)  o Antiplatelet Medications: (Plavix, Brilinta or Effient)     Is patient taking any aspirin products (includes Excedrin and Fiorinal)? Yes - Pt takes 81mg daily; instructed to hold 6 day(s) prior to procedure.      If more than 325mg/day, OK to schedule; Instruct Pt to decrease to less than 325 mg for 7 days AND route to RN pool/ Dr. Musa's Team     For CERVICAL procedures, hold all aspirin products for 6 days.     Tell Pt that if aspirin product is not held for 6 days, the procedure WILL BE cancelled.     Any allergies to contrast dye, iodine, shellfish, or numbing and steroid medications? No    If YES, schedule and add allergy information to appointment notes AND route to the RN pool/ Dr. Musa's Team    If BARBARA and Contrast Dye / Iodine Allergy? DO NOT SCHEDULE, route to RN pool/ Dr. Musa's Team    Allergies: Eszopiclone and Food     Does patient have an active infection or treated for one within the past week? No    Is patient currently taking any antibiotics or steroid medications?  No     For patients on chronic, preventative, or prophylactic antibiotics, procedures may be scheduled.     For patients on antibiotics for active or recent infection, schedule 4 days after completed.    For patients on steroid medications, schedule 4 days after completed.     Has the patient had a flu shot or any other vaccinations within the past 7 days? Yes, flu shot on 9/20/23  If yes, explain that for the vaccine to work best they need to:       wait 1 week before and 1 week after getting any Vaccine    wait 1 week before and 2 weeks after getting Covid Vaccine #2 or BOOSTER    If patient has concerns about the timing, send to RN pool/ Dr. Musa's Team    Does patient have an MRI/CT?  YES: 07/15/22  Include  Date and Check Procedure Scheduling Grid to see if required.    Was the MRI/CT done within the last 3 years?  Yes     If no route to  [place]/ Dr. Musa's Team    If yes, where was the MRI/CT done? [facility]      Refer to PACS Transmissions list for approved external locations and route to RN Pool [facility]/ Dr. Petersons Team    If MRI was not done at approved external location do NOT schedule and route to RN pool/ Dr. Petersons Team      If patient has an imaging disc, the injection MAY be scheduled but patient must bring disc to appt or appt will be cancelled.    Is patient able to transfer to a procedure table with minimal or no assistance? Yes     If no, do NOT schedule and route to RN Pool/ Dr. Musa's Team    Procedure Specific Instructions:    If celiac plexus block, informed patient NPO for 6 hours and that it is okay to take medications with sips of water, especially blood pressure medications Not Applicable         If this is for a cervical procedure, informed patient that aspirin needs to be held for 6 days.   YES: Informed       Sedation, If Sedation is ordered for any procedure, patient must be NPO for 6 hours prior to procedure Not Applicable      If IV needed:    Do not schedule procedures requiring IV placement in the first appointment of the day or first appointment after lunch. Do NOT schedule at 0745, 0815 or 1245.       Instructed patient to arrive 30 minutes early for IV start if required. (Check Procedure Scheduling Grid)  Not Applicable    Reminders:    If you are started on any steroids or antibiotics between now and your appointment, you must contact us because the procedure may need to be cancelled.  Yes      As a reminder, receiving steroids can decrease your body's ability to fight infection.   Would you still like to move forward with scheduling the injection?  Yes      IV Sedation is not provided for procedures. If oral anti-anxiety medication is needed, the patient should request  this from their referring provider.      Instruct patient to arrive as directed prior to the scheduled appointment time:  If IV needed 30 minutes before appointment time       For patients 85 or older we recommend having an adult stay w/ them for the remainder of the day.       If the patient is Diabetic, remind them to bring their glucometer.      Does the patient have any questions?  NO  Little Torres  [facility]

## 2023-10-17 NOTE — PROGRESS NOTES
[facility] - Procedure Note    Date of Visit: 10/19/2023    Procedure performed: C7-T1 interlaminar epidural steroid injection with fluoroscopic guidance  Diagnosis: Cervical spondylosis; Cervical radiculitis/radiculopathy  : Lolly Dexter MD    Anesthesia: none    Indications: Saran Wallis is a 58 year old male who is seen at the request of Dr. GUSTAFSON for cervical epidural steroid injection. The patient describes central neck pain that radiates to his right hand and is causing weakness and tingling. The patient has been exhibiting symptoms consistent with cervical intraspinal inflammation and radiculopathy. Symptoms have been persistent, disabling, and intermittently severe. The patient reports minimal improvement with conservative treatment, including PT and medications.    This is a repeat injection.  Previous ESVIN done by myself on 3/23/2023, 11/7/2022 & 9/21/2022 provided good pain relief for a period of 2 months.       Cervical MRI was done on 7/15/2022 which showed   FINDINGS: Normal cervical vertebral body heights and alignment. Normal  cervical spinal cord. T2 hyperintense Modic type I edematous  degenerative endplate changes on the left at C6-C7. Otherwise benign  marrow.     C2-C3: Preserved interspace. No herniation. Minor uncovertebral facet  joint degeneration. No stenosis.     C3-C4: Preserved interspace. Tiny central protrusion. Unremarkable  facets. No stenosis.     C4-C5: Preserved interspace. Small central disc protrusion and minor  annular bulging. Unremarkable facets. Mild spinal canal and foraminal  narrowing.     C5-C6: Mild interspace narrowing. Prominent left paracentral disc  extrusion. Minor circumferential annular bulging. Unremarkable facets.  Moderate to severe spinal canal stenosis. Mild spinal cord deformity  without spinal cord signal abnormality. Mild to  moderate left and  moderate to severe right foraminal narrowing.     C6-C7: Preserved  interspace. Broad posterior protrusion more prominent  on the left than on the right. Unremarkable facets. Mild to moderate  spinal canal stenosis. Moderate to severe left and mild right  foraminal narrowing.     C7-T1: Preserved interspace. No herniation. Mild right uncovertebral  joint degeneration. Unremarkable facets. No central stenosis or left  foraminal narrowing. Mild right foraminal narrowing.     T1-T2: Central disc extrusion versus ligamentous ossification  resulting in low-grade spinal canal narrowing.                                                                   IMPRESSION:  1.  Degenerative changes are most pronounced at C5-C6 and C6-C7 where  there is moderate to severe and mild to moderate spinal canal stenosis  respectively.  2.  At C5-C6 disc herniation deforms the spinal cord. No spinal cord  signal abnormality. There is moderate to severe right and mild to  moderate left foraminal narrowing.  3.  At C6-C7 there is moderate to severe left and mild right foraminal  narrowing.  4.  Mild spinal canal and foraminal narrowing at C4-C5.     Allergies:      Allergies   Allergen Reactions    Eszopiclone      Other reaction(s): Taste, Changes  Works, but bitter taste.    Food Hives     Peaches        Vitals:  /86   Pulse 90   SpO2 99%     Review of Systems: The patient denies recent fever, chills, illness, use of antibiotics or anticoagulants. All other 10-point review of systems negative.     Procedure: The procedure and risks were explained, and informed written consent was obtained from the patient. Risks include but are not limited to: infection, bleeding, increased pain, and damage to soft tissue, nerve, muscle, and vasculature structures. After getting informed consent, patient was brought into the procedure suite and was placed in a prone position on the procedure table. A Pause for the Cause was performed. Patient was prepped and draped in sterile fashion.     The C7-T1 interspace was  identified with use of fluoroscopy in AP view. A 25-gauge, 1.5 inch needle was used to anesthetize the skin and subcutaneous tissue entry site with a total of 2 ml of 1% lidocaine. Under fluoroscopic visualization, a 22-gauge, 3.5 inch Tuohy epidural needle was slowly advanced towards the epidural space a few millimeters left of midline. The latter part of the needle advancement was guided with fluoroscopy in the lateral view. The epidural space was identified using loss of resistance technique. After negative aspiration for heme and cerebrospinal fluid, a total of 1 mL of non-ionic contrast was injected to confirm needle placement. 0 mL of contrast was wasted. Epidurogram confirmed spread within the posterior epidural space. 2 ml of 10mg/ml of dexamethasone and 1 ml of preservative free 1% lidocaine was injected. The needle was removed.  Images were saved to PACS.    The patient tolerated the procedure well, and there was no evidence of procedural complications. No new sensory or motor deficits were noted following the procedure. The patient was stable and able to ambulate on discharge home. Post-procedure instructions were provided.     Pre-procedure pain score: 7/10 in the neck, 7/10 in the arm  Post-procedure pain score: 2/10 in the neck, 2/10 in the arm    Assessment/Plan: Saran Wallis is a 58 year old male s/p cervical interlaminar epidural steroid injection today for cervical spondylosis and radiculitis/radiculopathy.     1. Following today's procedure, the patient was advised to contact the Pain Management Center for any of the following:   Fever, chills, or night sweats   New onset of pain, numbness, or weakness   Any questions/concerns regarding the procedure  If unable to contact the Pain Center, the patient was instructed to go to a local Emergency Room for any complications.   2. The patient will receive a follow-up call in 1 week.   3. Follow-up with the referring provider in 2 weeks for post-procedure  evaluation.    ARYA DE LEON MD   Pain Management & Addiction Medicine

## 2023-10-19 ENCOUNTER — RADIOLOGY INJECTION OFFICE VISIT (OUTPATIENT)
Dept: PALLIATIVE MEDICINE | Facility: CLINIC | Age: 58
End: 2023-10-19
Attending: FAMILY MEDICINE
Payer: COMMERCIAL

## 2023-10-19 VITALS — SYSTOLIC BLOOD PRESSURE: 131 MMHG | DIASTOLIC BLOOD PRESSURE: 81 MMHG | OXYGEN SATURATION: 100 % | HEART RATE: 87 BPM

## 2023-10-19 DIAGNOSIS — M54.12 CERVICAL RADICULOPATHY: Primary | ICD-10-CM

## 2023-10-19 DIAGNOSIS — M48.02 SPINAL STENOSIS IN CERVICAL REGION: ICD-10-CM

## 2023-10-19 PROCEDURE — 62321 NJX INTERLAMINAR CRV/THRC: CPT | Performed by: ANESTHESIOLOGY

## 2023-10-19 RX ORDER — DEXAMETHASONE SODIUM PHOSPHATE 10 MG/ML
10 INJECTION, SOLUTION INTRAMUSCULAR; INTRAVENOUS ONCE
Status: COMPLETED | OUTPATIENT
Start: 2023-10-19 | End: 2023-10-19

## 2023-10-19 RX ADMIN — DEXAMETHASONE SODIUM PHOSPHATE 10 MG: 10 INJECTION, SOLUTION INTRAMUSCULAR; INTRAVENOUS at 13:11

## 2023-10-19 ASSESSMENT — PAIN SCALES - GENERAL: PAINLEVEL: SEVERE PAIN (7)

## 2023-10-19 NOTE — NURSING NOTE
Pre-procedure Intake  If YES to any questions or NO to having a   Please complete laminated checklist and leave on the computer keyboard for Provider, verbally inform provider if able.    For SCS Trial, RFA's or any sedation procedure:  Have you been fasting? NA  If yes, for how long?     Are you taking any any blood thinners such as Coumadin, Warfarin, Jantoven, Pradaxa Xarelto, Eliquis, Edoxaban, Enoxaparin, Lovenox, Heparin, Arixtra, Fondaparinux, or Fragmin? OR Antiplatelet medication such as Plavix, Brilinta, or Effient?   No   If yes, when did you take your last dose?     Do you take aspirin?  Yes -   ASA  If cervical procedure, have you held aspirin for 6 days?   No 10/14/22 last dose    Do you have any allergies to contrast dye, iodine, steroid and/or numbing medications?  NO    Are you currently taking antibiotics or have an active infection?  NO    Have you had a fever/elevated temperature within the past week? NO    Are you currently taking oral steroids? NO    Do you have a ? Yes    Are you pregnant or breastfeeding?  Not Applicable    Have you received the COVID-19 vaccine? Yes  If yes, was it your 1st, 2nd or only dose needed? 4  Date of most recent vaccine: 11/28/22    Notify provider and RNs if systolic BP >170, diastolic BP >100, P >100 or O2 sats < 90%      Ese Gomez MA  [facility] [place]

## 2023-10-19 NOTE — NURSING NOTE
Discharge Information    IV Discontiued Time:  NA    Amount of Fluid Infused:  NA    Discharge Criteria = When patient returns to baseline or as per MD order    Consciousness:  Pt is fully awake    Circulation:  BP +/- 20% of pre-procedure level    Respiration:  Patient is able to breathe deeply    O2 Sat:  Patient is able to maintain O2 Sat >92% on room air    Activity:  Moves 4 extremities on command    Ambulation:  Patient is able to stand and walk or stand and pivot into wheelchair    Dressing:  Clean/dry or No Dressing    Notes:   Discharge instructions and AVS given to patient    Patient meets criteria for discharge?  YES    Admitted to PCU?  No    Responsible adult present to accompany patient home?  Yes    Signature/Title:    Rocío Oates RN  RN Care Coordinator  [facility] [place]

## 2023-10-19 NOTE — PATIENT INSTRUCTIONS
[facility] Procedure Discharge Instructions    Today you saw:   Dr. Lolly Dexter     Your procedure:  Epidural steroid injection       Medications used:  Lidocaine (anesthetic)   Dexamethasone (steroid) Omnipaque (contrast)         If you were holding your blood thinning medication, please restart taking it: You can resume your aspirin in 24 hours        Be cautious as numbness and/or weakness in the area may occur up to 6-8 hours after the procedure due to effect of the local anesthetic  Do not drive for 6 hours. The effect of the local anesthetic could slow your reflexes.   Avoid strenuous activity for the first 24 hours. You may resume your regular activities after that.   You may shower, however avoid swimming, tub baths or hot tubs for 24 hours following your procedure  You may have a mild to moderate increase in pain for several days following the injection.    You may use ice packs for 10-15 minutes, 3 to 4 times a day at the injection site for comfort  Do not use heat to painful areas for 6 to 8 hours. This will give the local anesthetic time to wear off and prevent you from accidentally burning your skin.  Unless you have been directed to avoid the use of anti-inflammatory medications (NSAIDS-ibuprofen, Aleve, Motrin), you may use these medications or Tylenol for pain control if needed.   With diabetes, check your blood sugar more frequently than usual as your blood sugar may be higher than normal for 10-14 days following a steroid injection. Contact your doctor who manages your diabetes if your blood sugar is higher than usual  Possible side effects of steroids that you may experience include flushing, elevated blood pressure, increased appetite, mild headaches and restlessness.  All of these symptoms will get better with time.  It may take up to 14 days for the steroid medication to start working although you may feel the effect as early as a few days after the procedure.   Follow up  with your referring provider in 2-3 weeks- PCP.  OK to call our office for repeat injections    If you experience any of the following, call the pain center line during work hours at [phone] or on-call physician after hours at [phone]:  -Fever over 100 degree F  -Swelling, bleeding, redness, drainage, warmth at the injection site  -Progressive weakness or numbness in your arms  -Loss of bowel or bladder function  -Unusual headache that is not relieved by Tylenol or your regular headache medication  -Unusual new onset of pain that is not improving

## 2023-12-07 ENCOUNTER — TRANSFERRED RECORDS (OUTPATIENT)
Dept: HEALTH INFORMATION MANAGEMENT | Facility: CLINIC | Age: 58
End: 2023-12-07
Payer: COMMERCIAL

## 2023-12-07 LAB — RETINOPATHY: NEGATIVE

## 2024-02-10 ENCOUNTER — HEALTH MAINTENANCE LETTER (OUTPATIENT)
Age: 59
End: 2024-02-10

## 2024-02-12 DIAGNOSIS — E11.9 TYPE 2 DIABETES MELLITUS WITHOUT COMPLICATION, WITHOUT LONG-TERM CURRENT USE OF INSULIN (H): ICD-10-CM

## 2024-02-12 RX ORDER — SEMAGLUTIDE 0.68 MG/ML
INJECTION, SOLUTION SUBCUTANEOUS
Qty: 6 ML | Refills: 0 | Status: SHIPPED | OUTPATIENT
Start: 2024-02-12 | End: 2024-04-05

## 2024-02-12 NOTE — TELEPHONE ENCOUNTER
Patient calls to request refill of medication for Ozempic.     Pended medication.    Routing to primary care provider and refill reno.    Thank you,  Az Soriano, Triage RN Elsy Luis  3:19 PM 2/12/2024

## 2024-03-20 ENCOUNTER — OFFICE VISIT (OUTPATIENT)
Dept: FAMILY MEDICINE | Facility: CLINIC | Age: 59
End: 2024-03-20
Attending: FAMILY MEDICINE
Payer: COMMERCIAL

## 2024-03-20 ENCOUNTER — ANCILLARY PROCEDURE (OUTPATIENT)
Dept: GENERAL RADIOLOGY | Facility: CLINIC | Age: 59
End: 2024-03-20
Attending: FAMILY MEDICINE
Payer: COMMERCIAL

## 2024-03-20 VITALS
OXYGEN SATURATION: 100 % | BODY MASS INDEX: 26.44 KG/M2 | TEMPERATURE: 97 F | SYSTOLIC BLOOD PRESSURE: 110 MMHG | HEART RATE: 113 BPM | RESPIRATION RATE: 16 BRPM | WEIGHT: 184.7 LBS | DIASTOLIC BLOOD PRESSURE: 78 MMHG | HEIGHT: 70 IN

## 2024-03-20 DIAGNOSIS — M48.02 SPINAL STENOSIS IN CERVICAL REGION: Primary | ICD-10-CM

## 2024-03-20 DIAGNOSIS — F33.0 MAJOR DEPRESSIVE DISORDER, RECURRENT EPISODE, MILD (H): ICD-10-CM

## 2024-03-20 DIAGNOSIS — M25.531 RIGHT WRIST PAIN: ICD-10-CM

## 2024-03-20 DIAGNOSIS — Z12.5 SCREENING FOR PROSTATE CANCER: ICD-10-CM

## 2024-03-20 DIAGNOSIS — I10 HYPERTENSION GOAL BP (BLOOD PRESSURE) < 140/90: ICD-10-CM

## 2024-03-20 DIAGNOSIS — K21.9 GASTROESOPHAGEAL REFLUX DISEASE WITHOUT ESOPHAGITIS: ICD-10-CM

## 2024-03-20 DIAGNOSIS — E11.9 TYPE 2 DIABETES MELLITUS WITHOUT COMPLICATION, WITHOUT LONG-TERM CURRENT USE OF INSULIN (H): ICD-10-CM

## 2024-03-20 LAB
BASOPHILS # BLD AUTO: 0 10E3/UL (ref 0–0.2)
BASOPHILS NFR BLD AUTO: 0 %
EOSINOPHIL # BLD AUTO: 0.2 10E3/UL (ref 0–0.7)
EOSINOPHIL NFR BLD AUTO: 3 %
ERYTHROCYTE [DISTWIDTH] IN BLOOD BY AUTOMATED COUNT: 11.7 % (ref 10–15)
ERYTHROCYTE [SEDIMENTATION RATE] IN BLOOD BY WESTERGREN METHOD: 7 MM/HR (ref 0–20)
HBA1C MFR BLD: 6.2 % (ref 0–5.6)
HCT VFR BLD AUTO: 40.6 % (ref 40–53)
HGB BLD-MCNC: 14.1 G/DL (ref 13.3–17.7)
IMM GRANULOCYTES # BLD: 0 10E3/UL
IMM GRANULOCYTES NFR BLD: 0 %
LYMPHOCYTES # BLD AUTO: 1.7 10E3/UL (ref 0.8–5.3)
LYMPHOCYTES NFR BLD AUTO: 31 %
MCH RBC QN AUTO: 30.2 PG (ref 26.5–33)
MCHC RBC AUTO-ENTMCNC: 34.7 G/DL (ref 31.5–36.5)
MCV RBC AUTO: 87 FL (ref 78–100)
MONOCYTES # BLD AUTO: 0.3 10E3/UL (ref 0–1.3)
MONOCYTES NFR BLD AUTO: 5 %
NEUTROPHILS # BLD AUTO: 3.3 10E3/UL (ref 1.6–8.3)
NEUTROPHILS NFR BLD AUTO: 61 %
PLATELET # BLD AUTO: 142 10E3/UL (ref 150–450)
RBC # BLD AUTO: 4.67 10E6/UL (ref 4.4–5.9)
WBC # BLD AUTO: 5.4 10E3/UL (ref 4–11)

## 2024-03-20 PROCEDURE — 84550 ASSAY OF BLOOD/URIC ACID: CPT | Performed by: FAMILY MEDICINE

## 2024-03-20 PROCEDURE — 80061 LIPID PANEL: CPT | Performed by: FAMILY MEDICINE

## 2024-03-20 PROCEDURE — 80048 BASIC METABOLIC PNL TOTAL CA: CPT | Performed by: FAMILY MEDICINE

## 2024-03-20 PROCEDURE — 36415 COLL VENOUS BLD VENIPUNCTURE: CPT | Performed by: FAMILY MEDICINE

## 2024-03-20 PROCEDURE — 85652 RBC SED RATE AUTOMATED: CPT | Performed by: FAMILY MEDICINE

## 2024-03-20 PROCEDURE — 82043 UR ALBUMIN QUANTITATIVE: CPT | Performed by: FAMILY MEDICINE

## 2024-03-20 PROCEDURE — 99215 OFFICE O/P EST HI 40 MIN: CPT | Performed by: FAMILY MEDICINE

## 2024-03-20 PROCEDURE — 85025 COMPLETE CBC W/AUTO DIFF WBC: CPT | Performed by: FAMILY MEDICINE

## 2024-03-20 PROCEDURE — 73110 X-RAY EXAM OF WRIST: CPT | Mod: TC | Performed by: RADIOLOGY

## 2024-03-20 PROCEDURE — G0103 PSA SCREENING: HCPCS | Performed by: FAMILY MEDICINE

## 2024-03-20 PROCEDURE — 83036 HEMOGLOBIN GLYCOSYLATED A1C: CPT | Performed by: FAMILY MEDICINE

## 2024-03-20 PROCEDURE — 82570 ASSAY OF URINE CREATININE: CPT | Performed by: FAMILY MEDICINE

## 2024-03-20 RX ORDER — IRBESARTAN 150 MG/1
150 TABLET ORAL DAILY
Qty: 90 TABLET | Refills: 1 | Status: SHIPPED | OUTPATIENT
Start: 2024-03-20 | End: 2024-09-25

## 2024-03-20 RX ORDER — PANTOPRAZOLE SODIUM 40 MG/1
40 TABLET, DELAYED RELEASE ORAL DAILY
Qty: 90 TABLET | Refills: 1 | Status: SHIPPED | OUTPATIENT
Start: 2024-03-20 | End: 2024-09-25

## 2024-03-20 ASSESSMENT — PATIENT HEALTH QUESTIONNAIRE - PHQ9
SUM OF ALL RESPONSES TO PHQ QUESTIONS 1-9: 3
10. IF YOU CHECKED OFF ANY PROBLEMS, HOW DIFFICULT HAVE THESE PROBLEMS MADE IT FOR YOU TO DO YOUR WORK, TAKE CARE OF THINGS AT HOME, OR GET ALONG WITH OTHER PEOPLE: NOT DIFFICULT AT ALL
SUM OF ALL RESPONSES TO PHQ QUESTIONS 1-9: 3

## 2024-03-20 NOTE — PROGRESS NOTES
Assessment & Plan     Spinal stenosis in cervical region  Likely the cause of his ongoing neck and right arm radicular symptoms.  He'll call the pain clinic to arrange for another epidural steroid injection.  He is reminded to reach out to me if this does not reduce or completely alleviate his symptoms.     Right wrist pain  No significant osteoarthritis seen in his ulnar area on x-ray.  ESR and CBC normal.  Await uric acid to look for gout.  Likely soft tissue versus tendonopathy.  Recommended OTC NSAIDs including topical Voltaren gel.  Will refer to hand therapy.  - XR Wrist Right G/E 3 Views; Future  - ESR: Erythrocyte sedimentation rate; Future  - CBC with platelets and differential; Future  - Uric acid; Future  - Physical Therapy  Referral; Future  - Occupational Therapy  Referral; Future  - ESR: Erythrocyte sedimentation rate  - CBC with platelets and differential  - Uric acid    Type 2 diabetes mellitus without complication, without long-term current use of insulin (H)  Well controlled.  Continue present cares and recheck in 6 months.  - metFORMIN (GLUCOPHAGE) 500 MG tablet; Take 2 tablets (1,000 mg) by mouth daily (with breakfast)  - HEMOGLOBIN A1C; Future  - Albumin Random Urine Quantitative with Creat Ratio; Future  - Lipid panel reflex to direct LDL Fasting; Future  - HEMOGLOBIN A1C  - Albumin Random Urine Quantitative with Creat Ratio  - Lipid panel reflex to direct LDL Fasting    Gastroesophageal reflux disease without esophagitis  Stable on proton pump inhibitor.  Continue for now.  - pantoprazole (PROTONIX) 40 MG EC tablet; Take 1 tablet (40 mg) by mouth daily    Hypertension goal BP (blood pressure) < 140/90  Blood pressure at goal. Emphasized importance of healthy lifestyle including regular exercise and lower fat & lower sodium diet. Encouraged smoking cessation - not interested in any pharmaceutical interventions right now.  - irbesartan (AVAPRO) 150 MG tablet; Take 1 tablet  "(150 mg) by mouth daily  - BASIC METABOLIC PANEL; Future  - BASIC METABOLIC PANEL    Screening for prostate cancer    - PROSTATE SPEC ANTIGEN SCREEN; Future  - PROSTATE SPEC ANTIGEN SCREEN    Major depressive disorder, recurrent episode, mild (H24)  This remains well controlled on his sertraline.       46 minutes spent by me on the date of the encounter doing chart review, history and exam, documentation and further activities per the note      Nicotine/Tobacco Cessation  He reports that he has been smoking cigarettes. He started smoking about 44 years ago. He has a 21.5 pack-year smoking history. He has never used smokeless tobacco.  Nicotine/Tobacco Cessation Plan  Information offered: Patient not interested at this time      BMI  Estimated body mass index is 26.69 kg/m  as calculated from the following:    Height as of this encounter: 1.772 m (5' 9.75\").    Weight as of this encounter: 83.8 kg (184 lb 11.2 oz).   Weight management plan: Discussed healthy diet and exercise guidelines          Franklyn Noonan is a 59 year old, presenting for the following health issues:  Diabetes and Back Pain        3/20/2024     9:12 AM   Additional Questions   Roomed by Salome RENDON     History of Present Illness       Back Pain:  He presents for follow up of back pain. Patient's back pain is a recurring problem.  Location of back pain:  Right lower back and left lower back  Description of back pain: sharp and shooting  Back pain spreads: left foot    Since patient first noticed back pain, pain is: always present, but gets better and worse  Does back pain interfere with his job:  No       Diabetes:   He presents for follow up of diabetes.  He is checking home blood glucose a few times a month.   He checks blood glucose before and after meals.  Blood glucose is never over 200 and never under 70. He is aware of hypoglycemia symptoms including dizziness.   He is concerned about other.   He is having burning in feet.            Reason " "for visit:  Diabetes and neck, wrist pain    He eats 2-3 servings of fruits and vegetables daily.He consumes 0 sweetened beverage(s) daily.He exercises with enough effort to increase his heart rate 9 or less minutes per day.  He exercises with enough effort to increase his heart rate 3 or less days per week.   He is taking medications regularly.       Hypertension Follow-up    Do you check your blood pressure regularly outside of the clinic? No   Are you following a low salt diet? Yes  Are your blood pressures ever more than 140 on the top number (systolic) OR more   than 90 on the bottom number (diastolic), for example 140/90? N/A    Hyperlipidemia Follow-Up    Are you regularly taking any medication or supplement to lower your cholesterol?   Yes-    Are you having muscle aches or other side effects that you think could be caused by your cholesterol lowering medication?  No    Neck pain has recurred after his last epidural steroid injection which worked wonderfully.  Notes pain in his neck that is radiating down his right arm at times.  He advises me he's been instructed that he can simply call the pain clinic to set himself up to get another injection.    Right wrist pain near the ulnar area has been troubling him for the past 2-3 months.  No preceding trauma.  Can be worse with twisting motion, extension of his wrist, using mouse for computer.  He's been taking both Tylenol and ibuprofen for his neck which has helped his wrist minimally.  He wears a brace on the right wrist at night which helps somewhat.        Review of Systems  Constitutional, HEENT, cardiovascular, pulmonary, gi and gu systems are negative, except as otherwise noted.  Has bilateral cataracts which will be getting bilateral lens replacement later this year.      Objective    /78   Pulse 113   Temp 97  F (36.1  C) (Tympanic)   Resp 16   Ht 1.772 m (5' 9.75\")   Wt 83.8 kg (184 lb 11.2 oz)   SpO2 100%   BMI 26.69 kg/m    Body mass " index is 26.69 kg/m .  Physical Exam   GENERAL: alert and no distress  ORTHO: Wrist Exam: WRIST:  Inspection: no swelling  no effusion  Palpation: Tender: ulnar area of wrist diffusely.  Non-tender: distal radius, scaphoid, snuff box  Range of Motion: normal but crepitus over ulnar area with ROM  Strength: not tested.  Special tests: none performed.    ELBOW:  elbow exam not done  SKIN: no suspicious lesions or rashes    Xray - Reviewed and interpreted by me.  Normal        Signed Electronically by: Vamshi Clarke Jr, MD

## 2024-03-20 NOTE — RESULT ENCOUNTER NOTE
Mr. Wallis,    -Normal red blood cell (hgb) levels, normal white blood cell count and normal platelet levels.  -A1C (test of diabetes control the last 2-3 months) is at your goal. Please continue with your current plan. Also, you should make an appointment to see me and recheck your A1C test in 6 months.   -Inflammation test (ESR) for your wrist is normal.    If you have further questions about the interpretation of your labs, [email or web address] is a good website to check out for further information.    Please contact the clinic if you have additional questions.  Thank you.    Sincerely,    Vamshi Clarke MD

## 2024-03-20 NOTE — RESULT ENCOUNTER NOTE
Mr. Wallis,    Radiology has read your x-ray showing arthritis near your base of your thumb area (as we discussed - this is very common as people age), but nothing wrong near your area of pain.    If you have further questions about the interpretation of your labs, [email or web address] is a good website to check out for further information.    Please contact the clinic if you have additional questions.  Thank you.    Sincerely,    Vamshi Clarke MD

## 2024-03-21 LAB
ANION GAP SERPL CALCULATED.3IONS-SCNC: 10 MMOL/L (ref 7–15)
BUN SERPL-MCNC: 17.7 MG/DL (ref 6–20)
CALCIUM SERPL-MCNC: 9.7 MG/DL (ref 8.6–10)
CHLORIDE SERPL-SCNC: 103 MMOL/L (ref 98–107)
CHOLEST SERPL-MCNC: 140 MG/DL
CREAT SERPL-MCNC: 0.75 MG/DL (ref 0.67–1.17)
CREAT UR-MCNC: 65.7 MG/DL
DEPRECATED HCO3 PLAS-SCNC: 26 MMOL/L (ref 22–29)
EGFRCR SERPLBLD CKD-EPI 2021: >90 ML/MIN/1.73M2
FASTING STATUS PATIENT QL REPORTED: YES
GLUCOSE SERPL-MCNC: 114 MG/DL (ref 70–99)
HDLC SERPL-MCNC: 45 MG/DL
LDLC SERPL CALC-MCNC: 64 MG/DL
MICROALBUMIN UR-MCNC: <12 MG/L
MICROALBUMIN/CREAT UR: NORMAL MG/G{CREAT}
NONHDLC SERPL-MCNC: 95 MG/DL
POTASSIUM SERPL-SCNC: 4.7 MMOL/L (ref 3.4–5.3)
PSA SERPL DL<=0.01 NG/ML-MCNC: 0.52 NG/ML (ref 0–3.5)
SODIUM SERPL-SCNC: 139 MMOL/L (ref 135–145)
TRIGL SERPL-MCNC: 157 MG/DL
URATE SERPL-MCNC: 3.3 MG/DL (ref 3.4–7)

## 2024-03-22 NOTE — RESULT ENCOUNTER NOTE
Mr. Wallis,    -PSA (prostate specific antigen) test is normal.  This indicates a low likelihood of prostate cancer.  ADVISE: rechecking this in 1 year.  -Cholesterol levels are at your goal levels.  ADVISE: continuing your medication, a regular exercise program with at least 150 minutes of aerobic exercise per week, and eating a low saturated fat/low carbohydrate diet.  Also, you should recheck this fasting cholesterol panel in 12 months.  -Kidney function (GFR) is normal.  -Sodium is normal.  -Potassium is normal.  -Calcium is normal.  -Glucose is elevated due to your diabetes.  -Microalbumin (urine protein) test is normal.  ADVISE: rechecking this annually.  -Uric acid level (gout test) is normal, so gout is not likely causing your wrist pain.    If you have further questions about the interpretation of your labs, [email or web address] is a good website to check out for further information.    Please contact the clinic if you have additional questions.  Thank you.    Sincerely,    Vamshi Clarke MD

## 2024-03-27 ENCOUNTER — TELEPHONE (OUTPATIENT)
Dept: PALLIATIVE MEDICINE | Facility: CLINIC | Age: 59
End: 2024-03-27

## 2024-03-27 DIAGNOSIS — M54.12 CERVICAL RADICULOPATHY: Primary | ICD-10-CM

## 2024-03-27 NOTE — TELEPHONE ENCOUNTER
I can put in an order for a repeat ESVIN. This is a patient whos pain has not changed.  Please call the patient and let him know that the order has been placed.     Lolly Dexter MD

## 2024-03-27 NOTE — TELEPHONE ENCOUNTER
Routing for prescreen and scheduling    Pt aware of orders    Em CHRISTIANSON RN Care Coordinator  [facility]

## 2024-03-27 NOTE — TELEPHONE ENCOUNTER
Patient called and stated he would like another ESVIN.    Please review and place order.      Caro Macdonald  Complex   [facility]

## 2024-03-27 NOTE — TELEPHONE ENCOUNTER
Chart reviewed.   Patient is an injection only patient.       Contacted patient and advised requesting order from PCP.   Patient verbalizes understanding.     Baylee Fall RN  [facility]  [phone]

## 2024-03-28 ENCOUNTER — TELEPHONE (OUTPATIENT)
Dept: PALLIATIVE MEDICINE | Facility: CLINIC | Age: 59
End: 2024-03-28
Payer: COMMERCIAL

## 2024-03-28 DIAGNOSIS — M54.12 RADICULOPATHY, CERVICAL: Primary | ICD-10-CM

## 2024-03-28 DIAGNOSIS — M54.12 CERVICAL RADICULITIS: ICD-10-CM

## 2024-03-28 NOTE — TELEPHONE ENCOUNTER
"Screening Questions for Radiology Injections:    Injection to be done at which interventional clinic site? [facility]    If choosing [facility] for location, please inform patient:  \"[facility] is a Hospital based clinic. Before your visit, you should check with your insurance about how it covers the charges for facility services in a hospital-based clinic.     Procedure ordered by Dr. Dexter     Procedure ordered? ESVIN C7-T1 repeat     Transforaminal Cervical BARBARA - [place] [facility] - No [facility] providers perform this procedure    What insurance would patient like us to bill for this procedure? MEDICA     IF SCHEDULING IN [place] PAIN OR SPINE PLEASE SCHEDULE AT LEAST 7-10 BUSINESS DAYS OUT SO A PA CAN BE OBTAINED    Worker's comp or MVA (motor vehicle accident) -Any injection DO NOT SCHEDULE and route to Amarilis Almanzar.      [employer] insurance - For SI joint injections, DO NOT SCHEDULE and route to Jeannine Ricky.       ALL BCBS, [employer] and [employer] - DO NOT SCHEDULE and route to Jeannine García    MEDICA- facet joint injections, route to [facility]    Is patient scheduled at [facility]? NO    If YES, route every encounter to [facility] SPINE CENTER CARE NAVIGATION POOL [[phone]]    Is an  needed? No     Patient has a  home? (Review Grid) YES: Informed     Any chance of pregnancy? Not Applicable   If YES, do NOT schedule and route to RN pool  - Dr. Musa route to Ileana Crook and PM&R Nurse  [[id number]]      Is patient actively being treated for cancer or immunocompromised? No  If YES, do NOT schedule and route to RN pool/ Dr. Musa's Team    Does the patient have a bleeding or clotting disorder? No     If YES, okay to schedule AND route to RN nurse / Dr. Musa's Team     (For any patients with platelet count <100, RN must forward to provider)    Is patient taking any Blood Thinners OR Antiplatelet medication?  No   If hold needed, do NOT " schedule, route to BEVERLEY reyez/ Dr. Musa's Team    Examples:   o Blood Thinners: (Coumadin, Warfarin, Jantoven, Pradaxa, Xarelto, Eliquis, Edoxaban, Enoxaparin, Lovenox, Heparin, Arixtra, Fondaparinux or Fragmin)  o Antiplatelet Medications: (Plavix, Brilinta or Effient)     Is patient taking any aspirin products (includes Excedrin and Fiorinal)? Yes - Pt takes 81mg daily; instructed to hold 6 day(s) prior to procedure.      If more than 325mg/day, OK to schedule; Instruct Pt to decrease to less than 325 mg for 7 days AND route to RN pool/ Dr. Musa's Team     For CERVICAL procedures, hold all aspirin products for 6 days.     Tell Pt that if aspirin product is not held for 6 days, the procedure WILL BE cancelled.     Any allergies to contrast dye, iodine, shellfish, or numbing and steroid medications? No    If YES, schedule and add allergy information to appointment notes AND route to the RN pool/ Dr. Musa's Team    If BARBARA and Contrast Dye / Iodine Allergy? DO NOT SCHEDULE, route to RN pool/ Dr. Musa's Team    Allergies: Eszopiclone and Food     Does patient have an active infection or treated for one within the past week? No    Is patient currently taking any antibiotics or steroid medications?  No     For patients on chronic, preventative, or prophylactic antibiotics, procedures may be scheduled.     For patients on antibiotics for active or recent infection, schedule 4 days after completed.    For patients on steroid medications, schedule 4 days after completed.     Has the patient had a flu shot or any other vaccinations within the past 7 days? No  If yes, explain that for the vaccine to work best they need to:       wait 1 week before and 1 week after getting any Vaccine    wait 1 week before and 2 weeks after getting any Covid Vaccine     If patient has concerns about the timing, send to RN pool/ Dr. Musa's Team    Does patient have an MRI/CT?  YES: 07/15/22 Include Date and Check Procedure Scheduling  Grid to see if required.    Was the MRI/CT done within the last 3 years?  Yes     If no route to RN Pool/ Dr. Petersons Team    If yes, where was the MRI/CT done? [facility]      Refer to PACS Transmissions list for approved external locations and route to RN Pool High Priority/ Dr. Petersons Team    If MRI was not done at approved external location do NOT schedule and route to RN pool/ Dr. Petersons Team      If patient has an imaging disc, the injection MAY be scheduled but patient must bring disc to appt or appt will be cancelled.    Is patient able to transfer to a procedure table with minimal or no assistance? Yes     If no, do NOT schedule and route to RN Pool/ Dr. Musa's Team    Procedure Specific Instructions:    If celiac plexus block, informed patient NPO for 6 hours and that it is okay to take medications with sips of water, especially blood pressure medications Not Applicable         If this is for a cervical procedure, informed patient that aspirin needs to be held for 6 days.   Not Applicable      Sedation, If Sedation is ordered for any procedure, patient must be NPO for 6 hours prior to procedure Not Applicable      If IV needed:    Do not schedule procedures requiring IV placement in the first appointment of the day or first appointment after lunch. Do NOT schedule at 0745, 0815 or 1245.       Instructed patient to arrive 30 minutes early for IV start if required. (Check Procedure Scheduling Grid)  Not Applicable    Reminders:    If you are started on any steroids or antibiotics between now and your appointment, you must contact us because the procedure may need to be cancelled.  Yes      As a reminder, receiving steroids can decrease your body's ability to fight infection.   Would you still like to move forward with scheduling the injection?  Yes      IV Sedation is not provided for procedures. If oral anti-anxiety medication is needed, the patient should request this from their referring  provider.      Instruct patient to arrive as directed prior to the scheduled appointment time:  If IV needed 30 minutes before appointment time       For patients 85 or older we recommend having an adult stay w/ them for the remainder of the day.       If the patient is Diabetic, remind them to bring their glucometer.      Does the patient have any questions?  NO  Little Torres  [facility]

## 2024-04-04 ENCOUNTER — RADIOLOGY INJECTION OFFICE VISIT (OUTPATIENT)
Dept: PALLIATIVE MEDICINE | Facility: CLINIC | Age: 59
End: 2024-04-04
Attending: ANESTHESIOLOGY
Payer: COMMERCIAL

## 2024-04-04 VITALS — SYSTOLIC BLOOD PRESSURE: 132 MMHG | OXYGEN SATURATION: 98 % | HEART RATE: 107 BPM | DIASTOLIC BLOOD PRESSURE: 84 MMHG

## 2024-04-04 DIAGNOSIS — M54.12 RADICULOPATHY, CERVICAL: Primary | ICD-10-CM

## 2024-04-04 PROCEDURE — 62321 NJX INTERLAMINAR CRV/THRC: CPT | Performed by: ANESTHESIOLOGY

## 2024-04-04 RX ORDER — DEXAMETHASONE SODIUM PHOSPHATE 10 MG/ML
10 INJECTION, SOLUTION INTRAMUSCULAR; INTRAVENOUS ONCE
Status: COMPLETED | OUTPATIENT
Start: 2024-04-04 | End: 2024-04-04

## 2024-04-04 RX ADMIN — DEXAMETHASONE SODIUM PHOSPHATE 10 MG: 10 INJECTION, SOLUTION INTRAMUSCULAR; INTRAVENOUS at 14:27

## 2024-04-04 NOTE — NURSING NOTE
Discharge Information    IV Discontiued Time:  NA    Amount of Fluid Infused:  NA    Discharge Criteria = When patient returns to baseline or as per MD order    Consciousness:  Pt is fully awake    Circulation:  BP +/- 20% of pre-procedure level    Respiration:  Patient is able to breathe deeply    O2 Sat:  Patient is able to maintain O2 Sat >92% on room air    Activity:  Moves 4 extremities on command    Ambulation:  Patient is able to stand and walk or stand and pivot into wheelchair    Dressing:  Clean/dry or No Dressing    Notes:   Discharge instructions and AVS given to patient    Patient meets criteria for discharge?  YES    Admitted to PCU?  No    Responsible adult present to accompany patient home?  Yes    Signature/Title:    Rocío Oates RN  RN Care Coordinator  [facility] [place]

## 2024-04-04 NOTE — NURSING NOTE
Pre-procedure Intake    If YES to any questions or NO to having a   Please complete laminated checklist and leave on the computer keyboard for Provider, verbally inform provider if able.    For SCS Trial, RFA's or any sedation procedure: Have you been fasting? NA  If yes, for how long?    NA     Are you taking any any blood thinners such as Coumadin, Warfarin, Jantoven, Pradaxa Xarelto, Eliquis, Edoxaban, Enoxaparin, Lovenox, Heparin, Arixtra, Fondaparinux, or Fragmin? OR Antiplatelet medication such as Plavix, Brilinta, or Effient? N/A  If yes, when did you take your last dose?   NA     Do you take aspirin?   YES: 81mg once daily.   If cervical procedure, have you held aspirin for 6 days?   Yes    Do you have any allergies to contrast dye, iodine, steroid and/or numbing medications?  NO     Are you currently taking antibiotics or have an active infection?  NO     Have you had a fever/elevated temperature within the past week? NO     Are you currently taking oral steroids? NO     Do you have a ? Yes     Are you pregnant or breastfeeding? NA     Have you received any vaccines in the past 2 weeks? NO       Notify provider and RNs if systolic BP >170, diastolic BP >100, P >100 or O2 sats < 90%

## 2024-04-04 NOTE — PROGRESS NOTES
[facility] - Procedure Note    Date of Visit: 4/4/20234    Procedure performed: C7-T1 interlaminar epidural steroid injection with fluoroscopic guidance  Diagnosis: Cervical spondylosis; Cervical radiculitis/radiculopathy  : Lolly Dexter MD    Anesthesia: none    Indications: Saran Wallis is a 59 year old male who is seen at the request of Dr. GUSTAFSON for cervical epidural steroid injection. The patient describes central neck pain that radiates to his right hand and is causing weakness and tingling. The patient has been exhibiting symptoms consistent with cervical intraspinal inflammation and radiculopathy. Symptoms have been persistent, disabling, and intermittently severe. The patient reports minimal improvement with conservative treatment, including PT and medications.    This is a repeat injection.  Previous ESVIN done by myself on 10/19/2023, 3/23/2023, 11/7/2022 & 9/21/2022 provided good pain relief for a period of 2 months.       Cervical MRI was done on 7/15/2022 which showed   FINDINGS: Normal cervical vertebral body heights and alignment. Normal  cervical spinal cord. T2 hyperintense Modic type I edematous  degenerative endplate changes on the left at C6-C7. Otherwise benign  marrow.     C2-C3: Preserved interspace. No herniation. Minor uncovertebral facet  joint degeneration. No stenosis.     C3-C4: Preserved interspace. Tiny central protrusion. Unremarkable  facets. No stenosis.     C4-C5: Preserved interspace. Small central disc protrusion and minor  annular bulging. Unremarkable facets. Mild spinal canal and foraminal  narrowing.     C5-C6: Mild interspace narrowing. Prominent left paracentral disc  extrusion. Minor circumferential annular bulging. Unremarkable facets.  Moderate to severe spinal canal stenosis. Mild spinal cord deformity  without spinal cord signal abnormality. Mild to  moderate left and  moderate to severe right foraminal narrowing.     C6-C7:  Preserved interspace. Broad posterior protrusion more prominent  on the left than on the right. Unremarkable facets. Mild to moderate  spinal canal stenosis. Moderate to severe left and mild right  foraminal narrowing.     C7-T1: Preserved interspace. No herniation. Mild right uncovertebral  joint degeneration. Unremarkable facets. No central stenosis or left  foraminal narrowing. Mild right foraminal narrowing.     T1-T2: Central disc extrusion versus ligamentous ossification  resulting in low-grade spinal canal narrowing.                                                                   IMPRESSION:  1.  Degenerative changes are most pronounced at C5-C6 and C6-C7 where  there is moderate to severe and mild to moderate spinal canal stenosis  respectively.  2.  At C5-C6 disc herniation deforms the spinal cord. No spinal cord  signal abnormality. There is moderate to severe right and mild to  moderate left foraminal narrowing.  3.  At C6-C7 there is moderate to severe left and mild right foraminal  narrowing.  4.  Mild spinal canal and foraminal narrowing at C4-C5.     Allergies:      Allergies   Allergen Reactions    Eszopiclone      Other reaction(s): Taste, Changes  Works, but bitter taste.    Food Hives     Peaches        Vitals:  /74 (BP Location: Left arm, Patient Position: Chair, Cuff Size: Adult Regular)   Pulse 111   SpO2 100%     Review of Systems: The patient denies recent fever, chills, illness, use of antibiotics or anticoagulants. All other 10-point review of systems negative.     Procedure: The procedure and risks were explained, and informed written consent was obtained from the patient. Risks include but are not limited to: infection, bleeding, increased pain, and damage to soft tissue, nerve, muscle, and vasculature structures. After getting informed consent, patient was brought into the procedure suite and was placed in a prone position on the procedure table. A Pause for the Cause was  performed. Patient was prepped and draped in sterile fashion.     The C7-T1 interspace was identified with use of fluoroscopy in AP view. A 25-gauge, 1.5 inch needle was used to anesthetize the skin and subcutaneous tissue entry site with a total of 2 ml of 1% lidocaine. Under fluoroscopic visualization, a 22-gauge, 3.5 inch Tuohy epidural needle was slowly advanced towards the epidural space a few millimeters left of midline. The latter part of the needle advancement was guided with fluoroscopy in the lateral view. The epidural space was identified using loss of resistance technique. After negative aspiration for heme and cerebrospinal fluid, a total of 1 mL of non-ionic contrast was injected to confirm needle placement. 0 mL of contrast was wasted. Epidurogram confirmed spread within the posterior epidural space. 2 ml of 10mg/ml of dexamethasone and 1 ml of preservative free 1% lidocaine was injected. The needle was removed.  Images were saved to PACS.    The patient tolerated the procedure well, and there was no evidence of procedural complications. No new sensory or motor deficits were noted following the procedure. The patient was stable and able to ambulate on discharge home. Post-procedure instructions were provided.     Pre-procedure pain score: 7/10 in the neck, 8/10 in the arm  Post-procedure pain score: 3/10 in the neck, 3/10 in the arm    Assessment/Plan: Saran Wallis is a 59 year old male s/p C7-T1 Cervical interlaminar epidural steroid injection today for cervical spondylosis and radiculitis/radiculopathy.     1. Following today's procedure, the patient was advised to contact the Pain Management Center for any of the following:   Fever, chills, or night sweats   New onset of pain, numbness, or weakness   Any questions/concerns regarding the procedure  If unable to contact the Pain Center, the patient was instructed to go to a local Emergency Room for any complications.   2. The patient will receive a  follow-up call in 1 week.   3. Follow-up with the referring provider in 2 weeks for post-procedure evaluation.    ARYA DE LEON MD   Pain Management & Addiction Medicine

## 2024-04-04 NOTE — PATIENT INSTRUCTIONS
[facility] Procedure Discharge Instructions    Today you saw:   Dr. Lolly Dexter     Your procedure:  Epidural steroid injection       Medications used:  Lidocaine (anesthetic)  Dexamethasone (steroid)    Omnipaque (contrast)       You can resume aspirin in 24 hours       Be cautious when walking as numbness and/or weakness in the legs may occur up to 6-8 hours after the procedure due to effect of the local anesthetic  Do not drive for 6 hours. The effect of the local anesthetic could slow your reflexes.   Avoid strenuous activity for the first 24 hours. You may resume your regular activities after that.   You may shower, however avoid swimming, tub baths or hot tubs for 24 hours following your procedure  You may have a mild to moderate increase in pain for several days following the injection.    You may use ice packs for 10-15 minutes, 3 to 4 times a day at the injection site for comfort  Do not use heat to painful areas for 6 to 8 hours. This will give the local anesthetic time to wear off and prevent you from accidentally burning your skin.  Unless you have been directed to avoid the use of anti-inflammatory medications (NSAIDS-ibuprofen, Aleve, Motrin), you may use these medications or Tylenol for pain control if needed.   With diabetes, check your blood sugar more frequently than usual as your blood sugar may be higher than normal for 10-14 days following a steroid injection. Contact your doctor who manages your diabetes if your blood sugar is higher than usual  Possible side effects of steroids that you may experience include flushing, elevated blood pressure, increased appetite, mild headaches and restlessness.  All of these symptoms will get better with time.  It may take up to 14 days for the steroid medication to start working although you may feel the effect as early as a few days after the procedure.   Follow up with your referring provider in 2-3 weeks    If you experience any of the  following, call the pain center line during work hours at [phone] or on-call physician after hours at [phone]:  -Fever over 100 degree F  -Swelling, bleeding, redness, drainage, warmth at the injection site  -Progressive weakness or numbness in your arms  -Loss of bowel or bladder function  -Unusual headache that is not relieved by Tylenol or your regular headache medication  -Unusual new onset of pain that is not improving

## 2024-04-05 DIAGNOSIS — E11.9 TYPE 2 DIABETES MELLITUS WITHOUT COMPLICATION, WITHOUT LONG-TERM CURRENT USE OF INSULIN (H): ICD-10-CM

## 2024-04-05 RX ORDER — SEMAGLUTIDE 0.68 MG/ML
INJECTION, SOLUTION SUBCUTANEOUS
Qty: 6 ML | Refills: 0 | Status: SHIPPED | OUTPATIENT
Start: 2024-04-05 | End: 2024-06-06

## 2024-05-03 ENCOUNTER — TELEPHONE (OUTPATIENT)
Dept: FAMILY MEDICINE | Facility: CLINIC | Age: 59
End: 2024-05-03

## 2024-05-03 NOTE — TELEPHONE ENCOUNTER
Prior Authorization Specialty Medication Request    Medication/Dose: Prior auth for pt med - Ozempic 2mg / 3ml however looks like pt is already picking up?      Diagnosis and ICD code (if different than what is on RX):  Prior auth for pt med - Ozempic 2mg / 3ml however looks like pt is already picking up?    New/renewal/insurance change PA/secondary ins. PA:  Previously Tried and Failed:  NA    Important Lab Values: NA  Rationale: NA    Insurance   Primary:   Insurance IE-MEDICA [13]/MEDICA CHOICE [[id number]]  Member [id number]  Subscriber [id number]  [id number]  Effective from 12/1/2015D:      Secondary (if applicable):na  Insurance ID:  na    Pharmacy Information (if different than what is on RX)  Name:  [facility] -   [place], [place] -   [address] [place] [address].     Phone:  [phone]   Fax:[phone]

## 2024-05-03 NOTE — TELEPHONE ENCOUNTER
Difficult for me to tell if this requires a PA.  If it does, please initiate this.    Vamshi Clarke MD

## 2024-05-05 DIAGNOSIS — E78.5 HYPERLIPIDEMIA LDL GOAL <70: ICD-10-CM

## 2024-05-06 RX ORDER — SIMVASTATIN 10 MG
10 TABLET ORAL AT BEDTIME
Qty: 90 TABLET | Refills: 2 | Status: SHIPPED | OUTPATIENT
Start: 2024-05-06

## 2024-05-09 NOTE — TELEPHONE ENCOUNTER
Prior Authorization Approval    Medication: OZEMPIC (0.25 OR 0.5 MG/DOSE) 2 MG/3ML SC SOPN  Authorization Effective Date: 4/9/2024  Authorization Expiration Date: 5/9/2025  Approved Dose/Quantity:   Reference #:     Insurance Company: [employer] Non-Specialty PA's - Phone [phone] Fax [phone]  Expected CoPay: $    CoPay Card Available:      Financial Assistance Needed:   Which Pharmacy is filling the prescription: [facility] - [place], [place] - [address] [place] [address]  Pharmacy Notified: Yes  Patient Notified: **Instructed pharmacy to notify patient when script is ready to /ship.**

## 2024-05-16 ENCOUNTER — TRANSFERRED RECORDS (OUTPATIENT)
Dept: MULTI SPECIALTY CLINIC | Facility: CLINIC | Age: 59
End: 2024-05-16

## 2024-05-16 LAB — RETINOPATHY: NORMAL

## 2024-06-01 DIAGNOSIS — E11.9 TYPE 2 DIABETES MELLITUS WITHOUT COMPLICATION, WITHOUT LONG-TERM CURRENT USE OF INSULIN (H): ICD-10-CM

## 2024-06-06 ENCOUNTER — TELEPHONE (OUTPATIENT)
Dept: FAMILY MEDICINE | Facility: CLINIC | Age: 59
End: 2024-06-06
Payer: COMMERCIAL

## 2024-06-06 DIAGNOSIS — I10 HYPERTENSION GOAL BP (BLOOD PRESSURE) < 140/90: ICD-10-CM

## 2024-06-06 DIAGNOSIS — E11.9 TYPE 2 DIABETES MELLITUS WITHOUT COMPLICATION, WITHOUT LONG-TERM CURRENT USE OF INSULIN (H): ICD-10-CM

## 2024-06-06 DIAGNOSIS — K21.9 GASTROESOPHAGEAL REFLUX DISEASE WITHOUT ESOPHAGITIS: ICD-10-CM

## 2024-06-06 RX ORDER — IRBESARTAN 150 MG/1
150 TABLET ORAL DAILY
Qty: 90 TABLET | Refills: 1 | OUTPATIENT
Start: 2024-06-06

## 2024-06-06 RX ORDER — SEMAGLUTIDE 0.68 MG/ML
INJECTION, SOLUTION SUBCUTANEOUS
Qty: 6 ML | Refills: 0 | Status: SHIPPED | OUTPATIENT
Start: 2024-06-06 | End: 2024-07-26

## 2024-06-06 RX ORDER — PANTOPRAZOLE SODIUM 40 MG/1
40 TABLET, DELAYED RELEASE ORAL DAILY
Qty: 90 TABLET | Refills: 1 | OUTPATIENT
Start: 2024-06-06

## 2024-06-06 NOTE — TELEPHONE ENCOUNTER
Pt reports he just got a text that refill is ready. Request refused.     Mita Steele RN [facility]

## 2024-06-06 NOTE — TELEPHONE ENCOUNTER
Pending Prescriptions:                       Disp   Refills    irbesartan (AVAPRO) 150 MG tablet [Pharma*90 tab*1            Sig: TAKE 1 TABLET BY MOUTH EVERY DAY    pantoprazole (PROTONIX) 40 MG EC tablet [*90 tab*1            Sig: TAKE 1 TABLET BY MOUTH EVERY DAY    Local print prescriptions done at office visit 3/20/24 - #90 with 1 refill for both.    Please call patient to clarify - did he take written prescriptions to a pharmacy?  If so, should have a refill remaining.

## 2024-06-06 NOTE — TELEPHONE ENCOUNTER
Medication Question or Refill    Contacts         Type Contact Phone/Fax    06/06/2024 08:15 AM CDT Interface (Incoming) [facility] - [address] [phone]            What medication are you calling about (include dose and sig)?: OZEMPIC, 0.25 OR 0.5 MG/DOSE, 2 MG/3ML pen     Preferred Pharmacy:   [facility] - [address], [facility] [address]  Phone: [phone] Fax: [phone]      Controlled Substance Agreement on file:   CSA -- Patient Level:     [Media Unavailable] Controlled Substance Agreement - Opioid - Scan on 11/27/2020  9:32 AM   [Media Unavailable] Controlled Substance Agreement - Opioid - Scan on 3/27/2019  2:37 PM       Who prescribed the medication?: Clarke    Do you need a refill? Yes but can he at least get 3 refills at t atime    When did you use the medication last? Daily he is out now    Patient offered an appointment? No    Do you have any questions or concerns?  No      Could we send this information to you in [employer] or would you prefer to receive a phone call?:   Patient would prefer a phone call   Okay to leave a detailed message?: Yes at Cell number on file:    Telephone Information:   Mobile [phone]

## 2024-06-17 ENCOUNTER — VIRTUAL VISIT (OUTPATIENT)
Dept: FAMILY MEDICINE | Facility: CLINIC | Age: 59
End: 2024-06-17
Payer: COMMERCIAL

## 2024-06-17 DIAGNOSIS — E11.9 TYPE 2 DIABETES MELLITUS WITHOUT COMPLICATION, WITHOUT LONG-TERM CURRENT USE OF INSULIN (H): ICD-10-CM

## 2024-06-17 DIAGNOSIS — H26.9 CATARACT, UNSPECIFIED CATARACT TYPE, UNSPECIFIED LATERALITY: ICD-10-CM

## 2024-06-17 DIAGNOSIS — M54.42 BILATERAL LOW BACK PAIN WITH LEFT-SIDED SCIATICA, UNSPECIFIED CHRONICITY: Primary | ICD-10-CM

## 2024-06-17 PROCEDURE — 99214 OFFICE O/P EST MOD 30 MIN: CPT | Mod: 95 | Performed by: FAMILY MEDICINE

## 2024-06-17 RX ORDER — DIAZEPAM 2 MG
2 TABLET ORAL
Qty: 7 TABLET | Refills: 0 | Status: SHIPPED | OUTPATIENT
Start: 2024-06-17 | End: 2024-06-21

## 2024-06-17 RX ORDER — MOXIFLOXACIN 5 MG/ML
1 SOLUTION/ DROPS OPHTHALMIC
COMMUNITY
Start: 2024-06-04 | End: 2024-08-28

## 2024-06-17 RX ORDER — KETOROLAC TROMETHAMINE 5 MG/ML
1 SOLUTION OPHTHALMIC
COMMUNITY
Start: 2024-06-04 | End: 2024-08-28

## 2024-06-17 RX ORDER — GABAPENTIN 300 MG/1
300 CAPSULE ORAL 3 TIMES DAILY
Qty: 90 CAPSULE | Refills: 2 | Status: SHIPPED | OUTPATIENT
Start: 2024-06-17 | End: 2024-07-26

## 2024-06-17 RX ORDER — PREDNISOLONE ACETATE 10 MG/ML
1 SUSPENSION/ DROPS OPHTHALMIC
COMMUNITY
Start: 2024-06-04 | End: 2024-08-28

## 2024-06-17 NOTE — PROGRESS NOTES
Saran is a 59 year old who is being evaluated via a billable video visit.    How would you like to obtain your AVS? MyChart  If the video visit is dropped, the invitation should be resent by: Text to cell phone: [phone]  Will anyone else be joining your video visit? No      Assessment & Plan     Bilateral low back pain with left-sided sciatica, unspecified chronicity  2 weeks - acute injury but back hx   Will start back on gabapentin per patient request - may take tonight before surgery tomorrow  Valium at night for one week - do NOT take tonight but can take tomorrow  Also relafen for pain but wait until after surgery for this    - gabapentin (NEURONTIN) 300 MG capsule  Dispense: 90 capsule; Refill: 2  - diazepam (VALIUM) 2 MG tablet  Dispense: 7 tablet; Refill: 0  - nabumetone (RELAFEN) 750 MG tablet  Dispense: 30 tablet; Refill: 0    Type 2 diabetes mellitus without complication, without long-term current use of insulin (H)  Last A1c 6.2 and under good control               FUTURE APPOINTMENTS:       - Follow-up visit in September with primary per plan  Reminder he is due for lung cancer screening   See Patient Instructions    Subjective   Saran is a 59 year old, presenting for the following health issues:  Musculoskeletal Problem  He is having trouble with low back pain.    Any position is painful.    This bout of pain is for about 2 weeks ago.   He pulled something lifting propane tank.  A few years ago it was left sided and he had surgery for this.     He took gabapentin at that time.    He would like to be on this again.     He also wonders about something for muscle relaxing at night for a week or so.   In the past he has gotten lorazepam.   He also would like a few tabs of oxycodone.       Upon review of the chart it appears he is having cataract surgery tomorrow.    He confirms this.          6/17/2024    10:23 AM   Additional Questions   Roomed by Sylvia Zhong     History of Present Illness        Back Pain:  He presents for follow up of back pain. Patient's back pain is a recurring problem.  Location of back pain:  Right lower back and right hip  Description of back pain: burning, sharp and stabbing  Back pain spreads: right thigh and right foot    Since patient first noticed back pain, pain is: gradually worsening  Does back pain interfere with his job:  Yes       He eats 2-3 servings of fruits and vegetables daily.He consumes 0 sweetened beverage(s) daily.He exercises with enough effort to increase his heart rate 9 or less minutes per day.  He exercises with enough effort to increase his heart rate 3 or less days per week.   He is taking medications regularly.       Pain History:  Where in your body do you have pain? Back Pain  Onset/Duration: 2 weeks ago  Description:   Location of pain: low back right  Character of pain: sharp and constant  Pain radiation: radiates into the right leg  New numbness or weakness in legs, not attributed to pain: no   Intensity: Currently 8/10 and in the evening time its a 10  Progression of Symptoms: same  History:   Specific cause: lifting 50 pound gas tank  Pain interferes with job: YES- because it is hard to sit  History of back problems: herniated disc in 2008 and 2012 for left side  Any previous MRI or X-rays: None  Sees a specialist for back pain: No  Alleviating factors:   Improved by: patient has left over oxycodone and acetaminophen (Tylenol)    Precipitating factors:  Worsened by: always there and sitting at work  Therapies tried and outcome: acetaminophen (Tylenol)    Accompanying Signs & Symptoms:  Risk of Fracture: None  Risk of Cauda Equina: None  Risk of Infection: None  Risk of Cancer: None  Risk of Ankylosing Spondylitis: Onset at age <35, male, AND morning back stiffness  no       Past Medical History:   Diagnosis Date    Chronic radicular low back pain 4/30/2016    Depressive disorder     Diabetes mellitus (H)     Gastric reflux      Hypercholesteremia     Hypertension        Past Surgical History:   Procedure Laterality Date    BACK SURGERY  2009,2012    L4, L5, S1 Disk herniation    COLONOSCOPY N/A 1/28/2016    Procedure: COMBINED COLONOSCOPY, SINGLE OR MULTIPLE BIOPSY/POLYPECTOMY BY BIOPSY;  Surgeon: Michelle Morrison MD;  Location:  GI    COSMETIC SURGERY  1981    Left Hand Skin graft.    ESOPHAGOSCOPY, GASTROSCOPY, DUODENOSCOPY (EGD), COMBINED N/A 5/13/2016    Procedure: COMBINED ESOPHAGOSCOPY, GASTROSCOPY, DUODENOSCOPY (EGD);  Surgeon: Vamshi Lynn MD;  Location:  GI    EYE SURGERY  1981    ORTHOPEDIC SURGERY         MEDICATIONS:  Current Outpatient Medications   Medication Sig Dispense Refill    ACCU-CHEK GUIDE test strip USE TO TEST BLOOD SUGAR 1 TIME DAILY OR AS DIRECTED 100 strip 1    ASPIRIN PO Take 81 mg by mouth      blood glucose monitoring (ACCU-CHEK ALBINO PLUS) meter device kit Use to test blood sugar 1 times daily or as directed.  Ok to substitute alternative if insurance prefers. 1 kit 0    blood glucose monitoring (ACCU-CHEK FASTCLIX) lancets USE TO TEST BLOOD SUGAR ONE TIME DAILY OR AS DIRECTED 102 each 1    diazepam (VALIUM) 2 MG tablet Take 1 tablet (2 mg) by mouth nightly as needed for muscle spasms 7 tablet 0    gabapentin (NEURONTIN) 300 MG capsule Take 1 capsule (300 mg) by mouth 3 times daily 90 capsule 2    irbesartan (AVAPRO) 150 MG tablet Take 1 tablet (150 mg) by mouth daily 90 tablet 1    ketorolac (ACULAR) 0.5 % ophthalmic solution Apply 1 drop to eye      metFORMIN (GLUCOPHAGE) 500 MG tablet TAKE 2 TABLETS(1000 MG) BY MOUTH TWICE DAILY WITH MEALS 360 tablet 0    moxifloxacin (VIGAMOX) 0.5 % ophthalmic solution Apply 1 drop to eye      nabumetone (RELAFEN) 750 MG tablet Take 1 tablet (750 mg) by mouth 2 times daily as needed for moderate pain 30 tablet 0    Omega-3 Fatty Acids (FISH OIL) 500 MG CAPS Take by mouth daily      OZEMPIC, 0.25 OR 0.5 MG/DOSE, 2 MG/3ML pen Inject 0.75 mL (0.5 mg)  "subcutaneously every week. 6 mL 0    pantoprazole (PROTONIX) 40 MG EC tablet Take 1 tablet (40 mg) by mouth daily 90 tablet 1    prednisoLONE acetate (PRED FORTE) 1 % ophthalmic suspension Apply 1 drop to eye      simvastatin (ZOCOR) 10 MG tablet TAKE 1 TABLET(10 MG) BY MOUTH AT BEDTIME 90 tablet 2    VITAMIN D PO Take by mouth daily       No current facility-administered medications for this visit.       SOCIAL HISTORY:  Social History     Tobacco Use    Smoking status: Every Day     Current packs/day: 0.50     Average packs/day: 0.5 packs/day for 44.3 years (22.1 ttl pk-yrs)     Types: Cigarettes     Start date: 3/14/1980    Smokeless tobacco: Never    Tobacco comments:     Quit for quitting.   Substance Use Topics    Alcohol use: Not Currently     Alcohol/week: 0.0 standard drinks of alcohol     Comment: 1 bottles of beer per month.       Family History   Problem Relation Age of Onset    Diabetes Mother     Hypertension Brother     Hyperlipidemia Brother     Cerebrovascular Disease Brother     Other Cancer Paternal Grandmother     Colon Cancer No family hx of              Review of Systems  Constitutional, HEENT, cardiovascular, pulmonary, gi and gu systems are negative, except as otherwise noted.      Objective    Vitals - Patient Reported  Weight (Patient Reported): 80.7 kg (178 lb)  Height (Patient Reported): 177.8 cm (5' 10\")  BMI (Based on Pt Reported Ht/Wt): 25.54      Vitals:  No vitals were obtained today due to virtual visit.    Physical Exam   GENERAL: alert and no distress  EYES: Eyes grossly normal to inspection.  No discharge or erythema, or obvious scleral/conjunctival abnormalities.  RESP: No audible wheeze, cough, or visible cyanosis.    SKIN: Visible skin clear. No significant rash, abnormal pigmentation or lesions.  NEURO: Cranial nerves grossly intact.  Mentation and speech appropriate for age.  PSYCH: Appropriate affect, tone, and pace of words    Office Visit on 03/20/2024   Component Date " Value Ref Range Status    Hemoglobin A1C 03/20/2024 6.2 (H)  0.0 - 5.6 % Final    Normal <5.7%   Prediabetes 5.7-6.4%    Diabetes 6.5% or higher     Note: Adopted from ADA consensus guidelines.    Sodium 03/20/2024 139  135 - 145 mmol/L Final    Reference intervals for this test were updated on 09/26/2023 to more accurately reflect our healthy population. There may be differences in the flagging of prior results with similar values performed with this method. Interpretation of those prior results can be made in the context of the updated reference intervals.     Potassium 03/20/2024 4.7  3.4 - 5.3 mmol/L Final    Chloride 03/20/2024 103  98 - 107 mmol/L Final    Carbon Dioxide (CO2) 03/20/2024 26  22 - 29 mmol/L Final    Anion Gap 03/20/2024 10  7 - 15 mmol/L Final    Urea Nitrogen 03/20/2024 17.7  6.0 - 20.0 mg/dL Final    Creatinine 03/20/2024 0.75  0.67 - 1.17 mg/dL Final    GFR Estimate 03/20/2024 >90  >60 mL/min/1.73m2 Final    Calcium 03/20/2024 9.7  8.6 - 10.0 mg/dL Final    Glucose 03/20/2024 114 (H)  70 - 99 mg/dL Final    Creatinine Urine mg/dL 03/20/2024 65.7  mg/dL Final    The reference ranges have not been established in urine creatinine. The results should be integrated into the clinical context for interpretation.    Albumin Urine mg/L 03/20/2024 <12.0  mg/L Final    The reference ranges have not been established in urine albumin. The results should be integrated into the clinical context for interpretation.    Albumin Urine mg/g Cr 03/20/2024    Final    Unable to calculate, urine albumin and/or urine creatinine is outside detectable limits.  Microalbuminuria is defined as an albumin:creatinine ratio of 17 to 299 for males and 25 to 299 for females. A ratio of albumin:creatinine of 300 or higher is indicative of overt proteinuria.  Due to biologic variability, positive results should be confirmed by a second, first-morning random or 24-hour timed urine specimen. If there is discrepancy, a third  specimen is recommended. When 2 out of 3 results are in the microalbuminuria range, this is evidence for incipient nephropathy and warrants increased efforts at glucose control, blood pressure control, and institution of therapy with an angiotensin-converting-enzyme (ACE) inhibitor (if the patient can tolerate it).      Prostate Specific Antigen Screen 03/20/2024 0.52  0.00 - 3.50 ng/mL Final    Cholesterol 03/20/2024 140  <200 mg/dL Final    Triglycerides 03/20/2024 157 (H)  <150 mg/dL Final    Direct Measure HDL 03/20/2024 45  >=40 mg/dL Final    LDL Cholesterol Calculated 03/20/2024 64  <=100 mg/dL Final    Non HDL Cholesterol 03/20/2024 95  <130 mg/dL Final    Patient Fasting > 8hrs? 03/20/2024 Yes   Final    Erythrocyte Sedimentation Rate 03/20/2024 7  0 - 20 mm/hr Final    Uric Acid 03/20/2024 3.3 (L)  3.4 - 7.0 mg/dL Final    WBC Count 03/20/2024 5.4  4.0 - 11.0 10e3/uL Final    RBC Count 03/20/2024 4.67  4.40 - 5.90 10e6/uL Final    Hemoglobin 03/20/2024 14.1  13.3 - 17.7 g/dL Final    Hematocrit 03/20/2024 40.6  40.0 - 53.0 % Final    MCV 03/20/2024 87  78 - 100 fL Final    MCH 03/20/2024 30.2  26.5 - 33.0 pg Final    MCHC 03/20/2024 34.7  31.5 - 36.5 g/dL Final    RDW 03/20/2024 11.7  10.0 - 15.0 % Final    Platelet Count 03/20/2024 142 (L)  150 - 450 10e3/uL Final    % Neutrophils 03/20/2024 61  % Final    % Lymphocytes 03/20/2024 31  % Final    % Monocytes 03/20/2024 5  % Final    % Eosinophils 03/20/2024 3  % Final    % Basophils 03/20/2024 0  % Final    % Immature Granulocytes 03/20/2024 0  % Final    Absolute Neutrophils 03/20/2024 3.3  1.6 - 8.3 10e3/uL Final    Absolute Lymphocytes 03/20/2024 1.7  0.8 - 5.3 10e3/uL Final    Absolute Monocytes 03/20/2024 0.3  0.0 - 1.3 10e3/uL Final    Absolute Eosinophils 03/20/2024 0.2  0.0 - 0.7 10e3/uL Final    Absolute Basophils 03/20/2024 0.0  0.0 - 0.2 10e3/uL Final    Absolute Immature Granulocytes 03/20/2024 0.0  <=0.4 10e3/uL Final         Video-Visit  Details    Type of service:  Video Visit   Originating Location (pt. Location): Home    Distant Location (provider location):  On-site  Platform used for Video Visit: Bakari  Signed Electronically by: Erika De La Garza MD

## 2024-06-21 ENCOUNTER — VIRTUAL VISIT (OUTPATIENT)
Dept: FAMILY MEDICINE | Facility: CLINIC | Age: 59
End: 2024-06-21
Payer: COMMERCIAL

## 2024-06-21 DIAGNOSIS — Z87.39 HISTORY OF HERNIATED INTERVERTEBRAL DISC: ICD-10-CM

## 2024-06-21 DIAGNOSIS — M54.41 ACUTE RIGHT-SIDED LOW BACK PAIN WITH RIGHT-SIDED SCIATICA: Primary | ICD-10-CM

## 2024-06-21 PROCEDURE — 99213 OFFICE O/P EST LOW 20 MIN: CPT | Mod: 95 | Performed by: NURSE PRACTITIONER

## 2024-06-21 RX ORDER — METHYLPREDNISOLONE 4 MG
TABLET, DOSE PACK ORAL
Qty: 21 TABLET | Refills: 0 | Status: SHIPPED | OUTPATIENT
Start: 2024-06-21 | End: 2024-07-26

## 2024-06-21 ASSESSMENT — ENCOUNTER SYMPTOMS: BACK PAIN: 1

## 2024-06-21 NOTE — PROGRESS NOTES
Saran is a 59 year old who is being evaluated via a billable video visit.    How would you like to obtain your AVS? MyChart  If the video visit is dropped, the invitation should be resent by: Text to cell phone: [phone]  Will anyone else be joining your video visit? No      Assessment & Plan     Acute right-sided low back pain with right-sided sciatica  - methylPREDNISolone (MEDROL DOSEPAK) 4 MG tablet therapy pack  Dispense: 21 tablet; Refill: 0  - MR LUMBAR SPINE W/O & W CONTRAST    History of herniated intervertebral disc        Subjective   Saran is a 59 year old, presenting for the following health issues:  Back Pain        6/21/2024     4:00 PM   Additional Questions   Roomed by Rox SAENZ CMA     History of Present Illness       Back Pain:  He presents for follow up of back pain. Patient's back pain is a recurring problem.  Location of back pain:  Right lower back and right hip  Description of back pain: burning, sharp and stabbing  Back pain spreads: right thigh and right foot    Since patient first noticed back pain, pain is: gradually worsening  Does back pain interfere with his job:  Yes       He eats 2-3 servings of fruits and vegetables daily.He consumes 0 sweetened beverage(s) daily.He exercises with enough effort to increase his heart rate 9 or less minutes per day.  He exercises with enough effort to increase his heart rate 3 or less days per week.   He is taking medications regularly.     TRIAGE 6/21/2024    Patient was seen 6/17 virtually given gabapentin and muscle relaxants which has not been helpful.  History of disc herniation with discectomy in similar area on the left side years ago.  Pain feels the same as it did at the time he had that surgery.  At this time it is on the right side.  Pain is traveling from the right low back into the buttock and down the leg.        Nurse Triage SBAR     Is this a 2nd Level Triage? NO     Situation: back pain     Background: seen virtually on Monday June  "17th - prescribed gabapentin, nabumetone, and valium.   Recently had cataract surgery on 6/18 so can't drive.     Assessment:   back pain 10/10.   Nothing working except Tylenol  bilateral lower back back pain, radiating down to calf.   tylenol helps a little bit.   Hard to walk.    Any movement is \"killing me\"     Protocol Recommended Disposition:   Go To Office Now, See in Office Today, See More Appropriate Protocol     Recommendation: discussed with ADS provider - could do UC for x-ray and oral pain medication, or ER for IV pain medication. Discussed options with patient. States he can't drive so wants to do virtual visit. Scheduled today with Jayla Salgado CNP as she had opening.      Patient stated an understanding and agreed with plan.      ISABEL CABALLERO RN on 6/21/2024 at 2:23 PM   [facility]           Reason for Disposition   Back pain from overuse (work, exercise, gardening) OR from twisting, lifting, or bending injury   SEVERE back pain (e.g., excruciating, unable to do any normal activities) and not improved after pain medicine and CARE ADVICE   SEVERE back pain (e.g., excruciating, unable to do any normal activities) and not improved after pain medicine and CARE ADVICE    Additional Information   Negative: Pain radiates into groin, scrotum   Negative: Blood in urine (red, pink, or tea-colored)   Negative: Vomiting and pain over lower ribs of back (i.e., flank - kidney area)   Negative: Weakness of a leg or foot (e.g., unable to bear weight, dragging foot)   Negative: Patient sounds very sick or weak to the triager   Negative: Fever > 100.4 F (38.0 C) and flank pain   Negative: Pain or burning with passing urine (urination)    Protocols used: Back Injury-A-OH, Back Pain-A-OH           Constitutional, HEENT, cardiovascular, pulmonary, GI, , musculoskeletal, neuro, skin, endocrine and psych systems are negative, except as otherwise noted.        Objective           Vitals:  No vitals were " obtained today due to virtual visit.    Physical Exam   GENERAL: alert and no distress  EYES: Eyes grossly normal to inspection.  No discharge or erythema, or obvious scleral/conjunctival abnormalities.  RESP: No audible wheeze, cough, or visible cyanosis.    SKIN: Visible skin clear. No significant rash, abnormal pigmentation or lesions.  NEURO: Cranial nerves grossly intact.  Mentation and speech appropriate for age.  PSYCH: Appropriate affect, tone, and pace of words          Video-Visit Details    Type of service:  Video Visit   Originating Location (pt. Location): Home    Distant Location (provider location):  On-site  Platform used for Video Visit: Bakari  Signed Electronically by: Jayla Salgado, CNP

## 2024-06-24 ENCOUNTER — TELEPHONE (OUTPATIENT)
Dept: FAMILY MEDICINE | Facility: CLINIC | Age: 59
End: 2024-06-24
Payer: COMMERCIAL

## 2024-06-24 DIAGNOSIS — M54.41 ACUTE RIGHT-SIDED LOW BACK PAIN WITH RIGHT-SIDED SCIATICA: ICD-10-CM

## 2024-06-24 RX ORDER — OXYCODONE HYDROCHLORIDE 5 MG/1
5 TABLET ORAL EVERY 6 HOURS PRN
Qty: 12 TABLET | Refills: 0 | Status: SHIPPED | OUTPATIENT
Start: 2024-06-24 | End: 2024-06-25

## 2024-06-24 NOTE — TELEPHONE ENCOUNTER
Called and spoke with patient.     Oxycodone helping. Took last dose a couple hours ago, its a 7/8 now.     Right leg pain, but not numbness.     Pain worse at night. For a whole week didn't sleep more than 2 hours. Still having a hard time sleeping. Get up and walk around and lay down again. Pain never goes away. Last night pain 12/10    Routing to provider to review and advise.     Ana Luisa Montesinos RN  [facility]

## 2024-06-24 NOTE — TELEPHONE ENCOUNTER
General Call      Reason for Call:      Pt has a question about MRI - he cannot get in until July 14th and wants to know if there is a way he can get in sooner?    What are your questions or concerns:  He has to wait so long    Date of last appointment with provider: 06/21/24    Could we send this information to you in [place] or would you prefer to receive a phone call?:   Patient would prefer a phone call   Okay to leave a detailed message?: Yes  - Cell number on file:    Telephone Information:   Mobile [phone]       Scheduled in the middle of July for MRI -     [facility] - July 14th    IS THERE ANY WAY HE CAN GET MRI SOONER.    Gris LIM

## 2024-06-24 NOTE — TELEPHONE ENCOUNTER
Sent now. Apologies-I left pended on Friday. If still with ongoing pain should schedule MRI. This is ordered.    Jayla Salgado, CNP

## 2024-06-24 NOTE — TELEPHONE ENCOUNTER
Generally speaking there is a 10 day waiting period for scheduling high end imaging such as MRI unless the need is truly acute in which case we can order it STAT and bypass the wait period.  Things that would push me to order STAT are unbearable pain, any change in bowel or bladder function, any numbness in the saddle area (crotch) of his pelvis, legs.  Please call patient and if any of those urgent symptoms will order STAT MRI.  Otherwise waiting until 7/14 /24 is likely appropriate.  Manage pain with gabapentin, oxycodone, prednisone as has been prescribed.      Vamshi Clarke MD

## 2024-06-24 NOTE — TELEPHONE ENCOUNTER
New Medication Request        What medication are you requesting?: Pain med - pt reports at LOV pain med for back would be sent to pharmacy, like a narcotic. Patient reports severe back pain    Reason for medication request: back pain    Have you taken this medication before?: No    Controlled Substance Agreement on file:   CSA -- Patient Level:     [Media Unavailable] Controlled Substance Agreement - Opioid - Scan on 11/27/2020  9:32 AM   [Media Unavailable] Controlled Substance Agreement - Opioid - Scan on 3/27/2019  2:37 PM         Patient offered an appointment? No, pt already saw provider 6/21/24    Preferred Pharmacy:  [id number] - [place], [place] - [address] AT [facility] [address]  [address] [place] [address]  Phone: [phone] Fax: [phone]      Could we send this information to you [facility] or would you prefer to receive a phone call?:   No preference, receives notifications from pharmacy   Okay to leave a detailed message?: No

## 2024-06-25 RX ORDER — OXYCODONE HYDROCHLORIDE 5 MG/1
5 TABLET ORAL 2 TIMES DAILY PRN
Qty: 12 TABLET | Refills: 0 | Status: SHIPPED | OUTPATIENT
Start: 2024-06-25 | End: 2024-07-12

## 2024-06-25 NOTE — TELEPHONE ENCOUNTER
Will give one refill of oxycodone.  Follow through with MRI of lumbar spine as scheduled.  Complete course of prednisone (Medrol Dosepak) and continue gabapentin.  Luke should reach out to me if pain is increasing.      Chart reviewed.  Rx sent to pt's preferred pharmacy.  PDMP reviewed and last opioid Rx was the one written by Jayla Salgado.       [id number] - [place], [place] - [address] AT [facility] [address] [facility]  [facility] [address] [id number] - [place], [place] - [address] [facility] [address] [facility] [address] [facility]    Vamshi Clarke MD

## 2024-06-27 ENCOUNTER — TELEPHONE (OUTPATIENT)
Dept: FAMILY MEDICINE | Facility: CLINIC | Age: 59
End: 2024-06-27
Payer: COMMERCIAL

## 2024-06-27 DIAGNOSIS — Z87.39 HISTORY OF HERNIATED INTERVERTEBRAL DISC: ICD-10-CM

## 2024-06-27 DIAGNOSIS — M54.41 ACUTE RIGHT-SIDED LOW BACK PAIN WITH RIGHT-SIDED SCIATICA: Primary | ICD-10-CM

## 2024-06-27 NOTE — TELEPHONE ENCOUNTER
Called patient and explained providers note.     MRI is now scheduled for 7/1     Advised Massage, stretching, icing 20 minutes  3-5 times a day, Tylenol as directed along with prescription medication as directed.

## 2024-06-27 NOTE — TELEPHONE ENCOUNTER
Pain will not do an epidural steroid injection of his back without an updated MRI as the last one is well over 3 years old.  Our best resolution to the situation is to order his MRI STAT which I will do within this encounter.  Please call Saran to advise him of this and that he should be getting a scheduling call today or tomorrow.  Once we get the MRI result back, we can either refer him for an epidural steroid injection or take further steps to treat his pain.    Vamshi Clarke MD

## 2024-06-27 NOTE — TELEPHONE ENCOUNTER
Patient was told to call PCP back if his leg pain was increasing so he's calling to give update.    He has been using the oxycodone, taking prednisone, and continuing the gabapentin. His pain is unchanged. Denies weakness, numbness in leg. Denies dizziness, chest pain and red flag symptoms.   Rates pain 10/10 if he moves like walking or laying down. Goes down to a 7/10 if not doing those 2 things.  He is not getting any sleep at night and says he is really suffering with this pain.      MRI scheduled for 7/14/24 and he asked  for the soonest appointment at any location and that is the soonest.   He is asking if he could get an epidural before his MRI so it can control his pain better.    Routing to RN team as well as PCP since it looks like PCP is not in the office today.       Routing to provider to advise.  Keyonna AUGUSTN, RN

## 2024-06-29 ENCOUNTER — HEALTH MAINTENANCE LETTER (OUTPATIENT)
Age: 59
End: 2024-06-29

## 2024-07-01 ENCOUNTER — HOSPITAL ENCOUNTER (OUTPATIENT)
Dept: MRI IMAGING | Facility: CLINIC | Age: 59
Discharge: HOME | End: 2024-07-01
Attending: FAMILY MEDICINE | Admitting: FAMILY MEDICINE
Payer: COMMERCIAL

## 2024-07-01 DIAGNOSIS — M54.41 ACUTE RIGHT-SIDED LOW BACK PAIN WITH RIGHT-SIDED SCIATICA: ICD-10-CM

## 2024-07-01 DIAGNOSIS — Z87.39 HISTORY OF HERNIATED INTERVERTEBRAL DISC: ICD-10-CM

## 2024-07-01 PROCEDURE — 72148 MRI LUMBAR SPINE W/O DYE: CPT

## 2024-07-02 ENCOUNTER — MYC MEDICAL ADVICE (OUTPATIENT)
Dept: FAMILY MEDICINE | Facility: CLINIC | Age: 59
End: 2024-07-02
Payer: COMMERCIAL

## 2024-07-03 ENCOUNTER — TELEPHONE (OUTPATIENT)
Dept: PALLIATIVE MEDICINE | Facility: CLINIC | Age: 59
End: 2024-07-03
Payer: COMMERCIAL

## 2024-07-03 ENCOUNTER — MYC REFILL (OUTPATIENT)
Dept: FAMILY MEDICINE | Facility: CLINIC | Age: 59
End: 2024-07-03
Payer: COMMERCIAL

## 2024-07-03 DIAGNOSIS — M54.16 LUMBAR RADICULOPATHY: Primary | ICD-10-CM

## 2024-07-03 DIAGNOSIS — M54.41 ACUTE RIGHT-SIDED LOW BACK PAIN WITH RIGHT-SIDED SCIATICA: ICD-10-CM

## 2024-07-03 ASSESSMENT — ANXIETY QUESTIONNAIRES
6. BECOMING EASILY ANNOYED OR IRRITABLE: SEVERAL DAYS
7. FEELING AFRAID AS IF SOMETHING AWFUL MIGHT HAPPEN: SEVERAL DAYS
GAD7 TOTAL SCORE: 5
IF YOU CHECKED OFF ANY PROBLEMS ON THIS QUESTIONNAIRE, HOW DIFFICULT HAVE THESE PROBLEMS MADE IT FOR YOU TO DO YOUR WORK, TAKE CARE OF THINGS AT HOME, OR GET ALONG WITH OTHER PEOPLE: EXTREMELY DIFFICULT
GAD7 TOTAL SCORE: 5
4. TROUBLE RELAXING: NOT AT ALL
7. FEELING AFRAID AS IF SOMETHING AWFUL MIGHT HAPPEN: SEVERAL DAYS
3. WORRYING TOO MUCH ABOUT DIFFERENT THINGS: NOT AT ALL
5. BEING SO RESTLESS THAT IT IS HARD TO SIT STILL: NEARLY EVERY DAY
1. FEELING NERVOUS, ANXIOUS, OR ON EDGE: NOT AT ALL
2. NOT BEING ABLE TO STOP OR CONTROL WORRYING: NOT AT ALL
8. IF YOU CHECKED OFF ANY PROBLEMS, HOW DIFFICULT HAVE THESE MADE IT FOR YOU TO DO YOUR WORK, TAKE CARE OF THINGS AT HOME, OR GET ALONG WITH OTHER PEOPLE?: EXTREMELY DIFFICULT

## 2024-07-03 ASSESSMENT — PAIN SCALES - PAIN ENJOYMENT GENERAL ACTIVITY SCALE (PEG)
INTERFERED_GENERAL_ACTIVITY: 10
INTERFERED_ENJOYMENT_LIFE: 10 - COMPLETELY INTERFERES
AVG_PAIN_PASTWEEK: 10 - PAIN AS BAD AS YOU CAN IMAGINE
PEG_TOTALSCORE: 10
INTERFERED_GENERAL_ACTIVITY: 10 - COMPLETELY INTERFERES
PEG_TOTALSCORE: 10
INTERFERED_ENJOYMENT_LIFE: 10
AVG_PAIN_PASTWEEK: 10

## 2024-07-03 NOTE — TELEPHONE ENCOUNTER
Please see if he can do an interventional consult (virtual is fine) on 7/5/2024.     Lolly Dexter MD

## 2024-07-03 NOTE — TELEPHONE ENCOUNTER
PA pending additional information - please specify exact level, laterality and if this will be interlaminar or transforminal.        Jeannine BAUMANN  Complex   [employer] [facility]

## 2024-07-03 NOTE — TELEPHONE ENCOUNTER
"Screening Questions for Radiology Injections:    Injection to be done at which interventional clinic site? [facility]    If choosing [facility] for location, please inform patient:  \"[facility] is a Hospital based clinic. Before your visit, you should check with your insurance about how it covers the charges for facility services in a hospital-based clinic.     Procedure ordered by Vamshi Clarke Jr., MD in [facility]     Procedure ordered? LESI  Transforaminal Cervical BARBARA - Send to [facility] ([facility]) - No [facility] Site providers perform this procedure    What insurance would patient like us to bill for this procedure? MEDICA  IF SCHEDULING IN [place] PAIN OR SPINE PLEASE SCHEDULE AT LEAST 7-10 BUSINESS DAYS OUT SO A PA CAN BE OBTAINED  Worker's comp or MVA (motor vehicle accident) -Any injection DO NOT SCHEDULE and route to Amarilis Almanzar.    [employer] insurance - For SI joint injections, DO NOT SCHEDULE and route to Jeannine García.     ALL BCBS, [employer] and [employer] - DO NOT SCHEDULE and route to Jeannine García  MEDICA- ALL INJECTIONS- route to Jeannine García    Is patient scheduled at [facility]? NO    If YES, route every encounter to [facility] SPINE CENTER CARE NAVIGATION POOL [[phone]]    Is an  needed? No     Patient has a  home? (Review Grid) YES: Informed     Any chance of pregnancy? Not Applicable   If YES, do NOT schedule and route to RN pool  - Dr. Musa route to Ileana Crook and PM&R Nurse  [[id number]]      Is patient actively being treated for cancer or immunocompromised? No  If YES, do NOT schedule and route to RN pool/ Dr. Musa's Team    Does the patient have a bleeding or clotting disorder? No   If YES, okay to schedule AND route to RN nurse / Dr. Musa's Team   (For any patients with platelet count <100, RN must forward to provider)    Is patient taking any Blood Thinners OR Antiplatelet medication?  No   If hold needed, do NOT " schedule, route to BEVERLEY reyez/ Dr. Musa's Team  Examples:   Blood Thinners: (Coumadin, Warfarin, Jantoven, Pradaxa, Xarelto, Eliquis, Edoxaban, Enoxaparin, Lovenox, Heparin, Arixtra, Fondaparinux or Fragmin)  Antiplatelet Medications: (Plavix, Brilinta or Effient)     Is patient taking any aspirin products (includes Excedrin and Fiorinal)? Yes - Pt takes 81mg daily; instructed to hold 0 day(s) prior to procedure.    If yes route to RN pool/ Dr. Musa's Team - Do not schedule    Is patient taking any GLP-1 Antagonist (hold needed for sedation patients only)- YES, Pt is taking OZEMPIC  (semaglutide (Ozempic, Wegovy), dulaglutide (Trulicity), exenatide ER (Bydureon), tirzepatide (Mounjaro), Liraglutide (Saxenda, Victoza), semaglutide (Rybelsus), Terzepatide (Zepbound)  If YES, okay to schedule AND route to RN nurse / Dr. Musa's Team      Any allergies to contrast dye, iodine, shellfish, or numbing and steroid medications? No  If YES, schedule and add allergy information to appointment notes AND route to the RN pool/ Dr. Musa's Team  If BARBARA and Contrast Dye / Iodine Allergy? DO NOT SCHEDULE, route to RN pool/ Dr. Musa's Team  Allergies: Eszopiclone and Food     Does patient have an active infection or treated for one within the past week? No  Is patient currently taking any antibiotics or steroid medications?  No   For patients on chronic, preventative, or prophylactic antibiotics, procedures may be scheduled.   For patients on antibiotics for active or recent infection, schedule 4 days after completed.  For patients on steroid medications, schedule 4 days after completed.     Has the patient had a flu shot or any other vaccinations within the past 7 days? No  If yes, explain that for the vaccine to work best they need to:     wait 1 week before and 1 week after getting any Vaccine  wait 1 week before and 2 weeks after getting any Covid Vaccine   If patient has concerns about the timing, send to RN pool/   Dimple's Team    Does patient have an MRI/CT?  YES:  07/01/24Include Date and Check Procedure Scheduling Grid to see if required.  Was the MRI/CT done within the last 3 years?  Yes   If no route to RN Pool/ Dr. Petersons Team  If yes, where was the MRI/CT done? [facility]    Refer to PACS Transmissions list for approved external locations and route to RN Pool High Priority/ Dr. Nash Team  If MRI was not done at approved external location do NOT schedule and route to RN pool/ Dr. Nash Team    If patient has an imaging disc, the injection MAY be scheduled but patient must bring disc to appt or appt will be cancelled.    Is patient able to transfer to a procedure table with minimal or no assistance? Yes   If no, do NOT schedule and route to RN Pool/ Dr. Nash Team    Procedure Specific Instructions:  If celiac plexus block, informed patient NPO for 6 hours and that it is okay to take medications with sips of water, especially blood pressure medications Not Applicable       If this is for a cervical procedure, informed patient that aspirin needs to be held for 6 days.   Not Applicable    Sedation, If Sedation is ordered for any procedure, patient must be NPO for 6 hours prior to procedure Not Applicable    If IV needed:  Do not schedule procedures requiring IV placement in the first appointment of the day or first appointment after lunch. Do NOT schedule at 0745, 0815 or 1245.     Instructed patient to arrive 30 minutes early for IV start if required. (Check Procedure Scheduling Grid)  Not Applicable    Reminders:  If you are started on any steroids or antibiotics between now and your appointment, you must contact us because the procedure may need to be cancelled.  Yes    As a reminder, receiving steroids can decrease your body's ability to fight infection.   Would you still like to move forward with scheduling the injection?  Yes    IV Sedation is not provided for procedures. If oral anti-anxiety medication is  needed, the patient should request this from their referring provider.    Instruct patient to arrive as directed prior to the scheduled appointment time:  If IV needed 30 minutes before appointment time     For patients 85 or older we recommend having an adult stay w/ them for the remainder of the day.     If the patient is Diabetic, remind them to bring their glucometer.      Does the patient have any questions?  NO  Little Torres  [facility]

## 2024-07-03 NOTE — PROGRESS NOTES
+Saran is a 59 year old who is being evaluated via a billable video visit.    How would you like to obtain your AVS? MyChart  If the video visit is dropped, the invitation should be resent by:   Will anyone else be joining your video visit? No    Video-Visit Details  Type of service:  Video Visit   Originating Location (pt. Location): Home    Distant Location (provider location):  On-site  Platform used for Video Visit: [facility] INTERVENTIONAL CONSULT    Date of visit: 7/5/2024    Reason for consultation:    Saran Wallis is a 59 year old male who is seen in consultation today at the request of his primary care physician, Vamshi Clarke Jr.     Consultation and Evaluation for: PROCEDURE ONLY CONSULT  Diagnoses: Acute right-sided low back pain with right-sided sciatica   Order: Pain Management  Referral       Review of Electronic Chart: Today I have also reviewed available medical information in the patient's medical record at [place] ([facility]), including relevant provider notes, laboratory work, and imaging.       Chief Complaint:    Chief Complaint   Patient presents with    Pain       Pain history:  Saran Wallis is a 59 year old male who presents for initial evaluation of chief pain complaint of low back and leg pain.     - This is a patient I have previously seen for cervical neck injections only.  I have never seen him in clinic.  His PCP put in an order for a lumbar injection to be determined by physician performing the injection.   - Pain began Saturday June 1st. He was replacing the gas on his grill and lifted the tank and could hear the pop.  It was very severe the first week and felt like a muscle spasm and then it moved to going down his right leg to the posterior thigh and calf.  He has some pain in his right foot as well.  His big toe on his right leg feels weak.   - His pain is not improving over time.   - He cannot sleep at all.    - He has a history of previous spine surgeries.  In 2008 and 2013 he had discectomies on the Left - [facility] and the second was at [facility].  He has some residual numbness in his left leg.  He was told a fusion was recommended, however he did not go through with this.  He cannot remember if that was because of insurance reasons.  - He was seen 6/21/2024 by family practice and given oxycodone and gabapentin and Valium , none of which have been very helpful.  The oxycodone makes him happy for a couple hours. He is requesting a refill of oxycodone.  He contacted his primary care provider a couple days ago and has not had a response.  - Recent medrol dosepack. He finished this a couple weeks ago and it was not helpful.     Red Flags: The patient denies bowel or bladder incontinence, parasthesias, weakness, saddle anesthesia, unintentional weight loss, or fever/chills/sweats.       PAIN MEDICATIONS:  MEDROL DOSE PACK   GABAPENTIN 300MG TID  OXYCODONE 5MG PRN    INJECTIONS/SURGICAL HISTORY:   C7-T1 ESVIN 4/4/2024, 10/19/2023, 3/23/2023, 11/7/2022 & 9/21/2022     S/P left discectomy 2008, 2013?  Records unavailable    IMAGING:  MRI LUMBAR SPINE 7/1/2024:   FINDINGS: There are five lumbar-type vertebrae for the purposes of  this dictation.      There is straightening of normal lumbar lordosis. Alignment otherwise  normal. Vertebral body heights normal. Marrow signal normal. No  evidence for fracture or pathologic bony lesion.     There is loss of disc height, disc desiccation and posterior disc  bulging/herniation to varying degrees at all levels of the lumbar  spine with the exception of the normal-appearing L2-L3 disc.      The tip of the conus medullaris is at the L1-L2 level which is within  normal limits. There is no evidence for intrathecal abnormality.      Level by level:      T12-L1: Loss of disc height, disc desiccation and circumferential disc  bulging with a superimposed small posterior  central disc herniation  (extrusion). Mild facet arthropathy bilaterally. No spinal canal  stenosis. No foraminal stenosis on either side.     L1-L2: Loss of disc height, disc desiccation and mild circumferential  disc bulging. No herniation. Mild facet arthropathy bilaterally. No  spinal canal stenosis. No foraminal stenosis on either side.     L2-L3: Normal disc height and signal. No herniation. Mild facet  arthropathy bilaterally. No spinal canal stenosis. No foraminal  stenosis on either side.      L3-L4: Loss of disc height, disc desiccation and circumferential disc  bulging with a superimposed small posterior central disc herniation  (protrusion). Minimal spinal canal narrowing. No foraminal stenosis on  either side.     L4-L5: Loss of disc height, disc desiccation and circumferential disc  bulging with a superimposed moderate size right paracentral disc  herniation (extrusion). Extruded disc material extends caudad from the  parent disc in the right anterolateral epidural space down to the  lower L5 level. Moderate facet arthropathy bilaterally. Mild spinal  canal narrowing. Severe narrowing of the right lateral recess of the  spinal canal. Mild left foraminal narrowing. No right foraminal  stenosis.     L5-S1: Loss of disc height, disc desiccation and circumferential disc  bulging with a superimposed moderate sized left paracentral disc  herniation (extrusion). Moderate facet arthropathy bilaterally.  Minimal spinal canal narrowing. Severe narrowing of the left lateral  recess of the spinal canal. Moderate left foraminal stenosis. Mild  right foraminal narrowing.                                                                    IMPRESSION:  1. Diffuse degenerative change of the lumbar spine as detailed above.  2. No high-grade spinal canal stenosis of the lumbar spine.  3. Moderate to severe narrowing of the lateral recesses of the spinal  canal on the right at L4-L5 and on the left at L5-S1 due to  adjacent  disc herniations.  4. Moderate neural foraminal stenosis on the left at L5-S1. No other  significant neural foraminal narrowing of the lumbar spine.    CERVICAL MRI 7/15/2022:   FINDINGS: Normal cervical vertebral body heights and alignment. Normal  cervical spinal cord. T2 hyperintense Modic type I edematous  degenerative endplate changes on the left at C6-C7. Otherwise benign  marrow.     C2-C3: Preserved interspace. No herniation. Minor uncovertebral facet  joint degeneration. No stenosis.     C3-C4: Preserved interspace. Tiny central protrusion. Unremarkable  facets. No stenosis.     C4-C5: Preserved interspace. Small central disc protrusion and minor  annular bulging. Unremarkable facets. Mild spinal canal and foraminal  narrowing.     C5-C6: Mild interspace narrowing. Prominent left paracentral disc  extrusion. Minor circumferential annular bulging. Unremarkable facets.  Moderate to severe spinal canal stenosis. Mild spinal cord deformity  without spinal cord signal abnormality. Mild to  moderate left and  moderate to severe right foraminal narrowing.     C6-C7: Preserved interspace. Broad posterior protrusion more prominent  on the left than on the right. Unremarkable facets. Mild to moderate  spinal canal stenosis. Moderate to severe left and mild right  foraminal narrowing.     C7-T1: Preserved interspace. No herniation. Mild right uncovertebral  joint degeneration. Unremarkable facets. No central stenosis or left  foraminal narrowing. Mild right foraminal narrowing.     T1-T2: Central disc extrusion versus ligamentous ossification  resulting in low-grade spinal canal narrowing.                                                                   IMPRESSION:  1.  Degenerative changes are most pronounced at C5-C6 and C6-C7 where  there is moderate to severe and mild to moderate spinal canal stenosis  respectively.  2.  At C5-C6 disc herniation deforms the spinal cord. No spinal cord  signal  abnormality. There is moderate to severe right and mild to  moderate left foraminal narrowing.  3.  At C6-C7 there is moderate to severe left and mild right foraminal  narrowing.  4.  Mild spinal canal and foraminal narrowing at C4-C5.     EMG/Testing:  NONE    LABS:   HgbA1c - 6.2    Past Medical History:  Past Medical History:   Diagnosis Date    Chronic radicular low back pain 4/30/2016    Depressive disorder     Diabetes mellitus (H)     Gastric reflux     Hypercholesteremia     Hypertension        Past Surgical History:  Past Surgical History:   Procedure Laterality Date    BACK SURGERY  2009,2012    L4, L5, S1 Disk herniation    COLONOSCOPY N/A 1/28/2016    Procedure: COMBINED COLONOSCOPY, SINGLE OR MULTIPLE BIOPSY/POLYPECTOMY BY BIOPSY;  Surgeon: Michelle Morrison MD;  Location:  GI    COSMETIC SURGERY  1981    Left Hand Skin graft.    ESOPHAGOSCOPY, GASTROSCOPY, DUODENOSCOPY (EGD), COMBINED N/A 5/13/2016    Procedure: COMBINED ESOPHAGOSCOPY, GASTROSCOPY, DUODENOSCOPY (EGD);  Surgeon: Vamshi Lynn MD;  Location:  GI    EYE SURGERY  1981    ORTHOPEDIC SURGERY         Medications:  Current Outpatient Medications   Medication Sig Dispense Refill    ACCU-CHEK GUIDE test strip USE TO TEST BLOOD SUGAR 1 TIME DAILY OR AS DIRECTED 100 strip 1    ASPIRIN PO Take 81 mg by mouth      blood glucose monitoring (ACCU-CHEK ALBINO PLUS) meter device kit Use to test blood sugar 1 times daily or as directed.  Ok to substitute alternative if insurance prefers. 1 kit 0    blood glucose monitoring (ACCU-CHEK FASTCLIX) lancets USE TO TEST BLOOD SUGAR ONE TIME DAILY OR AS DIRECTED 102 each 1    gabapentin (NEURONTIN) 300 MG capsule Take 1 capsule (300 mg) by mouth 3 times daily 90 capsule 2    irbesartan (AVAPRO) 150 MG tablet Take 1 tablet (150 mg) by mouth daily 90 tablet 1    ketorolac (ACULAR) 0.5 % ophthalmic solution Apply 1 drop to eye      metFORMIN (GLUCOPHAGE) 500 MG tablet TAKE 2 TABLETS(1000 MG) BY MOUTH  TWICE DAILY WITH MEALS 360 tablet 0    methylPREDNISolone (MEDROL DOSEPAK) 4 MG tablet therapy pack Follow Package Directions 21 tablet 0    moxifloxacin (VIGAMOX) 0.5 % ophthalmic solution Apply 1 drop to eye      Omega-3 Fatty Acids (FISH OIL) 500 MG CAPS Take by mouth daily      oxyCODONE (ROXICODONE) 5 MG tablet Take 1 tablet (5 mg) by mouth 2 times daily as needed for pain 12 tablet 0    OZEMPIC, 0.25 OR 0.5 MG/DOSE, 2 MG/3ML pen Inject 0.75 mL (0.5 mg) subcutaneously every week. 6 mL 0    pantoprazole (PROTONIX) 40 MG EC tablet Take 1 tablet (40 mg) by mouth daily 90 tablet 1    prednisoLONE acetate (PRED FORTE) 1 % ophthalmic suspension Apply 1 drop to eye      simvastatin (ZOCOR) 10 MG tablet TAKE 1 TABLET(10 MG) BY MOUTH AT BEDTIME 90 tablet 2    VITAMIN D PO Take by mouth daily         Allergies:     Allergies   Allergen Reactions    Eszopiclone      Other reaction(s): Taste, Changes  Works, but bitter taste.    Food Hives     Peaches       Family history:  Family History   Problem Relation Age of Onset    Diabetes Mother     Hypertension Brother     Hyperlipidemia Brother     Cerebrovascular Disease Brother     Other Cancer Paternal Grandmother     Colon Cancer No family hx of        Physical Exam:  There were no vitals filed for this visit.    GENERAL: alert and no distress  EYES: Eyes grossly normal to inspection.  No discharge or erythema, or obvious scleral/conjunctival abnormalities.  RESP: No audible wheeze, cough, or visible cyanosis.    SKIN: Visible skin clear. No significant rash, abnormal pigmentation or lesions.  NEURO: Cranial nerves grossly intact.  Mentation and speech appropriate for age.  PSYCH: Appropriate affect, tone, and pace of words    This was scheduled as a video visit last minute as an urgent visit therefore in person physical exam was not an option.    ASSESSMENT/PLAN:  The following recommendations were given to the patient. Diagnosis, treatment options, risks, benefits, and  alternatives were discussed, and all questions were answered. The patient expressed understanding of the plan for management.     Saran Wallis is a 59 year old male with a past medical history of diabetes, GERD, hypercholesterolemia, hypertension and a mental health history significant for depression who is being seen at the spine clinic for an interventional consult.     1. Lumbar radiculopathy  The patient has acute onset of severe right-sided low back pain with radiation down the posterior thigh and calf and some weakness in his right toe.  Pain has been progressively worsening for about a month.  There has been no improvement despite trials of oxycodone, gabapentin and Valium as well as a Medrol Dosepak.  He was seen by his primary care provider for this who ordered a lumbar MRI which was reviewed.  It shows a large disc protrusion at L4-5 with severe right lateral stenosis.  I am recommending a right L4 transforaminal epidural steroid injection and I held a spot for him on Monday at 1245.  I am also recommending a neurosurgery referral to discuss possible surgical options including possibly a discectomy.  He does have a history of previous spine surgery.  It sounds like he had a left discectomy in 2008 with Dr. Lopez however these records are unavailable so I am unsure what level this was done at.  He also discusses a consult in 2013 where a fusion was recommended which he did not end up going through with.    I gave him a refill of his oxycodone 5 mg 3 times a day as needed to get him through the weekend.  I gave him 12 tablets.  He did request a refill of this from his primary the day before yesterday and there was no response.    I also gave him a prescription for tizanidine to use at night to hopefully help with sleep.    - Adult Neurosurgery  Referral; Future  - PAIN INJECTION EVAL/TREAT/FOLLOW UP    2. Acute pain    - oxyCODONE (ROXICODONE) 5 MG tablet; Take 1 tablet (5 mg) by mouth every 6  hours as needed for pain  Dispense: 12 tablet; Refill: 0  - Adult Neurosurgery  Referral; Future    3. Myofascial pain syndrome    - tiZANidine (ZANAFLEX) 4 MG tablet; Take 1 tablet (4 mg) by mouth 3 times daily as needed for muscle spasms  Dispense: 30 tablet; Refill: 0      MEDICATIONS:     Orders Placed This Encounter   Medications    oxyCODONE (ROXICODONE) 5 MG tablet     Sig: Take 1 tablet (5 mg) by mouth every 6 hours as needed for pain     Dispense:  12 tablet     Refill:  0    tiZANidine (ZANAFLEX) 4 MG tablet     Sig: Take 1 tablet (4 mg) by mouth 3 times daily as needed for muscle spasms     Dispense:  30 tablet     Refill:  0          - Continue other medications without change           FOLLOW UP: Monday for BARBARA and with neurosurgery       BILLING TIME DOCUMENTATION:   The total TIME spent on this patient on the date of the encounter/appointment was 38 minutes.      TOTAL TIME includes:   Time spent preparing to see the patient (reviewing records and tests) - 4 min  Time spent face to face (or over the phone) with the patient - 26 min  Time spent ordering tests, medications, procedures and referrals - 2 min  Time spent Referring and communicating with other healthcare professionals - 0 min  Time spent documenting clinical information in Epic - 6 min       ARYA DE LEON MD   Pain Management

## 2024-07-03 NOTE — TELEPHONE ENCOUNTER
Noted by nursing, no contraindications to proceeding    Em CHRISTIANSON RN Care Coordinator  [facility]

## 2024-07-05 ENCOUNTER — VIRTUAL VISIT (OUTPATIENT)
Dept: PALLIATIVE MEDICINE | Facility: CLINIC | Age: 59
End: 2024-07-05
Payer: COMMERCIAL

## 2024-07-05 DIAGNOSIS — M54.16 LUMBAR RADICULOPATHY: Primary | ICD-10-CM

## 2024-07-05 DIAGNOSIS — R52 ACUTE PAIN: ICD-10-CM

## 2024-07-05 DIAGNOSIS — M79.18 MYOFASCIAL PAIN SYNDROME: ICD-10-CM

## 2024-07-05 PROCEDURE — 99214 OFFICE O/P EST MOD 30 MIN: CPT | Mod: 95 | Performed by: ANESTHESIOLOGY

## 2024-07-05 PROCEDURE — G2211 COMPLEX E/M VISIT ADD ON: HCPCS | Mod: 95 | Performed by: ANESTHESIOLOGY

## 2024-07-05 RX ORDER — OXYCODONE HYDROCHLORIDE 5 MG/1
5 TABLET ORAL EVERY 6 HOURS PRN
Qty: 12 TABLET | Refills: 0 | Status: SHIPPED | OUTPATIENT
Start: 2024-07-05 | End: 2024-07-12

## 2024-07-05 RX ORDER — OXYCODONE HYDROCHLORIDE 5 MG/1
5 TABLET ORAL 2 TIMES DAILY PRN
Qty: 12 TABLET | Refills: 0 | OUTPATIENT
Start: 2024-07-05

## 2024-07-05 ASSESSMENT — PAIN SCALES - GENERAL: PAINLEVEL: EXTREME PAIN (8)

## 2024-07-05 NOTE — PATIENT INSTRUCTIONS
I am recommending a right L4 transforaminal epidural steroid injection.  I have held a spot in the interventional suite at 1245 on Monday for you.  You will still receive a call from our injection schedulers probably Monday morning.    I also placed a consult with neurosurgery.  They will be in contact to schedule an appointment to discuss possible surgical options.    I sent in a prescription for oxycodone 5 mg to be used sparingly as needed.  I also sent a prescription for tizanidine which is a muscle relaxant that can help with sleep.    Lolly Dexter MD    Medication reconciliation completed.  Reviewed Medication list and confirmed med allergies with patient; confirmed with Dr. First Medmesfin.

## 2024-07-05 NOTE — TELEPHONE ENCOUNTER
Refilled by Dr. Dexter in the pain clinic earlier today.  Will decline refill.  Getting epidural steroid injection in 72 hours and referred to  neurosurgery for evaluation as well after today's visit with Dr. Dexter.    Vamshi Clarke MD

## 2024-07-06 ASSESSMENT — PAIN SCALES - PAIN ENJOYMENT GENERAL ACTIVITY SCALE (PEG)
INTERFERED_ENJOYMENT_LIFE: 10
INTERFERED_ENJOYMENT_LIFE: 10 - COMPLETELY INTERFERES
INTERFERED_GENERAL_ACTIVITY: 10 - COMPLETELY INTERFERES
PEG_TOTALSCORE: 10
AVG_PAIN_PASTWEEK: 10
AVG_PAIN_PASTWEEK: 10 - PAIN AS BAD AS YOU CAN IMAGINE
INTERFERED_GENERAL_ACTIVITY: 10

## 2024-07-08 ENCOUNTER — RADIOLOGY INJECTION OFFICE VISIT (OUTPATIENT)
Dept: PALLIATIVE MEDICINE | Facility: CLINIC | Age: 59
End: 2024-07-08
Payer: COMMERCIAL

## 2024-07-08 ENCOUNTER — MYC REFILL (OUTPATIENT)
Dept: FAMILY MEDICINE | Facility: CLINIC | Age: 59
End: 2024-07-08

## 2024-07-08 VITALS — OXYGEN SATURATION: 99 % | DIASTOLIC BLOOD PRESSURE: 87 MMHG | SYSTOLIC BLOOD PRESSURE: 137 MMHG | HEART RATE: 96 BPM

## 2024-07-08 DIAGNOSIS — M54.42 BILATERAL LOW BACK PAIN WITH LEFT-SIDED SCIATICA, UNSPECIFIED CHRONICITY: ICD-10-CM

## 2024-07-08 DIAGNOSIS — M54.41 ACUTE RIGHT-SIDED LOW BACK PAIN WITH RIGHT-SIDED SCIATICA: ICD-10-CM

## 2024-07-08 DIAGNOSIS — M54.16 LUMBAR RADICULOPATHY: ICD-10-CM

## 2024-07-08 DIAGNOSIS — M54.16 LUMBAR RADICULOPATHY: Primary | ICD-10-CM

## 2024-07-08 PROCEDURE — 64483 NJX AA&/STRD TFRM EPI L/S 1: CPT | Mod: RT | Performed by: ANESTHESIOLOGY

## 2024-07-08 RX ORDER — DEXAMETHASONE SODIUM PHOSPHATE 10 MG/ML
10 INJECTION, SOLUTION INTRAMUSCULAR; INTRAVENOUS ONCE
Status: COMPLETED | OUTPATIENT
Start: 2024-07-08 | End: 2024-07-08

## 2024-07-08 RX ORDER — GABAPENTIN 300 MG/1
300 CAPSULE ORAL 3 TIMES DAILY
Qty: 90 CAPSULE | Refills: 2 | OUTPATIENT
Start: 2024-07-08

## 2024-07-08 RX ADMIN — DEXAMETHASONE SODIUM PHOSPHATE 10 MG: 10 INJECTION, SOLUTION INTRAMUSCULAR; INTRAVENOUS at 13:30

## 2024-07-08 NOTE — PATIENT INSTRUCTIONS
[facility] Procedure Discharge Instructions    Today you saw:   Dr. Lolly Dexter     Your procedure:  Epidural steroid injection     Medications used:  Lidocaine (anesthetic) Dexamethasone (steroid) Omnipaque (contrast)            Be cautious when walking as numbness and/or weakness in the legs may occur up to 6-8 hours after the procedure due to effect of the local anesthetic  Do not drive for 6 hours. The effect of the local anesthetic could slow your reflexes.   Avoid strenuous activity for the first 24 hours. You may resume your regular activities after that.   You may shower, however avoid swimming, tub baths or hot tubs for 24 hours following your procedure  You may have a mild to moderate increase in pain for several days following the injection.    You may use ice packs for 10-15 minutes, 3 to 4 times a day at the injection site for comfort  Do not use heat to painful areas for 6 to 8 hours. This will give the local anesthetic time to wear off and prevent you from accidentally burning your skin.  Unless you have been directed to avoid the use of anti-inflammatory medications (NSAIDS-ibuprofen, Aleve, Motrin), you may use these medications or Tylenol for pain control if needed.   With diabetes, check your blood sugar more frequently than usual as your blood sugar may be higher than normal for 10-14 days following a steroid injection. Contact your doctor who manages your diabetes if your blood sugar is higher than usual  Possible side effects of steroids that you may experience include flushing, elevated blood pressure, increased appetite, mild headaches and restlessness.  All of these symptoms will get better with time.  It may take up to 14 days for the steroid medication to start working although you may feel the effect as early as a few days after the procedure.   Follow up with your referring provider in 2-3 weeks- Dr Dexter    If you experience any of the following, call the pain  center line during work hours at [phone] or on-call physician after hours at [phone]:  -Fever over 100 degree F  -Swelling, bleeding, redness, drainage, warmth at the injection site  -Progressive weakness or numbness in your legs   -Loss of bowel or bladder function  -Unusual headache that is not relieved by Tylenol or your regular headache medication  -Unusual new onset of pain that is not improving

## 2024-07-08 NOTE — NURSING NOTE
Discharge Information    IV Discontiued Time:  NA    Amount of Fluid Infused:  NA    Discharge Criteria = When patient returns to baseline or as per MD order    Consciousness:  Pt is fully awake    Circulation:  BP +/- 20% of pre-procedure level    Respiration:  Patient is able to breathe deeply    O2 Sat:  Patient is able to maintain O2 Sat >92% on room air    Activity:  Moves 4 extremities on command    Ambulation:  Patient is able to stand and walk or stand and pivot into wheelchair    Dressing:  Clean/dry or No Dressing    Notes:   Discharge instructions and AVS given to patient    Patient meets criteria for discharge?  YES    Admitted to PCU?  No    Responsible adult present to accompany patient home?  Yes    Signature/Title:    Rocío Oates RN  RN Care Coordinator  [facility]

## 2024-07-08 NOTE — PROGRESS NOTES
[facility] - Procedure Note    Date of Service: 7/8/2024    Procedure performed: RIGHT L4 transforaminal epidural steroid injection with fluoroscopic guidance  Diagnosis: Lumbar spondylosis; Lumbar radiculitis/radiculopathy  : Lolly Dexter MD   Anesthesia: none    Indications: Saran Wallis is a 59 year old male who is seen at the request of myself for lumbar transforaminal epidural steroid injection. The patient describes right sided low back pain with radiation to the lateral and posterior thigh and calf to the foot with some weakness in the big toe. The patient has been exhibiting symptoms consistent with lumbar intraspinal inflammation and radiculopathy. Symptoms have been persistent, disabling, and intermittently severe. The patient reports minimal improvement with conservative treatment, including previous surgery, PT and medications.    LUMBAR MRI was done on 7/1/2024 which showed:   FINDINGS: There are five lumbar-type vertebrae for the purposes of  this dictation.      There is straightening of normal lumbar lordosis. Alignment otherwise  normal. Vertebral body heights normal. Marrow signal normal. No  evidence for fracture or pathologic bony lesion.     There is loss of disc height, disc desiccation and posterior disc  bulging/herniation to varying degrees at all levels of the lumbar  spine with the exception of the normal-appearing L2-L3 disc.      The tip of the conus medullaris is at the L1-L2 level which is within  normal limits. There is no evidence for intrathecal abnormality.      Level by level:      T12-L1: Loss of disc height, disc desiccation and circumferential disc  bulging with a superimposed small posterior central disc herniation  (extrusion). Mild facet arthropathy bilaterally. No spinal canal  stenosis. No foraminal stenosis on either side.     L1-L2: Loss of disc height, disc desiccation and mild circumferential  disc bulging. No herniation. Mild  facet arthropathy bilaterally. No  spinal canal stenosis. No foraminal stenosis on either side.     L2-L3: Normal disc height and signal. No herniation. Mild facet  arthropathy bilaterally. No spinal canal stenosis. No foraminal  stenosis on either side.      L3-L4: Loss of disc height, disc desiccation and circumferential disc  bulging with a superimposed small posterior central disc herniation  (protrusion). Minimal spinal canal narrowing. No foraminal stenosis on  either side.     L4-L5: Loss of disc height, disc desiccation and circumferential disc  bulging with a superimposed moderate size right paracentral disc  herniation (extrusion). Extruded disc material extends caudad from the  parent disc in the right anterolateral epidural space down to the  lower L5 level. Moderate facet arthropathy bilaterally. Mild spinal  canal narrowing. Severe narrowing of the right lateral recess of the  spinal canal. Mild left foraminal narrowing. No right foraminal  stenosis.     L5-S1: Loss of disc height, disc desiccation and circumferential disc  bulging with a superimposed moderate sized left paracentral disc  herniation (extrusion). Moderate facet arthropathy bilaterally.  Minimal spinal canal narrowing. Severe narrowing of the left lateral  recess of the spinal canal. Moderate left foraminal stenosis. Mild  right foraminal narrowing.                                                                      IMPRESSION:  1. Diffuse degenerative change of the lumbar spine as detailed above.  2. No high-grade spinal canal stenosis of the lumbar spine.  3. Moderate to severe narrowing of the lateral recesses of the spinal  canal on the right at L4-L5 and on the left at L5-S1 due to adjacent  disc herniations.  4. Moderate neural foraminal stenosis on the left at L5-S1. No other  significant neural foraminal narrowing of the lumbar spine.    Allergies:      Allergies   Allergen Reactions    Eszopiclone      Other reaction(s): Taste,  Changes  Works, but bitter taste.    Food Hives     Peaches        Vitals:  /87   Pulse 96   SpO2 99%     Review of Systems: The patient denies recent fever, chills, illness, use of antibiotics or anticoagulants. All other 10-point review of systems negative.     Procedure: The procedure and risks were explained, and informed written consent was obtained from the patient. Risks include but are not limited to: infection, bleeding, increased pain, and damage to soft tissue, nerve, muscle, and vasculature structures. After getting informed consent, patient was brought into the procedure suite and was placed in a prone position on the procedure table. A Pause for the Cause was performed. Patient was prepped and draped in sterile fashion.     After identifying the right L4 neuroforamen, the C-arm was rotated to a right lateral oblique angle.  A total of 4.5 mL of Lidocaine 1% was used to anesthetize the skin and the needle track at a skin entry site coaxial with the fluoroscopy beam, and overriding the superior aspect of the neuroforamen.  A 22 gauge 5 inch spinal needle was advanced under intermittent fluoroscopy until it entered the foramen superiorly.    The position was then inspected from anteroposterior and lateral views, and the needle adjusted appropriately.  After negative aspiration, a total of 1 mL of Omnipaque-300 was injected using static and continuous fluoroscopy confirming appropriate position, with spread along the nerve root sheath and into the epidural space, with no intravascular or intrathecal uptake. 0 mL of Omnipaque-300 was wasted.    1mL of 1% lidocaine with 20 mg of dexamethasone was injected.  The needle was removed. Hemostasis was achieved, the area was cleaned, and bandaids were placed when appropriate. Images were saved to PACS.    The patient tolerated the procedure well, and was taken to the recovery room, and there was no evidence of procedural complications. No new sensory or  motor deficits were noted following the procedure. The patient was stable and able to ambulate on discharge home. Post-procedure instructions were provided.     Pre-procedure pain score: 10/10 in the back, 10/10 in the leg  Post-procedure pain score: 4/10 in the back, 4/10 in the leg    Assessment/Plan: Saran Wallis is a 59 year old male s/p RIGHT L4 transforaminal epidural steroid injection today for lumbar spondylosis, radiculitis/radiculopathy.     1. Following today's procedure, the patient was advised to contact the Pain Management Center for any of the following:   Fever, chills, or night sweats   New onset of pain, numbness, or weakness   Any questions/concerns regarding the procedure  If unable to contact the Pain Center, the patient was instructed to go to a local Emergency Room for any complications.   2. The patient should follow-up with the referring provider in 2 weeks for post-procedure evaluation.      ARYA DE LEON MD   Pain Management

## 2024-07-09 ENCOUNTER — MYC REFILL (OUTPATIENT)
Dept: PALLIATIVE MEDICINE | Facility: CLINIC | Age: 59
End: 2024-07-09
Payer: COMMERCIAL

## 2024-07-09 DIAGNOSIS — R52 ACUTE PAIN: ICD-10-CM

## 2024-07-09 RX ORDER — OXYCODONE HYDROCHLORIDE 5 MG/1
5 TABLET ORAL EVERY 6 HOURS PRN
Qty: 12 TABLET | Refills: 0 | Status: CANCELLED | OUTPATIENT
Start: 2024-07-09

## 2024-07-09 NOTE — TELEPHONE ENCOUNTER
Patient was seen by Dr. Dexter on 07/05/24. Prescription was sent to the pharmacy at that time. [employer] message sent to patient.

## 2024-07-12 ENCOUNTER — MYC REFILL (OUTPATIENT)
Dept: FAMILY MEDICINE | Facility: CLINIC | Age: 59
End: 2024-07-12
Payer: COMMERCIAL

## 2024-07-12 DIAGNOSIS — M54.41 ACUTE RIGHT-SIDED LOW BACK PAIN WITH RIGHT-SIDED SCIATICA: ICD-10-CM

## 2024-07-12 RX ORDER — OXYCODONE HYDROCHLORIDE 5 MG/1
5 TABLET ORAL 2 TIMES DAILY PRN
Qty: 12 TABLET | Refills: 0 | OUTPATIENT
Start: 2024-07-12

## 2024-07-26 ENCOUNTER — VIRTUAL VISIT (OUTPATIENT)
Dept: FAMILY MEDICINE | Facility: CLINIC | Age: 59
End: 2024-07-26
Payer: COMMERCIAL

## 2024-07-26 DIAGNOSIS — M54.16 LUMBAR RADICULOPATHY: Primary | ICD-10-CM

## 2024-07-26 DIAGNOSIS — M48.061 SPINAL STENOSIS OF LUMBAR REGION, UNSPECIFIED WHETHER NEUROGENIC CLAUDICATION PRESENT: ICD-10-CM

## 2024-07-26 DIAGNOSIS — E11.9 TYPE 2 DIABETES MELLITUS WITHOUT COMPLICATION, WITHOUT LONG-TERM CURRENT USE OF INSULIN (H): ICD-10-CM

## 2024-07-26 PROCEDURE — G2211 COMPLEX E/M VISIT ADD ON: HCPCS | Mod: 95 | Performed by: NURSE PRACTITIONER

## 2024-07-26 PROCEDURE — 99214 OFFICE O/P EST MOD 30 MIN: CPT | Mod: 95 | Performed by: NURSE PRACTITIONER

## 2024-07-26 RX ORDER — OXYCODONE HYDROCHLORIDE 5 MG/1
5 TABLET ORAL DAILY PRN
Qty: 20 TABLET | Refills: 0 | Status: SHIPPED | OUTPATIENT
Start: 2024-07-26 | End: 2024-08-06

## 2024-07-26 RX ORDER — SEMAGLUTIDE 0.68 MG/ML
INJECTION, SOLUTION SUBCUTANEOUS
Qty: 6 ML | Refills: 0 | Status: SHIPPED | OUTPATIENT
Start: 2024-07-26 | End: 2024-09-20

## 2024-07-26 RX ORDER — GABAPENTIN 400 MG/1
400 CAPSULE ORAL 3 TIMES DAILY
Qty: 90 CAPSULE | Refills: 2 | Status: SHIPPED | OUTPATIENT
Start: 2024-07-26

## 2024-07-26 ASSESSMENT — ENCOUNTER SYMPTOMS: BACK PAIN: 1

## 2024-07-26 NOTE — PROGRESS NOTES
Saran is a 59 year old who is being evaluated via a billable video visit.    How would you like to obtain your AVS? MyChart  If the video visit is dropped, the invitation should be resent by: Text to cell phone: [phone]  Will anyone else be joining your video visit? No    Assessment & Plan       Saran was seen today for back pain and video visit.    Diagnoses and all orders for this visit:    Lumbar radiculopathy  Spinal stenosis of lumbar region, unspecified whether neurogenic claudication present  History of acute onset of severe right-sided low back pain with radiation down the posterior thigh and calf and some weakness in his right toe. Right L4 transforaminal epidural steroid injection completed on 7/8/24 with minimal improvement in plan.  Scheduled with Dr. Bagley - neurosurgery on 8/20/24 for further consultation.  Plan increase in Gabapentin to 400 mg three times daily and recommend sparing use of Oxycodone for severe pain.   -     gabapentin (NEURONTIN) 400 MG capsule; Take 1 capsule (400 mg) by mouth 3 times daily  -     oxyCODONE (ROXICODONE) 5 MG tablet; Take 1 tablet (5 mg) by mouth daily as needed for pain or severe pain, #20 dispensed.        Return in about 3 weeks (around 8/16/2024) for consultation with neurosurgeon.    The longitudinal plan of care for the diagnosis(es)/condition(s) as documented were addressed during this visit. Due to the added complexity in care, I will continue to support Saran in the subsequent management and with ongoing continuity of care.  Subjective   Saran is a 59 year old, presenting for the following health issues:  Back Pain and Video Visit        7/26/2024     1:12 PM   Additional Questions   Roomed by Salome     History of Present Illness       Reason for visit:  Prescription refill    He eats 0-1 servings of fruits and vegetables daily.He consumes 0 sweetened beverage(s) daily.He exercises with enough effort to increase his heart rate 9 or less minutes per day.  He  exercises with enough effort to increase his heart rate 3 or less days per week.   He is taking medications regularly.     Recheck back pain in low back - he had an MRI on 7-1-24  IMPRESSION:  1. Diffuse degenerative change of the lumbar spine as detailed above.  2. No high-grade spinal canal stenosis of the lumbar spine.  3. Moderate to severe narrowing of the lateral recesses of the spinal  canal on the right at L4-L5 and on the left at L5-S1 due to adjacent  disc herniations.  4. Moderate neural foraminal stenosis on the left at L5-S1. No other  significant neural foraminal narrowing of the lumbar spine.    Back pain started early June - severe at the time 10/10.    BARBARA on 7/5/24 - this slightly helped with his pain, but continues to have 6-7/10 pain on the pain scale.      - Pain began Saturday June 1st. He was replacing the gas on his grill and lifted the tank and could hear the pop.  It was very severe the first week and felt like a muscle spasm and then it moved to going down his right leg to the posterior thigh and calf.  He has some pain in his right foot as well.  His big toe on his right leg feels weak.     - He has a history of previous spine surgeries.  In 2008 and 2013 he had discectomies on the Left -  spine [facility] and the second was at [facility].  He has some residual numbness in his left leg.  He was told a fusion was recommended.    - He was seen 6/21/2024 by family practice and given oxycodone and gabapentin and Valium , none of which have been very helpful.     - Previous medrol dosepack, not helpful.       Red Flags: The patient denies bowel or bladder incontinence, saddle anesthesia, unintentional weight loss, or fever/chills/sweats. +Positive for LE weakness - right toe is weak.       Gabapentin 300 mg three two times daily currently.    Oxycodone 5 mg - Is taking one tablet in the morning and one at bedtime.    Oxycodone, #12 on 7/5/24 and #20 on 7/13/24.  PDMP reviewed.       He took last tablet this morning.      Medication Followup of oxyCODONE (ROXICODONE) 5 MG tablet   Taking Medication as prescribed: yes  Side Effects:  None  Medication Helping Symptoms:  yes - he has appointment with neurosurgeon 8/20 and needs refills until then         3/20/2024     9:16 AM 6/17/2024    10:30 AM 7/26/2024     1:18 PM   PEG Score   PEG Total Score 6.67 9.67 7.67          Review of Systems  Constitutional, HEENT, cardiovascular, pulmonary, gi and gu systems are negative, except as otherwise noted.      Objective    Vitals - Patient Reported  Weight (Patient Reported): 80.7 kg (178 lb)      Vitals:  No vitals were obtained today due to virtual visit.    Physical Exam     GENERAL: alert and no distress  PSYCH: Appropriate affect, tone, and pace of words          Video-Visit Details    Type of service:  Video Visit   Originating Location (pt. Location): Home  Distant Location (provider location):  On-site  Platform used for Video Visit: Bakari    Signed Electronically by: ANDREAS Méndez CNP

## 2024-08-06 DIAGNOSIS — F11.90 CHRONIC, CONTINUOUS USE OF OPIOIDS: Primary | ICD-10-CM

## 2024-08-06 DIAGNOSIS — M54.16 LUMBAR RADICULOPATHY: ICD-10-CM

## 2024-08-06 DIAGNOSIS — M48.061 SPINAL STENOSIS OF LUMBAR REGION, UNSPECIFIED WHETHER NEUROGENIC CLAUDICATION PRESENT: ICD-10-CM

## 2024-08-06 RX ORDER — OXYCODONE HYDROCHLORIDE 5 MG/1
5 TABLET ORAL DAILY PRN
Qty: 20 TABLET | Refills: 0 | Status: SHIPPED | OUTPATIENT
Start: 2024-08-06 | End: 2024-08-20

## 2024-08-06 NOTE — TELEPHONE ENCOUNTER
Will refill for 20 tablets.  If needs more opioids after neurosurgery consultation, will need to be seen for urine drug screen, controlled substance agreement, PHQ-9 and SKYLA-7.    Chart reviewed.  Rx sent to pt's preferred pharmacy.       [id number] - [place], [place] - [address] AT [address] [address]  [facility] [address] [id number] - [place], [place] - [address] [facility] [address]    Vamshi Clarke MD

## 2024-08-06 NOTE — TELEPHONE ENCOUNTER
Patient is calling to report he's in need of oxycodone refill.     Pharmacy only dispensed 7 pills of 7/26/24 rx, due to unable to give full 20. Need provider to describe treating chronic pain to be able to give full script. Please advise.

## 2024-08-07 NOTE — TELEPHONE ENCOUNTER
SPINE PATIENTS - NEW PROTOCOL PREVISIT    RECORDS RECEIVED FROM: Referred by Dr. Dexter   REASON FOR VISIT: Acute pain, Lumbar radiculopathy   PROVIDER: Kevyn   DATE OF APPT: 08/20/2024   NOTES (FOR ALL VISITS) STATUS DETAILS   OFFICE NOTE from referring provider Internal Referral and notes in chart   OFFICE NOTE from other specialist N/A    DISCHARGE SUMMARY from hospital N/A    DISCHARGE REPORT from ER N/A    OPERATIVE REPORT In chart Pre Minimally invasive decompression diskectomy, L4-L5, left. w/ Dr. Macdonald- [facility] 01/15/2009    Lumbar surg with [facility] in 2012, some notes scanned into media tab.   EMG REPORT N/A    Injection Internal Injection 07/08/2024 and 04/04/2024   Physical therapy Internal Prev PT last completed 09/14/2022   IMAGING  (FOR ALL VISITS)     MRI (HEAD, NECK, SPINE) Internal MR Lumbar 07/01/2024   XRAY (SPINE) *NEUROSURGERY* N/A    CT (HEAD, NECK, SPINE) N/A

## 2024-08-13 DIAGNOSIS — M79.18 MYOFASCIAL PAIN SYNDROME: ICD-10-CM

## 2024-08-13 NOTE — TELEPHONE ENCOUNTER
Received fax request from pharmacy requesting refill(s) for tiZANidine (ZANAFLEX) 4 MG tablet    Last refilled on 7/5/2024 per pharmacy.     Pt last seen on 7/5/2024.  Next appt scheduled for NONE.     Will facilitate refill.

## 2024-08-20 ENCOUNTER — PRE VISIT (OUTPATIENT)
Dept: NEUROSURGERY | Facility: CLINIC | Age: 59
End: 2024-08-20
Payer: COMMERCIAL

## 2024-08-20 ENCOUNTER — OFFICE VISIT (OUTPATIENT)
Dept: NEUROSURGERY | Facility: CLINIC | Age: 59
End: 2024-08-20
Attending: ANESTHESIOLOGY
Payer: COMMERCIAL

## 2024-08-20 VITALS
WEIGHT: 184 LBS | BODY MASS INDEX: 26.59 KG/M2 | DIASTOLIC BLOOD PRESSURE: 74 MMHG | SYSTOLIC BLOOD PRESSURE: 106 MMHG | HEART RATE: 81 BPM | OXYGEN SATURATION: 100 %

## 2024-08-20 DIAGNOSIS — M54.16 SPINAL STENOSIS OF LUMBAR REGION WITH RADICULOPATHY: Primary | ICD-10-CM

## 2024-08-20 DIAGNOSIS — M48.061 SPINAL STENOSIS OF LUMBAR REGION, UNSPECIFIED WHETHER NEUROGENIC CLAUDICATION PRESENT: ICD-10-CM

## 2024-08-20 DIAGNOSIS — R52 ACUTE PAIN: ICD-10-CM

## 2024-08-20 DIAGNOSIS — M51.16 LUMBAR DISC HERNIATION WITH RADICULOPATHY: ICD-10-CM

## 2024-08-20 DIAGNOSIS — F11.90 CHRONIC, CONTINUOUS USE OF OPIOIDS: ICD-10-CM

## 2024-08-20 DIAGNOSIS — M54.16 LUMBAR RADICULOPATHY: ICD-10-CM

## 2024-08-20 DIAGNOSIS — M48.061 SPINAL STENOSIS OF LUMBAR REGION WITH RADICULOPATHY: Primary | ICD-10-CM

## 2024-08-20 PROCEDURE — 99214 OFFICE O/P EST MOD 30 MIN: CPT | Performed by: NEUROLOGICAL SURGERY

## 2024-08-20 PROCEDURE — 99213 OFFICE O/P EST LOW 20 MIN: CPT | Performed by: NEUROLOGICAL SURGERY

## 2024-08-20 RX ORDER — OXYCODONE HYDROCHLORIDE 5 MG/1
5 TABLET ORAL DAILY PRN
Qty: 40 TABLET | Refills: 0 | Status: ON HOLD | OUTPATIENT
Start: 2024-08-20 | End: 2024-09-04

## 2024-08-20 ASSESSMENT — PAIN SCALES - GENERAL: PAINLEVEL: SEVERE PAIN (6)

## 2024-08-20 NOTE — LETTER
"8/20/2024      Saran Wallis  [address] [place] [address]      Dear Colleague,    Thank you for referring your patient, Saran Wallis, to the [facility] [place]. Please see a copy of my visit note below.    It was a pleasure to see Saran Wallis today in Neurosurgery Clinic. He is a 59 year old male who I have previously seen for issues with the cervical spine who is here because of new back and right lower extremity pain.    He has chronic left lower extremity pain since a microdiscectomy in 2012 with numbness in the left foot but since June he has had back and right lower extremity pain and what sounds like an L5 distribution.  The pain goes from the right back to the thigh to the lateral calf with weakness of lifting his big toe and foot.    He cannot do physical therapy as the activities are too painful.  He has done an epidural steroid injection in July which provided temporary relief.  He has tried medications such as oxycodone, tizanidine, gabapentin without significant relief.    Vitals:    08/20/24 0824   BP: 106/74   Pulse: 81   SpO2: 100%   Weight: 83.5 kg (184 lb)     Estimated body mass index is 26.59 kg/m  as calculated from the following:    Height as of 3/20/24: 1.772 m (5' 9.75\").    Weight as of this encounter: 83.5 kg (184 lb).  Severe Pain (6)    Awake and alert  Well-healed midline lumbar incision.  Bilateral lower extremity strength is 5 out of 5 in all muscle groups except for right 3 out of 5 EHL and 4 out of 5 ankle dorsiflexion and eversion    Reflexes symmetric and normal    Positive straight leg raise right    Decrease sensation to pinprick on the left in an S1 distribution    Imaging: Review of his MRI of the lumbar spine shows a herniated disc fragment on the right at L4-5 which I think is concordant with his right lower extremity symptoms.  He also has a chronic disc herniation on the left at L5-S1 with significant nerve root compression " that I think fits with his chronic left-sided symptoms.  The imaging was reviewed with the patient shown to the patient in clinic today.    Assessment: Right L4-5 herniated disc and left L5-S1 herniated disc with radiculopathy    Plan: Given the patient's symptoms, weakness, failure of conservative measures, I think it would be prudent to proceed with a right L4-5 discectomy.  I have also offered a left L5-S1 microdiscectomy given his chronic S1 symptoms. The risks benefits and alternatives to the procedure were discussed. Risks include, but are not limited to, bleeding, infection, neurologic injury such as nerve root injury, CSF leak, need for further surgery or failure for symptoms to improve. Questions were solicited and answered, and the patient wishes to proceed with surgery.           Again, thank you for allowing me to participate in the care of your patient.        Sincerely,        Logan Bagley MD

## 2024-08-20 NOTE — NURSING NOTE
"Saran Wallis is a 59 year old male who presents for:  Chief Complaint   Patient presents with    Consult        Vitals:    Vitals:    08/20/24 0824   BP: 106/74   Pulse: 81   SpO2: 100%   Weight: 184 lb (83.5 kg)       BMI:  Estimated body mass index is 26.59 kg/m  as calculated from the following:    Height as of 3/20/24: 5' 9.75\" (1.772 m).    Weight as of this encounter: 184 lb (83.5 kg).    Pain Score:  Severe Pain (6)        Amendo Phorn      "

## 2024-08-20 NOTE — NURSING NOTE
PRE-SURGERY EDUCATION  Reviewed pre- and post-operative instructions as outlined in the After Visit Summary/Patient Instructions with patient.   Surgery folder was given to patient    Patient Education Topic: Procedure with Dr. Bagley   Learner(s): Patient and Significant other/Spouse   Knowledge Level: Basic  Readiness to Learn: Ready  Method:  Verbal explanation and Written material   Outcome: Able to verbalize instructions  Barriers to Learning: No barrier      STD/FMLA: Yes  Job Description: Desk/Clerical Job  Time Off: 4 weeks     Patient had the opportunity for questions to be answered. Advised Patient and Significant other/Spouse  to call clinic with any questions/concerns. Gave patient antibacterial soap for pre-surgery skin preparation.     SPINE PRE-SURGICAL CHECKLIST   Intervention/Diagnostic Meets Criteria Details   NDI/VY  Completed within the last 6 months Yes Confirmation of completion within last 6 months        Physical Therapy   Completed within 6 months   Completed on the corresponding body part  Completed at least 4 weeks (unless provider documented that patient unable to tolerate PT) No  PT not completed: documented that patient cannot tolerate   Injection  Completed within last 1 years  Completed on the corresponding body part Yes  Records verified and located Internal       Imaging  MRI or CT completed within 6 months Yes Records verified and located Internal   Chronic Pain or Pain contract  No No

## 2024-08-20 NOTE — PROGRESS NOTES
"It was a pleasure to see Saran Wallis today in [facility]surgery [facility]. He is a 59 year old male who I have previously seen for issues with the cervical spine who is here because of new back and right lower extremity pain.    He has chronic left lower extremity pain since a microdiscectomy in 2012 with numbness in the left foot but since June he has had back and right lower extremity pain and what sounds like an L5 distribution.  The pain goes from the right back to the thigh to the lateral calf with weakness of lifting his big toe and foot.    He cannot do physical therapy as the activities are too painful.  He has done an epidural steroid injection in July which provided temporary relief.  He has tried medications such as oxycodone, tizanidine, gabapentin without significant relief.    Vitals:    08/20/24 0824   BP: 106/74   Pulse: 81   SpO2: 100%   Weight: 83.5 kg (184 lb)     Estimated body mass index is 26.59 kg/m  as calculated from the following:    Height as of 3/20/24: 1.772 m (5' 9.75\").    Weight as of this encounter: 83.5 kg (184 lb).  Severe Pain (6)    Awake and alert  Well-healed midline lumbar incision.  Bilateral lower extremity strength is 5 out of 5 in all muscle groups except for right 3 out of 5 EHL and 4 out of 5 ankle dorsiflexion and eversion    Reflexes symmetric and normal    Positive straight leg raise right    Decrease sensation to pinprick on the left in an S1 distribution    Imaging: Review of his MRI of the lumbar spine shows a herniated disc fragment on the right at L4-5 which I think is concordant with his right lower extremity symptoms.  He also has a chronic disc herniation on the left at L5-S1 with significant nerve root compression that I think fits with his chronic left-sided symptoms.  The imaging was reviewed with the patient shown to the patient in clinic today.    Assessment: Right L4-5 herniated disc and left L5-S1 herniated disc with radiculopathy    Plan: Given the patient's " symptoms, weakness, failure of conservative measures, I think it would be prudent to proceed with a right L4-5 discectomy.  I have also offered a left L5-S1 microdiscectomy given his chronic S1 symptoms. The risks benefits and alternatives to the procedure were discussed. Risks include, but are not limited to, bleeding, infection, neurologic injury such as nerve root injury, CSF leak, need for further surgery or failure for symptoms to improve. Questions were solicited and answered, and the patient wishes to proceed with surgery.

## 2024-08-20 NOTE — PATIENT INSTRUCTIONS
Patient Instructions    Surgery scheduled at [facility] for lumbar discectom with Dr. Bagley     Pre-Operative  Surgical risks: blood clots in the leg or lung, problems urinating, nerve damage, drainage from the incision, infection,stiffness  Pre-operative physical with primary care physician within 30 days of surgical date.   Likely same day procedure with discharge home day of surgery, may stay for 23 hour observation hospitalization for monitoring.     Shower procedure  Please shower with antimicrobial soap the night before surgery and morning of surgery. Please refer to showering instruction sheet in folder.  Eating/Drinking  Stop all solid foods 8 hours before surgery.  Keep drinking clear liquids until 4 hours before surgery  Clear liquids include water, clear juice, black coffee, or clear tea without milk, Gatorade, clear soda.   Medications  Hold Aspirin, NSAIDs (Advil/Ibuprofen, Indocin, Naproxen,Nuprin,Relafen/Nabumetone, Diclofenac,Meloxicam, Aleve, Celebrex) x 7 days prior to surgical date  You can take Tylenol (Acetaminophen) for pain, 1000 mg  Do not exceed 3,000 mg per day   Injectable: Semaglutide/Ozempic/Wegovy, Trulicity, Mounjaro  Need to be held 2 weeks prior to surgery due to risk for aspiration because of slowed gastric emptying.   If you are on chronic pain medication (oxycodone, Percocet, hydrocodone, Vicodin, Norco, Dilaudid, morphine, MS Contin, naltrexone, Suboxone, etc) or have a pain contract we will reach out to your pain clinic to gather your most recent records and recommendations for pain management post-op.  Please ask your provider who manages your chronic pain if they require you to schedule an office visit prior to surgery. Continue obtaining your pain medications from your current provider until surgery. Our team will manage your acute post-op pain in the hospital and during the recovery period. Your pain team will continue to manage your chronic pain.    Any other medications prescribed, please discuss with your primary care provider at your pre-operative physical   You may NOT receive the COVID-19 vaccine 72 hours before or after surgery.    Pain Management  You will have post-operative incisional pain which may require pain medications and muscle relaxants. You will receive medication upon discharge.  You may resume taking NSAIDs (ex. Ibuprofen, aleve, naproxen) immediately post-op   Do NOT drive while taking narcotic pain medication  Do NOT drink alcohol while using any pain medication  You can utilize ice as needed (no longer than 20 minutes at one time)    Incision Care  No submerging incision in water such as pools, hot tubs, baths for at least 8 weeks or until incision is healed  It is okay to shower, just pat the incision dry   Remove dressing as instructed upon discharge  Watch for signs of infection  Redness, swelling, warmth, drainage, and fever of 101 degrees or higher  Notify clinic [phone].    Activity Restrictions  For the first 6-8 weeks, no lifting > 10 pounds, limited bending, twisting, or overhead reaching.  Take stairs in moderation   Ok to walk as tolerated, take short frequent walks. You may gradually increase the distance as tolerated.   Avoid bed rest and prolonged sitting for longer than 30 minutes (change positions frequently while awake)  No contact sports until after follow up visit  No high impact activities such as; running/jogging, snowmobile or [place] riding or any other recreational vehicles  Please call the clinic if you develop any of the following symptoms:  Swelling and/or warmth in one or both legs  Pain or tenderness in your leg, ankle, foot, or arm   Red or discolored skin     Post-Op Follow Up Appointments  2 week incision check with RN  6 week post op follow up visit with Physician Assistant   3 months post op with Dr. Bagley   Please call to schedule follow up appointment at [phone]    Resources  If you are  currently employed, you will need to be off work for 2-4 weeks for post op recovery and healing.  Please fax any FMLA/short term disability paperwork to [phone]  You may call our clinic when you'd like to return to work and we can provide a work letter  To allow staff adequate time to complete paperwork, please send as soon as possible       [facility]  Phone: [phone]  Fax: [phone]

## 2024-08-21 ENCOUNTER — PATIENT OUTREACH (OUTPATIENT)
Dept: CARE COORDINATION | Facility: CLINIC | Age: 59
End: 2024-08-21
Payer: COMMERCIAL

## 2024-08-21 ENCOUNTER — MYC MEDICAL ADVICE (OUTPATIENT)
Dept: NEUROSURGERY | Facility: CLINIC | Age: 59
End: 2024-08-21
Payer: COMMERCIAL

## 2024-08-26 ENCOUNTER — DOCUMENTATION ONLY (OUTPATIENT)
Dept: NEUROSURGERY | Facility: CLINIC | Age: 59
End: 2024-08-26
Payer: COMMERCIAL

## 2024-08-26 NOTE — PROGRESS NOTES
VF completed FMLA paperwork from [employer]. In Dr. Bagley's bin in [place] awaiting signature.

## 2024-08-27 NOTE — PROGRESS NOTES
Spoke with Kim, spouse and relayed that we have and will complete the [place] FMLA for her as the pt's spouse but that we don't currently have anything currently for her . She acknowledged that and said that she would speak to her  before getting back to us.    Jose Ivey  Visit Facilitator  [facility]

## 2024-08-27 NOTE — PROGRESS NOTES
[facility] Call Center    Phone Message    May a detailed message be left on voicemail: yes     Reason for Call: Other: Wife calling wanting to know if they received both [place] paperwork for her and Saran.  Please call her back to advise.      Action Taken: Message routed to:  Other: [facility]     Travel Screening: Not Applicable     Date of Service:

## 2024-08-28 ENCOUNTER — ANESTHESIA EVENT (OUTPATIENT)
Dept: SURGERY | Facility: CLINIC | Age: 59
End: 2024-08-28
Payer: COMMERCIAL

## 2024-08-28 ENCOUNTER — OFFICE VISIT (OUTPATIENT)
Dept: FAMILY MEDICINE | Facility: CLINIC | Age: 59
End: 2024-08-28
Payer: COMMERCIAL

## 2024-08-28 VITALS
DIASTOLIC BLOOD PRESSURE: 82 MMHG | HEIGHT: 70 IN | WEIGHT: 182 LBS | RESPIRATION RATE: 13 BRPM | HEART RATE: 107 BPM | SYSTOLIC BLOOD PRESSURE: 138 MMHG | OXYGEN SATURATION: 99 % | TEMPERATURE: 97.9 F | BODY MASS INDEX: 26.05 KG/M2

## 2024-08-28 DIAGNOSIS — F33.0 MAJOR DEPRESSIVE DISORDER, RECURRENT EPISODE, MILD (H): ICD-10-CM

## 2024-08-28 DIAGNOSIS — Z13.0 SCREENING FOR DEFICIENCY ANEMIA: ICD-10-CM

## 2024-08-28 DIAGNOSIS — I10 HYPERTENSION GOAL BP (BLOOD PRESSURE) < 140/90: ICD-10-CM

## 2024-08-28 DIAGNOSIS — K21.9 GASTROESOPHAGEAL REFLUX DISEASE WITHOUT ESOPHAGITIS: ICD-10-CM

## 2024-08-28 DIAGNOSIS — E78.5 HYPERLIPIDEMIA LDL GOAL <70: ICD-10-CM

## 2024-08-28 DIAGNOSIS — Z01.818 PREOP GENERAL PHYSICAL EXAM: Primary | ICD-10-CM

## 2024-08-28 DIAGNOSIS — R79.89 ABNORMAL CBC MEASUREMENT: ICD-10-CM

## 2024-08-28 DIAGNOSIS — Z87.39 HISTORY OF HERNIATED INTERVERTEBRAL DISC: ICD-10-CM

## 2024-08-28 DIAGNOSIS — E11.9 TYPE 2 DIABETES MELLITUS WITHOUT COMPLICATION, WITHOUT LONG-TERM CURRENT USE OF INSULIN (H): ICD-10-CM

## 2024-08-28 DIAGNOSIS — F11.20 CONTINUOUS OPIOID DEPENDENCE (H): ICD-10-CM

## 2024-08-28 LAB
ALBUMIN SERPL BCG-MCNC: 4.8 G/DL (ref 3.5–5.2)
ALP SERPL-CCNC: 80 U/L (ref 40–150)
ALT SERPL W P-5'-P-CCNC: 55 U/L (ref 0–70)
AMPHETAMINES UR QL: NOT DETECTED
ANION GAP SERPL CALCULATED.3IONS-SCNC: 11 MMOL/L (ref 7–15)
AST SERPL W P-5'-P-CCNC: 36 U/L (ref 0–45)
BARBITURATES UR QL SCN: NOT DETECTED
BENZODIAZ UR QL SCN: NOT DETECTED
BILIRUB SERPL-MCNC: 0.9 MG/DL
BUN SERPL-MCNC: 14.1 MG/DL (ref 6–20)
BUPRENORPHINE UR QL: NOT DETECTED
CALCIUM SERPL-MCNC: 10.1 MG/DL (ref 8.8–10.4)
CANNABINOIDS UR QL: NOT DETECTED
CHLORIDE SERPL-SCNC: 106 MMOL/L (ref 98–107)
COCAINE UR QL SCN: NOT DETECTED
CREAT SERPL-MCNC: 0.71 MG/DL (ref 0.67–1.17)
D-METHAMPHET UR QL: NOT DETECTED
EGFRCR SERPLBLD CKD-EPI 2021: >90 ML/MIN/1.73M2
ERYTHROCYTE [DISTWIDTH] IN BLOOD BY AUTOMATED COUNT: 12 % (ref 10–15)
GLUCOSE SERPL-MCNC: 118 MG/DL (ref 70–99)
HBA1C MFR BLD: 5.8 % (ref 0–5.6)
HCO3 SERPL-SCNC: 25 MMOL/L (ref 22–29)
HCT VFR BLD AUTO: 40.1 % (ref 40–53)
HGB BLD-MCNC: 14 G/DL (ref 13.3–17.7)
MCH RBC QN AUTO: 31.1 PG (ref 26.5–33)
MCHC RBC AUTO-ENTMCNC: 34.9 G/DL (ref 31.5–36.5)
MCV RBC AUTO: 89 FL (ref 78–100)
METHADONE UR QL SCN: NOT DETECTED
OPIATES UR QL SCN: NOT DETECTED
OXYCODONE UR QL SCN: DETECTED
PCP UR QL SCN: NOT DETECTED
PLATELET # BLD AUTO: 158 10E3/UL (ref 150–450)
POTASSIUM SERPL-SCNC: 4.7 MMOL/L (ref 3.4–5.3)
PROT SERPL-MCNC: 7.4 G/DL (ref 6.4–8.3)
RBC # BLD AUTO: 4.5 10E6/UL (ref 4.4–5.9)
SODIUM SERPL-SCNC: 142 MMOL/L (ref 135–145)
TRICYCLICS UR QL SCN: NOT DETECTED
WBC # BLD AUTO: 6 10E3/UL (ref 4–11)

## 2024-08-28 PROCEDURE — 80053 COMPREHEN METABOLIC PANEL: CPT | Performed by: NURSE PRACTITIONER

## 2024-08-28 PROCEDURE — 85027 COMPLETE CBC AUTOMATED: CPT | Performed by: NURSE PRACTITIONER

## 2024-08-28 PROCEDURE — 36415 COLL VENOUS BLD VENIPUNCTURE: CPT | Performed by: NURSE PRACTITIONER

## 2024-08-28 PROCEDURE — 99214 OFFICE O/P EST MOD 30 MIN: CPT | Performed by: NURSE PRACTITIONER

## 2024-08-28 PROCEDURE — 93000 ELECTROCARDIOGRAM COMPLETE: CPT | Performed by: NURSE PRACTITIONER

## 2024-08-28 PROCEDURE — 80306 DRUG TEST PRSMV INSTRMNT: CPT | Performed by: NURSE PRACTITIONER

## 2024-08-28 PROCEDURE — 83036 HEMOGLOBIN GLYCOSYLATED A1C: CPT | Performed by: NURSE PRACTITIONER

## 2024-08-28 ASSESSMENT — PATIENT HEALTH QUESTIONNAIRE - PHQ9
10. IF YOU CHECKED OFF ANY PROBLEMS, HOW DIFFICULT HAVE THESE PROBLEMS MADE IT FOR YOU TO DO YOUR WORK, TAKE CARE OF THINGS AT HOME, OR GET ALONG WITH OTHER PEOPLE: SOMEWHAT DIFFICULT
SUM OF ALL RESPONSES TO PHQ QUESTIONS 1-9: 4
SUM OF ALL RESPONSES TO PHQ QUESTIONS 1-9: 4

## 2024-08-28 ASSESSMENT — ANXIETY QUESTIONNAIRES
GAD7 TOTAL SCORE: 3
GAD7 TOTAL SCORE: 3
2. NOT BEING ABLE TO STOP OR CONTROL WORRYING: SEVERAL DAYS
IF YOU CHECKED OFF ANY PROBLEMS ON THIS QUESTIONNAIRE, HOW DIFFICULT HAVE THESE PROBLEMS MADE IT FOR YOU TO DO YOUR WORK, TAKE CARE OF THINGS AT HOME, OR GET ALONG WITH OTHER PEOPLE: SOMEWHAT DIFFICULT
4. TROUBLE RELAXING: SEVERAL DAYS
GAD7 TOTAL SCORE: 3
3. WORRYING TOO MUCH ABOUT DIFFERENT THINGS: NOT AT ALL
5. BEING SO RESTLESS THAT IT IS HARD TO SIT STILL: SEVERAL DAYS
7. FEELING AFRAID AS IF SOMETHING AWFUL MIGHT HAPPEN: NOT AT ALL
6. BECOMING EASILY ANNOYED OR IRRITABLE: NOT AT ALL
1. FEELING NERVOUS, ANXIOUS, OR ON EDGE: NOT AT ALL
7. FEELING AFRAID AS IF SOMETHING AWFUL MIGHT HAPPEN: NOT AT ALL
8. IF YOU CHECKED OFF ANY PROBLEMS, HOW DIFFICULT HAVE THESE MADE IT FOR YOU TO DO YOUR WORK, TAKE CARE OF THINGS AT HOME, OR GET ALONG WITH OTHER PEOPLE?: SOMEWHAT DIFFICULT

## 2024-08-28 NOTE — LETTER
Opioid / Opioid Plus Controlled Substance Agreement    This is an agreement between you and your provider about the safe and appropriate use of controlled substance/opioids prescribed by your care team. Controlled substances are medicines that can cause physical and mental dependence (abuse).    There are strict laws about having and using these medicines. We here at [facility] are committing to working with you in your efforts to get better. To support you in this work, we ll help you schedule regular office appointments for medicine refills. If we must cancel or change your appointment for any reason, we ll make sure you have enough medicine to last until your next appointment.     As a Provider, I will:  Listen carefully to your concerns and treat you with respect.   Recommend a treatment plan that I believe is in your best interest. This plan may involve therapies other than opioid pain medication.   Talk with you often about the possible benefits, and the risk of harm of any medicine that we prescribe for you.   Provide a plan on how to taper (discontinue or go off) using this medicine if the decision is made to stop its use.    As a Patient, I understand that opioid(s):   Are a controlled substance prescribed by my care team to help me function or work and manage my condition(s).   Are strong medicines and can cause serious side effects such as:  Drowsiness, which can seriously affect my driving ability  A lower breathing rate, enough to cause death  Harm to my thinking ability   Depression   Abuse of and addiction to this medicine  Need to be taken exactly as prescribed. Combining opioids with certain medicines or chemicals (such as illegal drugs, sedatives, sleeping pills, and benzodiazepines) can be dangerous or even fatal. If I stop opioids suddenly, I may have severe withdrawal symptoms.  Do not work for all types of pain nor for all patients. If they re not helpful, I may be asked to stop  them.      The risks, benefits and side effects of these medicine(s) were explained to me. I agree that:  I will take part in other treatments as advised by my care team. This may be psychiatry or counseling, physical therapy, behavioral therapy, group treatment or a referral to a specialist.     I will keep all my appointments. I understand that this is part of the monitoring of opioids. My care team may require an office visit for EVERY opioid/controlled substance refill. If I miss appointments or don t follow instructions, my care team may stop my medicine.    I will take my medicines as prescribed. I will not change the dose or schedule unless my care team tells me to. There will be no refills if I run out early.     I may be asked to come to the clinic and complete a urine drug test or complete a pill count at any time. If I don t give a urine sample or participate in a pill count, the care team may stop my medicine.    I will only receive prescriptions from this clinic for chronic pain. If I am treated by another provider for acute pain issues, I will tell them that I am taking opioid pain medication for chronic pain and that I have a treatment agreement with this provider. I will inform my [facility] care team within one business day if I am given a prescription for any pain medication by another healthcare provider. My [facility] care team can contact other providers and pharmacists about my use of any medicines.    It is up to me to make sure that I don t run out of my medicines on weekends or holidays. If my care team is willing to refill my opioid prescription without a visit, I must request refills only during office hours. Refills may take up to 3 business days to process. I will use one pharmacy to fill all my opioid and other controlled substance prescriptions. I will notify the clinic about any changes to my insurance or medication availability.    I am responsible for my prescriptions.  If the medicine/prescription is lost, stolen or destroyed, it will not be replaced. I also agree not to share controlled substance medicines with anyone.    I am aware I should not use any illegal or recreational drugs. I agree not to drink alcohol unless my care team says I can.       If I enroll in the [place] [facility] Cannabis program, I will tell my care team prior to my next refill.     I will tell my care team right away if I become pregnant, have a new medical problem treated outside of my regular clinic, or have a change in my medications.    I understand that this medicine can affect my thinking, judgment and reaction time. Alcohol and drugs affect the brain and body, which can affect the safety of my driving. Being under the influence of alcohol or drugs can affect my decision-making, behaviors, personal safety, and the safety of others. Driving while impaired (DWI) can occur if a person is driving, operating, or in physical control of a car, motorcycle, boat, snowmobile, ATV, motorbike, off-road vehicle, or any other motor vehicle ([place] Statute 169A.20). I understand the risk if I choose to drive or operate any vehicle or machinery.    I understand that if I do not follow any of the conditions above, my prescriptions or treatment may be stopped or changed.          Opioids  What You Need to Know    What are opioids?   Opioids are pain medicines that must be prescribed by a doctor. They are also known as narcotics.     Examples are:   morphine (MS Contin, Stacia)  oxycodone (Oxycontin)  oxycodone and acetaminophen (Percocet)  hydrocodone and acetaminophen (Vicodin, Norco)   fentanyl patch (Duragesic)   hydromorphone (Dilaudid)   methadone  codeine (Tylenol #3)     What do opioids do well?   Opioids are best for severe short-term pain such as after a surgery or injury. They may work well for cancer pain. They may help some people with long-lasting (chronic) pain.     What do opioids NOT do well?   Opioids  never get rid of pain entirely, and they don t work well for most patients with chronic pain. Opioids don t reduce swelling, one of the causes of pain.                                    Other ways to manage chronic pain and improve function include:     Treat the health problem that may be causing pain  Anti-inflammation medicines, which reduce swelling and tenderness, such as ibuprofen (Advil, Motrin) or naproxen (Aleve)  Acetaminophen (Tylenol)  Antidepressants and anti-seizure medicines, especially for nerve pain  Topical treatments such as patches or creams  Injections or nerve blocks  Chiropractic or osteopathic treatment  Acupuncture, massage, deep breathing, meditation, visual imagery, aromatherapy  Use heat or ice at the pain site  Physical therapy   Exercise  Stop smoking  Take part in therapy       Risks and side effects     Talk to your doctor before you start or decide to keep taking opioids. Possible side effects include:    Lowering your breathing rate enough to cause death  Overdose, including death, especially if taking higher than prescribed doses  Worse depression symptoms; less pleasure in things you usually enjoy  Feeling tired or sluggish  Slower thoughts or cloudy thinking  Being more sensitive to pain over time; pain is harder to control  Trouble sleeping or restless sleep  Changes in hormone levels (for example, less testosterone)  Changes in sex drive or ability to have sex  Constipation  Unsafe driving  Itching and sweating  Dizziness  Nausea, throwing up and dry mouth    What else should I know about opioids?    Opioids may lead to dependence, tolerance, or addiction.    Dependence means that if you stop or reduce the medicine too quickly, you will have withdrawal symptoms. These include loose poop (diarrhea), jitters, flu-like symptoms, nervousness and tremors. Dependence is not the same as addiction.                     Tolerance means needing higher doses over time to get the same  effect. This may increase the chance of serious side effects.    Addiction is when people improperly use a substance that harms their body, their mind or their relations with others. Use of opiates can cause a relapse of addiction if you have a history of drug or alcohol abuse.    People who have used opioids for a long time may have a lower quality of life, worse depression, higher levels of pain and more visits to doctors.    You can overdose on opioids. Take these steps to lower your risk of overdose:    Recognize the signs:  Signs of overdose include decrease or loss of consciousness (blackout), slowed breathing, trouble waking up and blue lips. If someone is worried about overdose, they should call 911.    Talk to your doctor about Narcan (naloxone).   If you are at risk for overdose, you may be given a prescription for Narcan. This medicine very quickly reverses the effects of opioids.   If you overdose, a friend or family member can give you Narcan while waiting for the ambulance. They need to know the signs of overdose and how to give Narcan.     Don't use alcohol or street drugs.   Taking them with opioids can cause death.    Do not take any of these medicines unless your doctor says it s OK. Taking these with opioids can cause death:  Benzodiazepines, such as lorazepam (Ativan), alprazolam (Xanax) or diazepam (Valium)  Muscle relaxers, such as cyclobenzaprine (Flexeril)  Sleeping pills like zolpidem (Ambien)   Other opioids      How to keep you and other people safe while taking opioids:    Never share your opioids with others.  Opioid medicines are regulated by the Drug Enforcement Agency ([place]). Selling or sharing medications is a criminal act.    2. Be sure to store opioids in a secure place, locked up if possible. Young children can easily swallow them and overdose.    3. When you are traveling with your medicines, keep them in the original bottles. If you use a pill box, be sure you also carry a copy  of your medicine list from your clinic or pharmacy.    4. Safe disposal of opioids    Most pharmacies have places to get rid of medicine, called disposal kiosks. Medicine disposal options are also available in every [facility]. Search your county and  medication disposal  to find more options. You can find more details at:  https://www.pca.[facility].[place]./living-green/managing-unwanted-medications     I agree that my provider, clinic care team, and pharmacy may work with any city, state or federal law enforcement agency that investigates the misuse, sale, or other diversion of my controlled medicine. I will allow my provider to discuss my care with, or share a copy of, this agreement with any other treating provider, pharmacy or emergency room where I receive care.    I have read this agreement and have asked questions about anything I did not understand.    _______________________________________________________  Patient Signature - Saran Wallis _____________________                   Date     _______________________________________________________  Provider Signature - ANDREAS Chavez CNP   _____________________                   Date     _______________________________________________________  Witness Signature (required if provider not present while patient signing)   _____________________                   Date
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
## 2024-08-28 NOTE — PATIENT INSTRUCTIONS
How to Take Your Medication Before Surgery  Preoperative Medication Instructions   Antiplatelet or Anticoagulation Medication Instructions   - aspirin: Discontinue aspirin 7-10 days prior to procedure to reduce bleeding risk. It should be resumed postoperatively.     Additional Medication Instructions  Irbesartan DO NOT TAKE on day of surgery (minimum 11 hours for general anesthesia).   - metformin: DO NOT TAKE day of surgery.   - Ozempic : DO NOT TAKE 7 days before surgery   - Herbal medications and vitamins: DO NOT TAKE 14 days prior to surgery.    Morning of surgery will take pantoprazole.    Night before take gabapentin and simvastatin.             Patient Education   Preparing for Your Surgery  Getting started  A nurse will call you to review your health history and instructions. They will give you an arrival time based on your scheduled surgery time. Please be ready to share:  Your doctor's clinic name and phone number  Your medical, surgical, and anesthesia history  A list of allergies and sensitivities  A list of medicines, including herbal treatments and over-the-counter drugs  Whether the patient has a legal guardian (ask how to send us the papers in advance)  Please tell us if you're pregnant--or if there's any chance you might be pregnant. Some surgeries may injure a fetus (unborn baby), so they require a pregnancy test. Surgeries that are safe for a fetus don't always need a test, and you can choose whether to have one.   If you have a child who's having surgery, please ask for a copy of Preparing for Your Child's Surgery.    Preparing for surgery  Within 10 to 30 days of surgery: Have a pre-op exam (sometimes called an H&P, or History and Physical). This can be done at a clinic or pre-operative center.  If you're having a , you may not need this exam. Talk to your care team.  At your pre-op exam, talk to your care team about all medicines you take. If you need to stop any medicines before  surgery, ask when to start taking them again.  We do this for your safety. Many medicines can make you bleed too much during surgery. Some change how well surgery (anesthesia) drugs work.  Call your insurance company to let them know you're having surgery. (If you don't have insurance, call [phone].)  Call your clinic if there's any change in your health. This includes signs of a cold or flu (sore throat, runny nose, cough, rash, fever). It also includes a scrape or scratch near the surgery site.  If you have questions on the day of surgery, call your hospital or surgery center.  Eating and drinking guidelines  For your safety: Unless your surgeon tells you otherwise, follow the guidelines below.  Eat and drink as usual until 8 hours before you arrive for surgery. After that, no food or milk.  Drink clear liquids until 2 hours before you arrive. These are liquids you can see through, like water, Gatorade, and Propel Water. They also include plain black coffee and tea (no cream or milk), candy, and breath mints. You can spit out gum when you arrive.  If you drink alcohol: Stop drinking it the night before surgery.  If your care team tells you to take medicine on the morning of surgery, it's okay to take it with a sip of water.  Preventing infection  Shower or bathe the night before and morning of your surgery. Follow the instructions your clinic gave you. (If no instructions, use regular soap.)  Don't shave or clip hair near your surgery site. We'll remove the hair if needed.  Don't smoke or vape the morning of surgery. You may chew nicotine gum up to 2 hours before surgery. A nicotine patch is okay.  Note: Some surgeries require you to completely quit smoking and nicotine. Check with your surgeon.  Your care team will make every effort to keep you safe from infection. We will:  Clean our hands often with soap and water (or an alcohol-based hand rub).  Clean the skin at your surgery site with a special soap that  kills germs.  Give you a special gown to keep you warm. (Cold raises the risk of infection.)  Wear special hair covers, masks, gowns and gloves during surgery.  Give antibiotic medicine, if prescribed. Not all surgeries need antibiotics.  What to bring on the day of surgery  Photo ID and insurance card  Copy of your health care directive, if you have one  Glasses and hearing aids (bring cases)  You can't wear contacts during surgery  Inhaler and eye drops, if you use them (tell us about these when you arrive)  CPAP machine or breathing device, if you use them  A few personal items, if spending the night  If you have . . .  A pacemaker, ICD (cardiac defibrillator) or other implant: Bring the ID card.  An implanted stimulator: Bring the remote control.  A legal guardian: Bring a copy of the certified (court-stamped) guardianship papers.  Please remove any jewelry, including body piercings. Leave jewelry and other valuables at home.  If you're going home the day of surgery  You must have a responsible adult drive you home. They should stay with you overnight as well.  If you don't have someone to stay with you, and you aren't safe to go home alone, we may keep you overnight. Insurance often won't pay for this.  After surgery  If it's hard to control your pain or you need more pain medicine, please call your surgeon's office.  Questions?   If you have any questions for your care team, list them here: _________________________________________________________________________________________________________________________________________________________________________ ____________________________________ ____________________________________ ____________________________________  For informational purposes only. Not to replace the advice of your health care provider. Copyright   2003, 2019 [facility]. All rights reserved. Clinically reviewed by Mila Wilson MD. SMARTworks [id number] - REV 12/22.

## 2024-08-29 NOTE — RESULT ENCOUNTER NOTE
Dear Saran,    Here is a summary of your recent test results:    -A1C (test of diabetes control the last 2-3 months) is at your goal. Please continue with your current plan.   -Drug screen showed expected results.    Follow up with Dr. Clarke for a physical in the next few months.   Good luck with surgery.     For additional lab test information, [email or web address] is an excellent reference.    In addition, here is a list of due or overdue Health Maintenance reminders:    Hepatitis A Vaccine(1 of 2 - Risk 2-dose series) due on 01/09/1985  LUNG CANCER SCREENING due on 03/30/2024  Yearly Preventive Visit due on 09/20/2024  Diabetic Foot Exam due on 09/20/2024    Please call us at [phone] (or use [email or web address]) to address the above recommendations if needed.    Thank you for choosing  [facility].  It was an honor and a privilege to participate in your care.       Healthy regards,    Jayla Erickson, GENO  [facility]

## 2024-09-03 ENCOUNTER — DOCUMENTATION ONLY (OUTPATIENT)
Dept: NEUROSURGERY | Facility: CLINIC | Age: 59
End: 2024-09-03
Payer: COMMERCIAL

## 2024-09-03 NOTE — PROGRESS NOTES
Faxed Form September 3, 2024 to fax number [phone] AND [facility] [phone]    Right Fax confirmed at [phone]    Lety Loja RN

## 2024-09-03 NOTE — PROGRESS NOTES
Finished [employer] STD form FOR PATIENT (other encounter details [employer] form for wife). Placed document in provider's mailbox at [facility] for review and signature.      Off from 9/4/24-10/2/24

## 2024-09-04 ENCOUNTER — PATIENT OUTREACH (OUTPATIENT)
Dept: CARE COORDINATION | Facility: CLINIC | Age: 59
End: 2024-09-04

## 2024-09-04 ENCOUNTER — HOSPITAL ENCOUNTER (OUTPATIENT)
Facility: CLINIC | Age: 59
Discharge: HOME | End: 2024-09-04
Attending: NEUROLOGICAL SURGERY | Admitting: NEUROLOGICAL SURGERY
Payer: COMMERCIAL

## 2024-09-04 ENCOUNTER — ANESTHESIA (OUTPATIENT)
Dept: SURGERY | Facility: CLINIC | Age: 59
End: 2024-09-04
Payer: COMMERCIAL

## 2024-09-04 ENCOUNTER — APPOINTMENT (OUTPATIENT)
Dept: GENERAL RADIOLOGY | Facility: CLINIC | Age: 59
End: 2024-09-04
Attending: NEUROLOGICAL SURGERY
Payer: COMMERCIAL

## 2024-09-04 VITALS
BODY MASS INDEX: 25.8 KG/M2 | DIASTOLIC BLOOD PRESSURE: 83 MMHG | HEART RATE: 92 BPM | WEIGHT: 178.5 LBS | SYSTOLIC BLOOD PRESSURE: 125 MMHG | TEMPERATURE: 97.8 F | OXYGEN SATURATION: 96 % | RESPIRATION RATE: 16 BRPM

## 2024-09-04 DIAGNOSIS — M54.16 LUMBAR RADICULOPATHY: Primary | ICD-10-CM

## 2024-09-04 LAB
GLUCOSE BLDC GLUCOMTR-MCNC: 160 MG/DL (ref 70–99)
GLUCOSE BLDC GLUCOMTR-MCNC: 173 MG/DL (ref 70–99)

## 2024-09-04 PROCEDURE — 710N000012 HC RECOVERY PHASE 2, PER MINUTE: Performed by: NEUROLOGICAL SURGERY

## 2024-09-04 PROCEDURE — 82962 GLUCOSE BLOOD TEST: CPT

## 2024-09-04 PROCEDURE — 63030 LAMOT DCMPRN NRV RT 1 LMBR: CPT | Mod: 51 | Performed by: NEUROLOGICAL SURGERY

## 2024-09-04 PROCEDURE — 250N000005 HC OR RX SURGIFLO HEMOSTATIC MATRIX 10ML 199102S OPNP: Performed by: NEUROLOGICAL SURGERY

## 2024-09-04 PROCEDURE — 710N000009 HC RECOVERY PHASE 1, LEVEL 1, PER MIN: Performed by: NEUROLOGICAL SURGERY

## 2024-09-04 PROCEDURE — 999N000179 XR SURGERY CARM FLUORO LESS THAN 5 MIN W STILLS: Mod: TC

## 2024-09-04 PROCEDURE — 250N000013 HC RX MED GY IP 250 OP 250 PS 637: Performed by: ANESTHESIOLOGY

## 2024-09-04 PROCEDURE — 258N000003 HC RX IP 258 OP 636: Performed by: ANESTHESIOLOGY

## 2024-09-04 PROCEDURE — 370N000017 HC ANESTHESIA TECHNICAL FEE, PER MIN: Performed by: NEUROLOGICAL SURGERY

## 2024-09-04 PROCEDURE — 999N000141 HC STATISTIC PRE-PROCEDURE NURSING ASSESSMENT: Performed by: NEUROLOGICAL SURGERY

## 2024-09-04 PROCEDURE — 272N000001 HC OR GENERAL SUPPLY STERILE: Performed by: NEUROLOGICAL SURGERY

## 2024-09-04 PROCEDURE — 250N000011 HC RX IP 250 OP 636

## 2024-09-04 PROCEDURE — 250N000009 HC RX 250

## 2024-09-04 PROCEDURE — 250N000013 HC RX MED GY IP 250 OP 250 PS 637

## 2024-09-04 PROCEDURE — 360N000084 HC SURGERY LEVEL 4 W/ FLUORO, PER MIN: Performed by: NEUROLOGICAL SURGERY

## 2024-09-04 PROCEDURE — 63030 LAMOT DCMPRN NRV RT 1 LMBR: CPT | Mod: AS

## 2024-09-04 PROCEDURE — 63042 LAMINOTOMY SINGLE LUMBAR: CPT | Mod: 59 | Performed by: NEUROLOGICAL SURGERY

## 2024-09-04 PROCEDURE — 258N000003 HC RX IP 258 OP 636

## 2024-09-04 PROCEDURE — 250N000025 HC SEVOFLURANE, PER MIN: Performed by: NEUROLOGICAL SURGERY

## 2024-09-04 PROCEDURE — 250N000009 HC RX 250: Performed by: NEUROLOGICAL SURGERY

## 2024-09-04 PROCEDURE — 250N000011 HC RX IP 250 OP 636: Performed by: PHYSICIAN ASSISTANT

## 2024-09-04 PROCEDURE — 63042 LAMINOTOMY SINGLE LUMBAR: CPT | Mod: AS

## 2024-09-04 RX ORDER — KETOROLAC TROMETHAMINE 30 MG/ML
INJECTION, SOLUTION INTRAMUSCULAR; INTRAVENOUS PRN
Status: DISCONTINUED | OUTPATIENT
Start: 2024-09-04 | End: 2024-09-04

## 2024-09-04 RX ORDER — CEFAZOLIN SODIUM/WATER 2 G/20 ML
2 SYRINGE (ML) INTRAVENOUS SEE ADMIN INSTRUCTIONS
Status: DISCONTINUED | OUTPATIENT
Start: 2024-09-04 | End: 2024-09-04 | Stop reason: HOSPADM

## 2024-09-04 RX ORDER — MAGNESIUM HYDROXIDE 1200 MG/15ML
LIQUID ORAL PRN
Status: DISCONTINUED | OUTPATIENT
Start: 2024-09-04 | End: 2024-09-04 | Stop reason: HOSPADM

## 2024-09-04 RX ORDER — PROPOFOL 10 MG/ML
INJECTION, EMULSION INTRAVENOUS CONTINUOUS PRN
Status: DISCONTINUED | OUTPATIENT
Start: 2024-09-04 | End: 2024-09-04

## 2024-09-04 RX ORDER — METHOCARBAMOL 750 MG/1
750 TABLET, FILM COATED ORAL EVERY 6 HOURS PRN
Status: DISCONTINUED | OUTPATIENT
Start: 2024-09-04 | End: 2024-09-04 | Stop reason: HOSPADM

## 2024-09-04 RX ORDER — SODIUM CHLORIDE, SODIUM LACTATE, POTASSIUM CHLORIDE, CALCIUM CHLORIDE 600; 310; 30; 20 MG/100ML; MG/100ML; MG/100ML; MG/100ML
INJECTION, SOLUTION INTRAVENOUS CONTINUOUS
Status: DISCONTINUED | OUTPATIENT
Start: 2024-09-04 | End: 2024-09-04 | Stop reason: HOSPADM

## 2024-09-04 RX ORDER — FENTANYL CITRATE 50 UG/ML
INJECTION, SOLUTION INTRAMUSCULAR; INTRAVENOUS PRN
Status: DISCONTINUED | OUTPATIENT
Start: 2024-09-04 | End: 2024-09-04

## 2024-09-04 RX ORDER — LABETALOL 20 MG/4 ML (5 MG/ML) INTRAVENOUS SYRINGE
PRN
Status: DISCONTINUED | OUTPATIENT
Start: 2024-09-04 | End: 2024-09-04

## 2024-09-04 RX ORDER — HYDROMORPHONE HCL IN WATER/PF 6 MG/30 ML
0.2 PATIENT CONTROLLED ANALGESIA SYRINGE INTRAVENOUS
Status: DISCONTINUED | OUTPATIENT
Start: 2024-09-04 | End: 2024-09-04 | Stop reason: HOSPADM

## 2024-09-04 RX ORDER — HYDROMORPHONE HCL IN WATER/PF 6 MG/30 ML
0.2 PATIENT CONTROLLED ANALGESIA SYRINGE INTRAVENOUS EVERY 5 MIN PRN
Status: DISCONTINUED | OUTPATIENT
Start: 2024-09-04 | End: 2024-09-04 | Stop reason: HOSPADM

## 2024-09-04 RX ORDER — ONDANSETRON 2 MG/ML
4 INJECTION INTRAMUSCULAR; INTRAVENOUS EVERY 30 MIN PRN
Status: DISCONTINUED | OUTPATIENT
Start: 2024-09-04 | End: 2024-09-04 | Stop reason: HOSPADM

## 2024-09-04 RX ORDER — LIDOCAINE 40 MG/G
CREAM TOPICAL
Status: DISCONTINUED | OUTPATIENT
Start: 2024-09-04 | End: 2024-09-04 | Stop reason: HOSPADM

## 2024-09-04 RX ORDER — METOPROLOL TARTRATE 1 MG/ML
1-2 INJECTION, SOLUTION INTRAVENOUS EVERY 5 MIN PRN
Status: DISCONTINUED | OUTPATIENT
Start: 2024-09-04 | End: 2024-09-04 | Stop reason: HOSPADM

## 2024-09-04 RX ORDER — NEOSTIGMINE METHYLSULFATE 1 MG/ML
VIAL (ML) INJECTION PRN
Status: DISCONTINUED | OUTPATIENT
Start: 2024-09-04 | End: 2024-09-04

## 2024-09-04 RX ORDER — SODIUM CHLORIDE, SODIUM LACTATE, POTASSIUM CHLORIDE, CALCIUM CHLORIDE 600; 310; 30; 20 MG/100ML; MG/100ML; MG/100ML; MG/100ML
INJECTION, SOLUTION INTRAVENOUS CONTINUOUS PRN
Status: DISCONTINUED | OUTPATIENT
Start: 2024-09-04 | End: 2024-09-04

## 2024-09-04 RX ORDER — ACETAMINOPHEN 325 MG/1
975 TABLET ORAL ONCE
Status: COMPLETED | OUTPATIENT
Start: 2024-09-04 | End: 2024-09-04

## 2024-09-04 RX ORDER — FENTANYL CITRATE 50 UG/ML
50 INJECTION, SOLUTION INTRAMUSCULAR; INTRAVENOUS EVERY 5 MIN PRN
Status: DISCONTINUED | OUTPATIENT
Start: 2024-09-04 | End: 2024-09-04 | Stop reason: HOSPADM

## 2024-09-04 RX ORDER — NALOXONE HYDROCHLORIDE 0.4 MG/ML
0.1 INJECTION, SOLUTION INTRAMUSCULAR; INTRAVENOUS; SUBCUTANEOUS
Status: DISCONTINUED | OUTPATIENT
Start: 2024-09-04 | End: 2024-09-04 | Stop reason: HOSPADM

## 2024-09-04 RX ORDER — OXYCODONE HYDROCHLORIDE 5 MG/1
5 TABLET ORAL EVERY 4 HOURS PRN
Status: DISCONTINUED | OUTPATIENT
Start: 2024-09-04 | End: 2024-09-04 | Stop reason: HOSPADM

## 2024-09-04 RX ORDER — CEFAZOLIN SODIUM/WATER 2 G/20 ML
2 SYRINGE (ML) INTRAVENOUS
Status: COMPLETED | OUTPATIENT
Start: 2024-09-04 | End: 2024-09-04

## 2024-09-04 RX ORDER — PROPOFOL 10 MG/ML
INJECTION, EMULSION INTRAVENOUS PRN
Status: DISCONTINUED | OUTPATIENT
Start: 2024-09-04 | End: 2024-09-04

## 2024-09-04 RX ORDER — LIDOCAINE HYDROCHLORIDE 20 MG/ML
INJECTION, SOLUTION INFILTRATION; PERINEURAL PRN
Status: DISCONTINUED | OUTPATIENT
Start: 2024-09-04 | End: 2024-09-04

## 2024-09-04 RX ORDER — FENTANYL CITRATE 50 UG/ML
25 INJECTION, SOLUTION INTRAMUSCULAR; INTRAVENOUS EVERY 5 MIN PRN
Status: DISCONTINUED | OUTPATIENT
Start: 2024-09-04 | End: 2024-09-04 | Stop reason: HOSPADM

## 2024-09-04 RX ORDER — ONDANSETRON 4 MG/1
4 TABLET, ORALLY DISINTEGRATING ORAL EVERY 30 MIN PRN
Status: DISCONTINUED | OUTPATIENT
Start: 2024-09-04 | End: 2024-09-04 | Stop reason: HOSPADM

## 2024-09-04 RX ORDER — HYDROMORPHONE HCL IN WATER/PF 6 MG/30 ML
0.4 PATIENT CONTROLLED ANALGESIA SYRINGE INTRAVENOUS
Status: DISCONTINUED | OUTPATIENT
Start: 2024-09-04 | End: 2024-09-04 | Stop reason: HOSPADM

## 2024-09-04 RX ORDER — GLYCOPYRROLATE 0.2 MG/ML
INJECTION, SOLUTION INTRAMUSCULAR; INTRAVENOUS PRN
Status: DISCONTINUED | OUTPATIENT
Start: 2024-09-04 | End: 2024-09-04

## 2024-09-04 RX ORDER — HYDROMORPHONE HCL IN WATER/PF 6 MG/30 ML
0.4 PATIENT CONTROLLED ANALGESIA SYRINGE INTRAVENOUS EVERY 5 MIN PRN
Status: DISCONTINUED | OUTPATIENT
Start: 2024-09-04 | End: 2024-09-04 | Stop reason: HOSPADM

## 2024-09-04 RX ORDER — OXYCODONE HYDROCHLORIDE 5 MG/1
10 TABLET ORAL EVERY 4 HOURS PRN
Status: DISCONTINUED | OUTPATIENT
Start: 2024-09-04 | End: 2024-09-04 | Stop reason: HOSPADM

## 2024-09-04 RX ORDER — ACETAMINOPHEN 325 MG/1
975 TABLET ORAL EVERY 8 HOURS
Status: DISCONTINUED | OUTPATIENT
Start: 2024-09-04 | End: 2024-09-04 | Stop reason: HOSPADM

## 2024-09-04 RX ORDER — BUPIVACAINE HYDROCHLORIDE AND EPINEPHRINE 2.5; 5 MG/ML; UG/ML
INJECTION, SOLUTION INFILTRATION; PERINEURAL PRN
Status: DISCONTINUED | OUTPATIENT
Start: 2024-09-04 | End: 2024-09-04 | Stop reason: HOSPADM

## 2024-09-04 RX ORDER — DEXAMETHASONE SODIUM PHOSPHATE 4 MG/ML
4 INJECTION, SOLUTION INTRA-ARTICULAR; INTRALESIONAL; INTRAMUSCULAR; INTRAVENOUS; SOFT TISSUE
Status: DISCONTINUED | OUTPATIENT
Start: 2024-09-04 | End: 2024-09-04 | Stop reason: HOSPADM

## 2024-09-04 RX ORDER — PROCHLORPERAZINE MALEATE 10 MG
10 TABLET ORAL EVERY 6 HOURS PRN
Status: DISCONTINUED | OUTPATIENT
Start: 2024-09-04 | End: 2024-09-04 | Stop reason: HOSPADM

## 2024-09-04 RX ORDER — OXYCODONE HYDROCHLORIDE 5 MG/1
5 TABLET ORAL EVERY 6 HOURS PRN
Qty: 40 TABLET | Refills: 0 | Status: SHIPPED | OUTPATIENT
Start: 2024-09-04 | End: 2024-10-02

## 2024-09-04 RX ORDER — ACETAMINOPHEN 325 MG/1
650 TABLET ORAL EVERY 4 HOURS PRN
Status: DISCONTINUED | OUTPATIENT
Start: 2024-09-07 | End: 2024-09-04 | Stop reason: HOSPADM

## 2024-09-04 RX ORDER — KETOROLAC TROMETHAMINE 15 MG/ML
15 INJECTION, SOLUTION INTRAMUSCULAR; INTRAVENOUS
Status: DISCONTINUED | OUTPATIENT
Start: 2024-09-04 | End: 2024-09-04 | Stop reason: HOSPADM

## 2024-09-04 RX ORDER — ONDANSETRON 2 MG/ML
4 INJECTION INTRAMUSCULAR; INTRAVENOUS EVERY 6 HOURS PRN
Status: DISCONTINUED | OUTPATIENT
Start: 2024-09-04 | End: 2024-09-04 | Stop reason: HOSPADM

## 2024-09-04 RX ORDER — ONDANSETRON 4 MG/1
4 TABLET, ORALLY DISINTEGRATING ORAL EVERY 6 HOURS PRN
Status: DISCONTINUED | OUTPATIENT
Start: 2024-09-04 | End: 2024-09-04 | Stop reason: HOSPADM

## 2024-09-04 RX ORDER — HYDRALAZINE HYDROCHLORIDE 20 MG/ML
2.5-5 INJECTION INTRAMUSCULAR; INTRAVENOUS EVERY 10 MIN PRN
Status: DISCONTINUED | OUTPATIENT
Start: 2024-09-04 | End: 2024-09-04 | Stop reason: HOSPADM

## 2024-09-04 RX ORDER — DEXAMETHASONE SODIUM PHOSPHATE 4 MG/ML
INJECTION, SOLUTION INTRA-ARTICULAR; INTRALESIONAL; INTRAMUSCULAR; INTRAVENOUS; SOFT TISSUE PRN
Status: DISCONTINUED | OUTPATIENT
Start: 2024-09-04 | End: 2024-09-04

## 2024-09-04 RX ADMIN — Medication 2 G: at 07:33

## 2024-09-04 RX ADMIN — SODIUM CHLORIDE, POTASSIUM CHLORIDE, SODIUM LACTATE AND CALCIUM CHLORIDE: 600; 310; 30; 20 INJECTION, SOLUTION INTRAVENOUS at 06:17

## 2024-09-04 RX ADMIN — GLYCOPYRROLATE 0.7 MG: 0.2 INJECTION, SOLUTION INTRAMUSCULAR; INTRAVENOUS at 09:11

## 2024-09-04 RX ADMIN — GLYCOPYRROLATE 0.2 MG: 0.2 INJECTION, SOLUTION INTRAMUSCULAR; INTRAVENOUS at 07:46

## 2024-09-04 RX ADMIN — LABETALOL 20 MG/4 ML (5 MG/ML) INTRAVENOUS SYRINGE 10 MG: at 07:53

## 2024-09-04 RX ADMIN — PROPOFOL 50 MCG/KG/MIN: 10 INJECTION, EMULSION INTRAVENOUS at 07:46

## 2024-09-04 RX ADMIN — ACETAMINOPHEN 975 MG: 325 TABLET, FILM COATED ORAL at 10:16

## 2024-09-04 RX ADMIN — SODIUM CHLORIDE, POTASSIUM CHLORIDE, SODIUM LACTATE AND CALCIUM CHLORIDE: 600; 310; 30; 20 INJECTION, SOLUTION INTRAVENOUS at 07:03

## 2024-09-04 RX ADMIN — SODIUM CHLORIDE, POTASSIUM CHLORIDE, SODIUM LACTATE AND CALCIUM CHLORIDE: 600; 310; 30; 20 INJECTION, SOLUTION INTRAVENOUS at 08:59

## 2024-09-04 RX ADMIN — LIDOCAINE HYDROCHLORIDE 50 MG: 20 INJECTION, SOLUTION INFILTRATION; PERINEURAL at 07:46

## 2024-09-04 RX ADMIN — DEXAMETHASONE SODIUM PHOSPHATE 4 MG: 4 INJECTION, SOLUTION INTRA-ARTICULAR; INTRALESIONAL; INTRAMUSCULAR; INTRAVENOUS; SOFT TISSUE at 07:46

## 2024-09-04 RX ADMIN — MIDAZOLAM 2 MG: 1 INJECTION INTRAMUSCULAR; INTRAVENOUS at 07:37

## 2024-09-04 RX ADMIN — PROPOFOL 200 MG: 10 INJECTION, EMULSION INTRAVENOUS at 07:46

## 2024-09-04 RX ADMIN — FENTANYL CITRATE 100 MCG: 50 INJECTION INTRAMUSCULAR; INTRAVENOUS at 07:46

## 2024-09-04 RX ADMIN — KETOROLAC TROMETHAMINE 30 MG: 30 INJECTION, SOLUTION INTRAMUSCULAR at 09:11

## 2024-09-04 RX ADMIN — HYDROMORPHONE HYDROCHLORIDE 0.5 MG: 1 INJECTION, SOLUTION INTRAMUSCULAR; INTRAVENOUS; SUBCUTANEOUS at 08:01

## 2024-09-04 RX ADMIN — NEOSTIGMINE METHYLSULFATE 4 MG: 1 INJECTION, SOLUTION INTRAVENOUS at 09:11

## 2024-09-04 RX ADMIN — HYDROMORPHONE HYDROCHLORIDE 0.5 MG: 1 INJECTION, SOLUTION INTRAMUSCULAR; INTRAVENOUS; SUBCUTANEOUS at 08:15

## 2024-09-04 RX ADMIN — OXYCODONE HYDROCHLORIDE 5 MG: 5 TABLET ORAL at 10:15

## 2024-09-04 RX ADMIN — ROCURONIUM BROMIDE 50 MG: 50 INJECTION, SOLUTION INTRAVENOUS at 07:46

## 2024-09-04 ASSESSMENT — ACTIVITIES OF DAILY LIVING (ADL)
ADLS_ACUITY_SCORE: 31
ADLS_ACUITY_SCORE: 31
ADLS_ACUITY_SCORE: 32
ADLS_ACUITY_SCORE: 31
ADLS_ACUITY_SCORE: 31
ADLS_ACUITY_SCORE: 32
ADLS_ACUITY_SCORE: 31

## 2024-09-04 ASSESSMENT — LIFESTYLE VARIABLES: TOBACCO_USE: 1

## 2024-09-04 NOTE — ANESTHESIA PREPROCEDURE EVALUATION
Anesthesia Pre-Procedure Evaluation    Patient: Saran Wallis   MRN: [id number] : 1965        Procedure : Procedure(s):  Open Right Lumbar 4 to Lumbar 5 microdiscectomy, left Lumbar 5 to Sacral 1 redo microdiscectomy          Past Medical History:   Diagnosis Date    Chronic radicular low back pain 2016    Depressive disorder     Diabetes mellitus (H)     Gastric reflux     Hypercholesteremia     Hypertension       Past Surgical History:   Procedure Laterality Date    BACK SURGERY  ,    L4, L5, S1 Disk herniation    COLONOSCOPY N/A 2016    Procedure: COMBINED COLONOSCOPY, SINGLE OR MULTIPLE BIOPSY/POLYPECTOMY BY BIOPSY;  Surgeon: Michelle Morrison MD;  Location:  GI    COSMETIC SURGERY      Left Hand Skin graft.    ESOPHAGOSCOPY, GASTROSCOPY, DUODENOSCOPY (EGD), COMBINED N/A 2016    Procedure: COMBINED ESOPHAGOSCOPY, GASTROSCOPY, DUODENOSCOPY (EGD);  Surgeon: Vamshi Lynn MD;  Location:  GI    EYE SURGERY      ORTHOPEDIC SURGERY        Allergies   Allergen Reactions    Eszopiclone      Other reaction(s): Taste, Changes  Works, but bitter taste.    Food Hives     Peaches      Social History     Tobacco Use    Smoking status: Every Day     Current packs/day: 0.50     Average packs/day: 0.5 packs/day for 44.5 years (22.2 ttl pk-yrs)     Types: Cigarettes     Start date: 3/14/1980    Smokeless tobacco: Never    Tobacco comments:     Quit for quitting.   Substance Use Topics    Alcohol use: Not Currently     Comment: 1 bottles of beer per month.      Wt Readings from Last 1 Encounters:   24 81 kg (178 lb 8 oz)        Anesthesia Evaluation            ROS/MED HX  ENT/Pulmonary:     (+)                tobacco use, Current use,                       Neurologic: Comment:  radiculopathy      Cardiovascular:     (+) Dyslipidemia hypertension- -   -  - -                                      METS/Exercise Tolerance:     Hematologic: Comments: Lab Test        24      03/20/24 05/09/17                       1141          1023          1043          WBC          6.0          5.4          7.1           HGB          14.0         14.1         14.3          MCV          89           87           91            PLT          158          142*         178            Lab Test        09/04/24     08/28/24     03/20/24     03/15/23                       0536          1141          1023          1645          NA            --          142          139          144           POTASSIUM     --          4.7          4.7          4.6           CHLORIDE      --          106          103          106           CO2           --          25           26           24            BUN           --          14.1         17.7         15.5          CR            --          0.71         0.75         0.77          ANIONGAP      --          11           10           14            DELL           --          10.1         9.7          10.1*         GLC          160*         118*         114*         102*                Musculoskeletal: Comment: Lumbar disc herniation with radiculopathy      GI/Hepatic:     (+) GERD, Asymptomatic on medication,                  Renal/Genitourinary:       Endo:     (+) type I DM,                     Psychiatric/Substance Use:     (+) psychiatric history depression       Infectious Disease:       Malignancy:       Other:            Physical Exam    Airway        Mallampati: II   TM distance: > 3 FB   Neck ROM: full   Mouth opening: > 3 cm    Respiratory Devices and Support         Dental       (+) Minor Abnormalities - some fillings, tiny chips      Cardiovascular   cardiovascular exam normal          Pulmonary   pulmonary exam normal                OUTSIDE LABS:  CBC:   Lab Results   Component Value Date    WBC 6.0 08/28/2024    WBC 5.4 03/20/2024    HGB 14.0 08/28/2024    HGB 14.1 03/20/2024    HCT 40.1 08/28/2024    HCT 40.6 03/20/2024     08/28/2024     (L)  03/20/2024     BMP:   Lab Results   Component Value Date     08/28/2024     03/20/2024    POTASSIUM 4.7 08/28/2024    POTASSIUM 4.7 03/20/2024    CHLORIDE 106 08/28/2024    CHLORIDE 103 03/20/2024    CO2 25 08/28/2024    CO2 26 03/20/2024    BUN 14.1 08/28/2024    BUN 17.7 03/20/2024    CR 0.71 08/28/2024    CR 0.75 03/20/2024     (H) 09/04/2024     (H) 08/28/2024     COAGS:   Lab Results   Component Value Date    PTT 28 04/23/2010    INR 0.92 04/23/2010     POC:   Lab Results   Component Value Date     (H) 05/13/2016     HEPATIC:   Lab Results   Component Value Date    ALBUMIN 4.8 08/28/2024    PROTTOTAL 7.4 08/28/2024    ALT 55 08/28/2024    AST 36 08/28/2024    ALKPHOS 80 08/28/2024    BILITOTAL 0.9 08/28/2024     OTHER:   Lab Results   Component Value Date    A1C 5.8 (H) 08/28/2024    DELL 10.1 08/28/2024    PHOS 3.3 12/28/2015    MAG 1.9 02/09/2022    TSH 1.02 09/16/2019    SED 7 03/20/2024       Anesthesia Plan    ASA Status:  2    NPO Status:  NPO Appropriate    Anesthesia Type: General.     - Airway: ETT   Induction: Intravenous, Propofol.   Maintenance: Balanced.        Consents    Anesthesia Plan(s) and associated risks, benefits, and realistic alternatives discussed. Questions answered and patient/representative(s) expressed understanding.     - Discussed:     - Discussed with:  Patient      - Extended Intubation/Ventilatory Support Discussed: No.      - Patient is DNR/DNI Status: No     Use of blood products discussed: No .     Postoperative Care    Pain management: IV analgesics.   PONV prophylaxis: Dexamethasone or Solumedrol, Ondansetron (or other 5HT-3)     Comments:               Cal Gould MD    I have reviewed the pertinent notes and labs in the chart from the past 30 days and (re)examined the patient.  Any updates or changes from those notes are reflected in this note.             # Drug Induced Platelet Defect: home medication list includes an antiplatelet  "medication  # Overweight: Estimated body mass index is 25.8 kg/m  as calculated from the following:    Height as of 8/28/24: 1.772 m (5' 9.75\").    Weight as of this encounter: 81 kg (178 lb 8 oz).      "

## 2024-09-04 NOTE — ANESTHESIA PROCEDURE NOTES
Airway       Patient location during procedure: OR       Procedure Start/Stop Times: 9/4/2024 7:45 AM  Staff -        Anesthesiologist:  Cal Gould MD       CRNA: Ramiro Singh APRN CRNA       Performed By: CRNA  Consent for Airway        Urgency: elective  Indications and Patient Condition       Indications for airway management: michael-procedural       Induction type:intravenous       Mask difficulty assessment: 2 - vent by mask + OA or adjuvant +/- NMBA    Final Airway Details       Final airway type: endotracheal airway       Successful airway: ETT - single  Endotracheal Airway Details        ETT size (mm): 8.0       Cuffed: yes       Successful intubation technique: direct laryngoscopy       DL Blade Type: MAC 3       Grade View of Cords: 1       Adjucts: stylet       Position: Right       Measured from: lips       Secured at (cm): 24       Bite block used: Oral Airway    Post intubation assessment        Placement verified by: capnometry and equal breath sounds        Number of attempts at approach: 1       Number of other approaches attempted: 0       Secured with: tape       Ease of procedure: easy       Dentition: Intact and Unchanged    Medication(s) Administered   Medication Administration Time: 9/4/2024 7:45 AM

## 2024-09-04 NOTE — ANESTHESIA POSTPROCEDURE EVALUATION
Patient: Saran Wallis    Procedure: Procedure(s):  Open Right Lumbar 4 to Lumbar 5 microdiscectomy, left Lumbar 5 to Sacral 1 redo microdiscectomy       Anesthesia Type:  General    Note:  Disposition: Outpatient   Postop Pain Control: Uneventful            Sign Out: Well controlled pain   PONV: No   Neuro/Psych: Uneventful            Sign Out: Acceptable/Baseline neuro status   Airway/Respiratory: Uneventful            Sign Out: Acceptable/Baseline resp. status   CV/Hemodynamics: Uneventful            Sign Out: Acceptable CV status; No obvious hypovolemia; No obvious fluid overload   Other NRE: NONE   DID A NON-ROUTINE EVENT OCCUR? No           Last vitals:  Vitals Value Taken Time   /80 09/04/24 1021   Temp 97.7  F (36.5  C) 09/04/24 0939   Pulse 79 09/04/24 1023   Resp 9 09/04/24 1023   SpO2 100 % 09/04/24 1023   Vitals shown include unfiled device data.    Electronically Signed By: Cal Gould MD  September 4, 2024  10:57 AM

## 2024-09-04 NOTE — DISCHARGE INSTRUCTIONS
Maximum acetaminophen (Tylenol) dose from all sources should not exceed 4 grams (4000 mg) per day. You last had 975mg at 10:15am, your next dose is 4:15pm or later.    You received Toradol, an IV form of Ibuprofen (Motrin) at 9:10am.  Do not take any Ibuprofen products until 3:10pm.      Limit activity for the first day or two. Lie flat when resting.    After POD2, with assist  1. Sitting as tolerated  2. Avoid excessive forward or side bending, twisting, pushing, pulling, or reaching  3. No lifting greater than 5 pounds      DR. ADDY GUSTAFSON M.D.                    CLINIC PHONE NUMBER:  [phone]  [facility]

## 2024-09-04 NOTE — ANESTHESIA CARE TRANSFER NOTE
Patient: Saran Wallis    Procedure: Procedure(s):  Open Right Lumbar 4 to Lumbar 5 microdiscectomy, left Lumbar 5 to Sacral 1 redo microdiscectomy       Diagnosis: Lumbar disc herniation with radiculopathy [M51.16]  Diagnosis Additional Information: No value filed.    Anesthesia Type:   General     Note:    Oropharynx: oral airway in place and spontaneously breathing  Level of Consciousness: awake and drowsy  Oxygen Supplementation: face mask  Level of Supplemental Oxygen (L/min / FiO2): 8l  Independent Airway: airway patency satisfactory and stable  Dentition: dentition unchanged  Vital Signs Stable: post-procedure vital signs reviewed and stable  Report to RN Given: handoff report given  Patient transferred to: PACU  Comments: Pt to PACU, VSS, report to RN  Handoff Report: Identifed the Patient, Identified the Reponsible Provider, Reviewed the pertinent medical history, Discussed the surgical course, Reviewed Intra-OP anesthesia mangement and issues during anesthesia, Set expectations for post-procedure period and Allowed opportunity for questions and acknowledgement of understanding      Vitals:  Vitals Value Taken Time   /68 09/04/24 0936   Temp 97.7  F (36.5  C) 09/04/24 0939   Pulse 86 09/04/24 0939   Resp 7 09/04/24 0939   SpO2 100 % 09/04/24 0939   Vitals shown include unfiled device data.    Electronically Signed By: ANDREAS Cao CRNA  September 4, 2024  9:40 AM

## 2024-09-04 NOTE — OP NOTE
September 4, 2024    [facility]   Operative Note    Pre-operative diagnosis: Lumbar disc herniation with radiculopathy [M51.16]   Post-operative diagnosis Same   Procedure: Procedure(s):  Open Right Lumbar 4 to Lumbar 5 microdiscectomy, left Lumbar 5 to Sacral 1 redo microdiscectomy    Surgeon(s): Surgeons and Role:     * Logan Balgey MD - Primary     * Rocío Lundberg APRN CNP - Assisting   Estimated blood loss: * No values recorded between 9/4/2024  8:03 AM and 9/4/2024  9:18 AM * 25ml   Specimens: * No specimens in log *   Findings: Left L4-5 and right L5-S1 discectomies performed with redo at L5-S1.  Incidental durotomy noted on the left at L4-5 without CSF leakage.         Indications: Patient is a 59-year-old male with a history of a left L5-S1 discectomy and new symptoms of right L4-5 radiculopathy along with chronic left L5-S1 symptoms.  After failing conservative measures he is brought for the above-mentioned procedure. Please note that Rocío Lundberg NP's assistance was needed for positioning, retraction, suctioning, and closure.     Description of procedure: Patient was brought to the operating room.  General endotracheal anesthesia was induced.  Patient was rolled into the prone position on the Amrik frame and the back was prepped and draped in a sterile fashion.  C-arm fluoroscopy was used to localize the appropriate level.  A midline incision was made and then a right sided exposure performed at L4-5.  The level was confirmed with fluoroscopy and then the microscope was sterilely draped and brought in the field.  The high-speed drill was used to perform a laminotomy and medial facetectomy and then the ligamentum flavum was elevated and resected.  The underlying thecal sac was identified.  The thecal sac and nerve root were retracted medially and the underlying disc herniation identified.  Using a combination of curettes, rongeurs, Kerrison punches the disc material  which was quite calcified was removed from underneath the nerve root.  During the dissection a small durotomy was noted without CSF leakage.  This was reinforced with a small piece of Gelfoam.  Once the nerve was felt to be well decompressed and hemostasis was achieved then the exposure was moved down to the left side at L5 S1 which was noted to be heavily scarred.  The high-speed drill was again used to perform a laminotomy and medial facetectomy and eventually the dura was identified and using careful dissection with curettes and Kerrison punches and the Penfield 4 the nerve root was eventually freed from the scar tissue.  Calcified disc material was then debrided from underneath the disc.  Again once the nerve root was decompressed then hemostasis was achieved.  The fascia was closed with 0 Vicryl.  2-0 Vicryl was used for the subcutaneous layer and a 4-0 subcuticular Monocryl was used for skin with a Dermabond dressing.

## 2024-09-06 ENCOUNTER — TELEPHONE (OUTPATIENT)
Dept: NEUROSURGERY | Facility: CLINIC | Age: 59
End: 2024-09-06
Payer: COMMERCIAL

## 2024-09-06 NOTE — TELEPHONE ENCOUNTER
[facility] Call Center    Phone Message    May a detailed message be left on voicemail: yes     Reason for Call: Other: Kim calling to request a call back to discuss if the fax from Nicole was received yesterday with the updated [facility] paperwork. Kim stated that this is due back to Nicole is due by 9/10/24 and if not received by then, they will deny her request. Please call Kim to discuss at your earliest convenience.     Action Taken: Message routed to:  Other:  [facility]    Travel Screening: Not Applicable     Date of Service:

## 2024-09-09 NOTE — TELEPHONE ENCOUNTER
[facility]    Phone Message    May a detailed message be left on voicemail: yes     Reason for Call: Other: Pt spouse is calling about paperwork for FMLA Kim would like a call back to discuss.Kim wants to make sure that the paperwork was received by fax.  Kim is stating that the paperwork must be in by 9/10/2024. Please call back to discuss.      Action Taken: Message routed to:  Other: [facility]    Travel Screening: Not Applicable     Date of Service:

## 2024-09-10 NOTE — TELEPHONE ENCOUNTER
[facility] Call Center    Phone Message    May a detailed message be left on voicemail: yes     Reason for Call: Other: Patient spouse called in regards to the [place] paperwork stating if was not fully completed. It was refax to be refilled out and sent back to [phone]      Action Taken: Message routed to:  [facility]     Travel Screening: Not Applicable     Date of Service:

## 2024-09-10 NOTE — TELEPHONE ENCOUNTER
Updated spouses FMLA.     Faxed  Updated FMLA to Nicole  September 10, 2024 to fax number [phone] and HIM     Right Fax confirmed at 3:06 PM     Maribel Veliz

## 2024-09-13 NOTE — TELEPHONE ENCOUNTER
[facility] Call Center    Phone Message    May a detailed message be left on voicemail: yes     Reason for Call: Other: Pt is calling and stating that  Nicole  has not received [place] paperwork Please refax.    Action Taken: Message routed to:  Other: [facility]    Travel Screening: Not Applicable     Date of Service:

## 2024-09-13 NOTE — TELEPHONE ENCOUNTER
[facility] Call [facility]    Phone Message    May a detailed message be left on voicemail: yes     Reason for Call: Other: Please refax [facility] paperwork to [phone] and make sure the patients information is on the cover page.      Action Taken: Message routed to:  Other: [facility]     Travel Screening: Not Applicable     Date of Service:

## 2024-09-16 NOTE — TELEPHONE ENCOUNTER
FMLA re-sent to [place] at number provided [phone] via fax.    Right Fax confirmed at 3:34pm on 09.16.24.

## 2024-09-17 NOTE — TELEPHONE ENCOUNTER
[facility] Call Center    Phone Message    May a detailed message be left on voicemail: yes     Reason for Call: Other: Patient is calling and stating ana has not yet received is [facility] paperwork. Please resend it to [phone] and include fax cover with patient information on it. Patient would like a call when the paperwork is sent. Please review      Action Taken: Message routed to:  Other: [facility]     Travel Screening: Not Applicable     Date of Service:

## 2024-09-17 NOTE — TELEPHONE ENCOUNTER
Scanned forms to patients email listed in chart. Called patient to update that it has been emailed.

## 2024-09-17 NOTE — TELEPHONE ENCOUNTER
[facility] Call Center    Phone Message    May a detailed message be left on voicemail: yes     Reason for Call: Other: Patient is calling stating that his FMLA paperwork was not sent. The paperwork that was sent over was his wifes. Please review.     Action Taken: Message routed to:  Other: [facility]     Travel Screening: Not Applicable

## 2024-09-18 ENCOUNTER — ALLIED HEALTH/NURSE VISIT (OUTPATIENT)
Dept: NEUROSURGERY | Facility: CLINIC | Age: 59
End: 2024-09-18
Attending: NEUROLOGICAL SURGERY
Payer: COMMERCIAL

## 2024-09-18 VITALS
HEART RATE: 86 BPM | SYSTOLIC BLOOD PRESSURE: 134 MMHG | RESPIRATION RATE: 18 BRPM | TEMPERATURE: 97.8 F | OXYGEN SATURATION: 98 % | DIASTOLIC BLOOD PRESSURE: 86 MMHG

## 2024-09-18 DIAGNOSIS — Z98.890 S/P SPINAL SURGERY: Primary | ICD-10-CM

## 2024-09-18 PROCEDURE — 99207 PR NO CHARGE NURSE ONLY: CPT

## 2024-09-18 NOTE — PATIENT INSTRUCTIONS
Instructions for Patient    Incision  Keep your incision clean and dry at all times.   It is okay to shower, just pat the incision dry   No submerging incision in water such as pools, hot tubs, or baths for at least 8 weeks and until the incision is healed  Do not apply lotions or ointments to incision    Activity  No lifting greater than 10 pounds. Limited bending, twisting, or overhead reaching.  Avoid bed rest and prolonged sitting for longer than 30 minutes (change positions frequently while awake)  No contact sports or high impact activities such as; running/jogging, snowmobile or 4 [place] riding or any other recreational vehicles until after given clearance at one of your follow up visits    Medications   Refills of pain medication:   Please call the neurosurgery clinic to request 2-3 days before you run out. A nurse will call you back to obtain a pain assessment.   Weaning from narcotic pain medications  When it is time, start weaning by extending the time between doses.   For example, if you're taking 2 tabs every 4 hours, spread it out to 2 tabs over 4.5, 5, 6 hours. At that point you can certainly cut down to 1 tab, then wean to an as needed basis until completely done with them.  Don't take more than 3000mg of Acetaminophen in 24 hours  Ok to begin taking Aspirin and NSAIDs (ex: ibuprofen/Advil)  Encouraged icing for at least 3-4 times throughout the day for 20-30 minutes at a time. Avoid heat to the incision area.   Taking stool softeners regularly can reduce constipation commonly caused by narcotic pain medications.    Contact clinic or Emergency Room if you develop:   Infection (redness, swelling, warmth, drainage, fever over 101 F)  New injury  Bladder or bowel changes or loss of control    Signs of blood clot:  Swelling and/or warmth in one or both legs  Pain or tenderness in your leg, ankle, foot, or arm   Red or discolored skin     Go to the Emergency Room   If sudden onset of severe headache,  weakness, confusion, change in level of consciousness, pain, or loss of movement.  Chest pain  Trouble breathing     Post-operative appointments  6 week and 3 months post-op    [facility]  [facility]  [address] [place] [id number]  Telephone:  [phone]  Fax:  [phone]

## 2024-09-18 NOTE — PROGRESS NOTES
"Post-op Nurse Visit:    Patient seen today per the order of  Logan Bagley MD.   DOS: 9/4/24  Procedure: Open Right Lumbar 4 to Lumbar 5 microdiscectomy, left Lumbar 5 to Sacral 1 redo microdiscectomy     Pain/Neuro Assessment  Continued right low back pain, described as a \"pinching\" pain, continues to go into  RLE. Pain is currently a 6-7/10 in his right side low back. Pain in his left side low back is a 1-2/10.   Numbness/tingling: numbness in LLE(calf) is improving since surgery, 50% back per patient.    Strength: weakness in right foot improving per patient     Pain Relief Measures:  Oxycodone: 1 tablet in AM and 1 tablet in the PM  Tylenol: throughout the day  Zanaflex: as needed, helps him sleep  Ice: using throughout the day   Ibuprofen: alternating throughout the day    Incision   Incision inspected. Edges well-approximated. No redness, swelling, drainage, or warmth noted. Closed with surgical glue, unisolve used to remove small pieces of glue that were hanging off.     Activity  Following restrictions   Falls:  none  Patient is walking frequently without difficulty.   Denies redness, swelling, or warmth to bilateral calves.     GI/  Patient's appetite is normal  Bowel/bladder problems? No  Taking stool softeners? No, not currently needed    Refills/x-rays/return to work  Refills given at this appointment? No, reports he will contact us when he needs a refill.   Sent for x-rays after this appointment? No  Ordered future x-rays? No  Scheduling team notified? N/A  Return to work discussed at this appointment? Yes, planning to RTW 10/7 with restrictions.     All of patient's questions addressed today. Patient was instructed to call with any additional questions/concerns.           "

## 2024-09-20 DIAGNOSIS — E11.9 TYPE 2 DIABETES MELLITUS WITHOUT COMPLICATION, WITHOUT LONG-TERM CURRENT USE OF INSULIN (H): ICD-10-CM

## 2024-09-20 RX ORDER — SEMAGLUTIDE 0.68 MG/ML
INJECTION, SOLUTION SUBCUTANEOUS
Qty: 6 ML | Refills: 0 | Status: SHIPPED | OUTPATIENT
Start: 2024-09-20

## 2024-09-24 NOTE — TELEPHONE ENCOUNTER
Lizbeth from [employer] called.  She needs page 5 #7 updated for frequency.  It needs to at least state 1 time per week and the duration needs to be at least 2 days.  As far as beginning and end dates of episodic flare ups, the beginning date needs to state 9/5 and end date can be whatever Dr. Bagley determines.  She is requesting this updated form be emailed to her.  Any corrections need to be initialed and dated.  Please call with any questions.  Thanks.

## 2024-09-25 ENCOUNTER — TELEPHONE (OUTPATIENT)
Dept: FAMILY MEDICINE | Facility: CLINIC | Age: 59
End: 2024-09-25

## 2024-09-25 ENCOUNTER — OFFICE VISIT (OUTPATIENT)
Dept: FAMILY MEDICINE | Facility: CLINIC | Age: 59
End: 2024-09-25
Attending: FAMILY MEDICINE
Payer: COMMERCIAL

## 2024-09-25 VITALS
RESPIRATION RATE: 12 BRPM | BODY MASS INDEX: 25.62 KG/M2 | OXYGEN SATURATION: 99 % | SYSTOLIC BLOOD PRESSURE: 124 MMHG | WEIGHT: 179 LBS | HEIGHT: 70 IN | DIASTOLIC BLOOD PRESSURE: 80 MMHG | HEART RATE: 84 BPM | TEMPERATURE: 97.8 F

## 2024-09-25 DIAGNOSIS — M54.16 LUMBAR RADICULOPATHY: ICD-10-CM

## 2024-09-25 DIAGNOSIS — E11.9 TYPE 2 DIABETES MELLITUS WITHOUT COMPLICATION, WITHOUT LONG-TERM CURRENT USE OF INSULIN (H): ICD-10-CM

## 2024-09-25 DIAGNOSIS — G47.01 INSOMNIA DUE TO MEDICAL CONDITION: ICD-10-CM

## 2024-09-25 DIAGNOSIS — F17.200 NICOTINE DEPENDENCE, UNCOMPLICATED, UNSPECIFIED NICOTINE PRODUCT TYPE: ICD-10-CM

## 2024-09-25 DIAGNOSIS — I10 HYPERTENSION GOAL BP (BLOOD PRESSURE) < 140/90: ICD-10-CM

## 2024-09-25 DIAGNOSIS — F33.0 MAJOR DEPRESSIVE DISORDER, RECURRENT EPISODE, MILD (H): ICD-10-CM

## 2024-09-25 DIAGNOSIS — Z23 NEEDS FLU SHOT: ICD-10-CM

## 2024-09-25 DIAGNOSIS — F40.243 FEAR OF FLYING: ICD-10-CM

## 2024-09-25 DIAGNOSIS — Z00.00 ROUTINE GENERAL MEDICAL EXAMINATION AT A HEALTH CARE FACILITY: Primary | ICD-10-CM

## 2024-09-25 DIAGNOSIS — K21.9 GASTROESOPHAGEAL REFLUX DISEASE WITHOUT ESOPHAGITIS: ICD-10-CM

## 2024-09-25 DIAGNOSIS — Z23 NEED FOR COVID-19 VACCINE: ICD-10-CM

## 2024-09-25 PROCEDURE — 90471 IMMUNIZATION ADMIN: CPT | Performed by: FAMILY MEDICINE

## 2024-09-25 PROCEDURE — 91320 SARSCV2 VAC 30MCG TRS-SUC IM: CPT | Performed by: FAMILY MEDICINE

## 2024-09-25 PROCEDURE — 90480 ADMN SARSCOV2 VAC 1/ONLY CMP: CPT | Performed by: FAMILY MEDICINE

## 2024-09-25 PROCEDURE — 99214 OFFICE O/P EST MOD 30 MIN: CPT | Mod: 25 | Performed by: FAMILY MEDICINE

## 2024-09-25 PROCEDURE — 90673 RIV3 VACCINE NO PRESERV IM: CPT | Performed by: FAMILY MEDICINE

## 2024-09-25 PROCEDURE — 99396 PREV VISIT EST AGE 40-64: CPT | Mod: 25 | Performed by: FAMILY MEDICINE

## 2024-09-25 RX ORDER — PANTOPRAZOLE SODIUM 40 MG/1
40 TABLET, DELAYED RELEASE ORAL DAILY
Qty: 90 TABLET | Refills: 1 | Status: SHIPPED | OUTPATIENT
Start: 2024-09-25

## 2024-09-25 RX ORDER — LIDOCAINE 50 MG/G
1 PATCH TOPICAL EVERY 24 HOURS
Qty: 14 PATCH | Refills: 1 | Status: SHIPPED | OUTPATIENT
Start: 2024-09-25

## 2024-09-25 RX ORDER — TRAZODONE HYDROCHLORIDE 50 MG/1
50 TABLET, FILM COATED ORAL AT BEDTIME
Qty: 30 TABLET | Refills: 1 | Status: SHIPPED | OUTPATIENT
Start: 2024-09-25

## 2024-09-25 RX ORDER — IRBESARTAN 150 MG/1
150 TABLET ORAL DAILY
Qty: 90 TABLET | Refills: 1 | Status: SHIPPED | OUTPATIENT
Start: 2024-09-25

## 2024-09-25 RX ORDER — METFORMIN HCL 500 MG
500 TABLET, EXTENDED RELEASE 24 HR ORAL
Qty: 90 TABLET | Refills: 1 | Status: SHIPPED | OUTPATIENT
Start: 2024-09-25

## 2024-09-25 RX ORDER — LORAZEPAM 0.5 MG/1
0.5 TABLET ORAL EVERY 6 HOURS PRN
Qty: 10 TABLET | Refills: 0 | Status: SHIPPED | OUTPATIENT
Start: 2024-09-25

## 2024-09-25 ASSESSMENT — PAIN SCALES - GENERAL: PAINLEVEL: NO PAIN (0)

## 2024-09-25 NOTE — PATIENT INSTRUCTIONS
Quitting Tobacco: Care Instructions  Quitting tobacco is much harder than simply changing a habit. Nicotine cravings make it hard to quit, but you can do it. Your doctor will help you set up the plan that best meets your needs.    You will miss the nicotine at first. You may feel short-tempered and grumpy. You may have trouble sleeping or thinking clearly. The urge to use tobacco may continue for a time.    Combining tools can raise your chances of success. You can use medicine along with counseling. And you can join a quit-tobacco program, such as the American Lung Association's Freedom from Smoking program.    Get support.  Reach out to family and friends, and find a counselor to help you quit. Join a support group, such as Nicotine Anonymous. Go to www.smokefree.gov to sign up for text messaging support.     Talk to your doctor or pharmacist about medicines that can help you quit.  Medicines can help with cravings and withdrawal symptoms. There are several over-the-counter choices, such as nicotine patches, gum, and lozenges.     After you quit, do not use tobacco again, not even once.  Get rid of all tobacco products and anything that reminds you of tobacco, such as ashtrays.     Avoid things that make you reach for tobacco.  Change your daily routine. Take a different route to work, or eat a meal in a different place.     Try to cut down on stress.  Find ways to calm yourself, such as taking a hot bath or doing deep breathing exercises.     Eat a healthy diet, and get regular exercise.  Having healthy habits may help you quit using tobacco.     Don't give up on quitting if you use tobacco again.  Most people quit and restart a few times before they quit for good.   Follow-up care is a key part of your treatment and safety. Be sure to make and go to all appointments, and call your doctor if you are having problems. It's also a good idea to know your test results and keep a list of the medicines you take.  Where  "can you learn more?  Go to https://www."[employer].net/patiented  Enter Y522 in the search box to learn more about \"Quitting Tobacco: Care Instructions.\"  Current as of: November 15, 2023               Content Version: 14.0    7776-4652 [employer].   Care instructions adapted under license by your healthcare professional. If you have questions about a medical condition or this instruction, always ask your healthcare professional. [employer] disclaims any warranty or liability for your use of this information.      Patient Education   Preventive Care Advice   This is general advice given by our system to help you stay healthy. However, your care team may have specific advice just for you. Please talk to your care team about your preventive care needs.  Nutrition  Eat 5 or more servings of fruits and vegetables each day.  Try wheat bread, brown rice and whole grain pasta (instead of white bread, rice, and pasta).  Get enough calcium and vitamin D. Check the label on foods and aim for 100% of the RDA (recommended daily allowance).  Lifestyle  Exercise at least 150 minutes each week  (30 minutes a day, 5 days a week).  Do muscle strengthening activities 2 days a week. These help control your weight and prevent disease.  No smoking.  Wear sunscreen to prevent skin cancer.  Have a dental exam and cleaning every 6 months.  Yearly exams  See your health care team every year to talk about:  Any changes in your health.  Any medicines your care team has prescribed.  Preventive care, family planning, and ways to prevent chronic diseases.  Shots (vaccines)   HPV shots (up to age 26), if you've never had them before.  Hepatitis B shots (up to age 59), if you've never had them before.  COVID-19 shot: Get this shot when it's due.  Flu shot: Get a flu shot every year.  Tetanus shot: Get a tetanus shot every 10 years.  Pneumococcal, hepatitis A, and RSV shots: Ask your care team if you need these based on " your risk.  Shingles shot (for age 50 and up)  General health tests  Diabetes screening:  Starting at age 35, Get screened for diabetes at least every 3 years.  If you are younger than age 35, ask your care team if you should be screened for diabetes.  Cholesterol test: At age 39, start having a cholesterol test every 5 years, or more often if advised.  Bone density scan (DEXA): At age 50, ask your care team if you should have this scan for osteoporosis (brittle bones).  Hepatitis C: Get tested at least once in your life.  STIs (sexually transmitted infections)  Before age 24: Ask your care team if you should be screened for STIs.  After age 24: Get screened for STIs if you're at risk. You are at risk for STIs (including HIV) if:  You are sexually active with more than one person.  You don't use condoms every time.  You or a partner was diagnosed with a sexually transmitted infection.  If you are at risk for HIV, ask about PrEP medicine to prevent HIV.  Get tested for HIV at least once in your life, whether you are at risk for HIV or not.  Cancer screening tests  Cervical cancer screening: If you have a cervix, begin getting regular cervical cancer screening tests starting at age 21.  Breast cancer scan (mammogram): If you've ever had breasts, begin having regular mammograms starting at age 40. This is a scan to check for breast cancer.  Colon cancer screening: It is important to start screening for colon cancer at age 45.  Have a colonoscopy test every 10 years (or more often if you're at risk) Or, ask your provider about stool tests like a FIT test every year or Cologuard test every 3 years.  To learn more about your testing options, visit:   .  For help making a decision, visit:   https://bit.ly/cj59299.  Prostate cancer screening test: If you have a prostate, ask your care team if a prostate cancer screening test (PSA) at age 55 is right for you.  Lung cancer screening: If you are a current or former smoker ages  50 to 80, ask your care team if ongoing lung cancer screenings are right for you.  For informational purposes only. Not to replace the advice of your health care provider. Copyright   2023 [facility]. All rights reserved. Clinically reviewed by the [facility]itions Program. [employer] 204231 - REV 01/24.  Substance Use Disorder: Care Instructions  Overview     You can improve your life and health by stopping your use of alcohol or drugs. When you don't drink or use drugs, you may feel and sleep better. You may get along better with your family, friends, and coworkers. There are medicines and programs that can help with substance use disorder.  How can you care for yourself at home?  Here are some ways to help you stay sober and prevent relapse.  If you have been given medicine to help keep you sober or reduce your cravings, be sure to take it exactly as prescribed.  Talk to your doctor about programs that can help you stop using drugs or drinking alcohol.  Do not keep alcohol or drugs in your home.  Plan ahead. Think about what you'll say if other people ask you to drink or use drugs. Try not to spend time with people who drink or use drugs.  Use the time and money spent on drinking or drugs to do something that's important to you.  Preventing a relapse  Have a plan to deal with relapse. Learn to recognize changes in your thinking that lead you to drink or use drugs. Get help before you start to drink or use drugs again.  Try to stay away from situations, friends, or places that may lead you to drink or use drugs.  If you feel the need to drink alcohol or use drugs again, seek help right away. Call a trusted friend or family member. Some people get support from organizations such as Narcotics Anonymous or [employer] or from treatment facilities.  If you relapse, get help as soon as you can. Some people make a plan with another person that outlines what they want that person to do for  them if they relapse. The plan usually includes how to handle the relapse and who to notify in case of relapse.  Don't give up. Remember that a relapse doesn't mean that you have failed. Use the experience to learn the triggers that lead you to drink or use drugs. Then quit again. Recovery is a lifelong process. Many people have several relapses before they are able to quit for good.  Follow-up care is a key part of your treatment and safety. Be sure to make and go to all appointments, and call your doctor if you are having problems. It's also a good idea to know your test results and keep a list of the medicines you take.  When should you call for help?   Call 911  anytime you think you may need emergency care. For example, call if you or someone else:    Has overdosed or has withdrawal signs. Be sure to tell the emergency workers that you are or someone else is using or trying to quit using drugs. Overdose or withdrawal signs may include:  Losing consciousness.  Seizure.  Seeing or hearing things that aren't there (hallucinations).     Is thinking or talking about suicide or harming others.   Where to get help 24 hours a day, 7 days a week   If you or someone you know talks about suicide, self-harm, a mental health crisis, a substance use crisis, or any other kind of emotional distress, get help right away. You can:    Call the Suicide and Crisis Lifeline at 763.     Call 7-286-647-TALK (1-871.160.9721).     Text HOME to 174498 to access the Crisis Text Line.   Consider saving these numbers in your phone.  Go to [email or web address] [email or web address] for more information or to chat online.  Call your doctor now or seek immediate medical care if:    You are having withdrawal symptoms. These may include nausea or vomiting, sweating, shakiness, and anxiety.   Watch closely for changes in your health, and be sure to contact your doctor if:    You have a relapse.     You need more help or support to stop.   Where can you learn more?  Go to  "https://www.[employer].net/patiented  Enter H573 in the search box to learn more about \"Substance Use Disorder: Care Instructions.\"  Current as of: November 15, 2023               Content Version: 14.0    1705-8644 [employer].   Care instructions adapted under license by your healthcare professional. If you have questions about a medical condition or this instruction, always ask your healthcare professional. [employer] disclaims any warranty or liability for your use of this information.         "

## 2024-09-25 NOTE — TELEPHONE ENCOUNTER
Prior Authorization Retail Medication Request    Medication/Dose: Lidocaine 5% Patches  Diagnosis and ICD code (if different than what is on RX):    New/renewal/insurance change PA/secondary ins. PA:  Previously Tried and Failed:    Rationale:      Insurance   Primary: [employer]  Insurance ID:  [id number]     Secondary (if applicable):  Insurance ID:      Pharmacy Information (if different than what is on RX)  Name:  [facility]  Phone:  [phone]  Fax:[phone]    Clinic Information  Preferred routing pool for dept communication:

## 2024-09-25 NOTE — PROGRESS NOTES
Preventive Care Visit  [facility]  Vamshi Clarke Jr, MD, Family Medicine  Sep 25, 2024      Assessment & Plan     Routine general medical examination at a health care facility  For the most part, is up-to-date on all his preventative healthcare needs.  Encourage smoking cessation as outlined below as part of the biggest lifestyle change she could employ.  Follow-up in 1 year.    Type 2 diabetes mellitus without complication, without long-term current use of insulin (H)  Excellent glycemic control on metformin  mg twice daily as well as semaglutide.  Because his A1c is under 6, we will decrease his metformin to just 500 mg once daily.  Encouraged smoking cessation.  Recheck in 6 months.  - metFORMIN (GLUCOPHAGE XR) 500 MG 24 hr tablet; Take 1 tablet (500 mg) by mouth daily (with dinner).    Hypertension goal BP (blood pressure) < 140/90  Blood pressure is at goal.  He is tolerating his angiotensin receptor blocker without side effects.  Encouraged smoking cessation.  - irbesartan (AVAPRO) 150 MG tablet; Take 1 tablet (150 mg) by mouth daily.    Gastroesophageal reflux disease without esophagitis  Advised his GERD symptoms would likely significantly improve with cessation of tobacco.  Encouraged this again today.  - pantoprazole (PROTONIX) 40 MG EC tablet; Take 1 tablet (40 mg) by mouth daily.    Insomnia due to medical condition  Trial of trazodone to see if this helps with some of his insomnia.  I advised him that his oxycodone is likely a significant contributor to this.  Encouraged him to taper off of his oxycodone.  - traZODone (DESYREL) 50 MG tablet; Take 1 tablet (50 mg) by mouth at bedtime.    Nicotine dependence, uncomplicated, unspecified nicotine product type  He would like to do another CT scan of his chest to screen for lung cancer as he has had in the past.  Again, he is motivated to quit smoking but would like to do so cold turkey,  After having negative experiences  with pharmaceutical interventions in the past.  Advised that smoking cessation would dramatically reduce his GERD symptoms, improve his blood pressure and speed his surgical healing.  - CT Chest Lung Cancer Screen Low Dose Without; Future  - [place] Quit Partner Referral; Future  - SMOKING CESSATION COUNSELING 3-10 MIN    Fear of flying  Refill was given of his lorazepam.  I reviewed at length the risk of respiratory depression and combining benzodiazepines with opioids.  Because of this I am giving a prescription for naloxone as well.  I have advised Saran that he should not have any opioids within 24 hours of taking a dose of lorazepam.  He advises me he anticipates being off of his oxycodone in the next 4 weeks or so, if not sooner.  - LORazepam (ATIVAN) 0.5 MG tablet; Take 1 tablet (0.5 mg) by mouth every 6 hours as needed for anxiety.  - naloxone (NARCAN) 4 MG/0.1ML nasal spray; Spray 1 spray (4 mg) into one nostril alternating nostrils as needed for opioid reversal. every 2-3 minutes until assistance arrives    Major depressive disorder, recurrent episode, mild (H24)  Sertraline is renewed today.  - sertraline (ZOLOFT) 50 MG tablet; Take 1 tablet (50 mg) by mouth daily at 2 pm.    Lumbar radiculopathy  His surgeon Dr. Bagley is managing his opioid prescriptions at the present time.  Lidocaine prescription for patches given as outlined below.  Continue on his gabapentin.  He can certainly augment this with Tylenol as well.  Again, I reviewed at length the risk of respiratory depression with benzodiazepine and opioid use concurrently.  Naloxone prescription is written as above.  I have advised Saran that if he is going to take a benzodiazepine that he should not have any oxycodone within 24 hours of that.  Saran advises me he anticipates being off of oxycodone within the next 4 weeks, possibly sooner.  Of note, Saran has a history of prolonged opioid use several years ago which she stopped on his own.  I advised him today  "that it appears to me he was much healthier off of opioids been on them and I encouraged him to taper off of his present oxycodone as quickly as he felt possible.  - lidocaine (LIDODERM) 5 % patch; Place 1 patch over 12 hours onto the skin every 24 hours. To prevent lidocaine toxicity, patient should be patch free for 12 hrs daily.    Need for COVID-19 vaccine  Updated today.  - COVID-19 12+ (PFIZER)    Needs flu shot  Updated today.  - INFLUENZA VACCINE TRIVALENT(FLUBLOK)            BMI  Estimated body mass index is 25.87 kg/m  as calculated from the following:    Height as of this encounter: 1.772 m (5' 9.75\").    Weight as of this encounter: 81.2 kg (179 lb).       Counseling  Appropriate preventive services were addressed with this patient via screening, questionnaire, or discussion as appropriate for fall prevention, nutrition, physical activity, Tobacco-use cessation, social engagement, weight loss and cognition.  Checklist reviewing preventive services available has been given to the patient.  Reviewed patient's diet, addressing concerns and/or questions.   Patient is at risk for social isolation and has been provided with information about the benefit of social connection.   He is at risk for psychosocial distress and has been provided with information to reduce risk.           Franklyn Noonan is a 59 year old, presenting for the following:  Physical  He advises me that he has had 3 surgeries this year including lumbar discectomy as well as eye cataracts.  He reports the cataract surgery has worked very well.  He continues to recover from his discectomy surgery that was done about 3 weeks ago.  He is on oxycodone 5 mg daily that he takes every morning and occasionally takes at suppertime.  He is also treating his pain with gabapentin.  He has not found tizanidine to be terribly effective and notes that his sleep is disrupted.  He wonders what he can take for that.  He tried some Tylenol PM which she " reports left him a little groggy in the morning so he preferred not to take that.    He will be flying to [facility] where he will spend a month with his 86-year-old mother who is in failing health.  He requests a prescription for benzodiazepine for the flight for this.  Also requests a lidocaine patch that may be a little bit stronger than what is available over-the-counter to help with his pain.    He continues to smoke a small amount and is interested in quitting.  He has tried both Chantix and Wellbutrin in the past, neither of which he found terribly effective.  He also did not find nicotine gum or patches did much for him.  He had no success simply with quitting cold turkey and that his his plan this time around as well.        9/25/2024     9:02 AM   Additional Questions   Roomed by TERA CRUZ   Accompanied by SELF      History of Present Illness       Back Pain:  He presents for follow up of back pain. Patient's back pain is a chronic problem.  Location of back pain:  Right lower back and left lower back  Description of back pain: cramping, dull ache, sharp and stabbing  Back pain spreads: right thigh and left foot    Since patient first noticed back pain, pain is: always present, but gets better and worse  Does back pain interfere with his job:  Yes       Diabetes:   He presents for follow up of diabetes.  He is checking home blood glucose a few times a month.   He checks blood glucose after meals.  Blood glucose is never over 200 and never under 70. He is aware of hypoglycemia symptoms including dizziness.    He has no concerns regarding his diabetes at this time.  He is having burning in feet.            He eats 0-1 servings of fruits and vegetables daily.He consumes 0 sweetened beverage(s) daily.He exercises with enough effort to increase his heart rate 10 to 19 minutes per day.  He exercises with enough effort to increase his heart rate 4 days per week.   He is taking medications regularly.    About 3 weeks  kelli Noonan underwent a open right lumbar 4 to lumbar 5 microdiscectomy as well as a left lumbar 5 to sacral 1 redo microdiscectomy.  Because of the pain associated with his lumbar radiculopathy he is back on oxycodone.     Diabetes Follow-up    How often are you checking your blood sugar? A few times a month  Have you had any blood sugars above 200?  No  Have you had any blood sugars below 70?  No  What symptoms do you notice when your blood sugar is low?  None   Do you have any of these symptoms? (Select all that apply)  Numbness in feet and Burning in feet          Hyperlipidemia Follow-Up    Are you regularly taking any medication or supplement to lower your cholesterol?   Yes- Zocor 10 mg   Are you having muscle aches or other side effects that you think could be caused by your cholesterol lowering medication?  No    Hypertension Follow-up    Do you check your blood pressure regularly outside of the clinic? Yes   Are you following a low salt diet? Yes  Are your blood pressures ever more than 140 on the top number (systolic) OR more   than 90 on the bottom number (diastolic), for example 140/90? No    BP Readings from Last 2 Encounters:   09/25/24 124/80   09/18/24 134/86     Hemoglobin A1C (%)   Date Value   08/28/2024 5.8 (H)   03/20/2024 6.2 (H)   05/26/2021 8.4 (H)   11/25/2020 8.4 (H)     LDL Cholesterol Calculated (mg/dL)   Date Value   03/20/2024 64   03/15/2023 64   11/25/2020 71   09/16/2019 71           9/25/2024   General Health   How would you rate your overall physical health? (!) FAIR   Feel stress (tense, anxious, or unable to sleep) Only a little      (!) STRESS CONCERN      9/25/2024   Nutrition   Three or more servings of calcium each day? Yes   Diet: Diabetic   How many servings of fruit and vegetables per day? (!) 0-1   How many sweetened beverages each day? 0-1            9/25/2024   Exercise   Days per week of moderate/strenous exercise 4 days            9/25/2024   Social Factors   Frequency  of gathering with friends or relatives Never   Worry food won't last until get money to buy more No   Food not last or not have enough money for food? No   Do you have housing? (Housing is defined as stable permanent housing and does not include staying ouside in a car, in a tent, in an abandoned building, in an overnight shelter, or couch-surfing.) Yes   Are you worried about losing your housing? No   Lack of transportation? No   Unable to get utilities (heat,electricity)? No      (!) SOCIAL CONNECTIONS CONCERN      9/25/2024   Fall Risk   Fallen 2 or more times in the past year? No   Trouble with walking or balance? No             9/25/2024   Dental   Dentist two times every year? Yes            9/25/2024   TB Screening   Were you born outside of the [place]? Yes            Today's PHQ-9 Score:       8/28/2024    10:31 AM   PHQ-9 SCORE   PHQ-9 Total Score MyChart 4 (Minimal depression)   PHQ-9 Total Score 4           9/25/2024   Substance Use   Alcohol more than 3/day or more than 7/wk No   Do you use any other substances recreationally? (!) M30/OXY/FENTANYL PILLS        Social History     Tobacco Use    Smoking status: Every Day     Current packs/day: 0.50     Average packs/day: 0.5 packs/day for 44.5 years (22.3 ttl pk-yrs)     Types: Cigarettes     Start date: 3/14/1980    Smokeless tobacco: Never    Tobacco comments:     Quit for quitting.   Vaping Use    Vaping status: Every Day   Substance Use Topics    Alcohol use: Not Currently     Comment: 1 bottles of beer per month.    Drug use: No           9/25/2024   STI Screening   New sexual partner(s) since last STI/HIV test? No      Last PSA:   Prostate Specific Antigen Screen   Date Value Ref Range Status   03/20/2024 0.52 0.00 - 3.50 ng/mL Final   02/09/2022 0.41 0.00 - 4.00 ug/L Final     ASCVD Risk   The 10-year ASCVD risk score (Barney THOMAS, et al., 2019) is: 19%    Values used to calculate the score:      Age: 59 years      Sex: Male      Is Non-  ": No      Diabetic: Yes      Tobacco smoker: Yes      Systolic Blood Pressure: 124 mmHg      Is BP treated: Yes      HDL Cholesterol: 45 mg/dL      Total Cholesterol: 140 mg/dL           Reviewed and updated as needed this visit by Provider                             Objective    Exam  /80   Pulse 84   Temp 97.8  F (36.6  C) (Tympanic)   Resp 12   Ht 1.772 m (5' 9.75\")   Wt 81.2 kg (179 lb)   SpO2 99%   BMI 25.87 kg/m     Estimated body mass index is 25.87 kg/m  as calculated from the following:    Height as of this encounter: 1.772 m (5' 9.75\").    Weight as of this encounter: 81.2 kg (179 lb).    Physical Exam  GENERAL: alert and no distress  Diabetic foot exam: normal DP and PT pulses, no trophic changes or ulcerative lesions, normal sensory exam, and normal monofilament exam, except for findings noted on diabetic foot graphical image.                  Signed Electronically by: Vamshi Clarke Jr, MD    "

## 2024-09-26 ENCOUNTER — DOCUMENTATION ONLY (OUTPATIENT)
Dept: NEUROSURGERY | Facility: CLINIC | Age: 59
End: 2024-09-26
Payer: COMMERCIAL

## 2024-09-27 NOTE — PROGRESS NOTES
Faxed Form September 27, 2024 to fax number [phone] (inessa) [phone] ([facility])    Right Fax confirmed at [phone]    Lety Loja RN     Returned to CSS

## 2024-09-28 NOTE — TELEPHONE ENCOUNTER
Retail Pharmacy Prior Authorization Team   Phone: [phone]    PA Initiation    Medication: LIDOCAINE 5 % EX PTCH  Insurance Company: [employer] Non-Specialty PA's - Phone [phone] Fax [phone]  Pharmacy Filling the Rx: [id number] - [place], [place] - [address] [facility] [address]   Filling Pharmacy Phone:    Filling Pharmacy Fax:    Start Date: 9/28/2024

## 2024-10-01 NOTE — TELEPHONE ENCOUNTER
PRIOR AUTHORIZATION DENIED    Medication: LIDOCAINE 5 % EX PTCH  Insurance Company: [employer] Non-Specialty PA's - Phone [phone] Fax [phone]  Denial Date: 9/30/2024  Denial Reason(s):           Appeal Information:         Patient Notified: No

## 2024-10-02 ENCOUNTER — MYC REFILL (OUTPATIENT)
Dept: FAMILY MEDICINE | Facility: CLINIC | Age: 59
End: 2024-10-02
Payer: COMMERCIAL

## 2024-10-02 DIAGNOSIS — M54.16 LUMBAR RADICULOPATHY: ICD-10-CM

## 2024-10-02 RX ORDER — OXYCODONE HYDROCHLORIDE 5 MG/1
5 TABLET ORAL EVERY 6 HOURS PRN
Qty: 40 TABLET | Refills: 0 | Status: SHIPPED | OUTPATIENT
Start: 2024-10-03

## 2024-10-02 NOTE — TELEPHONE ENCOUNTER
Patient requesting refill of oxycodone.     DOS: 9/4/24   Surgeon:Dr. Bagley   Procedure:Open Right Lumbar 4 to Lumbar 5 microdiscectomy, left Lumbar 5 to Sacral 1 redo microdiscectomy   Weeks Post op:4 weeks   Last refilled: 9/4 #40, dated for 10/3 pickup    Pain assessment completed via [employer]. Please see encounter for further details. Refill request will be forwarded to care team.

## 2024-10-03 ENCOUNTER — TELEPHONE (OUTPATIENT)
Dept: FAMILY MEDICINE | Facility: CLINIC | Age: 59
End: 2024-10-03
Payer: COMMERCIAL

## 2024-10-03 NOTE — TELEPHONE ENCOUNTER
FYI - Status Update    Who is Calling: patient    Update: Patient was informed that a Pre-Authorization is needed to be placed to the insurance company in order for the CT scan to be covered appropriately    Does caller want a call/response back: Yes     Could we send this information to you in [employer] or would you prefer to receive a phone call?:   No preference   Okay to leave a detailed message?: Yes at Cell number on file:    Telephone Information:   Mobile [phone]

## 2024-10-03 NOTE — TELEPHONE ENCOUNTER
The authorization is in process when the orders are placed.  They are pending, at this time.  Pt can call and the appt will not be made till the insurance approves

## 2024-10-11 ENCOUNTER — MYC MEDICAL ADVICE (OUTPATIENT)
Dept: FAMILY MEDICINE | Facility: CLINIC | Age: 59
End: 2024-10-11
Payer: COMMERCIAL

## 2024-10-11 ENCOUNTER — TELEPHONE (OUTPATIENT)
Dept: FAMILY MEDICINE | Facility: CLINIC | Age: 59
End: 2024-10-11
Payer: COMMERCIAL

## 2024-10-11 NOTE — TELEPHONE ENCOUNTER
Sent mychart to patient to fill out LEATHA. The employer is request all records related to the lumbar issue, including xrays and more. We need LEATHA for this. Left paperwork in Dr. Clarke outgoing basket so it doesn't get lost    Andreina

## 2024-10-11 NOTE — TELEPHONE ENCOUNTER
Forms/Letter Request    Type of form/letter: Disability      Is Release of Information needed?: Yes  Was an LEATHA obtained?  No    Do we have the form/letter: Yes: in PCP basket    Who is the form from? Patient    Where did/will the form come from? Patient or family brought in       When is form/letter needed by: asap    How would you like the form/letter returned:  need to ask pt, LEATHA needed as they want records    Patient Notified form requests are processed in 5-7 business days:Yes    Could we send this information to you in [employer] or would you prefer to receive a phone call?:   Patient would like to be contacted via [employer]

## 2024-10-15 DIAGNOSIS — M54.16 LUMBAR RADICULOPATHY: ICD-10-CM

## 2024-10-15 DIAGNOSIS — M48.061 SPINAL STENOSIS OF LUMBAR REGION, UNSPECIFIED WHETHER NEUROGENIC CLAUDICATION PRESENT: ICD-10-CM

## 2024-10-15 RX ORDER — GABAPENTIN 400 MG/1
CAPSULE ORAL
Qty: 90 CAPSULE | Refills: 2 | Status: SHIPPED | OUTPATIENT
Start: 2024-10-15

## 2024-10-23 NOTE — TELEPHONE ENCOUNTER
Form was completed by Dr Clarke today and sent attached to a My Chart message to patient    Also sent to hims  And filed in the blue folder

## 2024-11-19 NOTE — RESULT ENCOUNTER NOTE
Mr. Wallis,    As you've seen, your MRI shows a disc herniation between L4 and L5 that is pinching the right side of the spinal cord at that level.  I think this could be contributing to your back and leg pain.  I've requested an epidural steroid injection from our pain providers at the [facility] in [place] just now.  Someone should be calling you to schedule this.    If you have further questions about the interpretation of your labs, [email or web address] is a good website to check out for further information.    Please contact the clinic if you have additional questions.  Thank you.    Sincerely,    Vamshi Clarke MD     Referral from: Dr. Nicolle Hernandez for Moderate AS    Patient called on 11/19/2024.    Left message requesting call back to discuss referral.       First attempt

## 2024-11-27 ENCOUNTER — OFFICE VISIT (OUTPATIENT)
Dept: FAMILY MEDICINE | Facility: CLINIC | Age: 59
End: 2024-11-27
Payer: COMMERCIAL

## 2024-11-27 VITALS
HEART RATE: 94 BPM | SYSTOLIC BLOOD PRESSURE: 124 MMHG | DIASTOLIC BLOOD PRESSURE: 74 MMHG | OXYGEN SATURATION: 99 % | RESPIRATION RATE: 14 BRPM | BODY MASS INDEX: 26.88 KG/M2 | TEMPERATURE: 97.6 F | WEIGHT: 186 LBS

## 2024-11-27 DIAGNOSIS — R11.0 NAUSEA: ICD-10-CM

## 2024-11-27 DIAGNOSIS — F33.0 MAJOR DEPRESSIVE DISORDER, RECURRENT EPISODE, MILD (H): Primary | ICD-10-CM

## 2024-11-27 DIAGNOSIS — E11.9 TYPE 2 DIABETES MELLITUS WITHOUT COMPLICATION, WITHOUT LONG-TERM CURRENT USE OF INSULIN (H): ICD-10-CM

## 2024-11-27 DIAGNOSIS — M54.2 NECK PAIN: ICD-10-CM

## 2024-11-27 LAB
EST. AVERAGE GLUCOSE BLD GHB EST-MCNC: 126 MG/DL
HBA1C MFR BLD: 6 % (ref 0–5.6)

## 2024-11-27 PROCEDURE — 96127 BRIEF EMOTIONAL/BEHAV ASSMT: CPT | Performed by: FAMILY MEDICINE

## 2024-11-27 PROCEDURE — 83036 HEMOGLOBIN GLYCOSYLATED A1C: CPT | Performed by: FAMILY MEDICINE

## 2024-11-27 PROCEDURE — G2211 COMPLEX E/M VISIT ADD ON: HCPCS | Performed by: FAMILY MEDICINE

## 2024-11-27 PROCEDURE — 36415 COLL VENOUS BLD VENIPUNCTURE: CPT | Performed by: FAMILY MEDICINE

## 2024-11-27 PROCEDURE — 99215 OFFICE O/P EST HI 40 MIN: CPT | Performed by: FAMILY MEDICINE

## 2024-11-27 PROCEDURE — 99417 PROLNG OP E/M EACH 15 MIN: CPT | Performed by: FAMILY MEDICINE

## 2024-11-27 RX ORDER — SERTRALINE HYDROCHLORIDE 100 MG/1
100 TABLET, FILM COATED ORAL
Qty: 90 TABLET | Refills: 1 | Status: SHIPPED | OUTPATIENT
Start: 2024-11-27

## 2024-11-27 RX ORDER — CYCLOBENZAPRINE HCL 10 MG
10 TABLET ORAL 3 TIMES DAILY PRN
Qty: 30 TABLET | Refills: 1 | Status: SHIPPED | OUTPATIENT
Start: 2024-11-27

## 2024-11-27 ASSESSMENT — ANXIETY QUESTIONNAIRES
7. FEELING AFRAID AS IF SOMETHING AWFUL MIGHT HAPPEN: SEVERAL DAYS
4. TROUBLE RELAXING: SEVERAL DAYS
2. NOT BEING ABLE TO STOP OR CONTROL WORRYING: NOT AT ALL
GAD7 TOTAL SCORE: 5
8. IF YOU CHECKED OFF ANY PROBLEMS, HOW DIFFICULT HAVE THESE MADE IT FOR YOU TO DO YOUR WORK, TAKE CARE OF THINGS AT HOME, OR GET ALONG WITH OTHER PEOPLE?: NOT DIFFICULT AT ALL
IF YOU CHECKED OFF ANY PROBLEMS ON THIS QUESTIONNAIRE, HOW DIFFICULT HAVE THESE PROBLEMS MADE IT FOR YOU TO DO YOUR WORK, TAKE CARE OF THINGS AT HOME, OR GET ALONG WITH OTHER PEOPLE: NOT DIFFICULT AT ALL
GAD7 TOTAL SCORE: 5
1. FEELING NERVOUS, ANXIOUS, OR ON EDGE: SEVERAL DAYS
5. BEING SO RESTLESS THAT IT IS HARD TO SIT STILL: SEVERAL DAYS
GAD7 TOTAL SCORE: 5
6. BECOMING EASILY ANNOYED OR IRRITABLE: SEVERAL DAYS
7. FEELING AFRAID AS IF SOMETHING AWFUL MIGHT HAPPEN: SEVERAL DAYS
3. WORRYING TOO MUCH ABOUT DIFFERENT THINGS: NOT AT ALL

## 2024-11-27 ASSESSMENT — PAIN SCALES - GENERAL: PAINLEVEL_OUTOF10: SEVERE PAIN (6)

## 2024-11-27 ASSESSMENT — PATIENT HEALTH QUESTIONNAIRE - PHQ9
SUM OF ALL RESPONSES TO PHQ QUESTIONS 1-9: 10
10. IF YOU CHECKED OFF ANY PROBLEMS, HOW DIFFICULT HAVE THESE PROBLEMS MADE IT FOR YOU TO DO YOUR WORK, TAKE CARE OF THINGS AT HOME, OR GET ALONG WITH OTHER PEOPLE: SOMEWHAT DIFFICULT
SUM OF ALL RESPONSES TO PHQ QUESTIONS 1-9: 10

## 2024-11-27 NOTE — Clinical Note
Work form in my inbasket on my desk.  Please fax to [facility] in [place], [place].  Phone is [phone].  Fax might be [phone].  Contact in Human Resources is Rose Delaney.

## 2024-11-27 NOTE — PATIENT INSTRUCTIONS
Increase pantoprozole to 40 mg twice daily for two weeks  Add Metamucil fiber supplement daily.  You can buy this over the counter.  Increase your sertraline to 100 mg.  Consider switch to Cymbalta/duloxetine or increasing sertraline to 200 mg.

## 2024-11-27 NOTE — RESULT ENCOUNTER NOTE
Mr. Wallis,    -A1C (test of diabetes control the last 2-3 months) is at your goal. Please continue with your current plan. Also, you should make an appointment to see me and recheck your A1C test in 6 months.     If you have further questions about the interpretation of your labs, [email or web address] is a good website to check out for further information.    Please contact the clinic if you have additional questions.  Thank you.    Sincerely,    Vamshi Clarke MD

## 2024-11-27 NOTE — PROGRESS NOTES
Assessment & Plan     Major depressive disorder, recurrent episode, mild (H)  I think a lot of of Saran symptoms are likely somatic related to the very stressful familial circumstances in [place].  Exacerbating this is that his wife is not entirely supportive of the time or financial commitment but Saran has done for his family back home.  We will increase his sertraline from 50 mg daily to 100 mg daily and I have recommended that he begin meeting with therapist.  He spent about 30 minutes with me today outlining the stresses he has been under with his mother and brother back in [place] and think he would benefit from meeting with a therapist on an ongoing basis.  Should the sertraline not improve his depression or somatic symptoms, could increase this to 200 versus changing to duloxetine.  He has been on duloxetine in the past for chronic pain which was discontinued for reasons I am not entirely clear.  - sertraline (ZOLOFT) 100 MG tablet; Take 1 tablet (100 mg) by mouth daily at 2 pm.  - Adult Mental Health [facility] Referral; Future    Neck pain  Continue over-the-counter analgesics as he has been doing.  He has some lidocaine patches he can apply as well.  Will do a trial of cyclobenzaprine muscle relaxant both to help sleep and improve his muscular neck pain.  Consider physical therapy.  Consider imaging with plain films should things not improve over the next 4 to 6 weeks.  - cyclobenzaprine (FLEXERIL) 10 MG tablet; Take 1 tablet (10 mg) by mouth 3 times daily as needed for muscle spasms.    Nausea  Recommended initiation of Metamucil in an over-the-counter fashion.  Increase pantoprazole to 40 mg twice daily for 2 weeks.  Check for H. pylori.  Again, if this is related to depression from his very stressful life event, I would anticipate this would improve over the next 4 to 6 weeks.  - Helicobacter pylori Antigen Stool; Future  - Helicobacter pylori Antigen Stool    Type 2 diabetes mellitus without  "complication, without long-term current use of insulin (H)  Await results of his A1c and adjust medications accordingly.  Optometry referral was placed as well.  - HEMOGLOBIN A1C; Future  - OPTOMETRY REFERRAL; Future  - HEMOGLOBIN A1C    I attempted to call human resources at [facility] as directed by Saran.  The contacts there is named Rose Delaney and her phone number is [phone].  Unfortunately, their telephone system was down and I was unable to get in touch with her.  I advised Saran that if he wants permanent restrictions placed that we should consider an independent medical exam to objectively assess his ability to complete specific tasks.          BMI  Estimated body mass index is 26.88 kg/m  as calculated from the following:    Height as of 9/25/24: 1.772 m (5' 9.75\").    Weight as of this encounter: 84.4 kg (186 lb).   Weight management plan: Discussed healthy diet and exercise guidelines    The longitudinal plan of care for the diagnosis(es)/condition(s) as documented were addressed during this visit. Due to the added complexity in care, I will continue to support Saran in the subsequent management and with ongoing continuity of care.      57 minutes spent by me on the date of the encounter doing chart review, history and exam, documentation and further activities per the note        Subjective   Saran is a 59 year old, presenting for the following health issues:  Neck Pain        11/27/2024    10:06 AM   Additional Questions   Roomed by TERA CRUZ CMA   Accompanied by SELF     History of Present Illness       Reason for visit:  Neck pain worse    He eats 0-1 servings of fruits and vegetables daily.He consumes 0 sweetened beverage(s) daily.He exercises with enough effort to increase his heart rate 20 to 29 minutes per day.  He exercises with enough effort to increase his heart rate 4 days per week.   He is taking medications regularly.     Work Form for completion: Mary Loukerry is here today requesting a form " be completed for his employer to allow him to work from home permanently.  He advises me that he has had a fair amount of stress in his life recently, largely stemming from illness in his mother and brother who reside in [place].  Saran reports that his mother is suffering from rapidly advancing dementia and that his brother recently suffered a hemorrhagic aneurysm and is presently residing in a long-term care facility.  He is bedridden and nonverbal.  Saran is attempting to take over the affairs of his mother who is a  and reports that he needs to report to [place] in person about every 90 days to deal with the banking and court system.  This first time he was there for 1 month, arranging accommodations for his mother in a nursing home.  Subsequent trips will now be a matter of days, but do need to occur every 90 days per government regulations.  He reports he has a 27-year-old nephew in [place] who is able to help on a limited basis.    He has had some fairly significant neck pain that has bothered him over the past several months, largely coinciding with his familial illnesses.  He reports posterior neck pain that radiates up to the bitemporal area.  This improves with nonsteroidal anti-inflammatories.  It is associated with disrupted sleep.  He has also had associated with some fairly significant nausea.  He reports he can eat a small amount of food but that when he gets hungry and eats a larger amount of food he shortly following consumption gets some fairly significant indigestion and nausea.  He has tried Pepto-Bismol which has not been particularly effective.  He was started on pantoprazole for GERD a number of years ago, but he reports this seems to be less effective for his nausea.  He has had no vomiting, melena or hematochezia.  Does note that his stool has darkened significantly since starting the Pepto-Bismol.    Finally, he is due for recheck of his hemoglobin A1c for his type 2  diabetes.  He reports his back pain seems to be improving following surgery earlier this year.  He is following up with Dr. Bagley next month.                  Objective    /74   Pulse 94   Temp 97.6  F (36.4  C) (Tympanic)   Resp 14   Wt 84.4 kg (186 lb)   SpO2 99%   BMI 26.88 kg/m    Body mass index is 26.88 kg/m .  Physical Exam   GENERAL: alert and no distress  EYES: Eyes grossly normal to inspection, PERRL and conjunctivae and sclerae normal  HENT: ear canals and TM's normal, nose and mouth without ulcers or lesions  NECK: no adenopathy, no asymmetry, masses, or scars, thyroid normal to palpation, and mild tenderness to palpation in the midline and paraspinous musculature diffusely in the neck posteriorly.  No tenderness to palpation in the trapezius.            Signed Electronically by: Vamshi Clarke Jr, MD

## 2024-12-02 DIAGNOSIS — E11.9 TYPE 2 DIABETES MELLITUS WITHOUT COMPLICATION, WITHOUT LONG-TERM CURRENT USE OF INSULIN (H): ICD-10-CM

## 2024-12-02 RX ORDER — SEMAGLUTIDE 0.68 MG/ML
INJECTION, SOLUTION SUBCUTANEOUS
Qty: 6 ML | Refills: 0 | Status: SHIPPED | OUTPATIENT
Start: 2024-12-02

## 2024-12-03 DIAGNOSIS — I10 HYPERTENSION GOAL BP (BLOOD PRESSURE) < 140/90: ICD-10-CM

## 2024-12-03 DIAGNOSIS — K21.9 GASTROESOPHAGEAL REFLUX DISEASE WITHOUT ESOPHAGITIS: ICD-10-CM

## 2024-12-03 RX ORDER — IRBESARTAN 150 MG/1
TABLET ORAL
Qty: 90 TABLET | Refills: 1 | OUTPATIENT
Start: 2024-12-03

## 2024-12-03 RX ORDER — PANTOPRAZOLE SODIUM 40 MG/1
TABLET, DELAYED RELEASE ORAL
Qty: 90 TABLET | Refills: 1 | OUTPATIENT
Start: 2024-12-03

## 2024-12-10 ENCOUNTER — ANCILLARY PROCEDURE (OUTPATIENT)
Dept: GENERAL RADIOLOGY | Facility: CLINIC | Age: 59
End: 2024-12-10
Attending: NEUROLOGICAL SURGERY
Payer: COMMERCIAL

## 2024-12-10 ENCOUNTER — OFFICE VISIT (OUTPATIENT)
Dept: NEUROSURGERY | Facility: CLINIC | Age: 59
End: 2024-12-10
Attending: NEUROLOGICAL SURGERY
Payer: COMMERCIAL

## 2024-12-10 VITALS — OXYGEN SATURATION: 100 % | HEART RATE: 87 BPM | DIASTOLIC BLOOD PRESSURE: 86 MMHG | SYSTOLIC BLOOD PRESSURE: 130 MMHG

## 2024-12-10 DIAGNOSIS — M47.22 CERVICAL SPONDYLOSIS WITH RADICULOPATHY: ICD-10-CM

## 2024-12-10 DIAGNOSIS — Z98.890 S/P SPINAL SURGERY: ICD-10-CM

## 2024-12-10 DIAGNOSIS — M48.061 SPINAL STENOSIS OF LUMBAR REGION WITH RADICULOPATHY: Primary | ICD-10-CM

## 2024-12-10 DIAGNOSIS — M54.16 SPINAL STENOSIS OF LUMBAR REGION WITH RADICULOPATHY: Primary | ICD-10-CM

## 2024-12-10 PROCEDURE — 72050 X-RAY EXAM NECK SPINE 4/5VWS: CPT | Mod: TC | Performed by: INTERNAL MEDICINE

## 2024-12-10 ASSESSMENT — PAIN SCALES - GENERAL: PAINLEVEL_OUTOF10: SEVERE PAIN (6)

## 2024-12-10 NOTE — LETTER
"12/10/2024      Saran Wallis  [address] [place] [address]      Dear Colleague,    Thank you for referring your patient, Saran Wallis, to the [facility]UROS[facility] [place]. Please see a copy of my visit note below.    It was a pleasure to see Saran Wallis today in Neurosurgery Clinic. He is a 59 year old male who underwent:    September 4, 2024     [facility]   Operative Note     Pre-operative diagnosis: Lumbar disc herniation with radiculopathy [M51.16]   Post-operative diagnosis Same   Procedure: Procedure(s):  Open Right Lumbar 4 to Lumbar 5 microdiscectomy, left Lumbar 5 to Sacral 1 redo microdiscectomy    Surgeon(s): Surgeons and Role:     * Logan Bagley MD - Primary     * Rocío Lundberg APRN CNP - Assisting   Estimated blood loss: * No values recorded between 9/4/2024  8:03 AM and 9/4/2024  9:18 AM *            25ml   Specimens: * No specimens in log *   Findings: Left L4-5 and right L5-S1 discectomies performed with redo at L5-S1.  Incidental durotomy noted on the left at L4-5 without CSF leakage.        Overall he is doing well with the surgery with improved right sided symptoms as well as some improved numbness on the left.  He does have some chronic left low back pain which sounds like it is facetogenic in nature.    He is also having worsening issues with his neck.  He has had chronic neck issues and has been receiving epidural steroid injections but is having more pain particular at the base of his neck down into the right upper extremity and what sounds like a C7 or maybe C8 distribution.    Vitals:    12/10/24 0907   BP: 130/86   Pulse: 87   SpO2: 100%     Estimated body mass index is 26.88 kg/m  as calculated from the following:    Height as of 9/25/24: 1.772 m (5' 9.75\").    Weight as of 11/27/24: 84.4 kg (186 lb).  Severe Pain (6)    Well-healed lumbar incision  Mild tenderness to palpation over L4-5/L5-S1 facet on the " right.  Bilateral upper extremity strength is 5 out of 5 in all muscle groups  Positive Spurling sign on right.    Imaging: No new imaging    Assessment: 1.  Status post lumbar decompression, doing well.  2.  Cervical radiculopathy    Plan: We briefly discussed possible procedures like facet injections or radiofrequency ablations for his low back pain if this bothers him more in the future.  Given his worsening neck and right upper extremity symptoms I would like to update his MRI as well as cervical 4 view x-rays.  We will see him back once these are done.         Again, thank you for allowing me to participate in the care of your patient.        Sincerely,        Logan Bagley MD

## 2024-12-10 NOTE — PATIENT INSTRUCTIONS
Patient Next Steps:      Order placed for cervical MRI imaging. You can schedule at our  today, or Central Scheduling will call you to make the appointment. If you do not hear from them within 1-2 business days you can call the numbers below.   [phone]     Order placed for cervical XR to be completed today    Dr. Bagley would like to see you back in clinic to review MRI and XR results, please call to schedule.     Please call us if you have any further questions or concerns.    [facility]   Phone: [phone]  Fax: [phone]

## 2024-12-10 NOTE — PROGRESS NOTES
"It was a pleasure to see Saran Wallis today in Neurosurgery Clinic. He is a 59 year old male who underwent:    September 4, 2024     [facility]   Operative Note     Pre-operative diagnosis: Lumbar disc herniation with radiculopathy [M51.16]   Post-operative diagnosis Same   Procedure: Procedure(s):  Open Right Lumbar 4 to Lumbar 5 microdiscectomy, left Lumbar 5 to Sacral 1 redo microdiscectomy    Surgeon(s): Surgeons and Role:     * Logan Bagley MD - Primary     * Rocío Lundberg APRN CNP - Assisting   Estimated blood loss: * No values recorded between 9/4/2024  8:03 AM and 9/4/2024  9:18 AM *            25ml   Specimens: * No specimens in log *   Findings: Left L4-5 and right L5-S1 discectomies performed with redo at L5-S1.  Incidental durotomy noted on the left at L4-5 without CSF leakage.        Overall he is doing well with the surgery with improved right sided symptoms as well as some improved numbness on the left.  He does have some chronic left low back pain which sounds like it is facetogenic in nature.    He is also having worsening issues with his neck.  He has had chronic neck issues and has been receiving epidural steroid injections but is having more pain particular at the base of his neck down into the right upper extremity and what sounds like a C7 or maybe C8 distribution.    Vitals:    12/10/24 0907   BP: 130/86   Pulse: 87   SpO2: 100%     Estimated body mass index is 26.88 kg/m  as calculated from the following:    Height as of 9/25/24: 1.772 m (5' 9.75\").    Weight as of 11/27/24: 84.4 kg (186 lb).  Severe Pain (6)    Well-healed lumbar incision  Mild tenderness to palpation over L4-5/L5-S1 facet on the right.  Bilateral upper extremity strength is 5 out of 5 in all muscle groups  Positive Spurling sign on right.    Imaging: No new imaging    Assessment: 1.  Status post lumbar decompression, doing well.  2.  Cervical radiculopathy    Plan: We briefly discussed " possible procedures like facet injections or radiofrequency ablations for his low back pain if this bothers him more in the future.  Given his worsening neck and right upper extremity symptoms I would like to update his MRI as well as cervical 4 view x-rays.  We will see him back once these are done.

## 2024-12-10 NOTE — NURSING NOTE
"Saran Wallis is a 59 year old male who presents for:  Chief Complaint   Patient presents with    Surgical Followup     12 week follow-up          Vitals:    Vitals:    12/10/24 0907   BP: 130/86   Pulse: 87   SpO2: 100%       BMI:  Estimated body mass index is 26.88 kg/m  as calculated from the following:    Height as of 9/25/24: 5' 9.75\" (1.772 m).    Weight as of 11/27/24: 186 lb (84.4 kg).    Pain Score:  Severe Pain (6)        Amendo Phorn      "

## 2024-12-12 ENCOUNTER — MYC MEDICAL ADVICE (OUTPATIENT)
Dept: FAMILY MEDICINE | Facility: CLINIC | Age: 59
End: 2024-12-12
Payer: COMMERCIAL

## 2024-12-23 ENCOUNTER — APPOINTMENT (OUTPATIENT)
Dept: LAB | Facility: CLINIC | Age: 59
End: 2024-12-23
Payer: COMMERCIAL

## 2024-12-24 LAB — H PYLORI AG STL QL IA: NEGATIVE

## 2025-01-30 DIAGNOSIS — E78.5 HYPERLIPIDEMIA LDL GOAL <70: ICD-10-CM

## 2025-01-30 RX ORDER — SIMVASTATIN 10 MG
10 TABLET ORAL AT BEDTIME
Qty: 90 TABLET | Refills: 2 | Status: SHIPPED | OUTPATIENT
Start: 2025-01-30

## 2025-02-07 ENCOUNTER — OFFICE VISIT (OUTPATIENT)
Dept: NEUROSURGERY | Facility: CLINIC | Age: 60
End: 2025-02-07
Payer: COMMERCIAL

## 2025-02-07 VITALS
HEART RATE: 91 BPM | OXYGEN SATURATION: 100 % | BODY MASS INDEX: 27.13 KG/M2 | WEIGHT: 189.5 LBS | DIASTOLIC BLOOD PRESSURE: 87 MMHG | HEIGHT: 70 IN | SYSTOLIC BLOOD PRESSURE: 129 MMHG

## 2025-02-07 DIAGNOSIS — M47.22 CERVICAL SPONDYLOSIS WITH RADICULOPATHY: Primary | ICD-10-CM

## 2025-02-07 PROCEDURE — 99213 OFFICE O/P EST LOW 20 MIN: CPT | Performed by: NEUROLOGICAL SURGERY

## 2025-02-07 ASSESSMENT — PAIN SCALES - GENERAL: PAINLEVEL_OUTOF10: SEVERE PAIN (7)

## 2025-02-07 NOTE — PATIENT INSTRUCTIONS
Patient Next Steps:    Order placed for epidural steroid injection  The steroid can take 10-14 days to reach max effect  Please call our clinic if symptoms persist after this timeframe.  You can call [facility] to schedule your injection: [phone]      Dr Bagley would like to see you back in the clinic for follow up in  3-4 weeks after injection if needed.  Please call the number below to schedule.          Please call us if you have any further questions or concerns.    [facility]   Phone: [phone]  Fax: [phone]

## 2025-02-07 NOTE — LETTER
"2/7/2025      Saran Wallis  [address] [place] [address]      Dear Colleague,    Thank you for referring your patient, Saran Wallis, to the [facility] NEUROLOGY [facility]. Please see a copy of my visit note below.    It was a pleasure to see Saran Wallis today in Neurosurgery Clinic. He is a 60 year old male who returns today to review his cervical MRI.    He continues to have pain in the neck that radiates down the back of the right arm.  He does have a history of previous epidural steroid injection a few years ago which he did not think was beneficial.    Vitals:    02/07/25 1551   BP: 129/87   Pulse: 91   SpO2: 100%   Weight: 86 kg (189 lb 8 oz)   Height: 1.778 m (5' 10\")     Estimated body mass index is 27.19 kg/m  as calculated from the following:    Height as of this encounter: 1.778 m (5' 10\").    Weight as of this encounter: 86 kg (189 lb 8 oz).  Severe Pain (7)    Bilateral upper extremity strength is 5 out of 5 in all muscle groups except for 4 out of 5 right tricep     Imaging: review of his cervical MRI shows degenerative changes at multiple levels but particularly right C6-7 foraminal stenosis which I think is concordant with his current symptomatology.    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    Plan: I would like for him to try an epidural steroid injection to see if this will help relieve some of his symptoms.  However if this does not work then I think he would be reasonable candidate for surgery particularly a posterior cervical foraminotomies.  We will see him back to see how he is doing if he is not getting better and we will discuss surgery at that time.          Again, thank you for allowing me to participate in the care of your patient.        Sincerely,        Logan Bagley MD    Electronically signed"

## 2025-02-07 NOTE — NURSING NOTE
"Saran Wallis is a 60 year old male who presents for:  Chief Complaint   Patient presents with    RECHECK     Patient has pain in the neck runs down the right arm to the elbow. Right hand is weak. Lvl 7.        Initial Vitals:  /87   Pulse 91   Ht 5' 10\" (1.778 m)   Wt 189 lb 8 oz (86 kg)   SpO2 100%   BMI 27.19 kg/m   Estimated body mass index is 27.19 kg/m  as calculated from the following:    Height as of this encounter: 5' 10\" (1.778 m).    Weight as of this encounter: 189 lb 8 oz (86 kg).. Body surface area is 2.06 meters squared. BP completed using cuff size: regular  Severe Pain (7)    Nursing Comments:       Dannielle Christianson    "

## 2025-02-07 NOTE — PROGRESS NOTES
"It was a pleasure to see Sarna Wallis today in Neurosurgery Clinic. He is a 60 year old male who returns today to review his cervical MRI.    He continues to have pain in the neck that radiates down the back of the right arm.  He does have a history of previous epidural steroid injection a few years ago which he did not think was beneficial.    Vitals:    02/07/25 1551   BP: 129/87   Pulse: 91   SpO2: 100%   Weight: 86 kg (189 lb 8 oz)   Height: 1.778 m (5' 10\")     Estimated body mass index is 27.19 kg/m  as calculated from the following:    Height as of this encounter: 1.778 m (5' 10\").    Weight as of this encounter: 86 kg (189 lb 8 oz).  Severe Pain (7)    Bilateral upper extremity strength is 5 out of 5 in all muscle groups except for 4 out of 5 right tricep     Imaging: review of his cervical MRI shows degenerative changes at multiple levels but particularly right C6-7 foraminal stenosis which I think is concordant with his current symptomatology.    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    Plan: I would like for him to try an epidural steroid injection to see if this will help relieve some of his symptoms.  However if this does not work then I think he would be reasonable candidate for surgery particularly a posterior cervical foraminotomies.  We will see him back to see how he is doing if he is not getting better and we will discuss surgery at that time.      "

## 2025-02-22 DIAGNOSIS — G47.01 INSOMNIA DUE TO MEDICAL CONDITION: ICD-10-CM

## 2025-02-23 NOTE — TELEPHONE ENCOUNTER
kadi metFORMIN (GLUCOPHAGE) 500 MG tablet         Last Written Prescription Date: 8/1/2016  Last Fill Quantity: 360 tablet, # refills: 1  Last Office Visit with [facility], [facility] or [facility] prescribing provider:  11/2/2016   Next 5 appointments (look out 90 days)     Feb 01, 2017 10:30 AM   Return Visit with ANDREAS Presley CNP   [facility] ([facility])    [address]  [address]  [facility] [address]   [phone]            Feb 06, 2017 10:00 AM   Office Visit with Vamshi Clarke Jr., MD   [facility] ([facility])    [address] [facility] [address] [phone]                   BP Readings from Last 3 Encounters:   12/30/16 116/77   12/14/16 120/78   11/03/16 120/68     MICROL       35   11/2/2016  No results found for this basename: microalbumin  CREATININE   Date Value Ref Range Status   05/17/2016 0.67 0.66 - 1.25 mg/dL Final   ]  GFR ESTIMATE   Date Value Ref Range Status   05/17/2016 >90  Non  GFR Calc   >60 mL/min/1.7m2 Final   12/28/2015 >90  Non  GFR Calc   >60 mL/min/1.7m2 Final   01/15/2009 >90 >60 mL/min/1.7m2 Final     GFR ESTIMATE IF BLACK   Date Value Ref Range Status   05/17/2016 >90   GFR Calc   >60 mL/min/1.7m2 Final   12/28/2015 >90   GFR Calc   >60 mL/min/1.7m2 Final   01/15/2009 >90 >60 mL/min/1.7m2 Final     CHOL      113   5/3/2016  HDL       35   5/3/2016  LDL       51   5/3/2016  TRIG      133   5/3/2016  No results found for this basename: cholhdlratio  AST       43   5/17/2016  ALT       82   5/17/2016  A1C      6.6   11/2/2016  A1C      7.7   8/1/2016  A1C      6.9   5/3/2016  A1C      6.7   12/28/2015  POTASSIUM   Date Value Ref Range Status   05/17/2016 4.7 3.4 - 5.3 mmol/L Final         traZODone (DESYREL) 100 MG tablet       Last Written Prescription Date: 8/1/2016  Last Fill Quantity: 90 tablet; # refills: 1  Last Office  Visit with [facility], [facility] or [facility] prescribing provider:  11/2/2016   Next 5 appointments (look out 90 days)     Feb 01, 2017 10:30 AM   Return Visit with ANDREAS Presley CNP   [facility] ([facility])    [address] [address] [address]   [phone]            Feb 06, 2017 10:00 AM   Office Visit with Vamshi Clarke Jr., MD   [facility] ([facility])    [address] [facility] [address] [phone]                   Last PHQ-9 score on record=   PHQ-9 SCORE 5/3/2016   Total Score 7       AST       43   5/17/2016  ALT       82   5/17/2016

## 2025-02-24 NOTE — TELEPHONE ENCOUNTER
Clinic RN: Please investigate patient's chart or contact patient if the information cannot be found because patient should have run out of this medication on 11/2024. Confirm patient is taking this medication as prescribed. Document findings and route refill encounter to provider for approval or denial.    Is patient taking this daily or just as needed?

## 2025-02-24 NOTE — PROGRESS NOTES
[facility] - Procedure Note    Date of Visit: 2/26/2025    Procedure performed: C6-7 interlaminar epidural steroid injection with fluoroscopic guidance  Diagnosis: Cervical spondylosis; Cervical radiculitis/radiculopathy  : Lolly Dexter MD    Anesthesia: none    Indications: Saran Wallis is a 60 year old male who is seen at the request of Dr. GUSTAFSON for cervical epidural steroid injection. The patient describes central neck pain that radiates to his right hand and is causing weakness and tingling. The patient has been exhibiting symptoms consistent with cervical intraspinal inflammation and radiculopathy. Symptoms have been persistent, disabling, and intermittently severe. The patient reports minimal improvement with conservative treatment, including PT and medications.    This is a repeat injection.  Previous C7-T1 ESVIN done by myself on 4/4/2024, 10/19/2023, 3/23/2023, 11/7/2022 & 9/21/2022 provided good pain relief for a period of 2 months.       Cervical MRI was done on 12/26/2024 which showed   FINDINGS: Vertebral body heights are maintained. The visualized  posterior fossa is unremarkable.     No anterolisthesis or retrolisthesis. No significant STIR edema within  the vertebral column.     Mild multilevel loss of disc height. No cord signal and reality.     C2-3: No cord compression or foraminal narrowing.     C3-4: Minimal broad-based disc osteophyte complex without cord  compression. Mild left foraminal narrowing.     C4-5: Broad-based disc osteophyte complex slightly eccentric to the  right. No cord compression. Partial effacement of the ventral  subarachnoid space. Mild right foraminal narrowing. Facet disease.     C5-6: Broad-based disc osteophyte complex with partial effacement of  the ventral subarachnoid space and mild cord compression ventrally.  Moderate right and mild left foraminal narrowing.     C6-7: Broad-based disc osteophyte complex slightly eccentric to  the  left. Mild cord compression ventrally. Mild left foraminal narrowing.     C7-T1: No cord compression. Broad-based disc osteophyte complex with  uncovertebral joint atrophic changes bilaterally with moderate right  and mild left foraminal narrowing.     Limited assessment of the soft tissues of the neck are unremarkable.                                                                    IMPRESSION:  Multilevel mild degenerative changes most notably  spanning C5-6 through C7-T1      Allergies:      Allergies   Allergen Reactions    Eszopiclone      Other reaction(s): Taste, Changes  Works, but bitter taste.    Food Hives     Peaches        Vitals:  /80   Pulse 88   SpO2 100%     Review of Systems: The patient denies recent fever, chills, illness, use of antibiotics or anticoagulants. All other 10-point review of systems negative.     Procedure: The procedure and risks were explained, and informed written consent was obtained from the patient. Risks include but are not limited to: infection, bleeding, increased pain, and damage to soft tissue, nerve, muscle, and vasculature structures. After getting informed consent, patient was brought into the procedure suite and was placed in a prone position on the procedure table. A Pause for the Cause was performed. Patient was prepped and draped in sterile fashion.     The C7-T1 interspace was identified with use of fluoroscopy in AP view. A 25-gauge, 1.5 inch needle was used to anesthetize the skin and subcutaneous tissue entry site with a total of 2 ml of 1% lidocaine. Under fluoroscopic visualization, a 22-gauge, 3.5 inch Tuohy epidural needle was slowly advanced towards the epidural space a few millimeters left of midline. The latter part of the needle advancement was guided with fluoroscopy in the lateral view. The epidural space was identified using loss of resistance technique. After negative aspiration for heme and cerebrospinal fluid, a total of 1 mL of  non-ionic contrast was injected to confirm needle placement. 0 mL of contrast was wasted. Epidurogram confirmed spread within the posterior epidural space. 2 ml of 10mg/ml of dexamethasone and 1 ml of preservative free 1% lidocaine was injected. The needle was removed.  Images were saved to PACS.    The patient tolerated the procedure well, and there was no evidence of procedural complications. No new sensory or motor deficits were noted following the procedure. The patient was stable and able to ambulate on discharge home. Post-procedure instructions were provided.     Pre-procedure pain score: 7/10 in the neck, 7/10 in the arm  Post-procedure pain score: 4/10 in the neck, 4/10 in the arm    Assessment/Plan: Saran Wallis is a 60 year old male s/p C7-T1 Cervical interlaminar epidural steroid injection today for cervical spondylosis and radiculitis/radiculopathy.     1. Following today's procedure, the patient was advised to contact the Pain Management Center for any of the following:   Fever, chills, or night sweats   New onset of pain, numbness, or weakness   Any questions/concerns regarding the procedure  If unable to contact the Pain Center, the patient was instructed to go to a local Emergency Room for any complications.   2. The patient will receive a follow-up call in 1 week.   3. Follow-up with the referring provider in 2 weeks for post-procedure evaluation.    ARYA DE LEON MD   Pain Management & Addiction Medicine

## 2025-02-26 ENCOUNTER — RADIOLOGY INJECTION OFFICE VISIT (OUTPATIENT)
Dept: PALLIATIVE MEDICINE | Facility: CLINIC | Age: 60
End: 2025-02-26
Attending: NEUROLOGICAL SURGERY
Payer: COMMERCIAL

## 2025-02-26 VITALS — DIASTOLIC BLOOD PRESSURE: 84 MMHG | HEART RATE: 79 BPM | SYSTOLIC BLOOD PRESSURE: 131 MMHG | OXYGEN SATURATION: 99 %

## 2025-02-26 DIAGNOSIS — M54.12 RADICULOPATHY, CERVICAL: ICD-10-CM

## 2025-02-26 DIAGNOSIS — M54.12 CERVICAL RADICULOPATHY: Primary | ICD-10-CM

## 2025-02-26 DIAGNOSIS — M47.22 CERVICAL SPONDYLOSIS WITH RADICULOPATHY: ICD-10-CM

## 2025-02-26 PROCEDURE — 62321 NJX INTERLAMINAR CRV/THRC: CPT | Performed by: ANESTHESIOLOGY

## 2025-02-26 RX ORDER — TRAZODONE HYDROCHLORIDE 50 MG/1
50 TABLET ORAL AT BEDTIME
Qty: 30 TABLET | Refills: 1 | Status: SHIPPED | OUTPATIENT
Start: 2025-02-26

## 2025-02-26 RX ORDER — DEXAMETHASONE SODIUM PHOSPHATE 10 MG/ML
10 INJECTION, SOLUTION INTRAMUSCULAR; INTRAVENOUS ONCE
Status: COMPLETED | OUTPATIENT
Start: 2025-02-26 | End: 2025-02-26

## 2025-02-26 RX ADMIN — DEXAMETHASONE SODIUM PHOSPHATE 10 MG: 10 INJECTION, SOLUTION INTRAMUSCULAR; INTRAVENOUS at 10:12

## 2025-02-26 ASSESSMENT — PAIN SCALES - GENERAL
PAINLEVEL_OUTOF10: SEVERE PAIN (7)
PAINLEVEL_OUTOF10: MODERATE PAIN (4)

## 2025-02-26 NOTE — NURSING NOTE
Pre-procedure Intake  If YES to any questions or NO to having a   Please complete laminated checklist and leave on the computer keyboard for Provider, verbally inform provider if able.    For SCS Trial, RFA's or any sedation procedure:  Have you been fasting? NA  If yes, for how long?     Are you taking any any blood thinners such as Coumadin, Warfarin, Jantoven, Pradaxa Xarelto, Eliquis, Edoxaban, Enoxaparin, Lovenox, Heparin, Arixtra, Fondaparinux, or Fragmin? OR Antiplatelet medication such as Plavix, Brilinta, or Effient?   No   If yes, when did you take your last dose?     Do you take aspirin?  No  If cervical procedure, have you held aspirin for 6 days?   NA    Is the Pt taking any GLP-1 Antagonist (hold needed for sedation patients only)  (semaglutide (Ozempic, Wegovy), dulaglutide (Trulicity), exenatide ER (Bydureon), tirzepatide (Mounjaro), Liraglutide (Saxenda, Victoza), semaglutide (Rybelsus)     NA  If yes, when did you take your last dose?     Do you have any allergies to contrast dye, iodine, steroid and/or numbing medications?  NO    Are you currently taking antibiotics or have an active infection?  NO    Have you had a fever/elevated temperature within the past week? NO    Are you currently taking oral steroids? NO    Do you have a ? Yes    Are you pregnant or breastfeeding?  Not Applicable    Have you received any vaccinations in the last week? NO    Notify provider and RNs if systolic BP >170, diastolic BP >100, P >100 or O2 sats < 90%      Ese Gomez MA  [facility]

## 2025-02-26 NOTE — NURSING NOTE
Discharge Information    IV Discontiued Time:  NA    Amount of Fluid Infused:  NA    Discharge Criteria = When patient returns to baseline or as per MD order    Consciousness:  Pt is fully awake    Circulation:  BP +/- 20% of pre-procedure level    Respiration:  Patient is able to breathe deeply    O2 Sat:  Patient is able to maintain O2 Sat >92% on room air    Activity:  Moves 4 extremities on command    Ambulation:  Patient is able to stand and walk or stand and pivot into wheelchair    Dressing:  Clean/dry or No Dressing    Notes:   Discharge instructions and AVS given to patient    Patient meets criteria for discharge?  YES    Admitted to PCU?  No    Responsible adult present to accompany patient home?  Yes    Signature/Title:    Madai Gillis RN  RN Care Coordinator  [facility]

## 2025-02-26 NOTE — PATIENT INSTRUCTIONS
[facility] Procedure Discharge Instructions    Today you saw:   Dr. Lolly Dexter     Your procedure:  Interlaminar Epidural steroid injection       Medications used:  Lidocaine (anesthetic)  Dexamethasone (steroid)    Omnipaque (contrast)        If you were holding your blood thinning medication, please restart taking it: N/A        Be cautious when walking as numbness and/or weakness in the legs may occur up to 6-8 hours after the procedure due to effect of the local anesthetic  Do not drive for 6 hours. The effect of the local anesthetic could slow your reflexes.   Avoid strenuous activity for the first 24 hours. You may resume your regular activities after that.   You may shower, however avoid swimming, tub baths or hot tubs for 24 hours following your procedure  You may have a mild to moderate increase in pain for several days following the injection.    You may use ice packs for 10-15 minutes, 3 to 4 times a day at the injection site for comfort  Do not use heat to painful areas for 6 to 8 hours. This will give the local anesthetic time to wear off and prevent you from accidentally burning your skin.  Unless you have been directed to avoid the use of anti-inflammatory medications (NSAIDS-ibuprofen, Aleve, Motrin), you may use these medications or Tylenol for pain control if needed.   With diabetes, check your blood sugar more frequently than usual as your blood sugar may be higher than normal for 10-14 days following a steroid injection. Contact your doctor who manages your diabetes if your blood sugar is higher than usual  Possible side effects of steroids that you may experience include flushing, elevated blood pressure, increased appetite, mild headaches and restlessness.  All of these symptoms will get better with time.  It may take up to 14 days for the steroid medication to start working although you may feel the effect as early as a few days after the procedure.   Follow up with   [facility] in 2-3 weeks    If you experience any of the following, call the pain center line during work hours at [phone] or on-call physician after hours at [phone]:  -Fever over 100 degree F  -Swelling, bleeding, redness, drainage, warmth at the injection site  -Progressive weakness or numbness in your legs or arms  -Loss of bowel or bladder function  -Unusual headache that is not relieved by Tylenol or your regular headache medication  -Unusual new onset of pain that is not improving

## 2025-03-08 ENCOUNTER — HEALTH MAINTENANCE LETTER (OUTPATIENT)
Age: 60
End: 2025-03-08

## 2025-03-19 ENCOUNTER — OFFICE VISIT (OUTPATIENT)
Dept: FAMILY MEDICINE | Facility: CLINIC | Age: 60
End: 2025-03-19
Payer: COMMERCIAL

## 2025-03-19 VITALS
DIASTOLIC BLOOD PRESSURE: 70 MMHG | SYSTOLIC BLOOD PRESSURE: 128 MMHG | HEIGHT: 70 IN | BODY MASS INDEX: 26.92 KG/M2 | WEIGHT: 188 LBS | RESPIRATION RATE: 14 BRPM | OXYGEN SATURATION: 99 % | TEMPERATURE: 98.2 F | HEART RATE: 89 BPM

## 2025-03-19 DIAGNOSIS — F11.20 CONTINUOUS OPIOID DEPENDENCE (H): ICD-10-CM

## 2025-03-19 DIAGNOSIS — M79.674 GREAT TOE PAIN, RIGHT: ICD-10-CM

## 2025-03-19 DIAGNOSIS — E11.9 TYPE 2 DIABETES MELLITUS WITHOUT COMPLICATION, WITHOUT LONG-TERM CURRENT USE OF INSULIN (H): Primary | ICD-10-CM

## 2025-03-19 DIAGNOSIS — M54.12 CERVICAL RADICULOPATHY: ICD-10-CM

## 2025-03-19 DIAGNOSIS — F40.243 FEAR OF FLYING: ICD-10-CM

## 2025-03-19 DIAGNOSIS — M54.16 LUMBAR RADICULOPATHY: ICD-10-CM

## 2025-03-19 DIAGNOSIS — E78.5 HYPERLIPIDEMIA LDL GOAL <70: ICD-10-CM

## 2025-03-19 DIAGNOSIS — I10 HYPERTENSION GOAL BP (BLOOD PRESSURE) < 140/90: ICD-10-CM

## 2025-03-19 DIAGNOSIS — K21.9 GASTROESOPHAGEAL REFLUX DISEASE WITHOUT ESOPHAGITIS: ICD-10-CM

## 2025-03-19 DIAGNOSIS — Z12.5 SCREENING FOR PROSTATE CANCER: ICD-10-CM

## 2025-03-19 LAB
EST. AVERAGE GLUCOSE BLD GHB EST-MCNC: 134 MG/DL
HBA1C MFR BLD: 6.3 % (ref 0–5.6)

## 2025-03-19 PROCEDURE — 83036 HEMOGLOBIN GLYCOSYLATED A1C: CPT | Performed by: FAMILY MEDICINE

## 2025-03-19 PROCEDURE — 3074F SYST BP LT 130 MM HG: CPT | Performed by: FAMILY MEDICINE

## 2025-03-19 PROCEDURE — 1125F AMNT PAIN NOTED PAIN PRSNT: CPT | Performed by: FAMILY MEDICINE

## 2025-03-19 PROCEDURE — 3078F DIAST BP <80 MM HG: CPT | Performed by: FAMILY MEDICINE

## 2025-03-19 PROCEDURE — 99215 OFFICE O/P EST HI 40 MIN: CPT | Performed by: FAMILY MEDICINE

## 2025-03-19 PROCEDURE — 36415 COLL VENOUS BLD VENIPUNCTURE: CPT | Performed by: FAMILY MEDICINE

## 2025-03-19 PROCEDURE — G2211 COMPLEX E/M VISIT ADD ON: HCPCS | Performed by: FAMILY MEDICINE

## 2025-03-19 RX ORDER — OXYCODONE HYDROCHLORIDE 5 MG/1
5 TABLET ORAL EVERY 6 HOURS PRN
Qty: 20 TABLET | Refills: 0 | Status: SHIPPED | OUTPATIENT
Start: 2025-03-19

## 2025-03-19 RX ORDER — IRBESARTAN 150 MG/1
150 TABLET ORAL DAILY
Qty: 90 TABLET | Refills: 1 | Status: SHIPPED | OUTPATIENT
Start: 2025-03-19

## 2025-03-19 RX ORDER — METFORMIN HYDROCHLORIDE 500 MG/1
500 TABLET, EXTENDED RELEASE ORAL
Qty: 90 TABLET | Refills: 1 | Status: SHIPPED | OUTPATIENT
Start: 2025-03-19

## 2025-03-19 RX ORDER — LORAZEPAM 0.5 MG/1
0.5 TABLET ORAL EVERY 6 HOURS PRN
Qty: 20 TABLET | Refills: 0 | Status: SHIPPED | OUTPATIENT
Start: 2025-03-19

## 2025-03-19 RX ORDER — SEMAGLUTIDE 0.68 MG/ML
INJECTION, SOLUTION SUBCUTANEOUS
Qty: 6 ML | Refills: 0 | Status: SHIPPED | OUTPATIENT
Start: 2025-03-19

## 2025-03-19 RX ORDER — PANTOPRAZOLE SODIUM 40 MG/1
40 TABLET, DELAYED RELEASE ORAL DAILY
Qty: 90 TABLET | Refills: 1 | Status: SHIPPED | OUTPATIENT
Start: 2025-03-19

## 2025-03-19 ASSESSMENT — PAIN SCALES - GENERAL: PAINLEVEL_OUTOF10: MODERATE PAIN (6)

## 2025-03-19 NOTE — RESULT ENCOUNTER NOTE
Mr. Wallis,    -A1C (test of diabetes control the last 2-3 months) is at your goal. Please continue with your current plan. Also, you should make an appointment to see me and recheck your A1C test in 6 months.     If you have further questions about the interpretation of your labs, [email or web address] is a good website to check out for further information.    Please contact the clinic if you have additional questions.  Thank you.    Sincerely,    Vamshi Clarke MD

## 2025-03-19 NOTE — PROGRESS NOTES
Assessment & Plan     Type 2 diabetes mellitus without complication, without long-term current use of insulin (H)  A1C in control.  Continue metformin and semaglutide. Encouraged increased exercise and decreased carbohydrate intake given 10 lb weight gain since his last visit with us.  Recheck in 6 months.   - HEMOGLOBIN A1C; Future  - Albumin Random Urine Quantitative with Creat Ratio; Future  - metFORMIN (GLUCOPHAGE XR) 500 MG 24 hr tablet; Take 1 tablet (500 mg) by mouth daily (with dinner).  - semaglutide (OZEMPIC, 0.25 OR 0.5 MG/DOSE,) 2 MG/3ML pen; Inject 0.75 mL (0.5 mg) subcutaneously every week.  - HEMOGLOBIN A1C  - Albumin Random Urine Quantitative with Creat Ratio    Cervical radiculopathy  Recommended scheduling appointment with Dr. Bagley to review next options.  Surgical intervention?    Hyperlipidemia LDL goal <70  Await lipid panel.  Continues on low intensity Simvastatin.    - Lipid panel reflex to direct LDL Fasting; Future  - Lipid panel reflex to direct LDL Fasting    Screening for prostate cancer  Following USPSTF guidelines for this.  - PROSTATE SPEC ANTIGEN SCREEN; Future  - PROSTATE SPEC ANTIGEN SCREEN    Continuous opioid dependence (H)  Has updated controlled substance agreement and urine drug screen as well as SKYLA-7 and PHQ-9.  Oxycodone is renewed today.  PDMP is reviewed today.    Hypertension goal BP (blood pressure) < 140/90  Blood pressure is controlled.  Tolerating his medication without side effects.  Encouraged weight loss with improved exercise and reduced carbohydrate diet.  - irbesartan (AVAPRO) 150 MG tablet; Take 1 tablet (150 mg) by mouth daily.    Fear of flying  Lorazepam is renewed today given that he will be flying back and forth to [place] to take care of family members.  - LORazepam (ATIVAN) 0.5 MG tablet; Take 1 tablet (0.5 mg) by mouth every 6 hours as needed for anxiety.    Gastroesophageal reflux disease without esophagitis  GERD symptoms are well-controlled with  proton pump inhibitor.  This is renewed today.  - pantoprazole (PROTONIX) 40 MG EC tablet; Take 1 tablet (40 mg) by mouth daily.    Lumbar radiculopathy  Presently using oxycodone to treat cervical radiculopathy.  Follow-up with Dr. Bagley recommended.  - oxyCODONE (ROXICODONE) 5 MG tablet; Take 1 tablet (5 mg) by mouth every 6 hours as needed for pain.    Great toe pain, right  I think this is likely muscular given his history and normal exam today.  Should this be osteoarthritis or gout, I suspect that his pain would be longer lasting than just fleeting as he describes.  I reassured him that I did not think this was due to a lumbar radiculopathy given the distribution of his pain being exclusively in his great toe.  Continue to follow clinically.          Nicotine/Tobacco Cessation  He reports that he has been smoking cigarettes. He started smoking about 45 years ago. He has a 22.5 pack-year smoking history. He has been exposed to tobacco smoke. He has never used smokeless tobacco.  Nicotine/Tobacco Cessation Plan  Information offered: Patient not interested at this time    The longitudinal plan of care for the diagnosis(es)/condition(s) as documented were addressed during this visit. Due to the added complexity in care, I will continue to support Saran in the subsequent management and with ongoing continuity of care.  43 minutes spent by me on the date of the encounter doing chart review, history and exam, documentation and further activities per the note        Franklyn Noonan is a 60 year old, presenting for the following health issues:  Diabetes        3/19/2025     9:01 AM   Additional Questions   Roomed by TERA CRUZ CMA   Accompanied by SELF     Via the Health Maintenance questionnaire, the patient has reported the following services have been completed -[place] [facility] [place], this information has been sent to the abstraction team.  History of Present Illness       Back Pain:  He presents for follow up of  back pain. Patient's back pain is a chronic problem.  Location of back pain:  Left lower back, right side of neck, left side of neck and right shoulder  Description of back pain: dull ache and sharp  Back pain spreads: left foot, right shoulder and left side of neck    Since patient first noticed back pain, pain is: always present, but gets better and worse  Does back pain interfere with his job:  No       Diabetes:   He presents for follow up of diabetes.  He is checking home blood glucose a few times a month.   He checks blood glucose after meals.  Blood glucose is never over 200 and never under 70. He is aware of hypoglycemia symptoms including dizziness.    He has no concerns regarding his diabetes at this time.  He is having burning in feet.  The patient has had a diabetic eye exam in the last 12 months. Eye exam performed on May 2024. Location of last eye exam [facility].        He eats 0-1 servings of fruits and vegetables daily.He consumes 0 sweetened beverage(s) daily.He exercises with enough effort to increase his heart rate 9 or less minutes per day.  He exercises with enough effort to increase his heart rate 3 or less days per week.   He is taking medications regularly.      Neck pain not significantly better after epidural steroid injection.  Pain relief lasted only two weeks.  Has seen neurosurgery (Dr. Bagley) who has recommended foraminotomy if he didn't get much relief from the epidural steroid injection and it appears we're there.  Continues to use cyclobenzaprine, gabapentin, ibuprofen, acetaminophen, oxycodone for pain relief in the interim.  This is very disruptive to his work and he notes he's able to work only about two hours at a time before needing to take a break.  He continues to work from home.     Has new symptom of pain in right great toe that troubles him with dorsiflexion of the great toe.  This happens exclusively in the morning upon awakening and has bothered him for the  "past week.  This lasts only seconds and resolves with plantar flexion.  This does not awaken him from sleep.  He has no other associated leg or foot pain.  He has not had pain in the past four days.  He refers to his above as the burning sensation in his foot.    Continues to fly home to [place] periodically to see his brother who now has a tracheostomy and remains in a coma.  His mother continues to reside in a Nursing home and Saran has listed her home to sell.  He asks for a refill of lorazepam for his fear of flying.                        Objective    /70   Pulse 89   Temp 98.2  F (36.8  C) (Tympanic)   Resp 14   Ht 1.778 m (5' 10\")   Wt 85.3 kg (188 lb)   SpO2 99%   BMI 26.98 kg/m    Body mass index is 26.98 kg/m .  Physical Exam   GENERAL: alert and no distress  EXT: Right foot without deformities on inspection.  Right great toe with FROM.            Signed Electronically by: Vamshi Clarke Jr, MD    "

## 2025-03-20 LAB
CHOLEST SERPL-MCNC: 147 MG/DL
CREAT UR-MCNC: 69.3 MG/DL
FASTING STATUS PATIENT QL REPORTED: YES
HDLC SERPL-MCNC: 50 MG/DL
LDLC SERPL CALC-MCNC: 70 MG/DL
MICROALBUMIN UR-MCNC: <12 MG/L
MICROALBUMIN/CREAT UR: NORMAL MG/G{CREAT}
NONHDLC SERPL-MCNC: 97 MG/DL
PSA SERPL DL<=0.01 NG/ML-MCNC: 0.42 NG/ML (ref 0–3.5)
TRIGL SERPL-MCNC: 134 MG/DL

## 2025-03-20 NOTE — RESULT ENCOUNTER NOTE
Mr. Wallis,    -PSA (prostate specific antigen) test is normal.  This indicates a low likelihood of prostate cancer.  ADVISE: rechecking this in 1 year.  -Cholesterol levels are at your goal levels.  ADVISE: continuing your medication, a regular exercise program with at least 150 minutes of aerobic exercise per week, and eating a low saturated fat/low carbohydrate diet.  Also, you should recheck this fasting cholesterol panel in 12 months.  -Microalbumin (urine protein) test is normal.  ADVISE: rechecking this annually.    If you have further questions about the interpretation of your labs, [email or web address] is a good website to check out for further information.    Please contact the clinic if you have additional questions.  Thank you.    Sincerely,    Vamshi Clarke MD

## 2025-05-21 ENCOUNTER — TRANSFERRED RECORDS (OUTPATIENT)
Dept: HEALTH INFORMATION MANAGEMENT | Facility: CLINIC | Age: 60
End: 2025-05-21
Payer: COMMERCIAL

## 2025-06-17 ENCOUNTER — MYC REFILL (OUTPATIENT)
Dept: FAMILY MEDICINE | Facility: CLINIC | Age: 60
End: 2025-06-17
Payer: COMMERCIAL

## 2025-06-17 DIAGNOSIS — E11.9 TYPE 2 DIABETES MELLITUS WITHOUT COMPLICATION, WITHOUT LONG-TERM CURRENT USE OF INSULIN (H): ICD-10-CM

## 2025-06-17 DIAGNOSIS — F33.0 MAJOR DEPRESSIVE DISORDER, RECURRENT EPISODE, MILD: ICD-10-CM

## 2025-06-17 RX ORDER — SEMAGLUTIDE 0.68 MG/ML
INJECTION, SOLUTION SUBCUTANEOUS
Qty: 6 ML | Refills: 0 | OUTPATIENT
Start: 2025-06-17

## 2025-06-17 RX ORDER — SERTRALINE HYDROCHLORIDE 100 MG/1
100 TABLET, FILM COATED ORAL
Qty: 90 TABLET | Refills: 1 | Status: SHIPPED | OUTPATIENT
Start: 2025-06-17

## 2025-06-17 ASSESSMENT — PATIENT HEALTH QUESTIONNAIRE - PHQ9
SUM OF ALL RESPONSES TO PHQ QUESTIONS 1-9: 6
SUM OF ALL RESPONSES TO PHQ QUESTIONS 1-9: 6
10. IF YOU CHECKED OFF ANY PROBLEMS, HOW DIFFICULT HAVE THESE PROBLEMS MADE IT FOR YOU TO DO YOUR WORK, TAKE CARE OF THINGS AT HOME, OR GET ALONG WITH OTHER PEOPLE: SOMEWHAT DIFFICULT

## 2025-06-18 ENCOUNTER — OFFICE VISIT (OUTPATIENT)
Dept: FAMILY MEDICINE | Facility: CLINIC | Age: 60
End: 2025-06-18
Payer: COMMERCIAL

## 2025-06-18 VITALS
DIASTOLIC BLOOD PRESSURE: 70 MMHG | BODY MASS INDEX: 26.63 KG/M2 | HEIGHT: 70 IN | SYSTOLIC BLOOD PRESSURE: 102 MMHG | WEIGHT: 186 LBS | TEMPERATURE: 98.6 F | RESPIRATION RATE: 16 BRPM | HEART RATE: 108 BPM | OXYGEN SATURATION: 100 %

## 2025-06-18 DIAGNOSIS — M54.2 NECK PAIN: ICD-10-CM

## 2025-06-18 DIAGNOSIS — R53.83 OTHER FATIGUE: ICD-10-CM

## 2025-06-18 DIAGNOSIS — M26.609 TMJ (TEMPOROMANDIBULAR JOINT SYNDROME): Primary | ICD-10-CM

## 2025-06-18 DIAGNOSIS — E11.9 TYPE 2 DIABETES MELLITUS WITHOUT COMPLICATION, WITHOUT LONG-TERM CURRENT USE OF INSULIN (H): ICD-10-CM

## 2025-06-18 LAB
ERYTHROCYTE [DISTWIDTH] IN BLOOD BY AUTOMATED COUNT: 12 % (ref 10–15)
EST. AVERAGE GLUCOSE BLD GHB EST-MCNC: 131 MG/DL
HBA1C MFR BLD: 6.2 % (ref 0–5.6)
HCT VFR BLD AUTO: 40.9 % (ref 40–53)
HGB BLD-MCNC: 13.9 G/DL (ref 13.3–17.7)
MCH RBC QN AUTO: 30.4 PG (ref 26.5–33)
MCHC RBC AUTO-ENTMCNC: 34 G/DL (ref 31.5–36.5)
MCV RBC AUTO: 90 FL (ref 78–100)
PLATELET # BLD AUTO: 136 10E3/UL (ref 150–450)
RBC # BLD AUTO: 4.57 10E6/UL (ref 4.4–5.9)
WBC # BLD AUTO: 6.6 10E3/UL (ref 4–11)

## 2025-06-18 PROCEDURE — 83036 HEMOGLOBIN GLYCOSYLATED A1C: CPT | Performed by: FAMILY MEDICINE

## 2025-06-18 PROCEDURE — 3044F HG A1C LEVEL LT 7.0%: CPT | Performed by: FAMILY MEDICINE

## 2025-06-18 PROCEDURE — 84443 ASSAY THYROID STIM HORMONE: CPT | Performed by: FAMILY MEDICINE

## 2025-06-18 PROCEDURE — 85027 COMPLETE CBC AUTOMATED: CPT | Performed by: FAMILY MEDICINE

## 2025-06-18 PROCEDURE — 36415 COLL VENOUS BLD VENIPUNCTURE: CPT | Performed by: FAMILY MEDICINE

## 2025-06-18 PROCEDURE — 3078F DIAST BP <80 MM HG: CPT | Performed by: FAMILY MEDICINE

## 2025-06-18 PROCEDURE — G2211 COMPLEX E/M VISIT ADD ON: HCPCS | Performed by: FAMILY MEDICINE

## 2025-06-18 PROCEDURE — 99213 OFFICE O/P EST LOW 20 MIN: CPT | Performed by: FAMILY MEDICINE

## 2025-06-18 PROCEDURE — 3074F SYST BP LT 130 MM HG: CPT | Performed by: FAMILY MEDICINE

## 2025-06-18 RX ORDER — CYCLOBENZAPRINE HCL 10 MG
10 TABLET ORAL 3 TIMES DAILY PRN
Qty: 30 TABLET | Refills: 1 | Status: SHIPPED | OUTPATIENT
Start: 2025-06-18

## 2025-06-18 RX ORDER — METFORMIN HYDROCHLORIDE 500 MG/1
500 TABLET, EXTENDED RELEASE ORAL 2 TIMES DAILY WITH MEALS
Qty: 180 TABLET | Refills: 1 | Status: SHIPPED | OUTPATIENT
Start: 2025-06-18

## 2025-06-18 NOTE — PROGRESS NOTES
"  Assessment & Plan     TMJ (temporomandibular joint syndrome)  Symptoms consistent with TMJ. Recommend antiinflammatories, can try cyclobenzaprine. Utilize ice/heat whichever feels better. Avoid chewy foods and opening mouth all the way. He has appointment with dentist already -- recommend following up ther    Type 2 diabetes mellitus without complication, without long-term current use of insulin (H)  Well controlled  - metFORMIN (GLUCOPHAGE XR) 500 MG 24 hr tablet; Take 1 tablet (500 mg) by mouth 2 times daily (with meals).  - Hemoglobin A1c; Future  - Hemoglobin A1c    Neck pain  Ongoing issue; has been given referral to neurosurgery already. Encouraged him to set up this appointment with Dr. Bagley  - cyclobenzaprine (FLEXERIL) 10 MG tablet; Take 1 tablet (10 mg) by mouth 3 times daily as needed for muscle spasms.    Other fatigue    - CBC with platelets; Future  - TSH with free T4 reflex; Future  - CBC with platelets  - TSH with free T4 reflex          BMI  Estimated body mass index is 26.69 kg/m  as calculated from the following:    Height as of this encounter: 1.778 m (5' 10\").    Weight as of this encounter: 84.4 kg (186 lb).       Franklyn Noonan is a 60 year old, presenting for the following health issues:  Ear Problem        6/18/2025    11:02 AM   Additional Questions   Roomed by Rox SAENZ CMA     Ear Problem    History of Present Illness       Headaches:   Since the patient's last clinic visit, headaches are: no change  The patient is getting headaches:  All day  He is able to do normal daily activities when he has a migraine.  The patient is taking the following rescue/relief medications:  Ibuprofen (Advil, Motrin) and Tylenol   Patient states \"The relief is inconsistent\" from the rescue/relief medications.   The patient is taking the following medications to prevent migraines:  No medications to prevent migraines  In the past 4 weeks, the patient has gone to an Urgent Care or Emergency Room 0 times times " "due to headaches.    Reason for visit:  Ear pain  Symptom onset:  1-2 weeks ago  Symptom intensity:  Moderate  Symptom progression:  Staying the same  Had these symptoms before:  No  What makes it better:  Advil   He is taking medications regularly.          Acute Illness  Acute illness concerns: Ear Pain  Onset/Duration: 10 days ago   Symptoms:  Fever: No  Chills/Sweats: No  Headache (location?): YES - both sides, constant  Sinus Pressure: No  Conjunctivitis:  No  Ear Pain: YES: right  Rhinorrhea: No  Congestion: No  Sore Throat: No  Cough: no  Wheeze: No  Decreased Appetite: No  Nausea: No  Vomiting: No  Diarrhea: No  Dysuria/Freq.: No  Dysuria or Hematuria: No  Fatigue/Achiness: No  Sick/Strep Exposure: No  Therapies tried and outcome: Advil helped very slightly      He feels the pain just inferior to the ear but if he opens up his mouth wide, feels pain over TMJ. He denies any otorrhea, hearing changes, tinnitus, fevers, chills, congestion, runny nose. Pains started fairly suddenly and has persisted. Denies any blurry vision, dizziness, lightheadedness, or headaches. NO history of injury or trauma to face or jaw. He doesn't grind or clench his teeth as far as he knows.    Diabetes mellitus: blood sugars were increasing so he started taking metformin twice daily.    Review of Systems  Constitutional, HEENT, cardiovascular, pulmonary, gi and gu systems are negative, except as otherwise noted.      Objective    /70   Pulse 108   Temp 98.6  F (37  C) (Tympanic)   Resp 16   Ht 1.778 m (5' 10\")   Wt 84.4 kg (186 lb)   SpO2 100%   BMI 26.69 kg/m    Body mass index is 26.69 kg/m .    Physical Exam   GENERAL: alert and no distress  HENT: ear canals and TM's normal, nose and mouth without ulcers or lesions  NECK: no adenopathy, no asymmetry, masses, or scars  RESP: lungs clear to auscultation - no rales, rhonchi or wheezes  CV: regular rates and rhythm, normal S1 S2, no S3 or S4, and no murmur, click or " rub  PSYCH: mentation appears normal, affect normal/bright        The longitudinal plan of care for the diagnosis(es)/condition(s) as documented were addressed during this visit. Due to the added complexity in care, I will continue to support Lukerry in the subsequent management and with ongoing continuity of care.          Signed Electronically by: Vargas Davison DO

## 2025-06-19 ENCOUNTER — RESULTS FOLLOW-UP (OUTPATIENT)
Dept: FAMILY MEDICINE | Facility: CLINIC | Age: 60
End: 2025-06-19

## 2025-06-19 LAB — TSH SERPL DL<=0.005 MIU/L-ACNC: 0.9 UIU/ML (ref 0.3–4.2)

## 2025-07-01 ENCOUNTER — MYC REFILL (OUTPATIENT)
Dept: FAMILY MEDICINE | Facility: CLINIC | Age: 60
End: 2025-07-01
Payer: COMMERCIAL

## 2025-07-01 DIAGNOSIS — E78.5 HYPERLIPIDEMIA LDL GOAL <70: ICD-10-CM

## 2025-07-01 RX ORDER — SIMVASTATIN 10 MG
10 TABLET ORAL DAILY
Qty: 90 TABLET | Refills: 1 | OUTPATIENT
Start: 2025-07-01

## 2025-07-02 RX ORDER — SIMVASTATIN 10 MG
10 TABLET ORAL AT BEDTIME
Qty: 90 TABLET | Refills: 0 | Status: SHIPPED | OUTPATIENT
Start: 2025-07-02

## 2025-07-14 DIAGNOSIS — E11.9 TYPE 2 DIABETES MELLITUS WITHOUT COMPLICATION, WITHOUT LONG-TERM CURRENT USE OF INSULIN (H): ICD-10-CM

## 2025-07-14 RX ORDER — SEMAGLUTIDE 0.68 MG/ML
INJECTION, SOLUTION SUBCUTANEOUS
Qty: 6 ML | Refills: 0 | Status: SHIPPED | OUTPATIENT
Start: 2025-07-14

## 2025-08-19 DIAGNOSIS — E78.5 HYPERLIPIDEMIA LDL GOAL <70: ICD-10-CM

## 2025-08-19 RX ORDER — SIMVASTATIN 10 MG
10 TABLET ORAL AT BEDTIME
Qty: 90 TABLET | Refills: 0 | OUTPATIENT
Start: 2025-08-19

## 2025-08-25 DIAGNOSIS — E11.9 TYPE 2 DIABETES MELLITUS WITHOUT COMPLICATION, WITHOUT LONG-TERM CURRENT USE OF INSULIN (H): ICD-10-CM

## 2025-08-25 RX ORDER — SEMAGLUTIDE 0.68 MG/ML
INJECTION, SOLUTION SUBCUTANEOUS
Qty: 6 ML | Refills: 0 | Status: SHIPPED | OUTPATIENT
Start: 2025-08-25

## (undated) DEVICE — ESU GROUND PAD ADULT W/CORD E7507

## (undated) DEVICE — LINEN ORTHO ACL PACK 5447

## (undated) DEVICE — PACK SMALL SPINE RIDGES

## (undated) DEVICE — SYR 30ML LL W/O NDL 302832

## (undated) DEVICE — SPONGE COTTONOID 1/2X1/2" 80-1400

## (undated) DEVICE — GLOVE BIOGEL PI MICRO INDICATOR UNDERGLOVE SZ 8.5 48985

## (undated) DEVICE — TUBING SUCTION 12"X1/4" N612

## (undated) DEVICE — SUCTION MANIFOLD NEPTUNE 2 SYS 4 PORT 0702-020-000

## (undated) DEVICE — DRAPE STERI TOWEL LG 1010

## (undated) DEVICE — GLOVE BIOGEL PI MICRO SZ 8.0 48580

## (undated) DEVICE — RX SURGIFLO HEMOSTATIC MATRIX 8ML 2991

## (undated) DEVICE — SU MONOCRYL 3-0 PS-2 27" Y427H

## (undated) DEVICE — ESU ELEC BLADE 2.75" COATED/INSULATED E1455

## (undated) DEVICE — SU DERMABOND ADVANCED .7ML DNX12

## (undated) DEVICE — DRAPE MICROSCOPE LEICA 54X120" 09-MK653

## (undated) DEVICE — SUCTION FRAZIER 12FR W/HANDLE K73

## (undated) DEVICE — TOOL DISSECT MIDAS MR8 14CM MATCH HEAD 3MM MR8-14MH30

## (undated) DEVICE — GOWN XXL 9575

## (undated) DEVICE — DRAPE X-RAY TUBE 00-901169-01-OEC

## (undated) DEVICE — GLOVE BIOGEL PI MICRO SZ 6.5 48565

## (undated) DEVICE — CUSHION INSERT LG PRONE VIEW JACKSON TABLE

## (undated) DEVICE — GLOVE BIOGEL PI MICRO SZ 7.5 48575

## (undated) DEVICE — GLOVE BIOGEL PI MICRO INDICATOR UNDERGLOVE SZ 6.5 48965

## (undated) DEVICE — SU VICRYL 2-0 CT-2 CR 8X18" J726D

## (undated) DEVICE — SU VICRYL 0 CT-1 CR 8X18" J740D

## (undated) DEVICE — GLOVE BIOGEL PI MICRO INDICATOR UNDERGLOVE SZ 8.0 48980

## (undated) DEVICE — SOL NACL 0.9% IRRIG 1000ML BOTTLE 2F7124

## (undated) DEVICE — DRAPE MAYO STAND 23X54 8337

## (undated) DEVICE — SPONGE SURGIFOAM 12 1972

## (undated) DEVICE — PAD FOAM WILSON FRAME/JACKSON TABLE PT KIT 5878

## (undated) RX ORDER — FENTANYL CITRATE 50 UG/ML
INJECTION, SOLUTION INTRAMUSCULAR; INTRAVENOUS
Status: DISPENSED
Start: 2024-09-04

## (undated) RX ORDER — ACETAMINOPHEN 325 MG/1
TABLET ORAL
Status: DISPENSED
Start: 2024-09-04

## (undated) RX ORDER — OXYCODONE HYDROCHLORIDE 5 MG/1
TABLET ORAL
Status: DISPENSED
Start: 2024-09-04

## (undated) RX ORDER — BUPIVACAINE HYDROCHLORIDE AND EPINEPHRINE 2.5; 5 MG/ML; UG/ML
INJECTION, SOLUTION EPIDURAL; INFILTRATION; INTRACAUDAL; PERINEURAL
Status: DISPENSED
Start: 2024-09-04

## (undated) RX ORDER — ONDANSETRON 2 MG/ML
INJECTION INTRAMUSCULAR; INTRAVENOUS
Status: DISPENSED
Start: 2024-09-04

## (undated) RX ORDER — PROPOFOL 10 MG/ML
INJECTION, EMULSION INTRAVENOUS
Status: DISPENSED
Start: 2024-09-04

## (undated) RX ORDER — DEXAMETHASONE SODIUM PHOSPHATE 4 MG/ML
INJECTION, SOLUTION INTRA-ARTICULAR; INTRALESIONAL; INTRAMUSCULAR; INTRAVENOUS; SOFT TISSUE
Status: DISPENSED
Start: 2024-09-04

## (undated) RX ORDER — GLYCOPYRROLATE 0.2 MG/ML
INJECTION, SOLUTION INTRAMUSCULAR; INTRAVENOUS
Status: DISPENSED
Start: 2024-09-04

## (undated) RX ORDER — KETOROLAC TROMETHAMINE 30 MG/ML
INJECTION, SOLUTION INTRAMUSCULAR; INTRAVENOUS
Status: DISPENSED
Start: 2024-09-04

## (undated) RX ORDER — LIDOCAINE HYDROCHLORIDE 10 MG/ML
INJECTION, SOLUTION EPIDURAL; INFILTRATION; INTRACAUDAL; PERINEURAL
Status: DISPENSED
Start: 2024-09-04

## (undated) RX ORDER — CEFAZOLIN SODIUM/WATER 2 G/20 ML
SYRINGE (ML) INTRAVENOUS
Status: DISPENSED
Start: 2024-09-04